# Patient Record
Sex: FEMALE | Race: BLACK OR AFRICAN AMERICAN | NOT HISPANIC OR LATINO | Employment: UNEMPLOYED | URBAN - METROPOLITAN AREA
[De-identification: names, ages, dates, MRNs, and addresses within clinical notes are randomized per-mention and may not be internally consistent; named-entity substitution may affect disease eponyms.]

---

## 2023-02-22 LAB
CLUE CELLS: NORMAL
NUGENT SCORE: 3
YEAST: NORMAL

## 2023-03-09 PROBLEM — M65.4 DE QUERVAIN'S TENOSYNOVITIS, RIGHT: Status: ACTIVE | Noted: 2023-03-09

## 2023-03-09 PROBLEM — H52.203 ASTIGMATISM OF BOTH EYES: Status: ACTIVE | Noted: 2023-03-09

## 2023-03-09 PROBLEM — H52.13 MYOPIA OF BOTH EYES: Status: ACTIVE | Noted: 2023-03-09

## 2023-03-09 PROBLEM — B00.9 HERPES SIMPLEX VIRUS (HSV) INFECTION: Status: ACTIVE | Noted: 2023-03-09

## 2023-03-09 PROBLEM — R23.9 IMPAIRED SKIN INTEGRITY ASSOCIATED WITH SURGICAL INCISION: Status: ACTIVE | Noted: 2023-03-09

## 2023-03-09 PROBLEM — R23.4 BREAST SKIN CHANGES: Status: ACTIVE | Noted: 2023-03-09

## 2023-03-09 PROBLEM — E11.9 DIABETES (MULTI): Status: ACTIVE | Noted: 2023-03-09

## 2023-03-09 PROBLEM — Z98.891 S/P CESAREAN SECTION: Status: ACTIVE | Noted: 2023-03-09

## 2023-03-09 PROBLEM — B37.9 YEAST INFECTION: Status: ACTIVE | Noted: 2023-03-09

## 2023-03-09 PROBLEM — N61.1 NONPUERPERAL ABSCESS OF LEFT BREAST: Status: ACTIVE | Noted: 2023-03-09

## 2023-03-09 PROBLEM — T81.49XA WOUND INFECTION AFTER SURGERY: Status: ACTIVE | Noted: 2023-03-09

## 2023-03-09 PROBLEM — R53.83 FATIGUE: Status: ACTIVE | Noted: 2023-03-09

## 2023-03-09 PROBLEM — L02.213 CUTANEOUS ABSCESS OF CHEST WALL: Status: ACTIVE | Noted: 2023-03-09

## 2023-03-09 PROBLEM — L70.9 ACNE: Status: ACTIVE | Noted: 2023-03-09

## 2023-03-09 PROBLEM — R06.09 DYSPNEA ON EXERTION: Status: ACTIVE | Noted: 2023-03-09

## 2023-03-09 PROBLEM — N89.8 VAGINAL ODOR: Status: ACTIVE | Noted: 2023-03-09

## 2023-03-09 PROBLEM — G89.29 CHRONIC HEADACHES: Status: ACTIVE | Noted: 2023-03-09

## 2023-03-09 PROBLEM — E78.5 HYPERLIPEMIA: Status: ACTIVE | Noted: 2023-03-09

## 2023-03-09 PROBLEM — N89.8 VAGINAL IRRITATION: Status: ACTIVE | Noted: 2023-03-09

## 2023-03-09 PROBLEM — R87.620 VAGINAL PAP SMEAR WITH ASC-US: Status: ACTIVE | Noted: 2023-03-09

## 2023-03-09 PROBLEM — R29.818 SUSPECTED SLEEP APNEA: Status: ACTIVE | Noted: 2023-03-09

## 2023-03-09 PROBLEM — N64.52 NIPPLE DISCHARGE, BLOODY: Status: ACTIVE | Noted: 2023-03-09

## 2023-03-09 PROBLEM — Z97.5 NEXPLANON IN PLACE: Status: ACTIVE | Noted: 2023-03-09

## 2023-03-09 PROBLEM — L72.0 EPIDERMAL INCLUSION CYST: Status: ACTIVE | Noted: 2023-03-09

## 2023-03-09 PROBLEM — N93.9 ABNORMAL UTERINE BLEEDING: Status: ACTIVE | Noted: 2023-03-09

## 2023-03-09 PROBLEM — N63.10 LUMP OF RIGHT BREAST: Status: ACTIVE | Noted: 2023-03-09

## 2023-03-09 PROBLEM — L02.91 ABSCESS: Status: ACTIVE | Noted: 2023-03-09

## 2023-03-09 PROBLEM — B96.89 BACTERIAL VAGINOSIS: Status: ACTIVE | Noted: 2023-03-09

## 2023-03-09 PROBLEM — T81.89XA IMPAIRED SKIN INTEGRITY ASSOCIATED WITH SURGICAL INCISION: Status: ACTIVE | Noted: 2023-03-09

## 2023-03-09 PROBLEM — R23.8 OTHER SKIN CHANGES: Status: ACTIVE | Noted: 2023-03-09

## 2023-03-09 PROBLEM — E04.0 DIFFUSE GOITER: Status: ACTIVE | Noted: 2023-03-09

## 2023-03-09 PROBLEM — R51.9 CHRONIC HEADACHES: Status: ACTIVE | Noted: 2023-03-09

## 2023-03-09 PROBLEM — Z85.3 HISTORY OF BREAST CANCER: Status: ACTIVE | Noted: 2023-03-09

## 2023-03-09 PROBLEM — E78.1 HYPERTRIGLYCERIDEMIA: Status: ACTIVE | Noted: 2023-03-09

## 2023-03-09 PROBLEM — L08.9 INFECTION OF GREAT TOE: Status: ACTIVE | Noted: 2023-03-09

## 2023-03-09 PROBLEM — N76.0 BACTERIAL VAGINOSIS: Status: ACTIVE | Noted: 2023-03-09

## 2023-03-09 PROBLEM — G56.02 LEFT CARPAL TUNNEL SYNDROME: Status: ACTIVE | Noted: 2023-03-09

## 2023-03-09 PROBLEM — E11.9 DIABETES MELLITUS TYPE 2 WITHOUT RETINOPATHY (MULTI): Status: ACTIVE | Noted: 2023-03-09

## 2023-03-09 PROBLEM — N60.01 CYST OF RIGHT BREAST: Status: ACTIVE | Noted: 2023-03-09

## 2023-03-09 PROBLEM — R74.8 ELEVATED LIVER ENZYMES: Status: ACTIVE | Noted: 2023-03-09

## 2023-03-09 PROBLEM — R35.0 URINE FREQUENCY: Status: ACTIVE | Noted: 2023-03-09

## 2023-03-09 PROBLEM — E11.65 UNCONTROLLED TYPE 2 DIABETES MELLITUS WITH HYPERGLYCEMIA (MULTI): Status: ACTIVE | Noted: 2023-03-09

## 2023-03-09 PROBLEM — R53.1 WEAKNESS GENERALIZED: Status: ACTIVE | Noted: 2023-03-09

## 2023-03-09 PROBLEM — F32.A DEPRESSION: Status: ACTIVE | Noted: 2023-03-09

## 2023-03-09 PROBLEM — R30.0 DYSURIA: Status: ACTIVE | Noted: 2023-03-09

## 2023-03-09 RX ORDER — OMEGA-3-ACID ETHYL ESTERS 1 G/1
2 CAPSULE, LIQUID FILLED ORAL
COMMUNITY
Start: 2022-03-25 | End: 2023-11-29 | Stop reason: SDUPTHER

## 2023-03-09 RX ORDER — ATORVASTATIN CALCIUM 40 MG/1
1 TABLET, FILM COATED ORAL NIGHTLY
COMMUNITY
Start: 2022-04-26 | End: 2023-10-18 | Stop reason: SDUPTHER

## 2023-03-09 RX ORDER — ESCITALOPRAM OXALATE 10 MG/1
3 TABLET ORAL DAILY
COMMUNITY
Start: 2021-02-11 | End: 2023-11-29 | Stop reason: ALTCHOICE

## 2023-03-09 RX ORDER — CALCIUM CITRATE/VITAMIN D3 200MG-6.25
TABLET ORAL 4 TIMES DAILY
COMMUNITY
Start: 2021-01-26 | End: 2023-11-29 | Stop reason: ALTCHOICE

## 2023-03-09 RX ORDER — INSULIN GLARGINE 100 [IU]/ML
40 INJECTION, SOLUTION SUBCUTANEOUS 2 TIMES DAILY
COMMUNITY
Start: 2020-03-18 | End: 2023-10-10 | Stop reason: SDUPTHER

## 2023-03-09 RX ORDER — MEDROXYPROGESTERONE ACETATE 150 MG/ML
INJECTION, SUSPENSION INTRAMUSCULAR
COMMUNITY
End: 2023-11-29 | Stop reason: ALTCHOICE

## 2023-03-09 RX ORDER — GABAPENTIN 100 MG/1
300 CAPSULE ORAL 2 TIMES DAILY
COMMUNITY
End: 2023-05-11

## 2023-03-09 RX ORDER — PEN NEEDLE, DIABETIC 30 GX3/16"
NEEDLE, DISPOSABLE MISCELLANEOUS
COMMUNITY
End: 2023-11-29 | Stop reason: ALTCHOICE

## 2023-03-09 RX ORDER — INSULIN LISPRO 100 [IU]/ML
INJECTION, SOLUTION SUBCUTANEOUS
COMMUNITY
Start: 2021-02-03 | End: 2023-11-22 | Stop reason: SDUPTHER

## 2023-03-09 RX ORDER — VALACYCLOVIR HYDROCHLORIDE 500 MG/1
1 TABLET, FILM COATED ORAL 2 TIMES DAILY
COMMUNITY
Start: 2022-06-16

## 2023-03-09 RX ORDER — METFORMIN HYDROCHLORIDE 500 MG/1
1000 TABLET, EXTENDED RELEASE ORAL
COMMUNITY
Start: 2015-12-21 | End: 2023-11-29 | Stop reason: SDUPTHER

## 2023-05-02 ENCOUNTER — OFFICE VISIT (OUTPATIENT)
Dept: PRIMARY CARE | Facility: CLINIC | Age: 33
End: 2023-05-02
Payer: COMMERCIAL

## 2023-05-02 VITALS
WEIGHT: 293 LBS | TEMPERATURE: 97.7 F | HEART RATE: 84 BPM | DIASTOLIC BLOOD PRESSURE: 76 MMHG | HEIGHT: 71 IN | SYSTOLIC BLOOD PRESSURE: 122 MMHG | BODY MASS INDEX: 41.02 KG/M2

## 2023-05-02 DIAGNOSIS — E11.65 UNCONTROLLED TYPE 2 DIABETES MELLITUS WITH HYPERGLYCEMIA (MULTI): Primary | ICD-10-CM

## 2023-05-02 DIAGNOSIS — E78.5 HYPERLIPIDEMIA, UNSPECIFIED HYPERLIPIDEMIA TYPE: ICD-10-CM

## 2023-05-02 DIAGNOSIS — E66.01 CLASS 3 SEVERE OBESITY DUE TO EXCESS CALORIES WITH SERIOUS COMORBIDITY AND BODY MASS INDEX (BMI) OF 40.0 TO 44.9 IN ADULT (MULTI): ICD-10-CM

## 2023-05-02 PROBLEM — E66.813 CLASS 3 SEVERE OBESITY DUE TO EXCESS CALORIES WITH SERIOUS COMORBIDITY AND BODY MASS INDEX (BMI) OF 40.0 TO 44.9 IN ADULT: Status: ACTIVE | Noted: 2023-05-02

## 2023-05-02 PROCEDURE — 3074F SYST BP LT 130 MM HG: CPT | Performed by: FAMILY MEDICINE

## 2023-05-02 PROCEDURE — 3008F BODY MASS INDEX DOCD: CPT | Performed by: FAMILY MEDICINE

## 2023-05-02 PROCEDURE — 1036F TOBACCO NON-USER: CPT | Performed by: FAMILY MEDICINE

## 2023-05-02 PROCEDURE — 3078F DIAST BP <80 MM HG: CPT | Performed by: FAMILY MEDICINE

## 2023-05-02 PROCEDURE — 99214 OFFICE O/P EST MOD 30 MIN: CPT | Performed by: FAMILY MEDICINE

## 2023-05-02 NOTE — PROGRESS NOTES
Subjective   Patient ID: Nia Harper is a 32 y.o. female who presents for surgery clearance.  HPI  The patient is here for a referral to weight management.  The patient brought her form for completion.     A review of system was completed.  All systems were reviewed and were normal, except for the ones that are listed in the HPI.    Objective   Physical Exam  Constitutional:       Appearance: Normal appearance.   HENT:      Head: Normocephalic and atraumatic.      Right Ear: Tympanic membrane, ear canal and external ear normal.      Left Ear: Tympanic membrane, ear canal and external ear normal.      Nose: Nose normal.      Mouth/Throat:      Mouth: Mucous membranes are moist.      Pharynx: Oropharynx is clear.   Eyes:      Extraocular Movements: Extraocular movements intact.      Conjunctiva/sclera: Conjunctivae normal.      Pupils: Pupils are equal, round, and reactive to light.   Cardiovascular:      Rate and Rhythm: Normal rate and regular rhythm.      Pulses: Normal pulses.   Pulmonary:      Effort: Pulmonary effort is normal.      Breath sounds: Normal breath sounds.   Abdominal:      General: Abdomen is flat. Bowel sounds are normal.      Palpations: Abdomen is soft.   Musculoskeletal:         General: Normal range of motion.      Cervical back: Normal range of motion and neck supple.   Skin:     General: Skin is warm.   Neurological:      General: No focal deficit present.      Mental Status: She is alert and oriented to person, place, and time. Mental status is at baseline.   Psychiatric:         Mood and Affect: Mood normal.         Behavior: Behavior normal.         Thought Content: Thought content normal.         Judgment: Judgment normal.         Assessment/Plan   Problem List Items Addressed This Visit          Endocrine/Metabolic    Uncontrolled type 2 diabetes mellitus with hyperglycemia (CMS/HCC) - Primary    Relevant Orders    Referral to Bariatric Surgery    Hemoglobin A1C    Lipid Panel    TSH  with reflex to Free T4 if abnormal    Comprehensive Metabolic Panel    CBC    Albumin , Urine Random    BMI 40.0-44.9, adult (CMS/[facility])    Relevant Orders    Referral to Bariatric Surgery    Class 3 severe obesity due to excess calories with serious comorbidity and body mass index (BMI) of 40.0 to 44.9 in adult (CMS/[facility])    Relevant Orders    Referral to Bariatric Surgery       Other    Hyperlipemia    Relevant Orders    Referral to Bariatric Surgery

## 2023-05-15 LAB — CHORIOGONADOTROPIN (MIU/ML) IN SER/PLAS: <3 IU/L

## 2023-05-19 ENCOUNTER — HOSPITAL ENCOUNTER (OUTPATIENT)
Dept: DATA CONVERSION | Facility: HOSPITAL | Age: 33
End: 2023-05-19
Attending: SURGERY
Payer: COMMERCIAL

## 2023-05-19 DIAGNOSIS — R92.8 OTHER ABNORMAL AND INCONCLUSIVE FINDINGS ON DIAGNOSTIC IMAGING OF BREAST: ICD-10-CM

## 2023-05-19 DIAGNOSIS — N63.11 UNSPECIFIED LUMP IN THE RIGHT BREAST, UPPER OUTER QUADRANT: ICD-10-CM

## 2023-05-19 DIAGNOSIS — N63.10 UNSPECIFIED LUMP IN THE RIGHT BREAST, UNSPECIFIED QUADRANT: ICD-10-CM

## 2023-05-19 DIAGNOSIS — Z17.0 ESTROGEN RECEPTOR POSITIVE STATUS (ER+): ICD-10-CM

## 2023-05-19 DIAGNOSIS — R59.0 LOCALIZED ENLARGED LYMPH NODES: ICD-10-CM

## 2023-05-19 DIAGNOSIS — C50.411 MALIGNANT NEOPLASM OF UPPER-OUTER QUADRANT OF RIGHT FEMALE BREAST (MULTI): ICD-10-CM

## 2023-05-23 ENCOUNTER — HOSPITAL ENCOUNTER (OUTPATIENT)
Dept: DATA CONVERSION | Facility: HOSPITAL | Age: 33
End: 2023-05-23
Attending: INTERNAL MEDICINE | Admitting: INTERNAL MEDICINE
Payer: COMMERCIAL

## 2023-05-23 DIAGNOSIS — Z79.84 LONG TERM (CURRENT) USE OF ORAL HYPOGLYCEMIC DRUGS: ICD-10-CM

## 2023-05-23 DIAGNOSIS — K31.89 OTHER DISEASES OF STOMACH AND DUODENUM: ICD-10-CM

## 2023-05-23 DIAGNOSIS — E11.9 TYPE 2 DIABETES MELLITUS WITHOUT COMPLICATIONS (MULTI): ICD-10-CM

## 2023-05-23 DIAGNOSIS — Z79.4 LONG TERM (CURRENT) USE OF INSULIN (MULTI): ICD-10-CM

## 2023-05-23 DIAGNOSIS — E78.00 PURE HYPERCHOLESTEROLEMIA, UNSPECIFIED: ICD-10-CM

## 2023-05-23 DIAGNOSIS — R11.2 NAUSEA WITH VOMITING, UNSPECIFIED: ICD-10-CM

## 2023-05-23 LAB — POCT GLUCOSE: 137 MG/DL (ref 74–99)

## 2023-05-24 LAB
COMPLETE PATHOLOGY REPORT: NORMAL
CONVERTED ADDENDUM DIAGNOSIS 2: NORMAL
CONVERTED ADDENDUM DIAGNOSIS 3: NORMAL
CONVERTED CLINICAL DIAGNOSIS-HISTORY: NORMAL
CONVERTED FINAL DIAGNOSIS: NORMAL
CONVERTED FINAL REPORT PDF LINK TO COPY AND PASTE: NORMAL
CONVERTED GROSS DESCRIPTION: NORMAL

## 2023-06-01 LAB
COMPLETE PATHOLOGY REPORT: NORMAL
CONVERTED CLINICAL DIAGNOSIS-HISTORY: NORMAL
CONVERTED FINAL DIAGNOSIS: NORMAL
CONVERTED FINAL REPORT PDF LINK TO COPY AND PASTE: NORMAL
CONVERTED GROSS DESCRIPTION: NORMAL

## 2023-06-03 ENCOUNTER — LAB (OUTPATIENT)
Dept: LAB | Facility: LAB | Age: 33
End: 2023-06-03
Payer: COMMERCIAL

## 2023-06-03 DIAGNOSIS — E11.65 UNCONTROLLED TYPE 2 DIABETES MELLITUS WITH HYPERGLYCEMIA (MULTI): ICD-10-CM

## 2023-06-03 LAB
ALANINE AMINOTRANSFERASE (SGPT) (U/L) IN SER/PLAS: 43 U/L (ref 7–45)
ALBUMIN (G/DL) IN SER/PLAS: 4.4 G/DL (ref 3.4–5)
ALKALINE PHOSPHATASE (U/L) IN SER/PLAS: 52 U/L (ref 33–110)
ANION GAP IN SER/PLAS: 13 MMOL/L (ref 10–20)
ASPARTATE AMINOTRANSFERASE (SGOT) (U/L) IN SER/PLAS: 28 U/L (ref 9–39)
BILIRUBIN TOTAL (MG/DL) IN SER/PLAS: 0.3 MG/DL (ref 0–1.2)
CALCIUM (MG/DL) IN SER/PLAS: 9.7 MG/DL (ref 8.6–10.6)
CARBON DIOXIDE, TOTAL (MMOL/L) IN SER/PLAS: 25 MMOL/L (ref 21–32)
CHLORIDE (MMOL/L) IN SER/PLAS: 103 MMOL/L (ref 98–107)
CHOLESTEROL (MG/DL) IN SER/PLAS: 243 MG/DL (ref 0–199)
CHOLESTEROL IN HDL (MG/DL) IN SER/PLAS: 41.2 MG/DL
CHOLESTEROL/HDL RATIO: 5.9
CREATININE (MG/DL) IN SER/PLAS: 0.61 MG/DL (ref 0.5–1.05)
ERYTHROCYTE DISTRIBUTION WIDTH (RATIO) BY AUTOMATED COUNT: 13 % (ref 11.5–14.5)
ERYTHROCYTE MEAN CORPUSCULAR HEMOGLOBIN CONCENTRATION (G/DL) BY AUTOMATED: 33.3 G/DL (ref 32–36)
ERYTHROCYTE MEAN CORPUSCULAR VOLUME (FL) BY AUTOMATED COUNT: 87 FL (ref 80–100)
ERYTHROCYTES (10*6/UL) IN BLOOD BY AUTOMATED COUNT: 4.52 X10E12/L (ref 4–5.2)
ESTIMATED AVERAGE GLUCOSE FOR HBA1C: 194 MG/DL
GFR FEMALE: >90 ML/MIN/1.73M2
GLUCOSE (MG/DL) IN SER/PLAS: 132 MG/DL (ref 74–99)
HEMATOCRIT (%) IN BLOOD BY AUTOMATED COUNT: 39.3 % (ref 36–46)
HEMOGLOBIN (G/DL) IN BLOOD: 13.1 G/DL (ref 12–16)
HEMOGLOBIN A1C/HEMOGLOBIN TOTAL IN BLOOD: 8.4 %
LDL: 177 MG/DL (ref 0–99)
LEUKOCYTES (10*3/UL) IN BLOOD BY AUTOMATED COUNT: 6.6 X10E9/L (ref 4.4–11.3)
NRBC (PER 100 WBCS) BY AUTOMATED COUNT: 0 /100 WBC (ref 0–0)
PLATELETS (10*3/UL) IN BLOOD AUTOMATED COUNT: 240 X10E9/L (ref 150–450)
POTASSIUM (MMOL/L) IN SER/PLAS: 4.1 MMOL/L (ref 3.5–5.3)
PROTEIN TOTAL: 7.4 G/DL (ref 6.4–8.2)
SODIUM (MMOL/L) IN SER/PLAS: 137 MMOL/L (ref 136–145)
THYROTROPIN (MIU/L) IN SER/PLAS BY DETECTION LIMIT <= 0.05 MIU/L: 1 MIU/L (ref 0.44–3.98)
TRIGLYCERIDE (MG/DL) IN SER/PLAS: 122 MG/DL (ref 0–149)
UREA NITROGEN (MG/DL) IN SER/PLAS: 15 MG/DL (ref 6–23)
VLDL: 24 MG/DL (ref 0–40)

## 2023-06-03 PROCEDURE — 83036 HEMOGLOBIN GLYCOSYLATED A1C: CPT

## 2023-06-03 PROCEDURE — 84443 ASSAY THYROID STIM HORMONE: CPT

## 2023-06-03 PROCEDURE — 80053 COMPREHEN METABOLIC PANEL: CPT

## 2023-06-03 PROCEDURE — 85027 COMPLETE CBC AUTOMATED: CPT

## 2023-06-03 PROCEDURE — 80061 LIPID PANEL: CPT

## 2023-06-03 PROCEDURE — 36415 COLL VENOUS BLD VENIPUNCTURE: CPT

## 2023-06-22 ENCOUNTER — HOSPITAL ENCOUNTER (OUTPATIENT)
Dept: DATA CONVERSION | Facility: HOSPITAL | Age: 33
End: 2023-06-22
Attending: SURGERY
Payer: COMMERCIAL

## 2023-06-22 DIAGNOSIS — C50.411 MALIGNANT NEOPLASM OF UPPER-OUTER QUADRANT OF RIGHT FEMALE BREAST (MULTI): ICD-10-CM

## 2023-06-22 DIAGNOSIS — Z17.0 ESTROGEN RECEPTOR POSITIVE STATUS (ER+): ICD-10-CM

## 2023-06-30 ENCOUNTER — HOSPITAL ENCOUNTER (OUTPATIENT)
Dept: DATA CONVERSION | Facility: HOSPITAL | Age: 33
End: 2023-06-30
Attending: SURGERY

## 2023-06-30 ENCOUNTER — HOSPITAL ENCOUNTER (OUTPATIENT)
Dept: DATA CONVERSION | Facility: HOSPITAL | Age: 33
End: 2023-07-02
Attending: SURGERY | Admitting: SURGERY
Payer: COMMERCIAL

## 2023-06-30 DIAGNOSIS — E78.5 HYPERLIPIDEMIA, UNSPECIFIED: ICD-10-CM

## 2023-06-30 DIAGNOSIS — C50.911 MALIGNANT NEOPLASM OF UNSPECIFIED SITE OF RIGHT FEMALE BREAST (MULTI): ICD-10-CM

## 2023-06-30 DIAGNOSIS — K21.9 GASTRO-ESOPHAGEAL REFLUX DISEASE WITHOUT ESOPHAGITIS: ICD-10-CM

## 2023-06-30 DIAGNOSIS — E66.9 OBESITY, UNSPECIFIED: ICD-10-CM

## 2023-06-30 DIAGNOSIS — C50.411 MALIGNANT NEOPLASM OF UPPER-OUTER QUADRANT OF RIGHT FEMALE BREAST (MULTI): ICD-10-CM

## 2023-06-30 DIAGNOSIS — F41.9 ANXIETY DISORDER, UNSPECIFIED: ICD-10-CM

## 2023-06-30 LAB
POCT GLUCOSE: 177 MG/DL (ref 74–99)
POCT GLUCOSE: 209 MG/DL (ref 74–99)

## 2023-07-01 LAB
ALBUMIN (G/DL) IN SER/PLAS: 4.2 G/DL (ref 3.4–5)
ANION GAP IN SER/PLAS: 15 MMOL/L (ref 10–20)
CALCIUM (MG/DL) IN SER/PLAS: 9 MG/DL (ref 8.6–10.6)
CARBON DIOXIDE, TOTAL (MMOL/L) IN SER/PLAS: 23 MMOL/L (ref 21–32)
CHLORIDE (MMOL/L) IN SER/PLAS: 102 MMOL/L (ref 98–107)
CREATININE (MG/DL) IN SER/PLAS: 0.67 MG/DL (ref 0.5–1.05)
ERYTHROCYTE DISTRIBUTION WIDTH (RATIO) BY AUTOMATED COUNT: 12.7 % (ref 11.5–14.5)
ERYTHROCYTE DISTRIBUTION WIDTH (RATIO) BY AUTOMATED COUNT: 13 % (ref 11.5–14.5)
ERYTHROCYTE MEAN CORPUSCULAR HEMOGLOBIN CONCENTRATION (G/DL) BY AUTOMATED: 32.3 G/DL (ref 32–36)
ERYTHROCYTE MEAN CORPUSCULAR HEMOGLOBIN CONCENTRATION (G/DL) BY AUTOMATED: 34.7 G/DL (ref 32–36)
ERYTHROCYTE MEAN CORPUSCULAR VOLUME (FL) BY AUTOMATED COUNT: 84 FL (ref 80–100)
ERYTHROCYTE MEAN CORPUSCULAR VOLUME (FL) BY AUTOMATED COUNT: 88 FL (ref 80–100)
ERYTHROCYTES (10*6/UL) IN BLOOD BY AUTOMATED COUNT: 3.9 X10E12/L (ref 4–5.2)
ERYTHROCYTES (10*6/UL) IN BLOOD BY AUTOMATED COUNT: 4 X10E12/L (ref 4–5.2)
GFR FEMALE: >90 ML/MIN/1.73M2
GLUCOSE (MG/DL) IN SER/PLAS: 230 MG/DL (ref 74–99)
HEMATOCRIT (%) IN BLOOD BY AUTOMATED COUNT: 33.7 % (ref 36–46)
HEMATOCRIT (%) IN BLOOD BY AUTOMATED COUNT: 34.4 % (ref 36–46)
HEMOGLOBIN (G/DL) IN BLOOD: 11.1 G/DL (ref 12–16)
HEMOGLOBIN (G/DL) IN BLOOD: 11.7 G/DL (ref 12–16)
LEUKOCYTES (10*3/UL) IN BLOOD BY AUTOMATED COUNT: 10.2 X10E9/L (ref 4.4–11.3)
LEUKOCYTES (10*3/UL) IN BLOOD BY AUTOMATED COUNT: 11.4 X10E9/L (ref 4.4–11.3)
MAGNESIUM (MG/DL) IN SER/PLAS: 1.71 MG/DL (ref 1.6–2.4)
NRBC (PER 100 WBCS) BY AUTOMATED COUNT: 0 /100 WBC (ref 0–0)
NRBC (PER 100 WBCS) BY AUTOMATED COUNT: 0 /100 WBC (ref 0–0)
PHOSPHATE (MG/DL) IN SER/PLAS: 3.8 MG/DL (ref 2.5–4.9)
PLATELETS (10*3/UL) IN BLOOD AUTOMATED COUNT: 197 X10E9/L (ref 150–450)
PLATELETS (10*3/UL) IN BLOOD AUTOMATED COUNT: 227 X10E9/L (ref 150–450)
POCT GLUCOSE: 185 MG/DL (ref 74–99)
POCT GLUCOSE: 186 MG/DL (ref 74–99)
POCT GLUCOSE: 226 MG/DL (ref 74–99)
POCT GLUCOSE: 281 MG/DL (ref 74–99)
POTASSIUM (MMOL/L) IN SER/PLAS: 4 MMOL/L (ref 3.5–5.3)
SODIUM (MMOL/L) IN SER/PLAS: 136 MMOL/L (ref 136–145)
UREA NITROGEN (MG/DL) IN SER/PLAS: 14 MG/DL (ref 6–23)

## 2023-07-02 LAB
ALBUMIN (G/DL) IN SER/PLAS: 3.8 G/DL (ref 3.4–5)
ANION GAP IN SER/PLAS: 8 MMOL/L (ref 10–20)
CALCIUM (MG/DL) IN SER/PLAS: 8.8 MG/DL (ref 8.6–10.6)
CARBON DIOXIDE, TOTAL (MMOL/L) IN SER/PLAS: 27 MMOL/L (ref 21–32)
CHLORIDE (MMOL/L) IN SER/PLAS: 106 MMOL/L (ref 98–107)
CREATININE (MG/DL) IN SER/PLAS: 0.62 MG/DL (ref 0.5–1.05)
ERYTHROCYTE DISTRIBUTION WIDTH (RATIO) BY AUTOMATED COUNT: 13 % (ref 11.5–14.5)
ERYTHROCYTE MEAN CORPUSCULAR HEMOGLOBIN CONCENTRATION (G/DL) BY AUTOMATED: 32.6 G/DL (ref 32–36)
ERYTHROCYTE MEAN CORPUSCULAR VOLUME (FL) BY AUTOMATED COUNT: 89 FL (ref 80–100)
ERYTHROCYTES (10*6/UL) IN BLOOD BY AUTOMATED COUNT: 3.65 X10E12/L (ref 4–5.2)
GFR FEMALE: >90 ML/MIN/1.73M2
GLUCOSE (MG/DL) IN SER/PLAS: 169 MG/DL (ref 74–99)
HEMATOCRIT (%) IN BLOOD BY AUTOMATED COUNT: 32.5 % (ref 36–46)
HEMOGLOBIN (G/DL) IN BLOOD: 10.6 G/DL (ref 12–16)
LEUKOCYTES (10*3/UL) IN BLOOD BY AUTOMATED COUNT: 5.5 X10E9/L (ref 4.4–11.3)
MAGNESIUM (MG/DL) IN SER/PLAS: 1.82 MG/DL (ref 1.6–2.4)
NRBC (PER 100 WBCS) BY AUTOMATED COUNT: 0 /100 WBC (ref 0–0)
PHOSPHATE (MG/DL) IN SER/PLAS: 3.2 MG/DL (ref 2.5–4.9)
PLATELETS (10*3/UL) IN BLOOD AUTOMATED COUNT: 199 X10E9/L (ref 150–450)
POCT GLUCOSE: 168 MG/DL (ref 74–99)
POTASSIUM (MMOL/L) IN SER/PLAS: 4.1 MMOL/L (ref 3.5–5.3)
SODIUM (MMOL/L) IN SER/PLAS: 137 MMOL/L (ref 136–145)
UREA NITROGEN (MG/DL) IN SER/PLAS: 14 MG/DL (ref 6–23)

## 2023-07-07 DIAGNOSIS — C50.911 MALIGNANT NEOPLASM OF UNSPECIFIED SITE OF RIGHT FEMALE BREAST (MULTI): ICD-10-CM

## 2023-07-10 LAB
COMPLETE PATHOLOGY REPORT: NORMAL
CONVERTED ADDENDUM DIAGNOSIS 2: NORMAL
CONVERTED CLINICAL DIAGNOSIS-HISTORY: NORMAL
CONVERTED FINAL DIAGNOSIS: NORMAL
CONVERTED FINAL REPORT PDF LINK TO COPY AND PASTE: NORMAL
CONVERTED GROSS DESCRIPTION: NORMAL
CONVERTED SYNOPTIC DIAGNOSIS: NORMAL

## 2023-08-11 LAB
ACTIVATED PARTIAL THROMBOPLASTIN TIME IN PPP BY COAGULATION ASSAY: 31 SEC (ref 27–38)
ALBUMIN (MG/L) IN URINE: 9.7 MG/L
ALBUMIN/CREATININE (UG/MG) IN URINE: 5.7 UG/MG CRT (ref 0–30)
AMPHETAMINE (PRESENCE) IN URINE BY SCREEN METHOD: ABNORMAL
BARBITURATES PRESENCE IN URINE BY SCREEN METHOD: ABNORMAL
BENZODIAZEPINE (PRESENCE) IN URINE BY SCREEN METHOD: ABNORMAL
C PEPTIDE (NG/ML) IN SER/PLAS: 3.4 NG/ML (ref 0.7–3.9)
CALCIDIOL (25 OH VITAMIN D3) (NG/ML) IN SER/PLAS: 17 NG/ML
CANNABINOIDS IN URINE BY SCREEN METHOD: ABNORMAL
CHOLESTEROL (MG/DL) IN SER/PLAS: 159 MG/DL (ref 0–199)
CHOLESTEROL IN HDL (MG/DL) IN SER/PLAS: 42 MG/DL
CHOLESTEROL/HDL RATIO: 3.8
COBALAMIN (VITAMIN B12) (PG/ML) IN SER/PLAS: 502 PG/ML (ref 211–911)
COCAINE (PRESENCE) IN URINE BY SCREEN METHOD: ABNORMAL
CREATININE (MG/DL) IN URINE: 169 MG/DL (ref 20–320)
DRUG SCREEN COMMENT URINE: ABNORMAL
ESTIMATED AVERAGE GLUCOSE FOR HBA1C: 157 MG/DL
FENTANYL URINE: ABNORMAL
FERRITIN (UG/LL) IN SER/PLAS: 46 UG/L (ref 8–150)
FOLATE (NG/ML) IN SER/PLAS: 12.4 NG/ML
H. PYLORI UBIT: NORMAL
HEMOGLOBIN A1C/HEMOGLOBIN TOTAL IN BLOOD: 7.1 %
INR IN PPP BY COAGULATION ASSAY: 1 (ref 0.9–1.1)
IRON (UG/DL) IN SER/PLAS: 48 UG/DL (ref 35–150)
IRON BINDING CAPACITY (UG/DL) IN SER/PLAS: 396 UG/DL (ref 240–445)
IRON SATURATION (%) IN SER/PLAS: 12 % (ref 25–45)
LDL: 95 MG/DL (ref 0–99)
METHADONE (PRESENCE) IN URINE BY SCREEN METHOD: ABNORMAL
OPIATES (PRESENCE) IN URINE BY SCREEN METHOD: ABNORMAL
OXYCODONE (PRESENCE) IN URINE BY SCREEN METHOD: ABNORMAL
PARATHYRIN INTACT (PG/ML) IN SER/PLAS: 32.3 PG/ML (ref 18.5–88)
PHENCYCLIDINE (PRESENCE) IN URINE BY SCREEN METHOD: ABNORMAL
PROTHROMBIN TIME (PT) IN PPP BY COAGULATION ASSAY: 10.7 SEC (ref 9.8–12.8)
THYROTROPIN (MIU/L) IN SER/PLAS BY DETECTION LIMIT <= 0.05 MIU/L: 1.01 MIU/L (ref 0.44–3.98)
THYROTROPIN (MIU/L) IN SER/PLAS BY DETECTION LIMIT <= 0.05 MIU/L: 1.06 MIU/L (ref 0.44–3.98)
TRIGLYCERIDE (MG/DL) IN SER/PLAS: 112 MG/DL (ref 0–149)
VLDL: 22 MG/DL (ref 0–40)

## 2023-08-15 LAB
COPPER: 133.1 UG/DL (ref 80–155)
ZINC,SERUM OR PLASMA: 102.9 UG/DL (ref 60–120)

## 2023-08-16 LAB — VITAMIN B1, WHOLE BLOOD: 120 NMOL/L (ref 70–180)

## 2023-08-17 LAB
11-NOR-9-CARBOXY-THC, URN, QUANT: >500 NG/ML
COTININE BLOOD QUANTITATIVE: 8 NG/ML
NICOTINE BLOOD QUANTITATIVE: <5 NG/ML

## 2023-09-05 ENCOUNTER — DOCUMENTATION (OUTPATIENT)
Dept: CARE COORDINATION | Facility: CLINIC | Age: 33
End: 2023-09-05
Payer: COMMERCIAL

## 2023-09-06 LAB
GRAM STAIN: NORMAL
TISSUE/WOUND CULTURE/SMEAR: NORMAL

## 2023-09-08 ENCOUNTER — OFFICE VISIT (OUTPATIENT)
Dept: PRIMARY CARE | Facility: CLINIC | Age: 33
End: 2023-09-08
Payer: COMMERCIAL

## 2023-09-08 VITALS
SYSTOLIC BLOOD PRESSURE: 115 MMHG | TEMPERATURE: 98 F | WEIGHT: 280 LBS | BODY MASS INDEX: 40.18 KG/M2 | DIASTOLIC BLOOD PRESSURE: 77 MMHG | HEART RATE: 80 BPM

## 2023-09-08 DIAGNOSIS — Z09 HOSPITAL DISCHARGE FOLLOW-UP: ICD-10-CM

## 2023-09-08 DIAGNOSIS — R11.2 NAUSEA AND VOMITING, UNSPECIFIED VOMITING TYPE: ICD-10-CM

## 2023-09-08 DIAGNOSIS — C50.911 MALIGNANT NEOPLASM OF RIGHT FEMALE BREAST, UNSPECIFIED ESTROGEN RECEPTOR STATUS, UNSPECIFIED SITE OF BREAST (MULTI): Primary | ICD-10-CM

## 2023-09-08 DIAGNOSIS — N61.1: ICD-10-CM

## 2023-09-08 DIAGNOSIS — I10 PRIMARY HYPERTENSION: ICD-10-CM

## 2023-09-08 PROCEDURE — 99496 TRANSJ CARE MGMT HIGH F2F 7D: CPT | Performed by: FAMILY MEDICINE

## 2023-09-08 PROCEDURE — 3051F HG A1C>EQUAL 7.0%<8.0%: CPT | Performed by: FAMILY MEDICINE

## 2023-09-08 PROCEDURE — 1036F TOBACCO NON-USER: CPT | Performed by: FAMILY MEDICINE

## 2023-09-08 PROCEDURE — 3078F DIAST BP <80 MM HG: CPT | Performed by: FAMILY MEDICINE

## 2023-09-08 PROCEDURE — 3008F BODY MASS INDEX DOCD: CPT | Performed by: FAMILY MEDICINE

## 2023-09-08 PROCEDURE — 3074F SYST BP LT 130 MM HG: CPT | Performed by: FAMILY MEDICINE

## 2023-09-08 NOTE — ASSESSMENT & PLAN NOTE
-She was also seen by dermatology for a breast drainage. They believe it was related to her Hidradenitis Suppurativa.   -Topical Hibiclens twice a day continued.  -Doxycycline 100 mg BID for 7 days started.   -Repeat WBC recommended for monitoring. Ordered.

## 2023-09-08 NOTE — PROGRESS NOTES
Subjective   Patient ID: Nia Harper is a 33 y.o. female who presents for Hospital Follow-up.  HPI  The patient is a 33 year old Female with a PMHx of DMII on insulin, marijuana use, ER+/WA+ breast CA Dx 6/23 s/p right mastectomy w/ SLNBx and 1 cycle of   docetaxel/cyclophosphamide (8/24/2023) with ongoing goserelin who presents for a post hospitalization follow-up visit (9/2-9/5/23) for nausea and emesis.  Most likely cause is chemo induced nausea and vomiting, however   cannabinoid hyperemesis cannot be ruled out.      She was noted to have HTN and was started on Losartan 25mg.  She was asked to follow up with PCP.   She was also seen by dermatology for a breast drainage. They believe it was related to her Hidradenitis Suppurativa. Topical Hibiclens   twice a day and Doxycycline 100 mg BID for 7 days started.   Repeat WBC recommended for monitoring.    A review of system was completed.  All systems were reviewed and were normal, except for the ones that are listed in the HPI.    Objective   Physical Exam  Constitutional:       Appearance: Normal appearance.   HENT:      Head: Normocephalic and atraumatic.      Right Ear: Tympanic membrane, ear canal and external ear normal.      Left Ear: Tympanic membrane, ear canal and external ear normal.      Nose: Nose normal.      Mouth/Throat:      Mouth: Mucous membranes are moist.      Pharynx: Oropharynx is clear.   Eyes:      Extraocular Movements: Extraocular movements intact.      Conjunctiva/sclera: Conjunctivae normal.      Pupils: Pupils are equal, round, and reactive to light.   Cardiovascular:      Rate and Rhythm: Normal rate and regular rhythm.      Pulses: Normal pulses.   Pulmonary:      Effort: Pulmonary effort is normal.      Breath sounds: Normal breath sounds.   Abdominal:      General: Abdomen is flat. Bowel sounds are normal.      Palpations: Abdomen is soft.   Musculoskeletal:         General: Normal range of motion.      Cervical back: Normal range  of motion and neck supple.   Skin:     General: Skin is warm.   Neurological:      General: No focal deficit present.      Mental Status: She is alert and oriented to person, place, and time. Mental status is at baseline.   Psychiatric:         Mood and Affect: Mood normal.         Behavior: Behavior normal.         Thought Content: Thought content normal.         Judgment: Judgment normal.     Assessment/Plan   Problem List Items Addressed This Visit       Nonpuerperal abscess of left breast     -She was also seen by dermatology for a breast drainage. They believe it was related to her Hidradenitis Suppurativa.   -Topical Hibiclens twice a day continued.  -Doxycycline 100 mg BID for 7 days started.   -Repeat WBC recommended for monitoring. Ordered.         Malignant neoplasm of right female breast (CMS/HCC) - Primary    Nausea and vomiting     -controlled now.  - Most likely cause is chemo induced nausea and vomiting, however cannabinoid hyperemesis cannot be ruled out.  - PRN Medication   *prochlorperazine 10 mg oral tablet - 1 tab(s) orally every 6 hours, As Needed -   for nausea and vomiting   *loperamide 2 mg oral capsule - 1 cap(s) orally every 4 hours, As Needed - for   diarrhea   *Zofran 8 mg oral tablet - 1 tab(s) orally every 8 hours, As Needed - for nausea   and vomiting          Primary hypertension      -She was noted to have HTN and was started on Losartan 25mg.   -BP controlled today.         Hospital discharge follow-up     -see above problem list.           Patient to return to office in 3 months for routine care.

## 2023-09-08 NOTE — ASSESSMENT & PLAN NOTE
-controlled now.  - Most likely cause is chemo induced nausea and vomiting, however cannabinoid hyperemesis cannot be ruled out.  - PRN Medication   *prochlorperazine 10 mg oral tablet - 1 tab(s) orally every 6 hours, As Needed -   for nausea and vomiting   *loperamide 2 mg oral capsule - 1 cap(s) orally every 4 hours, As Needed - for   diarrhea   *Zofran 8 mg oral tablet - 1 tab(s) orally every 8 hours, As Needed - for nausea   and vomiting

## 2023-09-21 VITALS — BODY MASS INDEX: 41.95 KG/M2 | HEIGHT: 70 IN | WEIGHT: 293 LBS

## 2023-09-21 PROBLEM — C50.919 MALIGNANT NEOPLASM OF FEMALE BREAST (MULTI): Status: ACTIVE | Noted: 2023-09-08

## 2023-09-21 RX ORDER — HEPARIN SODIUM,PORCINE/PF 10 UNIT/ML
50 SYRINGE (ML) INTRAVENOUS AS NEEDED
Status: CANCELLED | OUTPATIENT
Start: 2023-09-30

## 2023-09-21 RX ORDER — HEPARIN 100 UNIT/ML
500 SYRINGE INTRAVENOUS AS NEEDED
Status: CANCELLED | OUTPATIENT
Start: 2023-09-30

## 2023-09-26 ENCOUNTER — PATIENT OUTREACH (OUTPATIENT)
Dept: CARE COORDINATION | Facility: CLINIC | Age: 33
End: 2023-09-26
Payer: COMMERCIAL

## 2023-09-27 RX ORDER — LIDOCAINE HYDROCHLORIDE 10 MG/ML
2 INJECTION, SOLUTION EPIDURAL; INFILTRATION; INTRACAUDAL; PERINEURAL ONCE
Status: CANCELLED | OUTPATIENT
Start: 2023-10-16

## 2023-09-27 RX ORDER — ALBUTEROL SULFATE 0.83 MG/ML
3 SOLUTION RESPIRATORY (INHALATION) AS NEEDED
Status: CANCELLED | OUTPATIENT
Start: 2023-10-16

## 2023-09-27 RX ORDER — EPINEPHRINE 0.3 MG/.3ML
0.3 INJECTION SUBCUTANEOUS EVERY 5 MIN PRN
Status: CANCELLED | OUTPATIENT
Start: 2023-10-16

## 2023-09-27 RX ORDER — DIPHENHYDRAMINE HYDROCHLORIDE 50 MG/ML
50 INJECTION INTRAMUSCULAR; INTRAVENOUS AS NEEDED
Status: CANCELLED | OUTPATIENT
Start: 2023-10-16

## 2023-09-27 RX ORDER — METHYLPREDNISOLONE SODIUM SUCCINATE 40 MG/ML
40 INJECTION INTRAMUSCULAR; INTRAVENOUS AS NEEDED
Status: CANCELLED | OUTPATIENT
Start: 2023-10-16

## 2023-09-27 RX ORDER — FAMOTIDINE 10 MG/ML
20 INJECTION INTRAVENOUS ONCE AS NEEDED
Status: CANCELLED | OUTPATIENT
Start: 2023-10-16

## 2023-09-28 ENCOUNTER — PATIENT OUTREACH (OUTPATIENT)
Dept: CARE COORDINATION | Facility: CLINIC | Age: 33
End: 2023-09-28
Payer: COMMERCIAL

## 2023-09-28 RX ORDER — FAMOTIDINE 40 MG/1
40 TABLET, FILM COATED ORAL 2 TIMES DAILY
COMMUNITY
End: 2024-01-11 | Stop reason: ALTCHOICE

## 2023-09-28 RX ORDER — LORAZEPAM 1 MG/1
1 TABLET ORAL DAILY PRN
COMMUNITY
End: 2023-10-05 | Stop reason: SDUPTHER

## 2023-09-28 RX ORDER — DOXYCYCLINE 100 MG/1
100 CAPSULE ORAL 2 TIMES DAILY
COMMUNITY
End: 2023-10-05 | Stop reason: ALTCHOICE

## 2023-09-28 RX ORDER — PREDNISONE 10 MG/1
10 TABLET ORAL DAILY
COMMUNITY
End: 2023-10-04

## 2023-09-28 RX ORDER — LORATADINE 10 MG/1
20 TABLET ORAL 2 TIMES DAILY
COMMUNITY
End: 2023-10-11 | Stop reason: SDUPTHER

## 2023-09-28 NOTE — PROGRESS NOTES
Discharge Facility: [facility]  Discharge Diagnosis: angioedema  Admission Date: 9/24/23  Discharge Date: 9/26/23  (Also admitted to [facility] 9/23-9/24/23)    PCP Appointment Date: 10/3/23  Specialist Appointment Date: dermatology 10/4, allergy 10/11, oncology 10/5  Hospital Encounter and Summary: Linked  See discharge assessment below for further details    Engagement  Call Start Time: 1112 (9/28/2023 11:14 AM)    Medications  Medications reviewed with patient/caregiver?: Yes (9/28/2023 11:14 AM)  Is the patient having any side effects they believe may be caused by any medication additions or changes?: No (9/28/2023 11:14 AM)  Does the patient have all medications ordered at discharge?: Yes (9/28/2023 11:14 AM)  Medication Comments: new/changed medications reviewed only. Hold losartan and discuss with PCP. Start - doxycycline hyclate 100 mg oral capsule - 1 cap(s) orally 2 times a day for hidradenitis suppurativa, taper as directed predniSONE 10 mg oral tablet, loratadine 10 mg oral tablet - 2 tab(s) orally 2 times a day, famotidine 40 mg oral tablet - 1 tab(s) orally 2 times a day, LORazepam 1 mg oral tablet - 1 tab(s) orally once a day, As Needed (9/28/2023 11:14 AM)    Appointments  Does the patient have a primary care provider?: Yes (9/28/2023 11:14 AM)  Care Management Interventions: Verified appointment date/time/provider (9/28/2023 11:14 AM)  Care Management Interventions: Advised patient to keep appointment (9/28/2023 11:14 AM)    Self Management  What is the home health agency?: n/a (9/28/2023 11:14 AM)  What Durable Medical Equipment (DME) was ordered?: n/a (9/28/2023 11:14 AM)    Patient Teaching  Care Management Interventions: Reviewed instructions with patient (9/28/2023 11:14 AM)  What is the patient's perception of their health status since discharge?: Improving (9/28/2023 11:14 AM)  Is the patient/caregiver able to teach back the hierarchy of who to call/visit for symptoms/problems? PCP,  Specialist, Home Health nurse, Urgent Care, ED, 911: Yes (9/28/2023 11:14 AM)

## 2023-09-29 VITALS — WEIGHT: 293 LBS | HEIGHT: 70 IN | BODY MASS INDEX: 41.95 KG/M2

## 2023-09-30 NOTE — PROGRESS NOTES
Service: Supportive Oncology     Subjective Data:   DASIA GUNTER is a 33 year old Female who is Hospital Day # 2 and POD #0 for Wound exploration with evacuation of hematoma ligation of bleeders.    Overnight Events: Acute events in the past 24 hours  include   Additional Information:    Patient was emergently taken back to OR for wound exploration with evacuation of hematoma and ligation of bleeders. This morning reports her pain is about 7/10, no  other complaints. Drains with minimal output since the take back.     Objective Data:     Objective Information:      T   P  R  BP   MAP  SpO2   Value  36.5  90  18  121/73      100%  Date/Time 7/1 9:20 7/1 9:20 7/1 9:20 7/1 9:20    7/1 9:20  Range  (36.4C - 36.6C )  (82 - 93 )  (18 - 20 )  (119 - 136 )/ (73 - 89 )    (97% - 100% )      Pain reported at 7/1 8:46: 6 = Moderate    ---- Intake and Output  -----  Mn/Dy/Year Time  Intake   Output  Net  Jul 1, 2023 6:00 am  1450   1105  345  Jun 30, 2023 10:00 pm  336   265  71  Jun 30, 2023 2:00 pm  1000   30  970    The Intake and Output Totals for the last 24 hours are:      Intake   Output  Net      2786   1400  1386    Physical Exam Narrative:  ·  Physical Exam:    Constitutional: no acute distress, resting comfortably in bed  HEENT: EOMI, no scleral icterus, NCAT  CV: regular rate, non tachycardic   Pulm: nonlabored breathing on room air  Breast: right breast s/p mastectomy, incision c/d/i with fluff and kerlix in place, wrapped in ace wrap, right chest drains x2 with minimal ss output.   Abd: soft, nondistended, nontender to palpation  Extremities: warm and well perfused  Neuro: alert and oriented x3  Psych: normal affect      Medication:    Medications:          Continuous Medications       --------------------------------  No continuous medications are active       Scheduled Medications       --------------------------------    1. Acetaminophen IntraVenous:  1000  mg  IntraVenous Piggyback  Every 6 Hours    2.  Atorvastatin:  40  mg  Oral  Daily    3. Clindamycin IV Piggy Back:  900  mg  IntraVenous Piggyback  Every 6 Hours    4. Ezetimibe:  10  mg  Oral  Daily    5. Insulin Lispro Mild Corrective Scale:  unit(s)  SubCutaneous  3 Times a Day Before Meals    6. Ketorolac Injectable:  15  mg  IntraVenous Push  Every 6 Hours    7. Methocarbamol:  500  mg  Oral  4 Times a Day    8. Pantoprazole:  40  mg  Oral  Daily         PRN Medications       --------------------------------    1. Dextrose 50% in Water Injectable:  25  gram(s)  IntraVenous Push  Every 15 Minutes    2. Glucagon Injectable:  1  mg  IntraMuscular  Every 15 Minutes    3. Ondansetron Injectable:  4  mg  IntraVenous Push  Every 6 Hours    4. oxyCODONE Immediate Release:  5  mg  Oral  Every 4 Hours    5. oxyCODONE Immediate Release:  10  mg  Oral  Every 4 Hours        Recent Lab Results:    Results:    CBC: 7/1/2023 06:16              \     Hgb     /                              \     11.1 L    /  WBC  ----------------  Plt               10.2       ----------------    227              /     Hct     \                              /     34.4 L    \            RBC: 3.90 L    MCV: 88           RFP: 7/1/2023 06:16  NA+        Cl-     BUN  /                         136    102    14  /  --------------------------------  Glucose                ---------------------------  230 H    K+     HCO3-   Creat \                         4.0    23    0.67  \  Calcium : 9.0Anion Gap : 15          Albumin : 4.2     Phos : 3.8      Assessment and Plan:   Code Status:  ·  Code Status Full Code     Assessment:    33 year old female with right breast cancer now POD1 s/p mastectomy and POD0 s/p take back for wound exploration, hematoma evacuation and ligation of bleeders.    NEURO: pain control with tylenol, oxycodone, add robaxin and toradol   CV: cont home atorvastatin, ezetimibe   PULM: encourage IS 10x/hr  GI: resume diabatic diet, home omeprazole switched to pantoprazole in  house   RENAL: HLIV  : urinating spontaneously   HEME: Hgb 11.1<- 11.7, continue to monitor drain output for bleeding  ENDO: hold home hypoglycemic agents, on mild SSI   ID: 24 hours of clindamycin for concern of active hydradenitis   Skin: continue to monitor incision   Prophylaxis: SCDs, no chemoppx   Dispo: discharge later today or tomorrow     Patient discussed with attending Dr. Beatriz Gonzalez MD  PGY3  General Surgery  Surgical Oncology/[facility] pager [phone]        Attestation:   Note Completion:  I am a:  Resident/Fellow   Attending Attestation I saw and evaluated the patient.  I personally obtained the key and critical portions of the history and physical exam or was physically present for key and  critical portions performed by the resident/fellow. I reviewed the resident/fellow?s documentation and discussed the patient with the resident/fellow.  I agree with the resident/fellow?s medical decision making as documented in the note.     I personally evaluated the patient on 01-Jul-2023         Electronic Signatures:  Tamir Henderson)  (Signed 03-Jul-2023 10:51)   Authored: Note Completion   Co-Signer: Service, Subjective Data, Objective Data, Assessment and Plan, Note Completion  Jayson Gonzalez (Resident))  (Signed 01-Jul-2023 12:13)   Authored: Service, Subjective Data, Objective Data, Assessment  and Plan, Note Completion      Last Updated: 03-Jul-2023 10:51 by Tamir Henderson)

## 2023-09-30 NOTE — H&P
"    History & Physical Reviewed:   Pregnant/Lactating:  ·  Are You Pregnant no (1)   ·  Are You Currently Breastfeeding no (1)     I have reviewed the History and Physical dated:  22-Jun-2023   History and Physical reviewed and relevant findings noted. Patient examined to review pertinent physical  findings.: No significant changes   Home Medications Reviewed: no changes noted   Allergies Reviewed: no changes noted       ERAS (Enhanced Recovery After Surgery):  ·  ERAS Patient: no     Consent:   COVID-19 Consent:  ·  COVID-19 Risk Consent Surgeon has reviewed key risks related to the risk of radha COVID-19 and if they contract COVID-19 what the risks are.     Attestation:   Note Completion:  I am a:  Resident/Fellow   Attending Attestation I saw and evaluated the patient.  I personally obtained the key and critical portions of the history and physical exam or was physically present for key and  critical portions performed by the resident/fellow. I reviewed the resident/fellow?s documentation and discussed the patient with the resident/fellow.  I agree with the resident/fellow?s medical decision making as documented in the note.     I personally evaluated the patient on 30-Jun-2023         Electronic Signatures:  Tamir Henderson)  (Signed 30-Jun-2023 18:24)   Authored: Note Completion   Co-Signer: History & Physical Reviewed, ERAS, Consent, Note Completion  Jayson Gonzalez (Resident))  (Signed 30-Jun-2023 09:18)   Authored: History & Physical Reviewed, ERAS, Consent,  Note Completion      Last Updated: 30-Jun-2023 18:24 by Tamir Henderson)    References:  1.  Data Referenced From \"Patient Profile - Preop v3\" 30-Jun-2023 08:11   "

## 2023-09-30 NOTE — DISCHARGE SUMMARY
Send Summary:   Discharge Summary Providers:  Provider Role Provider Name   · Attending Tamir Henderson   · Referring Olga Hancock   · Primary Olga Hancock       Note Recipients: Olga Hancock MD Shenk, Robert, MD       Discharge:    Summary:   Admission Date: .30-Jun-2023 07:36:00   Discharge Date: 02-Jul-2023   Attending Physician at Discharge: Tamir Henderson   Admission Reason: Right partial mastectomy with SLNB   Final Discharge Diagnoses: Breast cancer, right   Procedures: Date: 30-Jun-2023 13:56:00  Procedure Name: Right partial mastectomy  [place] lymph node biopsy     Date: 01-Jul-2023 00:48:00  Procedure Name: Wound exploration with evacuation of hematoma ligation of bleeders   Condition at Discharge: Satisfactory   Disposition at Discharge: .Home   Vital Signs:        T   P  R  BP   MAP  SpO2   Value  36.3  78  18  124/79      99%  Date/Time 7/2 5:29 7/2 5:29 7/2 5:29 7/2 5:29    7/2 5:29  Range  (36.3C - 36.5C )  (71 - 91 )  (18 - 20 )  (119 - 130 )/ (70 - 79 )    (92% - 100% )    Date:            Weight/Scale Type:  Height:   30-Jun-2023 17:32  135.7  kg / standing  177.8  cm  Physical Exam:    Constitutional: no acute distress  Cardiac: regular rate  Respiratory: non labored breathing on room air  Breasts: R breast with 2 drains (serosang) output  Abdomen:  not distended  Extremities: LÓPEZ  Skin: warm and dry  Neuro: alert and oriented x3 - currently at pt's baseline  Psych: appropriate mood  Tubes/Lines: 2 drains; no [place] [facility] Course:    Patient is 33 year old with right breast cancer s/p right mastectomy and sentinel lymph node biopsy c/b post-op bleeding requiring wound exploration, evacuation of  hematoma and ligation of bleeders.   Post-op patient was restarted on diet and tolerated well. She was able to ambulate without problems. She was taking oral pain medications. Her drains had minimal scant output since take back to OR.  Patient was  discharged home with drains, home care and to follow up outpatient. Pt instructed to strip drains three times daily. Follow-up with Dr. Henderson in 1-2 weeks after discharge      Discharge Information:    and Continuing Care:   Lab Results - Pending:    Surgical Pathology Drawn at 30-Jun-2023 12:56:00  Radiology Results - Pending: None   Discharge Instructions:    Activity:           activity as tolerated.          May shower..  Sunday          May not return to school/work.            May not drive while taking narcotics.            No pushing, pulling, or lifting objects greater than 10 pounds.      Nutrition/Diet:           resume normal diet    Wound Care:           Wound Site:   chest/axilla          Wound Type:   surgical incision          Cleanse With:   soap and water          Cover With:   no dressing, leave open to air          Instructions:   no lotions, creams, or tub soaks          Other Instructions:   You may take off the wrap and shower on Monday. You may then wear a Surgibra for comfort.    Drain/Tube Care:           Type:   RADHA (Jesus Rutledge)          Site:   chest/axilla          Suction:   continuous   self          Cleanse With:   soap and water          Dress With:   2x2 gauze dressing          Other Instructions:   To empty the drain, open the cap, tip into cup and squeeze to empty. Squeeze drain flat then replace the cap.  Please empty the drain and record its output  _3_ times a day and bring these numbers to your follow up appointment.   The drain output should decrease and the color of the drainage should become lighter (red to pink to yellow).  This drain is sutured into place.  Keep the area around the drain clean and dry.  You may use mild soap and water to cleanse around the drain.   It is ok to shower; do not soak in a tub. Change the gauze around the drain as needed.  Call the office if you notice drastic changes in drain output, bloody drain output, or redness/drainage around insertion  site. Please call Dr. Henderson's office on Wednesday  with drain output information.    Home Care Certification:           Skilled Disciplines Ordered:   RN/LPN    Home Care Services:           Home Care Skilled Service:   assessment,  drain care,  follow up teaching,  wound care    Follow Up Appointments:    Follow-Up Appointment 01:           Physician/Dept/Service:   Dr Henderson          Reason for Referral:   post-op appointment          Call to Schedule in:   2 weeks          Phone Number:   [phone]    Discharge Medications: Home Medication   ezetimibe 10 mg oral tablet - 1 tab(s) orally once a day (at bedtime)  insulin glargine 100 units/mL subcutaneous solution - 40 unit(s) subcutaneous 2 times a day  dulaglutide 0.75 mg/0.5 mL subcutaneous solution - Inject 0.75mg once weekly on Mondays  Lovaza 1000 mg oral capsule - 2 cap(s) orally 2 times a day with meals  metFORMIN 500 mg oral tablet, extended release - 2 tab(s) orally 2 times a day with meals  atorvastatin 40 mg oral tablet - 1 tab(s) orally once a day  insulin lispro 100 units/mL injectable solution - Inject up to 20 units before meals; per sliding scale  acetaminophen 325 mg oral tablet - 2 tab(s) orally every 4 hours  omeprazole 20 mg oral delayed release tablet - 1 tab(s) oral once a day in the morning before breakfast     PRN Medication   oxyCODONE 5 mg oral tablet - 1 tab(s) orally every 4 hours, As Needed for pain     G89.18  LORazepam 1 mg oral tablet - 1 tab(s) orally once a day, As Needed     DNR Status:   ·  Code Status Code Status order at time of discharge: Full Code     Attestation:   Note Completion:  I am a:  Resident/Fellow   Attending Attestation I reviewed the resident/fellow?s documentation and discussed the patient with the resident/fellow.  I agree with the resident/fellow?s medical  decision making as documented in the note.          Electronic Signatures:  Moise Ortiz (Resident))  (Signed 02-Jul-2023 06:54)   Authored: Send  Summary, Summary Content, Ongoing Care,  DNR Status, Note Completion  Tamir Henderson)  (Signed 03-Jul-2023 10:51)   Authored: Summary Content, Ongoing Care, Note Completion   Co-Signer: Send Summary, Summary Content, Ongoing Care, DNR Status, Note Completion      Last Updated: 03-Jul-2023 10:51 by Tamir Henderson)

## 2023-10-02 NOTE — OP NOTE
PROCEDURE DETAILS    Preoperative Diagnosis:  Right breast cancer   Postoperative Diagnosis:  Right breast cancer   Surgeon: Tamir Henderson MD  Resident/Fellow/Other Assistant: Jayson Gonzalez MD    Procedure:  Right partial mastectomy  [place] lymph node biopsy   Anesthesia: general   Estimated Blood Loss: 30cc  Blood Replaced: none  Findings: Scarring in inframammary fold from prior infection/hydradenitis. Right axillary lymph node with magseed in place excised.   Specimens(s) Collected: yes,  Right breast, right axillary sentinel lymph node x 2   IV Fluids: 1L crystalloid   Drains and/or Catheters: right chest drain   Patient Returned To/Condition: PACU/stable         Operative Report:   Clinical note:  This woman had an abnormal mammogram showing multiple masses.  Biopsy showed invasive ductal cancer ER/OK positive and 2 separate areas.  She had an MRI of the breast which showed multiple foci of cancer.  She had a biopsy of the lymph node that came  back negative.  After discussing the options with her it was elected to have a mastectomy.  She saw plastic surgery but because of her diabetes and weight was not a candidate for immediate reconstruction at this time.  We therefore plan to do a mastectomy  but safe skin.  She has had hidradenitis in the lower medial portions of her breast and has had periareolar infections as well.  She had a chronic sinus in that area.  We did remove the nipple with a mastectomy.  I injected her with technetium 99 labeled  solution Lymphoseek and identify the sentinel nodes identified 2 sentinel nodes 1 of which was the previously biopsied node.  We had placed a mag seed in that node to identify it.    Operative note:  With the patient's supine position after general anesthesia was obtained her breast chest neck abdomen upper arm axilla were prepped with ChloraPrep  she was draped in usual manner.  Made an elliptical incision to include the nipple areolar complex and then elevated  skin flaps in all directions using Bovie cautery which is below the clavicle superiorly just medial to the sternum medially the insertion  the rectus muscle inferiorly and latissimus laterally.  We then took the breast off the pectoralis going from superior to inferior we dissected the tail of the breast off of the axilla and using the gamma probe identified a hotspot.  This had the mag  seed right next to it.  I dissected that out using Bovie cautery brought it up in the wound and excised it.  It had 56 counts.  Second lymph node was identified near this 1 that was dissected out in the same fashion had 23 counts.  It was soft.  We then  completed taking the breast off the pectoralis going from superior to inferior then went from medial to lateral at the inferior portion of the serratus and latissimus and remove the breast.  A stitch was placed in the tail of the breast.  We then irrigated  the area with warm saline there was good hemostasis which was obtained throughout Bovie cautery and 3-0 Vicryl ties.  We placed a 19 [place]gu[place] channel drain through a separate incision placed some Peri in the upper portion of the chest and then reapproximated  the skin with deep dermal 2-0 Vicryl sutures burying the knot.  We then closed the skin with a running 3-0 undyed PDS subicular stitch Steri-Strips fluff dressing were applied and an Ace wrap.  She tolerated the procedure well and was taken recovery room  in satisfactory condition.      [facility] Synoptic Op Report:  Breast [place] Node Biopsy  Operation performed with curative intent: yes  Tracer(s) used to identify sentinel nodes in the upfront surgery (non-neoadjuvant) setting: radioactive tracer  Tracer(s) used to identify sentinel nodes in the neoadjuvant setting: not applicable  All nodes (colored or non-colored) present at the end of a dye-filled lymphatic channel were removed: not applicable  All significantly radioactive nodes were removed: yes  All palpably suspicious  nodes were removed: yes  Biopsy-proven positive nodes marked with clips prior to chemotherapy were identified and removed: not applicable                    Attestation:   Note Completion   Attending Attestation I was present for the entire procedure   I am a: Resident/Fellow       Electronic Signatures for Addendum Section:   Jayson Gonzalez (MD (Resident)) (Signed Addendum 01-Jul-2023 09:51)   Procedure is right mastectomy and sentinel lymph node biopsy     Electronic Signatures:  Tamir Henderson)  (Signed 30-Jun-2023 18:32)   Authored: Post-Operative Note, Chart Review, Note Completion   Co-Signer: Post-Operative Note, Chart Review, Note Completion  Jayson Gonzalez (MD (Resident))  (Signed 30-Jun-2023 14:01)   Authored: Post-Operative Note, Chart Review, Note Completion      Last Updated: 01-Jul-2023 09:51 by Jayson Gonzalez (MD (Resident))

## 2023-10-02 NOTE — OP NOTE
PROCEDURE DETAILS    Preoperative Diagnosis:  Bleeding after right mastectomy with hematoma  Postoperative Diagnosis:  Bleeding after right mastectomy with hematoma  Surgeon: Beatriz  Resident/Fellow/Other Assistant: COLEMAN Pan    Procedure:  Wound exploration with evacuation of hematoma ligation of bleeders  Anesthesia: General  Estimated Blood Loss: 100cc  Findings: hematoma 2 small bleeders ligated one medial one lateral  Specimens(s) Collected: no,     Drains and/or Catheters: 2 19F channel drains right chest wall  Patient Returned To/Condition: To RR in sat cond                                Attestation:   Note Completion:  Attending Attestation I was present for the entire procedure         Electronic Signatures:  Tamir Henderson)  (Signed 01-Jul-2023 00:52)   Authored: Post-Operative Note, Chart Review, Note Completion      Last Updated: 01-Jul-2023 00:52 by Tamir Henderson)

## 2023-10-03 ENCOUNTER — APPOINTMENT (OUTPATIENT)
Dept: PRIMARY CARE | Facility: CLINIC | Age: 33
End: 2023-10-03
Payer: COMMERCIAL

## 2023-10-04 ENCOUNTER — APPOINTMENT (OUTPATIENT)
Dept: DERMATOLOGY | Facility: CLINIC | Age: 33
End: 2023-10-04
Payer: COMMERCIAL

## 2023-10-05 ENCOUNTER — APPOINTMENT (OUTPATIENT)
Dept: HEMATOLOGY/ONCOLOGY | Facility: HOSPITAL | Age: 33
End: 2023-10-05
Payer: COMMERCIAL

## 2023-10-05 ENCOUNTER — OFFICE VISIT (OUTPATIENT)
Dept: HEMATOLOGY/ONCOLOGY | Facility: HOSPITAL | Age: 33
End: 2023-10-05
Payer: COMMERCIAL

## 2023-10-05 VITALS
HEART RATE: 75 BPM | TEMPERATURE: 96.6 F | HEIGHT: 70 IN | DIASTOLIC BLOOD PRESSURE: 76 MMHG | SYSTOLIC BLOOD PRESSURE: 126 MMHG | OXYGEN SATURATION: 99 % | BODY MASS INDEX: 41.95 KG/M2 | WEIGHT: 293 LBS

## 2023-10-05 DIAGNOSIS — T45.1X5A CHEMOTHERAPY-INDUCED NAUSEA: ICD-10-CM

## 2023-10-05 DIAGNOSIS — Z79.4 TYPE 2 DIABETES MELLITUS WITHOUT COMPLICATION, WITH LONG-TERM CURRENT USE OF INSULIN (MULTI): ICD-10-CM

## 2023-10-05 DIAGNOSIS — E11.9 TYPE 2 DIABETES MELLITUS WITHOUT COMPLICATION, WITH LONG-TERM CURRENT USE OF INSULIN (MULTI): ICD-10-CM

## 2023-10-05 DIAGNOSIS — F41.9 ANXIETY: ICD-10-CM

## 2023-10-05 DIAGNOSIS — C50.919 MALIGNANT NEOPLASM OF FEMALE BREAST, UNSPECIFIED ESTROGEN RECEPTOR STATUS, UNSPECIFIED LATERALITY, UNSPECIFIED SITE OF BREAST (MULTI): Primary | ICD-10-CM

## 2023-10-05 DIAGNOSIS — R11.0 CHEMOTHERAPY-INDUCED NAUSEA: ICD-10-CM

## 2023-10-05 DIAGNOSIS — B37.31 VAGINAL CANDIDIASIS: ICD-10-CM

## 2023-10-05 PROCEDURE — 3008F BODY MASS INDEX DOCD: CPT | Performed by: INTERNAL MEDICINE

## 2023-10-05 PROCEDURE — 99215 OFFICE O/P EST HI 40 MIN: CPT | Performed by: INTERNAL MEDICINE

## 2023-10-05 PROCEDURE — 99417 PROLNG OP E/M EACH 15 MIN: CPT | Performed by: INTERNAL MEDICINE

## 2023-10-05 PROCEDURE — 3078F DIAST BP <80 MM HG: CPT | Performed by: INTERNAL MEDICINE

## 2023-10-05 PROCEDURE — 1036F TOBACCO NON-USER: CPT | Performed by: INTERNAL MEDICINE

## 2023-10-05 PROCEDURE — 3051F HG A1C>EQUAL 7.0%<8.0%: CPT | Performed by: INTERNAL MEDICINE

## 2023-10-05 PROCEDURE — 3074F SYST BP LT 130 MM HG: CPT | Performed by: INTERNAL MEDICINE

## 2023-10-05 RX ORDER — PROCHLORPERAZINE EDISYLATE 5 MG/ML
10 INJECTION INTRAMUSCULAR; INTRAVENOUS EVERY 6 HOURS PRN
Status: CANCELLED | OUTPATIENT
Start: 2023-11-09

## 2023-10-05 RX ORDER — METHOTREXATE 25 MG/ML
40 INJECTION INTRA-ARTERIAL; INTRAMUSCULAR; INTRATHECAL; INTRAVENOUS ONCE
Status: CANCELLED | OUTPATIENT
Start: 2023-10-16

## 2023-10-05 RX ORDER — FAMOTIDINE 10 MG/ML
20 INJECTION INTRAVENOUS ONCE AS NEEDED
Status: CANCELLED | OUTPATIENT
Start: 2023-11-09

## 2023-10-05 RX ORDER — FLUOROURACIL 50 MG/ML
600 INJECTION, SOLUTION INTRAVENOUS ONCE
Status: CANCELLED | OUTPATIENT
Start: 2023-10-16

## 2023-10-05 RX ORDER — PALONOSETRON 0.05 MG/ML
0.25 INJECTION, SOLUTION INTRAVENOUS ONCE
Status: CANCELLED | OUTPATIENT
Start: 2023-10-16

## 2023-10-05 RX ORDER — ALBUTEROL SULFATE 0.83 MG/ML
3 SOLUTION RESPIRATORY (INHALATION) AS NEEDED
Status: CANCELLED | OUTPATIENT
Start: 2023-11-09

## 2023-10-05 RX ORDER — FAMOTIDINE 10 MG/ML
20 INJECTION INTRAVENOUS ONCE AS NEEDED
Status: CANCELLED | OUTPATIENT
Start: 2023-10-16

## 2023-10-05 RX ORDER — PROCHLORPERAZINE MALEATE 5 MG
10 TABLET ORAL EVERY 6 HOURS PRN
Status: CANCELLED | OUTPATIENT
Start: 2023-11-09

## 2023-10-05 RX ORDER — PROCHLORPERAZINE EDISYLATE 5 MG/ML
10 INJECTION INTRAMUSCULAR; INTRAVENOUS EVERY 6 HOURS PRN
Status: CANCELLED | OUTPATIENT
Start: 2023-10-16

## 2023-10-05 RX ORDER — EPINEPHRINE 0.3 MG/.3ML
0.3 INJECTION SUBCUTANEOUS EVERY 5 MIN PRN
Status: CANCELLED | OUTPATIENT
Start: 2023-10-16

## 2023-10-05 RX ORDER — EPINEPHRINE 0.3 MG/.3ML
0.3 INJECTION SUBCUTANEOUS EVERY 5 MIN PRN
Status: CANCELLED | OUTPATIENT
Start: 2023-11-09

## 2023-10-05 RX ORDER — ALBUTEROL SULFATE 0.83 MG/ML
3 SOLUTION RESPIRATORY (INHALATION) AS NEEDED
Status: CANCELLED | OUTPATIENT
Start: 2023-10-16

## 2023-10-05 RX ORDER — DIPHENHYDRAMINE HYDROCHLORIDE 50 MG/ML
50 INJECTION INTRAMUSCULAR; INTRAVENOUS AS NEEDED
Status: CANCELLED | OUTPATIENT
Start: 2023-11-09

## 2023-10-05 RX ORDER — FLUCONAZOLE 150 MG/1
150 TABLET ORAL ONCE
Qty: 1 TABLET | Refills: 0 | Status: SHIPPED | OUTPATIENT
Start: 2023-10-05 | End: 2023-10-05

## 2023-10-05 RX ORDER — METHOTREXATE 25 MG/ML
40 INJECTION INTRA-ARTERIAL; INTRAMUSCULAR; INTRATHECAL; INTRAVENOUS ONCE
Status: CANCELLED | OUTPATIENT
Start: 2023-11-09

## 2023-10-05 RX ORDER — METHYLPREDNISOLONE SODIUM SUCCINATE 40 MG/ML
40 INJECTION INTRAMUSCULAR; INTRAVENOUS AS NEEDED
Status: CANCELLED | OUTPATIENT
Start: 2023-10-16

## 2023-10-05 RX ORDER — PROCHLORPERAZINE MALEATE 5 MG
10 TABLET ORAL EVERY 6 HOURS PRN
Status: CANCELLED | OUTPATIENT
Start: 2023-10-16

## 2023-10-05 RX ORDER — METHYLPREDNISOLONE SODIUM SUCCINATE 40 MG/ML
40 INJECTION INTRAMUSCULAR; INTRAVENOUS AS NEEDED
Status: CANCELLED | OUTPATIENT
Start: 2023-11-09

## 2023-10-05 RX ORDER — PALONOSETRON 0.05 MG/ML
0.25 INJECTION, SOLUTION INTRAVENOUS ONCE
Status: CANCELLED | OUTPATIENT
Start: 2023-11-09

## 2023-10-05 RX ORDER — FLUOROURACIL 50 MG/ML
600 INJECTION, SOLUTION INTRAVENOUS ONCE
Status: CANCELLED | OUTPATIENT
Start: 2023-11-09

## 2023-10-05 RX ORDER — DIPHENHYDRAMINE HYDROCHLORIDE 50 MG/ML
50 INJECTION INTRAMUSCULAR; INTRAVENOUS AS NEEDED
Status: CANCELLED | OUTPATIENT
Start: 2023-10-16

## 2023-10-05 RX ORDER — LORAZEPAM 1 MG/1
1 TABLET ORAL DAILY PRN
Qty: 30 TABLET | Refills: 0 | Status: SHIPPED | OUTPATIENT
Start: 2023-10-05 | End: 2023-11-11 | Stop reason: SDUPTHER

## 2023-10-05 RX ORDER — DULAGLUTIDE 1.5 MG/.5ML
1.5 INJECTION, SOLUTION SUBCUTANEOUS
Qty: 2 ML | Refills: 11 | Status: SHIPPED | OUTPATIENT
Start: 2023-10-05 | End: 2023-11-29 | Stop reason: ALTCHOICE

## 2023-10-05 RX ORDER — PROCHLORPERAZINE MALEATE 10 MG
10 TABLET ORAL EVERY 6 HOURS PRN
Qty: 30 TABLET | Refills: 3 | Status: SHIPPED | OUTPATIENT
Start: 2023-10-05 | End: 2023-12-04

## 2023-10-05 ASSESSMENT — PAIN SCALES - GENERAL: PAINLEVEL: 0-NO PAIN

## 2023-10-09 ENCOUNTER — TELEPHONE (OUTPATIENT)
Dept: HEMATOLOGY/ONCOLOGY | Facility: HOSPITAL | Age: 33
End: 2023-10-09
Payer: COMMERCIAL

## 2023-10-09 NOTE — TELEPHONE ENCOUNTER
Patient continues to have hives and itching, she just wants to confirm allergy testing will be done. Do we have any other recommendations.

## 2023-10-10 ENCOUNTER — TELEPHONE (OUTPATIENT)
Dept: HEMATOLOGY/ONCOLOGY | Facility: HOSPITAL | Age: 33
End: 2023-10-10
Payer: COMMERCIAL

## 2023-10-10 DIAGNOSIS — E11.9 DIABETES MELLITUS TYPE 2 WITHOUT RETINOPATHY (MULTI): Primary | ICD-10-CM

## 2023-10-10 RX ORDER — INSULIN GLARGINE 100 [IU]/ML
40 INJECTION, SOLUTION SUBCUTANEOUS 2 TIMES DAILY
Qty: 3 ML | Refills: 2 | Status: SHIPPED | OUTPATIENT
Start: 2023-10-10 | End: 2023-11-20 | Stop reason: SDUPTHER

## 2023-10-11 ENCOUNTER — OFFICE VISIT (OUTPATIENT)
Dept: DERMATOLOGY | Facility: CLINIC | Age: 33
End: 2023-10-11
Payer: COMMERCIAL

## 2023-10-11 DIAGNOSIS — L50.3 URTICARIA, DERMATOGRAPHIC: ICD-10-CM

## 2023-10-11 DIAGNOSIS — L50.9 URTICARIA: ICD-10-CM

## 2023-10-11 DIAGNOSIS — L73.2 HIDRADENITIS SUPPURATIVA: Primary | ICD-10-CM

## 2023-10-11 DIAGNOSIS — T78.3XXA ANGIOEDEMA, INITIAL ENCOUNTER: ICD-10-CM

## 2023-10-11 PROCEDURE — 1036F TOBACCO NON-USER: CPT | Performed by: STUDENT IN AN ORGANIZED HEALTH CARE EDUCATION/TRAINING PROGRAM

## 2023-10-11 PROCEDURE — 11900 INJECT SKIN LESIONS </W 7: CPT | Performed by: STUDENT IN AN ORGANIZED HEALTH CARE EDUCATION/TRAINING PROGRAM

## 2023-10-11 PROCEDURE — 3008F BODY MASS INDEX DOCD: CPT | Performed by: STUDENT IN AN ORGANIZED HEALTH CARE EDUCATION/TRAINING PROGRAM

## 2023-10-11 PROCEDURE — 3051F HG A1C>EQUAL 7.0%<8.0%: CPT | Performed by: STUDENT IN AN ORGANIZED HEALTH CARE EDUCATION/TRAINING PROGRAM

## 2023-10-11 PROCEDURE — 99214 OFFICE O/P EST MOD 30 MIN: CPT | Performed by: STUDENT IN AN ORGANIZED HEALTH CARE EDUCATION/TRAINING PROGRAM

## 2023-10-11 RX ORDER — LORATADINE 10 MG/1
20 TABLET ORAL 2 TIMES DAILY
Qty: 120 TABLET | Refills: 0 | Status: SHIPPED | OUTPATIENT
Start: 2023-10-11 | End: 2023-11-08

## 2023-10-11 RX ORDER — BENZOYL PEROXIDE 50 MG/ML
LIQUID TOPICAL DAILY
Qty: 236 G | Refills: 11 | Status: SHIPPED | OUTPATIENT
Start: 2023-10-11 | End: 2024-10-10

## 2023-10-11 RX ORDER — CLINDAMYCIN PHOSPHATE 11.9 MG/ML
SOLUTION TOPICAL DAILY
Qty: 60 ML | Refills: 11 | Status: SHIPPED | OUTPATIENT
Start: 2023-10-11 | End: 2024-02-09 | Stop reason: ALTCHOICE

## 2023-10-11 RX ORDER — FAMOTIDINE 40 MG/1
40 TABLET, FILM COATED ORAL 2 TIMES DAILY
Qty: 60 TABLET | Refills: 11 | Status: SHIPPED | OUTPATIENT
Start: 2023-10-11 | End: 2023-11-29 | Stop reason: ALTCHOICE

## 2023-10-11 ASSESSMENT — ITCH NUMERIC RATING SCALE: HOW SEVERE IS YOUR ITCHING?: 8

## 2023-10-11 NOTE — PROGRESS NOTES
Patient ID: Nia Harper is a 33 y.o. female.  MRN: [id number]  : 1990  The patient presents to clinic today for her history of breast cancer.     Cancer Staging   Malignant neoplasm of female breast (CMS/HCC)  Staging form: Breast, AJCC 8th Edition  - Pathologic stage from 2023: Stage IB (pT2(3), pN0(sn), cM0, G3, ER+, NH+, HER2-, Oncotype DX score: 45) - Signed by Jeramie Lancaster MD on 10/10/2023    Diagnostic/Therapeutic History:  -Enlarging right breast lumps.  -5/15/23: Dx MG/US demonstrated multiple masses: 1.1 cm (11:00, 8 cm FN); 1 cm (9:30, 5 cm FN); 1.2 cm (9:00, 6 cm FN); 0.7 cm (10:00, 6 cm FN); 0.4 cm (10:00, 5 cm FN). Four lymph nodes demonstrated suspicious cortical thickening.  -23: US-guided CNB of the 11:00 mass showed IDC grade 3; ER >95% NH 85% Her2-negative (1+ IHC). Biopsy of the 9:30 mass showed IDC grade 2-3; ER 80% NH 10% Her2-negative (1+ IHC). A right axillary LN was negative for carcinoma.  -23: Right mastectomy and SLNBx performed. Multipe foci of grade 3 carcinoma noted (3.1 cm, 2.2 cm, 1.8 cm), no LVI, margins negative, 0/3 LN’s involved IB, pT2 pN0 cM0 G3  Oncotype Dx RS 45 (33% 9-year recurrence risk; >15% chemo benefit).  RSClin: 59% recurrence risk with tamoxifen; 44% chemo benefit.  -23: Adjuvant TC (docetaxel, cyclophosphamide) initiated. First cycle complicated by admission for nausea/vomiting. Second cycle complicated by admission for angioedema, hives; thought to be secondary to docetaxel.    History of Present Illness (HPI)/Interval History:  Nia Harper presents for routine FUV.  She has been feeling well since discharge from the hospital.  Still notes intermittent hives/rash, but these have improved significantly.  No nausea, dyspnea, cough, diarrhea.    Review of Systems:  14-point ROS otherwise negative, as per HPI/Interval History.    Past Medical History:   Diagnosis Date    22 weeks gestation of pregnancy 2019    22 weeks gestation of  pregnancy    24 weeks gestation of pregnancy 2019    24 weeks gestation of pregnancy    Abnormal chromosomal and genetic finding on  screening of mother 2019    Abnormal genetic test during pregnancy    Abnormal hematological finding on  screening of mother 2019    Abnormal maternal serum screening test    Abnormal levels of other serum enzymes 2021    Elevated liver enzymes    Abnormal levels of other serum enzymes 2021    Elevated liver enzymes    Acute candidiasis of vulva and vagina 10/15/2015    Vaginal candidiasis    Acute candidiasis of vulva and vagina 10/05/2018    Vaginitis due to Candida albicans    Acute vaginitis 2020    Bacterial vaginosis    Anxiety disorder, unspecified 2019    Anxiety and depression    Body mass index (BMI)40.0-44.9, adult 2019    BMI 40.0-44.9, adult    Candidiasis, unspecified 2021    Yeast infection    Carpal tunnel syndrome, bilateral upper limbs 2019    Carpal tunnel syndrome on both sides    Carpal tunnel syndrome, left upper limb 2019    Left carpal tunnel syndrome    Carpal tunnel syndrome, right upper limb 2019    Right carpal tunnel syndrome    Depression, unspecified 2021    Depression    Disruption of external operation (surgical) wound, not elsewhere classified, initial encounter 2019    Open draining abdominal incision    Encounter for change or removal of nonsurgical wound dressing 2018    Dressing change    Encounter for contraceptive management, unspecified 2015    Contraceptive management    Encounter for contraceptive management, unspecified 2015    Encounter for contraceptive management    Encounter for examination of eyes and vision without abnormal findings 2016    Diabetic eye exam    Encounter for gynecological examination (general) (routine) without abnormal findings 2021    Encounter for well woman exam with routine  gynecological exam    Encounter for gynecological examination (general) (routine) without abnormal findings 07/28/2015    Encounter for cervical Pap smear with pelvic exam    Encounter for immunization 10/31/2017    Need for Tdap vaccination    Encounter for initial prescription of contraceptive pills 02/06/2018    Encounter for initial prescription of contraceptive pills    Encounter for other general counseling and advice on contraception 06/11/2018    Encounter for counseling regarding initiation of other contraceptive measure    Encounter for other general counseling and advice on procreation 11/28/2017    Encounter for preconception consultation    Encounter for other preprocedural examination 11/28/2017    Pre-op testing    Encounter for pregnancy test, result positive 11/28/2017    Pregnancy confirmed by positive urine test    Encounter for routine postpartum follow-up 02/06/2018    Postpartum exam    Encounter for screening for infections with a predominantly sexual mode of transmission 06/04/2019    Routine screening for STI (sexually transmitted infection)    Encounter for screening for infections with a predominantly sexual mode of transmission     Routine screening for STI (sexually transmitted infection)    Encounter for supervision of normal pregnancy, unspecified, unspecified trimester 11/28/2017    Encounter for pregnancy related examination    Encounter for surveillance of injectable contraceptive 08/19/2020    Encounter for Depo-Provera contraception    Glycosuria 06/30/2014    Glucosuria    Irregular menstruation, unspecified 12/21/2015    Missed periods    Irregular menstruation, unspecified 05/08/2019    Missed menses    Maternal care for other (suspected) fetal abnormality and damage, fetal cardiac anomalies, not applicable or unspecified 12/09/2019    Abnormal fetal echocardiogram affecting antepartum care of mother    Maternal care for other known or suspected poor fetal growth, unspecified  trimester, not applicable or unspecified 2019    Intrauterine growth restriction,     Maternal care for unspecified type scar from previous  delivery 2019    Uterine scar from previous  delivery complicating pregnancy    Missed  2019    , missed    Mixed hyperlipidemia 2018    Hyperlipemia, mixed    Morbid (severe) obesity due to excess calories (CMS/[facility]) 2019    Severe obesity (BMI >= 40)    Myopia, bilateral 09/15/2017    Myopia of both eyes with astigmatism    Obesity complicating pregnancy, unspecified trimester 2019    Obesity in pregnancy    Other conditions influencing health status 2017    History of cough    Other conditions influencing health status 2017    History of pregnancy    Other conditions influencing health status 2017    Type 2 diabetes mellitus, uncontrolled    Other infections with a predominantly sexual mode of transmission complicating pregnancy, unspecified trimester 2020    Genital herpes affecting pregnancy    Other mental disorders complicating pregnancy, unspecified trimester 2019    Depression affecting pregnancy, antepartum    Other specified pregnancy related conditions, second trimester 2019    Pregnancy headache in second trimester    Pain in right hand 2017    Pain of right hand    Personal history of other complications of pregnancy, childbirth and the puerperium 2015    History of galactorrhea    Personal history of other diseases of the circulatory system 2019    History of hypertension    Personal history of other diseases of the female genital tract 2015    History of vaginitis    Personal history of other diseases of the female genital tract 2017    History of irregular menstrual cycles    Personal history of other diseases of the female genital tract 2017    History of amenorrhea    Personal history of other diseases of the  female genital tract 08/24/2015    History of amenorrhea    Personal history of other diseases of the female genital tract 10/24/2014    History of vaginal discharge    Personal history of other diseases of the respiratory system 10/26/2014    History of bronchitis    Personal history of other drug therapy 09/19/2017    History of influenza vaccination    Personal history of other endocrine, nutritional and metabolic disease 06/11/2019    History of type 2 diabetes mellitus    Personal history of other endocrine, nutritional and metabolic disease 11/28/2017    History of hypoglycemia    Personal history of other endocrine, nutritional and metabolic disease 11/28/2017    History of hyperglycemia    Personal history of other infectious and parasitic diseases 06/11/2019    History of herpes genitalis    Personal history of other infectious and parasitic diseases 01/29/2019    History of wound infection    Personal history of other mental and behavioral disorders 09/08/2017    History of anxiety    Personal history of other mental and behavioral disorders 09/08/2017    History of depression    Personal history of other specified conditions 01/23/2015    No post-op complications    Personal history of other specified conditions 11/28/2017    History of nausea    Personal history of other specified conditions 05/01/2019    History of headache    Personal history of other specified conditions 07/30/2018    History of breast lump    Personal history of other specified conditions 05/01/2019    History of insomnia    Personal history of other specified conditions 10/17/2017    History of lump of right breast    Personal history of other specified conditions 03/04/2019    History of nausea and vomiting    Pre-existing type 2 diabetes mellitus, in pregnancy, unspecified trimester 12/09/2019    Pre-existing type 2 diabetes mellitus during pregnancy, antepartum    Pre-existing type 2 diabetes mellitus, in pregnancy, unspecified  trimester 12/15/2021    Type 2 diabetes mellitus during pregnancy    Pregnancy with inconclusive fetal viability, not applicable or unspecified 2017    Pregnancy with uncertain fetal viability    Solitary cyst of right breast 2021    Cyst of right breast    Supervision of young multigravida, unspecified trimester 2019    Encounter for supervision of high-risk pregnancy of young multigravida    Type 2 diabetes mellitus without complications (CMS/HCC) 2022    Diabetes    Unspecified maternal hypertension, second trimester 2017    Hypertension during pregnancy in second trimester    Unspecified maternal hypertension, unspecified trimester 2017    Hypertension in pregnancy, antepartum    Unspecified pre-existing hypertension complicating pregnancy, unspecified trimester 2019    Chronic hypertension in pregnancy     Patient Active Problem List   Diagnosis    Abnormal uterine bleeding    Abscess    Abscess of breast, postpartum    Acne    Astigmatism of both eyes    Bacterial vaginosis    Breast skin changes    Cutaneous abscess of chest wall    Cyst of right breast    Lump of right breast    De Quervain's tenosynovitis, right    Depression    Diabetes (CMS/HCC)    Diabetes mellitus type 2 without retinopathy (CMS/[facility])    Diffuse goiter    Dyspnea on exertion    Dysuria    Elevated liver enzymes    Epidermal inclusion cyst    Herpes simplex virus (HSV) infection    History of breast cancer    Hyperlipemia    Hypertriglyceridemia    Impaired skin integrity associated with surgical incision    Infection of great toe    Left carpal tunnel syndrome    Mild postpartum depression    Myopia of both eyes    Nexplanon in place    Nipple discharge, bloody    Nonpuerperal abscess of left breast    Other skin changes    S/P  section    Suspected sleep apnea    Uncontrolled type 2 diabetes mellitus with hyperglycemia (CMS/HCC)    Urine frequency    Vaginal irritation    Vaginal Pap  smear with ASC-US    Fatigue    Weakness generalized    Wound infection after surgery    Yeast infection    Vaginal odor    Chronic headaches    BMI 40.0-44.9, adult (CMS/[facility])    Class 3 severe obesity due to excess calories with serious comorbidity and body mass index (BMI) of 40.0 to 44.9 in adult (CMS/HCC)    Malignant neoplasm of female breast (CMS/[facility])    Nausea and vomiting    Primary hypertension    Hospital discharge follow-up        Past Surgical History:   Procedure Laterality Date     SECTION, CLASSIC  2019     Section    MR HEAD ANGIO WO IV CONTRAST  2015    MR HEAD ANGIO WO IV CONTRAST 2015 [facility]    OTHER SURGICAL HISTORY  2019    Surgical Treatment Of Missed  In First Trimester       Social History     Socioeconomic History    Marital status: Single     Spouse name: Not on file    Number of children: Not on file    Years of education: Not on file    Highest education level: Not on file   Occupational History    Not on file   Tobacco Use    Smoking status: Never    Smokeless tobacco: Never   Vaping Use    Vaping Use: Never used   Substance and Sexual Activity    Alcohol use: Never    Drug use: Yes     Types: Marijuana    Sexual activity: Not on file   Other Topics Concern    Not on file   Social History Narrative    Not on file     Social Determinants of Health     Financial Resource Strain: Not on file   Food Insecurity: Not on file   Transportation Needs: Not on file   Physical Activity: Not on file   Stress: Not on file   Social Connections: Not on file   Intimate Partner Violence: Not on file   Housing Stability: Not on file       Family History   Problem Relation Name Age of Onset    Heart disease Father      Diabetes type II Mother's Sister          uncontrolled    Breast cancer Father's Sister          two paternal 1st cousins (through paternal uncle, both sisters, both early onset, one ). two paternal great aunts (through PGF's  "side, both  in their 50s/60s)    Cancer Father's Sister      Cancer Father's Brother      Cancer Maternal Grandmother      Diabetes type II Maternal Grandmother      Cancer Maternal Grandfather      Cancer Paternal Grandmother      Breast cancer Paternal Cousin          two paternal 1st cousins (through paternal uncle, both sisters, both early onset, one ). two paternal great aunts (through PGF's side, both  in their 50s/60s)       Allergies:   Allergies   Allergen Reactions    Cephalexin Hives, Other, Rash and Swelling     \"welts\"    Sulfamethoxazole-Trimethoprim Hives, Rash and Swelling         Current Outpatient Medications:     atorvastatin (Lipitor) 40 mg tablet, Take 1 tablet (40 mg) by mouth once daily at bedtime., Disp: , Rfl:     blood sugar diagnostic (True Metrix Glucose Test Strip) strip, 4 times a day., Disp: , Rfl:     escitalopram (Lexapro) 10 mg tablet, Take 3 tablets (30 mg) by mouth once daily., Disp: , Rfl:     famotidine (Pepcid) 40 mg tablet, Take 1 tablet (40 mg) by mouth 2 times a day., Disp: , Rfl:     flash glucose sensor (FREESTYLE NGUYỄN 2 SENSOR MISC), Change sensor every 14 days as directed, Disp: , Rfl:     FREESTYLE LANCETS MISC, Use 4 lancets each day, Disp: , Rfl:     insulin lispro (HumaLOG) 100 unit/mL injection, inject up to 20 units before meals per sliding scale, Disp: , Rfl:     loratadine (Claritin) 10 mg tablet, TAKE 2 TABLETS BY MOUTH TWO TIMES A DAY, LAST DOSE IS 10/7, Disp: 48 tablet, Rfl: 0    metFORMIN XR (Glucophage-XR) 500 mg 24 hr tablet, Take 2 tablets (1,000 mg) by mouth 2 times a day with meals., Disp: , Rfl:     omega-3 acid ethyl esters (Lovaza) 1 gram capsule, Take 2 capsules (2 g) by mouth 2 times a day with meals., Disp: , Rfl:     pen needle, diabetic (BD Ultra-Fine Mini Pen Needle) 31 gauge x 3/16\" needle, Use 4 a day as directed, Disp: , Rfl:     pen needle, diabetic (TechLITE Pen Needle) 31 gauge x 5/16\" needle, , Disp: , Rfl:     " "valACYclovir (Valtrex) 500 mg tablet, Take 1 tablet (500 mg) by mouth 2 times a day., Disp: , Rfl:     dulaglutide (Trulicity) 1.5 mg/0.5 mL pen injector injection, Inject 1.5 mg under the skin 1 (one) time per week., Disp: 2 mL, Rfl: 11    famotidine (Pepcid) 40 mg tablet, TAKE 1 TABLET BY MOUTH TWO TIMES A DAY. LAST DOSE ON 10/7/23. (Patient not taking: Reported on 10/5/2023), Disp: 24 tablet, Rfl: 0    gabapentin (Neurontin) 100 mg capsule, TAKE 3 CAPSULES TWICE A DAY (Patient not taking: Reported on 10/5/2023), Disp: 180 capsule, Rfl: 5    insulin glargine (Lantus Solostar U-100 Insulin) 100 unit/mL (3 mL) pen, Inject 40 Units under the skin 2 times a day., Disp: 3 mL, Rfl: 2    loratadine (Claritin) 10 mg tablet, Take 2 tablets (20 mg) by mouth 2 times a day., Disp: , Rfl:     LORazepam (Ativan) 1 mg tablet, TAKE 1 TABLET BY MOUTH ONCE DAILY AS NEEDED FOR ANXIETY, Disp: 30 tablet, Rfl: 0    medroxyPROGESTERone (Depo-Provera) 150 mg/mL injection, MedroxyPROGESTERone Acetate 150 MG/ML Intramuscular Suspension  Refills: 0     Active, Disp: , Rfl:     predniSONE (Deltasone) 10 mg tablet, TAKE 8 TABLETS BY MOUTH ONCE DAILY ON 9/27/23 AT 7:00 AM THEN TAKE 7 TABLETS BY MOUTH ONCE DAILY ON 9/28/23 AT 7:00 AM THEN TAKE 6 TABLETS BY MOUTH ONCE DAILY ON 9/29/23 AT 7:00 AM THEN TAKE 5 TABLETS BY MOUTH ONCE DAILY ON 9/30/23 AT 7:00 AM THEN TAKE 4 (Patient not taking: Reported on 10/5/2023), Disp: 36 tablet, Rfl: 0    prochlorperazine (Compazine) 10 mg tablet, Take 1 tablet (10 mg) by mouth every 6 hours if needed for nausea., Disp: 30 tablet, Rfl: 3     Objective    BSA: 2.58 meters squared  /76   Pulse 75   Temp 35.9 °C (96.6 °F) (Skin)   Ht 1.787 m (5' 10.35\")   Wt 134 kg (296 lb 1.2 oz)   SpO2 99%   BMI 42.06 kg/m²     ECOG Performance Status:  0 Fully active, able to carry on all pre-disease performance without restriction.  1 Restricted in physically strenuous activity but ambulatory and able to carry out " work of a light or sedentary nature, e.g., light house work, office work.  2 Ambulatory and capable of all selfcare but unable to carry out any work activities; up and about more than 50% of waking hours.  3 Capable of only limited selfcare; confined to bed or chair more than 50% of waking hours.  4 Completely disabled; cannot carry on any selfcare; totally confined to bed or chair.    Physical Exam  Constitutional:       General: She is not in acute distress.     Appearance: Normal appearance.   HENT:      Head: Normocephalic and atraumatic.      Mouth/Throat:      Mouth: Mucous membranes are moist.      Pharynx: Oropharynx is clear. No oropharyngeal exudate.   Eyes:      General: No scleral icterus.     Conjunctiva/sclera: Conjunctivae normal.   Cardiovascular:      Rate and Rhythm: Normal rate and regular rhythm.      Heart sounds: No murmur heard.  Pulmonary:      Effort: Pulmonary effort is normal. No respiratory distress.      Breath sounds: Normal breath sounds.   Musculoskeletal:         General: No swelling, tenderness or deformity. Normal range of motion.      Cervical back: Normal range of motion and neck supple. No rigidity.   Lymphadenopathy:      Cervical: No cervical adenopathy.   Skin:     General: Skin is warm and dry.      Coloration: Skin is not jaundiced.      Findings: No rash.   Neurological:      Mental Status: She is alert and oriented to person, place, and time.      Gait: Gait normal.   Psychiatric:         Mood and Affect: Mood normal.         Behavior: Behavior normal.         Thought Content: Thought content normal.         Judgment: Judgment normal.         Laboratory Data:  Lab Results   Component Value Date    WBC 17.5 (H) 09/26/2023    HGB 12.4 09/26/2023    HCT 38.4 09/26/2023    MCV 87 09/26/2023     09/26/2023    ANC 15.37 (H) 09/26/2023       Chemistry    Lab Results   Component Value Date/Time     (L) 09/26/2023 0402    K 4.6 09/26/2023 0402    CL 99 09/26/2023 0402     CO2 22 09/26/2023 0402    BUN 17 09/26/2023 0402    CREATININE 0.61 09/26/2023 0402    Lab Results   Component Value Date/Time    CALCIUM 9.9 09/26/2023 0402    ALKPHOS 97 09/25/2023 0558    AST 32 09/25/2023 0558    ALT 82 (H) 09/25/2023 0558    BILITOT 0.3 09/25/2023 0558             Radiology:  STUDY:  CT ANGIO CHEST FOR PE;  9/23/2023 11:39 am     INDICATION:  CP, dyspnea, cancer, Lie Flat: Yes .     COMPARISON:  Same day chest radiograph.     ACCESSION NUMBER(S):  [id number]     ORDERING CLINICIAN:  DELFINO REA     TECHNIQUE:  Helical data acquisition of the chest was obtained after intravenous  administration of 150 milliliterOMNIPAQUE 350, as per PE protocol.  Images were reformatted in coronal and sagittal planes. Axial and  coronal maximum intensity projection (MIP) images were created and  reviewed.     FINDINGS:  POTENTIAL LIMITATIONS OF THE STUDY: The assessment is limited by  respiratory motion and suboptimal contrast opacification of pulmonary  arteries.     HEART AND VESSELS:  There are no discrete filling defects within main pulmonary artery  and its branches to suggest acute pulmonary embolism.  Main pulmonary artery and its branches are normal in caliber.     The thoracic aorta normal in course and caliber.Although, the study  is not tailored for evaluation of aorta, there is no definite  evidence of acute aortic pathology.  No coronary artery calcifications are seen. Please note, the study is  not optimized for evaluation of coronary arteries.     The cardiac chambers are not enlarged.There are no findings to  suggest right heart strain.     There is no pericardial effusion seen.     MEDIASTINUM AND BENJAMÍN, LOWER NECK AND AXILLA:  The visualized thyroid gland is within normal limits.  No evidence of thoracic lymphadenopathy by CT criteria.  Esophagus appears within normal limits as seen.     LUNGS AND AIRWAYS:  The trachea and central airways are patent. No endobronchial lesion  is seen. There is  mild diffuse bronchial wall thickening, which is a  non-specific finding, but may be due to chronic bronchitis, given the  history of smoking.     The bilateral lungs are clear without evidence of focal  consolidation, pleural effusion, or pneumothorax.     UPPER ABDOMEN:  Probable hepatomegaly and fatty liver infiltrates. Visualized adrenal  glands are unremarkable.     CHEST WALL AND OSSEOUS STRUCTURES:  Within the right axilla on series 401, image 29, there is a 2.9 x 3.1  cm fat containing rounded, lesion which likely represents lipoma.  Otherwise, the chest wall soft tissues appear unremarkable  No acute osseous pathology.There are no suspicious osseous lesions.     IMPRESSION:  1. No evidence of acute pulmonary embolism to segmental level. Please  note that, assessment of subsegmental branches is limited and small  peripheral emboli are not entirely excluded due to the limitations  listed above.  2. There is no consolidation, pleural effusion, or pneumothorax.  3. Rounded fat containing lesion measuring up to 3.1 x 2.9 cm within  the right axilla likely represent lipoma.    Pathology:  No recent pathology results to review.         Assessment/Plan:  Nia Harper is a 33 y.o. female with a history of right breast cancer, who presents today for follow-up evaluation on adjuvant chemotherapy.    Breast cancer, right, stage IB (ER/AZ+, Her2-).  High-risk for recurrence, based on Oncotype Dx and clinical features.  Very poor tolerance of TC (2 cycles), including severe allergic reaction.  - I recommend switching to CMF (cyclophosphamide, methotrexate, fluorouracil) for 2 cycles. Potential toxicities discussed; formal consent signed.  - Premedications will include:  - No need for pegfilgrastim.  - Following chemotherapy, will discuss endocrine therapy. No RT needed.    Allergic reaction.  - Likely delayed reaction to docetaxel, but pegfilgrastim also possible.  - Avoid further TC/Neulasta. Switch therapy, as  above.  - Pending Allergy/Immunology evaluation.    Premenopausal status. Continue goserelin q4 weeks, initiated 8/18/23. Next scheduled for 10/16/23.    Insulin-dependent T2DM.  - Needs new endocrinologist. Referral placed.  - Refills on current regimen sent (Trulicyesenia, Lantus).    Disposition.  - CMF scheduled for 10/16/23.  - FUV ~4-5 weeks for C2D1 CMF and labs.  - She has our contact information and was instructed to call with concerns/questions in the interim.    Orders Placed This Encounter   Procedures    CBC and Auto Differential    Comprehensive metabolic panel    Hcg, Quantitative, Pregnancy    Hepatitis B surface antigen    Hepatitis B Core Antibody, Total    Hepatitis B surface antibody    CBC and Auto Differential    Comprehensive metabolic panel    Hcg, Quantitative, Pregnancy    Referral to Endocrinology        Jeramie Lancaster MD  Hematology and Medical Oncology  [place] [facility]

## 2023-10-11 NOTE — PROGRESS NOTES
Subjective     Nia Harper is a 33 y.o. female who presents for the following: Rash (Widespread).   Boils in skin fold, present for decades. They drain and recur. Has used antibiotics for short courses in past, minimal improvement    Angioedema/hives on arms back. Hospitalized for this x2. Was supposed to see allergy today per her discharge instructions but is scheduled with derm instead.     Review of Systems:  No other skin or systemic complaints other than what is documented elsewhere in the note.    The following portions of the chart were reviewed this encounter and updated as appropriate:          Skin Cancer History  No skin cancer on file.      Specialty Problems          Dermatology Problems    Acne    Epidermal inclusion cyst    Impaired skin integrity associated with surgical incision    Other skin changes        Objective   Well appearing patient in no apparent distress; mood and affect are within normal limits.    A focused skin examination was performed. All findings within normal limits unless otherwise noted below.    Assessment/Plan   1. Hidradenitis suppurativa  Left Inguinal Area  2 cm draining nodule    HS- Garcia stage I  Draining nodule of left inguinal fold  Discussed diagnosis and various treatment options  Start BPO 5% cleanser daily to affected area. Discussed risk of skin irritation and bleaching of towels/clothing.  Start clindamycin 1% solution qam to affected area  Will inject draining lesions today with Kenalog, discuss r/b/se. 0.5 ml of ILK injected today.     FU 4 months    Intralesional injection - Left Inguinal Area  Intralesional Injection:   Consent:     Consent obtained:  Verbal    Consent given by:  Patient    Risks discussed:  Poor cosmetic result, pain, infection and bleeding    Alternatives discussed:  No treatment  Pre-Procedure Details:     Prep Type:  Isopropyl alcohol  Procedure Details:   Injection:  Triamcinolone  Post-procedure details: sterile dressing applied and  wound care instructions given  Dressing type: bandage     benzoyl peroxide 5 % external wash - Left Inguinal Area  Apply topically once daily. Lather onto skin folds, leave on 2 minutes, rinse with water    clindamycin (Cleocin T) 1 % external solution - Left Inguinal Area  Apply topically once daily.    Related Procedures  Follow Up In Dermatology - Established Patient    Related Medications  triamcinolone acetonide (Kenalog) injection 5 mg      2. Angioedema, initial encounter    Related Medications  loratadine (Claritin) 10 mg tablet  Take 2 tablets (20 mg) by mouth 2 times a day.    3. Urticaria    Related Medications  loratadine (Claritin) 10 mg tablet  Take 2 tablets (20 mg) by mouth 2 times a day.    4. Urticaria, dermatographic  Urticarial erythematous plaque on RUE    Urticaria, h/o angioedema causing hospitalization. Started after starting chemotherapy and new BP medication. Patient is dermatographic today and has hives present on RUE.    Restart Loratadine 20mg BID and Famotidine 40mg BID given recurrence. Patient was instructed to follow up today with A&I on her discharge instructions, but had derm appt today instead. Advised her to make fu with A&I.     famotidine (Pepcid) 40 mg tablet  Take 1 tablet (40 mg) by mouth 2 times a day.    Referral to Allergy    Related Medications  loratadine (Claritin) 10 mg tablet  Take 2 tablets (20 mg) by mouth 2 times a day.

## 2023-10-12 ENCOUNTER — APPOINTMENT (OUTPATIENT)
Dept: HEMATOLOGY/ONCOLOGY | Facility: HOSPITAL | Age: 33
End: 2023-10-12
Payer: COMMERCIAL

## 2023-10-16 ENCOUNTER — INFUSION (OUTPATIENT)
Dept: HEMATOLOGY/ONCOLOGY | Facility: HOSPITAL | Age: 33
End: 2023-10-16
Payer: COMMERCIAL

## 2023-10-16 ENCOUNTER — SOCIAL WORK (OUTPATIENT)
Dept: CASE MANAGEMENT | Facility: HOSPITAL | Age: 33
End: 2023-10-16
Payer: COMMERCIAL

## 2023-10-16 ENCOUNTER — LAB (OUTPATIENT)
Dept: LAB | Facility: HOSPITAL | Age: 33
End: 2023-10-16
Payer: COMMERCIAL

## 2023-10-16 VITALS
OXYGEN SATURATION: 100 % | DIASTOLIC BLOOD PRESSURE: 71 MMHG | SYSTOLIC BLOOD PRESSURE: 140 MMHG | HEART RATE: 95 BPM | WEIGHT: 293 LBS | TEMPERATURE: 97.5 F | BODY MASS INDEX: 42.28 KG/M2 | RESPIRATION RATE: 18 BRPM

## 2023-10-16 DIAGNOSIS — C50.919 MALIGNANT NEOPLASM OF FEMALE BREAST, UNSPECIFIED ESTROGEN RECEPTOR STATUS, UNSPECIFIED LATERALITY, UNSPECIFIED SITE OF BREAST (MULTI): ICD-10-CM

## 2023-10-16 LAB
ALBUMIN SERPL BCP-MCNC: 4 G/DL (ref 3.4–5)
ALP SERPL-CCNC: 57 U/L (ref 33–110)
ALT SERPL W P-5'-P-CCNC: 65 U/L (ref 7–45)
ANION GAP SERPL CALC-SCNC: 13 MMOL/L (ref 10–20)
AST SERPL W P-5'-P-CCNC: 33 U/L (ref 9–39)
B-HCG SERPL-ACNC: <3 MIU/ML
BASOPHILS # BLD AUTO: 0.01 X10*3/UL (ref 0–0.1)
BASOPHILS NFR BLD AUTO: 0.2 %
BILIRUB SERPL-MCNC: 0.3 MG/DL (ref 0–1.2)
BUN SERPL-MCNC: 13 MG/DL (ref 6–23)
CALCIUM SERPL-MCNC: 9.2 MG/DL (ref 8.6–10.3)
CHLORIDE SERPL-SCNC: 104 MMOL/L (ref 98–107)
CO2 SERPL-SCNC: 28 MMOL/L (ref 21–32)
CREAT SERPL-MCNC: 0.53 MG/DL (ref 0.5–1.05)
EOSINOPHIL # BLD AUTO: 0.16 X10*3/UL (ref 0–0.7)
EOSINOPHIL NFR BLD AUTO: 3.7 %
ERYTHROCYTE [DISTWIDTH] IN BLOOD BY AUTOMATED COUNT: 14.7 % (ref 11.5–14.5)
GFR SERPL CREATININE-BSD FRML MDRD: >90 ML/MIN/1.73M*2
GLUCOSE SERPL-MCNC: 134 MG/DL (ref 74–99)
HBV CORE AB SER QL: NONREACTIVE
HBV SURFACE AB SER-ACNC: >1000 MIU/ML
HBV SURFACE AG SERPL QL IA: NONREACTIVE
HCT VFR BLD AUTO: 35.4 % (ref 36–46)
HGB BLD-MCNC: 11.6 G/DL (ref 12–16)
IMM GRANULOCYTES # BLD AUTO: 0.01 X10*3/UL (ref 0–0.7)
IMM GRANULOCYTES NFR BLD AUTO: 0.2 % (ref 0–0.9)
LYMPHOCYTES # BLD AUTO: 1.74 X10*3/UL (ref 1.2–4.8)
LYMPHOCYTES NFR BLD AUTO: 40 %
MCH RBC QN AUTO: 28.4 PG (ref 26–34)
MCHC RBC AUTO-ENTMCNC: 32.8 G/DL (ref 32–36)
MCV RBC AUTO: 87 FL (ref 80–100)
MONOCYTES # BLD AUTO: 0.29 X10*3/UL (ref 0.1–1)
MONOCYTES NFR BLD AUTO: 6.7 %
NEUTROPHILS # BLD AUTO: 2.14 X10*3/UL (ref 1.2–7.7)
NEUTROPHILS NFR BLD AUTO: 49.2 %
NRBC BLD-RTO: 0 /100 WBCS (ref 0–0)
PLATELET # BLD AUTO: 306 X10*3/UL (ref 150–450)
PMV BLD AUTO: 10.1 FL (ref 7.5–11.5)
POTASSIUM SERPL-SCNC: 3.8 MMOL/L (ref 3.5–5.3)
PROT SERPL-MCNC: 7.1 G/DL (ref 6.4–8.2)
RBC # BLD AUTO: 4.09 X10*6/UL (ref 4–5.2)
SODIUM SERPL-SCNC: 141 MMOL/L (ref 136–145)
WBC # BLD AUTO: 4.4 X10*3/UL (ref 4.4–11.3)

## 2023-10-16 PROCEDURE — 96375 TX/PRO/DX INJ NEW DRUG ADDON: CPT

## 2023-10-16 PROCEDURE — 96402 CHEMO HORMON ANTINEOPL SQ/IM: CPT

## 2023-10-16 PROCEDURE — 2500000004 HC RX 250 GENERAL PHARMACY W/ HCPCS (ALT 636 FOR OP/ED): Performed by: INTERNAL MEDICINE

## 2023-10-16 PROCEDURE — 96411 CHEMO IV PUSH ADDL DRUG: CPT

## 2023-10-16 PROCEDURE — 96413 CHEMO IV INFUSION 1 HR: CPT

## 2023-10-16 PROCEDURE — 86704 HEP B CORE ANTIBODY TOTAL: CPT | Mod: CMCLAB

## 2023-10-16 PROCEDURE — 87340 HEPATITIS B SURFACE AG IA: CPT | Mod: CMCLAB

## 2023-10-16 PROCEDURE — 86706 HEP B SURFACE ANTIBODY: CPT

## 2023-10-16 PROCEDURE — 80053 COMPREHEN METABOLIC PANEL: CPT

## 2023-10-16 PROCEDURE — 2500000005 HC RX 250 GENERAL PHARMACY W/O HCPCS: Performed by: INTERNAL MEDICINE

## 2023-10-16 PROCEDURE — 96361 HYDRATE IV INFUSION ADD-ON: CPT

## 2023-10-16 PROCEDURE — 86706 HEP B SURFACE ANTIBODY: CPT | Mod: CMCLAB

## 2023-10-16 PROCEDURE — 84702 CHORIONIC GONADOTROPIN TEST: CPT

## 2023-10-16 PROCEDURE — 85025 COMPLETE CBC W/AUTO DIFF WBC: CPT

## 2023-10-16 PROCEDURE — 36415 COLL VENOUS BLD VENIPUNCTURE: CPT

## 2023-10-16 PROCEDURE — 84702 CHORIONIC GONADOTROPIN TEST: CPT | Mod: CMCLAB

## 2023-10-16 RX ORDER — FLUOROURACIL 50 MG/ML
600 INJECTION, SOLUTION INTRAVENOUS ONCE
Status: COMPLETED | OUTPATIENT
Start: 2023-10-16 | End: 2023-10-16

## 2023-10-16 RX ORDER — FAMOTIDINE 10 MG/ML
20 INJECTION INTRAVENOUS ONCE AS NEEDED
Status: CANCELLED | OUTPATIENT
Start: 2023-11-13

## 2023-10-16 RX ORDER — LIDOCAINE HYDROCHLORIDE 10 MG/ML
2 INJECTION, SOLUTION EPIDURAL; INFILTRATION; INTRACAUDAL; PERINEURAL ONCE
Status: COMPLETED | OUTPATIENT
Start: 2023-10-16 | End: 2023-10-16

## 2023-10-16 RX ORDER — PALONOSETRON 0.05 MG/ML
0.25 INJECTION, SOLUTION INTRAVENOUS ONCE
Status: COMPLETED | OUTPATIENT
Start: 2023-10-16 | End: 2023-10-16

## 2023-10-16 RX ORDER — EPINEPHRINE 0.3 MG/.3ML
0.3 INJECTION SUBCUTANEOUS EVERY 5 MIN PRN
Status: CANCELLED | OUTPATIENT
Start: 2023-11-13

## 2023-10-16 RX ORDER — METHOTREXATE 25 MG/ML
40 INJECTION INTRA-ARTERIAL; INTRAMUSCULAR; INTRATHECAL; INTRAVENOUS ONCE
Status: COMPLETED | OUTPATIENT
Start: 2023-10-16 | End: 2023-10-16

## 2023-10-16 RX ORDER — LIDOCAINE HYDROCHLORIDE 10 MG/ML
2 INJECTION, SOLUTION EPIDURAL; INFILTRATION; INTRACAUDAL; PERINEURAL ONCE
Status: CANCELLED | OUTPATIENT
Start: 2023-11-13

## 2023-10-16 RX ORDER — FAMOTIDINE 10 MG/ML
20 INJECTION INTRAVENOUS ONCE AS NEEDED
Status: DISCONTINUED | OUTPATIENT
Start: 2023-10-16 | End: 2023-10-16 | Stop reason: HOSPADM

## 2023-10-16 RX ORDER — PROCHLORPERAZINE MALEATE 10 MG
10 TABLET ORAL EVERY 6 HOURS PRN
Status: DISCONTINUED | OUTPATIENT
Start: 2023-10-16 | End: 2023-10-16 | Stop reason: HOSPADM

## 2023-10-16 RX ORDER — ALBUTEROL SULFATE 0.83 MG/ML
3 SOLUTION RESPIRATORY (INHALATION) AS NEEDED
Status: DISCONTINUED | OUTPATIENT
Start: 2023-10-16 | End: 2023-10-16 | Stop reason: HOSPADM

## 2023-10-16 RX ORDER — DIPHENHYDRAMINE HYDROCHLORIDE 50 MG/ML
50 INJECTION INTRAMUSCULAR; INTRAVENOUS AS NEEDED
Status: CANCELLED | OUTPATIENT
Start: 2023-11-13

## 2023-10-16 RX ORDER — ALBUTEROL SULFATE 0.83 MG/ML
3 SOLUTION RESPIRATORY (INHALATION) AS NEEDED
Status: CANCELLED | OUTPATIENT
Start: 2023-11-13

## 2023-10-16 RX ORDER — EPINEPHRINE 0.3 MG/.3ML
0.3 INJECTION SUBCUTANEOUS EVERY 5 MIN PRN
Status: DISCONTINUED | OUTPATIENT
Start: 2023-10-16 | End: 2023-10-16 | Stop reason: HOSPADM

## 2023-10-16 RX ORDER — DIPHENHYDRAMINE HYDROCHLORIDE 50 MG/ML
50 INJECTION INTRAMUSCULAR; INTRAVENOUS AS NEEDED
Status: DISCONTINUED | OUTPATIENT
Start: 2023-10-16 | End: 2023-10-16 | Stop reason: HOSPADM

## 2023-10-16 RX ORDER — PROCHLORPERAZINE EDISYLATE 5 MG/ML
10 INJECTION INTRAMUSCULAR; INTRAVENOUS EVERY 6 HOURS PRN
Status: DISCONTINUED | OUTPATIENT
Start: 2023-10-16 | End: 2023-10-16 | Stop reason: HOSPADM

## 2023-10-16 RX ADMIN — METHOTREXATE 103.5 MG: 25 SOLUTION INTRA-ARTERIAL; INTRAMUSCULAR; INTRATHECAL; INTRAVENOUS at 16:54

## 2023-10-16 RX ADMIN — GOSERELIN ACETATE 3.6 MG: 3.6 IMPLANT SUBCUTANEOUS at 16:46

## 2023-10-16 RX ADMIN — FLUOROURACIL 1550 MG: 50 INJECTION, SOLUTION INTRAVENOUS at 17:07

## 2023-10-16 RX ADMIN — LIDOCAINE HYDROCHLORIDE 20 MG: 10 INJECTION, SOLUTION EPIDURAL; INFILTRATION; INTRACAUDAL; PERINEURAL at 16:41

## 2023-10-16 RX ADMIN — PALONOSETRON 250 MCG: 0.05 INJECTION, SOLUTION INTRAVENOUS at 15:34

## 2023-10-16 RX ADMIN — Medication 1500 MG: at 16:06

## 2023-10-16 RX ADMIN — SODIUM CHLORIDE 500 ML: 9 INJECTION, SOLUTION INTRAVENOUS at 14:34

## 2023-10-16 RX ADMIN — DEXAMETHASONE SODIUM PHOSPHATE 12 MG: 10 INJECTION, SOLUTION INTRAMUSCULAR; INTRAVENOUS at 15:34

## 2023-10-16 NOTE — PROGRESS NOTES
Patient is requesting a medical certification form from the [employer]. The company has issued a shut off notice. Her outstanding bill exceeds $2,000.00. she was unable to sign up for the PIP plan to help with her bills. I have sent an email to the [employer] requesting the medical certification form which will be completed and signed by physician. Email: [facility] [employer]  phone number [phone].  I have left a message for patient on her cell phone re: this situation.    MIGUEL Blood

## 2023-10-17 ENCOUNTER — SOCIAL WORK (OUTPATIENT)
Dept: CASE MANAGEMENT | Facility: HOSPITAL | Age: 33
End: 2023-10-17
Payer: COMMERCIAL

## 2023-10-17 NOTE — PROGRESS NOTES
SW spoke with patient this date. She reports that she spoke with a representative from the [employer] and was awarded a 30 day  extension before a shutoff would take place. Patient is working to Get on the PIP plan that helps with bills.    MIGUEL Astudillo

## 2023-10-18 ENCOUNTER — TELEPHONE (OUTPATIENT)
Dept: HEMATOLOGY/ONCOLOGY | Facility: HOSPITAL | Age: 33
End: 2023-10-18
Payer: COMMERCIAL

## 2023-10-18 DIAGNOSIS — E78.5 HYPERLIPIDEMIA, UNSPECIFIED HYPERLIPIDEMIA TYPE: Primary | ICD-10-CM

## 2023-10-18 RX ORDER — ATORVASTATIN CALCIUM 40 MG/1
40 TABLET, FILM COATED ORAL NIGHTLY
Qty: 90 TABLET | Refills: 3 | Status: SHIPPED | OUTPATIENT
Start: 2023-10-18 | End: 2023-11-29 | Stop reason: SDUPTHER

## 2023-10-19 ENCOUNTER — NUTRITION (OUTPATIENT)
Dept: SURGERY | Facility: CLINIC | Age: 33
End: 2023-10-19
Payer: COMMERCIAL

## 2023-10-19 VITALS — WEIGHT: 287 LBS | HEIGHT: 70 IN | BODY MASS INDEX: 41.09 KG/M2

## 2023-10-19 NOTE — PROGRESS NOTES
"PREOPERATIVE, MULTIDISCIPLINARY, MEDICALLY SUPERVISED, REDUCED CALORIE DIET, BEHAVIOR MODIFICATION AND EXERCISE PROGRAM    S:  May need to wait a year+ until she can have surgery d/t her cancer dx. The pt will continue to get chemo until next month. Recently started to eat again this month and is showing a little bit of weight gain. B: egg (2) L: crab dip and cracker or tuna/turkey (3oz) sandwich, D: chicken wing in air fryer and salad, S: fruits. Continues to get 64oz of water and Gatorade zero. Continues to practice post op behaviors. Continues to take a MVI.     O:    Wt: 287lbs     Ht:    1.781 m (5' 10.1\")          BMI: 41    Goal: 5% body weight loss over the course of program    Dietary recommendation:   1. Continue to drink 64oz of beverages.   2. Practice the 30-30-30 rule by drinking between meals.  3. Structure your meal plan - have 3 meals and 1 snack daily.  4. Have balanced meals that always contain a good source of protein.  5. Increase intake of non-starchy vegetables.  Have 5 servings fruits and vegetables daily.   6. Continue to take a multivitamin daily.  7. Continue walking and increase physical activity by 10-15 minutes to an end goal of 60 minutes 5 x per week.    Group Topic: Eating Triggers and Controlling Environment    Behavioral recommendation: Pt will be able to identify eating triggers and how to avoid them.    A/P: Pt appears to be able to recognize eating triggers and how to avoid eating when not hungry and/or not eating when it is not time for a meal or snack. Pt will continue to practice being mindful of healthy eating habits. The pt is doing well and is cleared from a nutrition standpoint. Will call pt to check in over the next 2 months.     Exercise: walking for 10-15 minutes 2-3x per week     Amanda Soria RDN, LD  "

## 2023-10-23 ENCOUNTER — PATIENT OUTREACH (OUTPATIENT)
Dept: CARE COORDINATION | Facility: CLINIC | Age: 33
End: 2023-10-23
Payer: COMMERCIAL

## 2023-10-23 NOTE — PROGRESS NOTES
Follow up call after hospitalization. Patient did not have PCP appt within 14 days of discharge.   At time of outreach call the patient feels as if their condition has (improved) since last visit.  Addressed any questions or concerns.

## 2023-11-01 LAB
GENETICS TEST NAME-DATA CONVERSION: NORMAL
LAB MOLECULAR CA TECHNICAL NOTES: NORMAL

## 2023-11-08 DIAGNOSIS — L50.9 URTICARIA: ICD-10-CM

## 2023-11-08 DIAGNOSIS — T78.3XXA ANGIOEDEMA, INITIAL ENCOUNTER: ICD-10-CM

## 2023-11-08 DIAGNOSIS — L50.3 URTICARIA, DERMATOGRAPHIC: ICD-10-CM

## 2023-11-08 RX ORDER — LORATADINE 10 MG/1
20 TABLET ORAL 2 TIMES DAILY
Qty: 120 TABLET | Refills: 0 | Status: SHIPPED | OUTPATIENT
Start: 2023-11-08 | End: 2023-11-13 | Stop reason: SDUPTHER

## 2023-11-09 ENCOUNTER — LAB (OUTPATIENT)
Dept: LAB | Facility: HOSPITAL | Age: 33
End: 2023-11-09
Payer: COMMERCIAL

## 2023-11-09 ENCOUNTER — INFUSION (OUTPATIENT)
Dept: HEMATOLOGY/ONCOLOGY | Facility: HOSPITAL | Age: 33
End: 2023-11-09
Payer: COMMERCIAL

## 2023-11-09 ENCOUNTER — OFFICE VISIT (OUTPATIENT)
Dept: HEMATOLOGY/ONCOLOGY | Facility: HOSPITAL | Age: 33
End: 2023-11-09
Payer: COMMERCIAL

## 2023-11-09 ENCOUNTER — APPOINTMENT (OUTPATIENT)
Dept: HEMATOLOGY/ONCOLOGY | Facility: HOSPITAL | Age: 33
End: 2023-11-09
Payer: COMMERCIAL

## 2023-11-09 VITALS
TEMPERATURE: 98.2 F | RESPIRATION RATE: 20 BRPM | WEIGHT: 293 LBS | HEART RATE: 100 BPM | SYSTOLIC BLOOD PRESSURE: 129 MMHG | OXYGEN SATURATION: 99 % | HEIGHT: 70 IN | BODY MASS INDEX: 41.95 KG/M2 | DIASTOLIC BLOOD PRESSURE: 73 MMHG

## 2023-11-09 DIAGNOSIS — C50.919 MALIGNANT NEOPLASM OF FEMALE BREAST, UNSPECIFIED ESTROGEN RECEPTOR STATUS, UNSPECIFIED LATERALITY, UNSPECIFIED SITE OF BREAST (MULTI): ICD-10-CM

## 2023-11-09 DIAGNOSIS — Z01.818 EXAMINATION PRIOR TO CHEMOTHERAPY: Primary | ICD-10-CM

## 2023-11-09 DIAGNOSIS — L50.9 HIVES: ICD-10-CM

## 2023-11-09 DIAGNOSIS — Z79.818 USE OF GOSERELIN ACETATE (ZOLADEX): ICD-10-CM

## 2023-11-09 DIAGNOSIS — N95.9 PREMENOPAUSAL PATIENT: ICD-10-CM

## 2023-11-09 LAB
ALBUMIN SERPL BCP-MCNC: 4.6 G/DL (ref 3.4–5)
ALP SERPL-CCNC: 66 U/L (ref 33–110)
ALT SERPL W P-5'-P-CCNC: 65 U/L (ref 7–45)
ANION GAP SERPL CALC-SCNC: 16 MMOL/L (ref 10–20)
AST SERPL W P-5'-P-CCNC: 40 U/L (ref 9–39)
B-HCG SERPL-ACNC: <3 MIU/ML
BASOPHILS # BLD AUTO: 0.03 X10*3/UL (ref 0–0.1)
BASOPHILS NFR BLD AUTO: 0.5 %
BILIRUB SERPL-MCNC: 0.4 MG/DL (ref 0–1.2)
BUN SERPL-MCNC: 14 MG/DL (ref 6–23)
CALCIUM SERPL-MCNC: 10 MG/DL (ref 8.6–10.3)
CHLORIDE SERPL-SCNC: 101 MMOL/L (ref 98–107)
CO2 SERPL-SCNC: 26 MMOL/L (ref 21–32)
CREAT SERPL-MCNC: 0.72 MG/DL (ref 0.5–1.05)
EOSINOPHIL # BLD AUTO: 0.06 X10*3/UL (ref 0–0.7)
EOSINOPHIL NFR BLD AUTO: 1.1 %
ERYTHROCYTE [DISTWIDTH] IN BLOOD BY AUTOMATED COUNT: 14.6 % (ref 11.5–14.5)
GFR SERPL CREATININE-BSD FRML MDRD: >90 ML/MIN/1.73M*2
GLUCOSE SERPL-MCNC: 165 MG/DL (ref 74–99)
HCT VFR BLD AUTO: 40.4 % (ref 36–46)
HGB BLD-MCNC: 13.6 G/DL (ref 12–16)
IMM GRANULOCYTES # BLD AUTO: 0.01 X10*3/UL (ref 0–0.7)
IMM GRANULOCYTES NFR BLD AUTO: 0.2 % (ref 0–0.9)
LYMPHOCYTES # BLD AUTO: 2.02 X10*3/UL (ref 1.2–4.8)
LYMPHOCYTES NFR BLD AUTO: 35.4 %
MCH RBC QN AUTO: 28.5 PG (ref 26–34)
MCHC RBC AUTO-ENTMCNC: 33.7 G/DL (ref 32–36)
MCV RBC AUTO: 85 FL (ref 80–100)
MONOCYTES # BLD AUTO: 0.31 X10*3/UL (ref 0.1–1)
MONOCYTES NFR BLD AUTO: 5.4 %
NEUTROPHILS # BLD AUTO: 3.28 X10*3/UL (ref 1.2–7.7)
NEUTROPHILS NFR BLD AUTO: 57.4 %
NRBC BLD-RTO: 0 /100 WBCS (ref 0–0)
PLATELET # BLD AUTO: 259 X10*3/UL (ref 150–450)
POTASSIUM SERPL-SCNC: 4.3 MMOL/L (ref 3.5–5.3)
PROT SERPL-MCNC: 8.1 G/DL (ref 6.4–8.2)
RBC # BLD AUTO: 4.77 X10*6/UL (ref 4–5.2)
SODIUM SERPL-SCNC: 139 MMOL/L (ref 136–145)
WBC # BLD AUTO: 5.7 X10*3/UL (ref 4.4–11.3)

## 2023-11-09 PROCEDURE — 2500000005 HC RX 250 GENERAL PHARMACY W/O HCPCS: Performed by: INTERNAL MEDICINE

## 2023-11-09 PROCEDURE — 1036F TOBACCO NON-USER: CPT | Performed by: INTERNAL MEDICINE

## 2023-11-09 PROCEDURE — 99215 OFFICE O/P EST HI 40 MIN: CPT | Performed by: INTERNAL MEDICINE

## 2023-11-09 PROCEDURE — 84702 CHORIONIC GONADOTROPIN TEST: CPT

## 2023-11-09 PROCEDURE — 99215 OFFICE O/P EST HI 40 MIN: CPT | Mod: 25 | Performed by: INTERNAL MEDICINE

## 2023-11-09 PROCEDURE — 3008F BODY MASS INDEX DOCD: CPT | Performed by: INTERNAL MEDICINE

## 2023-11-09 PROCEDURE — 96375 TX/PRO/DX INJ NEW DRUG ADDON: CPT | Mod: INF

## 2023-11-09 PROCEDURE — 3051F HG A1C>EQUAL 7.0%<8.0%: CPT | Performed by: INTERNAL MEDICINE

## 2023-11-09 PROCEDURE — 3078F DIAST BP <80 MM HG: CPT | Performed by: INTERNAL MEDICINE

## 2023-11-09 PROCEDURE — 3074F SYST BP LT 130 MM HG: CPT | Performed by: INTERNAL MEDICINE

## 2023-11-09 PROCEDURE — 85025 COMPLETE CBC W/AUTO DIFF WBC: CPT

## 2023-11-09 PROCEDURE — 96411 CHEMO IV PUSH ADDL DRUG: CPT

## 2023-11-09 PROCEDURE — 80053 COMPREHEN METABOLIC PANEL: CPT

## 2023-11-09 PROCEDURE — 96413 CHEMO IV INFUSION 1 HR: CPT

## 2023-11-09 PROCEDURE — 36415 COLL VENOUS BLD VENIPUNCTURE: CPT

## 2023-11-09 PROCEDURE — 2500000004 HC RX 250 GENERAL PHARMACY W/ HCPCS (ALT 636 FOR OP/ED): Performed by: INTERNAL MEDICINE

## 2023-11-09 PROCEDURE — 96361 HYDRATE IV INFUSION ADD-ON: CPT | Mod: INF

## 2023-11-09 PROCEDURE — 2500000004 HC RX 250 GENERAL PHARMACY W/ HCPCS (ALT 636 FOR OP/ED): Mod: JZ | Performed by: INTERNAL MEDICINE

## 2023-11-09 RX ORDER — HEPARIN 100 UNIT/ML
500 SYRINGE INTRAVENOUS AS NEEDED
OUTPATIENT
Start: 2023-11-09

## 2023-11-09 RX ORDER — EPINEPHRINE 0.3 MG/.3ML
0.3 INJECTION SUBCUTANEOUS EVERY 5 MIN PRN
Status: CANCELLED | OUTPATIENT
Start: 2023-11-13

## 2023-11-09 RX ORDER — FLUOROURACIL 50 MG/ML
600 INJECTION, SOLUTION INTRAVENOUS ONCE
Status: COMPLETED | OUTPATIENT
Start: 2023-11-09 | End: 2023-11-09

## 2023-11-09 RX ORDER — PALONOSETRON 0.05 MG/ML
0.25 INJECTION, SOLUTION INTRAVENOUS ONCE
Status: COMPLETED | OUTPATIENT
Start: 2023-11-09 | End: 2023-11-09

## 2023-11-09 RX ORDER — DIPHENHYDRAMINE HYDROCHLORIDE 50 MG/ML
50 INJECTION INTRAMUSCULAR; INTRAVENOUS AS NEEDED
Status: CANCELLED | OUTPATIENT
Start: 2023-11-13

## 2023-11-09 RX ORDER — FAMOTIDINE 10 MG/ML
20 INJECTION INTRAVENOUS ONCE AS NEEDED
Status: CANCELLED | OUTPATIENT
Start: 2023-11-13

## 2023-11-09 RX ORDER — HEPARIN SODIUM,PORCINE/PF 10 UNIT/ML
50 SYRINGE (ML) INTRAVENOUS AS NEEDED
OUTPATIENT
Start: 2023-11-09

## 2023-11-09 RX ORDER — LIDOCAINE HYDROCHLORIDE 10 MG/ML
2 INJECTION, SOLUTION EPIDURAL; INFILTRATION; INTRACAUDAL; PERINEURAL ONCE
Status: COMPLETED | OUTPATIENT
Start: 2023-11-09 | End: 2023-11-09

## 2023-11-09 RX ORDER — METHOTREXATE 25 MG/ML
40 INJECTION INTRA-ARTERIAL; INTRAMUSCULAR; INTRATHECAL; INTRAVENOUS ONCE
Status: COMPLETED | OUTPATIENT
Start: 2023-11-09 | End: 2023-11-09

## 2023-11-09 RX ORDER — PROCHLORPERAZINE MALEATE 10 MG
10 TABLET ORAL EVERY 6 HOURS PRN
Status: DISCONTINUED | OUTPATIENT
Start: 2023-11-09 | End: 2023-11-09 | Stop reason: HOSPADM

## 2023-11-09 RX ORDER — ALBUTEROL SULFATE 0.83 MG/ML
3 SOLUTION RESPIRATORY (INHALATION) AS NEEDED
Status: CANCELLED | OUTPATIENT
Start: 2023-11-13

## 2023-11-09 RX ORDER — LIDOCAINE HYDROCHLORIDE 10 MG/ML
2 INJECTION, SOLUTION EPIDURAL; INFILTRATION; INTRACAUDAL; PERINEURAL ONCE
Status: CANCELLED | OUTPATIENT
Start: 2023-11-13

## 2023-11-09 RX ORDER — PROCHLORPERAZINE EDISYLATE 5 MG/ML
10 INJECTION INTRAMUSCULAR; INTRAVENOUS EVERY 6 HOURS PRN
Status: DISCONTINUED | OUTPATIENT
Start: 2023-11-09 | End: 2023-11-09 | Stop reason: HOSPADM

## 2023-11-09 RX ADMIN — CYCLOPHOSPHAMIDE 1500 MG: 1 INJECTION, POWDER, FOR SOLUTION INTRAVENOUS; ORAL at 14:59

## 2023-11-09 RX ADMIN — SODIUM CHLORIDE 500 ML: 9 INJECTION, SOLUTION INTRAVENOUS at 13:47

## 2023-11-09 RX ADMIN — FLUOROURACIL 1550 MG: 50 INJECTION, SOLUTION INTRAVENOUS at 15:59

## 2023-11-09 RX ADMIN — DEXAMETHASONE SODIUM PHOSPHATE 12 MG: 10 INJECTION, SOLUTION INTRAMUSCULAR; INTRAVENOUS at 13:51

## 2023-11-09 RX ADMIN — METHOTREXATE SODIUM 103.5 MG: 25 INJECTION, SOLUTION INTRA-ARTERIAL; INTRAMUSCULAR; INTRAVENOUS at 15:46

## 2023-11-09 RX ADMIN — PALONOSETRON HYDROCHLORIDE 250 MCG: 0.25 INJECTION INTRAVENOUS at 13:49

## 2023-11-09 RX ADMIN — LIDOCAINE HYDROCHLORIDE 20 MG: 10 INJECTION, SOLUTION EPIDURAL; INFILTRATION; INTRACAUDAL; PERINEURAL at 14:10

## 2023-11-09 RX ADMIN — GOSERELIN ACETATE 3.6 MG: 3.6 IMPLANT SUBCUTANEOUS at 14:10

## 2023-11-09 ASSESSMENT — PAIN SCALES - GENERAL: PAINLEVEL: 8

## 2023-11-09 NOTE — PROGRESS NOTES
Patient ID: Nia Harper is a 33 y.o. female.  MRN: [id number]  : 1990  The patient presents to clinic today for her history of breast cancer.     Cancer Staging   Malignant neoplasm of female breast (CMS/HCC)  Staging form: Breast, AJCC 8th Edition  - Pathologic stage from 2023: Stage IB (pT2(3), pN0(sn), cM0, G3, ER+, ND+, HER2-, Oncotype DX score: 45) - Signed by Jeramie Lancaster MD on 10/10/2023    Diagnostic/Therapeutic History:  -Enlarging right breast lumps.  -5/15/23: Dx MG/US demonstrated multiple masses: 1.1 cm (11:00, 8 cm FN); 1 cm (9:30, 5 cm FN); 1.2 cm (9:00, 6 cm FN); 0.7 cm (10:00, 6 cm FN); 0.4 cm (10:00, 5 cm FN). Four lymph nodes demonstrated suspicious cortical thickening.  -23: US-guided CNB of the 11:00 mass showed IDC grade 3; ER >95% ND 85% Her2-negative (1+ IHC). Biopsy of the 9:30 mass showed IDC grade 2-3; ER 80% ND 10% Her2-negative (1+ IHC). A right axillary LN was negative for carcinoma.  -23: Right mastectomy and SLNBx performed. Multipe foci of grade 3 carcinoma noted (3.1 cm, 2.2 cm, 1.8 cm), no LVI, margins negative, 0/3 LN’s involved IB, pT2 pN0 cM0 G3  Oncotype Dx RS 45 (33% 9-year recurrence risk; >15% chemo benefit).  RSClin: 59% recurrence risk with tamoxifen; 44% chemo benefit.  -23: Adjuvant TC (docetaxel, cyclophosphamide) initiated. First cycle complicated by admission for nausea/vomiting. Second cycle complicated by admission for angioedema, hives; thought to be secondary to docetaxel. Switched to CMF (cyclophosphamide, methotrexate, fluorouracil) on 10/16/23. Second (and last) cycle given 23.    History of Present Illness (HPI)/Interval History:  Nia Harper presents for routine FUV prior to last CMF treatment   She tolerated last cycle of CMF , admitted to some diarrhea , also some tiredness.   Seen Derm and is on Claritin 20 mg BID , also on Pepcid 40 mg bid .   Having some dizziness and dry mouth .   Asked for refill on ativan for  anxiety , takes it prn at night   Asked about ovarian suppression , hormonal therapy and future child bearing , she wants to have one more child before she turns 38 .    Review of Systems:  14-point ROS otherwise negative, as per HPI/Interval History.    Past Medical History:   Diagnosis Date    22 weeks gestation of pregnancy 2019    22 weeks gestation of pregnancy    24 weeks gestation of pregnancy 2019    24 weeks gestation of pregnancy    Abnormal chromosomal and genetic finding on  screening of mother 2019    Abnormal genetic test during pregnancy    Abnormal hematological finding on  screening of mother 2019    Abnormal maternal serum screening test    Abnormal levels of other serum enzymes 2021    Elevated liver enzymes    Abnormal levels of other serum enzymes 2021    Elevated liver enzymes    Acute candidiasis of vulva and vagina 10/15/2015    Vaginal candidiasis    Acute candidiasis of vulva and vagina 10/05/2018    Vaginitis due to Candida albicans    Acute vaginitis 2020    Bacterial vaginosis    Anxiety disorder, unspecified 2019    Anxiety and depression    Body mass index (BMI)40.0-44.9, adult 2019    BMI 40.0-44.9, adult    Candidiasis, unspecified 2021    Yeast infection    Carpal tunnel syndrome, bilateral upper limbs 2019    Carpal tunnel syndrome on both sides    Carpal tunnel syndrome, left upper limb 2019    Left carpal tunnel syndrome    Carpal tunnel syndrome, right upper limb 2019    Right carpal tunnel syndrome    Depression, unspecified 2021    Depression    Disruption of external operation (surgical) wound, not elsewhere classified, initial encounter 2019    Open draining abdominal incision    Encounter for change or removal of nonsurgical wound dressing 2018    Dressing change    Encounter for contraceptive management, unspecified 2015    Contraceptive management     Encounter for contraceptive management, unspecified 04/06/2015    Encounter for contraceptive management    Encounter for examination of eyes and vision without abnormal findings 07/14/2016    Diabetic eye exam    Encounter for gynecological examination (general) (routine) without abnormal findings 05/20/2021    Encounter for well woman exam with routine gynecological exam    Encounter for gynecological examination (general) (routine) without abnormal findings 07/28/2015    Encounter for cervical Pap smear with pelvic exam    Encounter for immunization 10/31/2017    Need for Tdap vaccination    Encounter for initial prescription of contraceptive pills 02/06/2018    Encounter for initial prescription of contraceptive pills    Encounter for other general counseling and advice on contraception 06/11/2018    Encounter for counseling regarding initiation of other contraceptive measure    Encounter for other general counseling and advice on procreation 11/28/2017    Encounter for preconception consultation    Encounter for other preprocedural examination 11/28/2017    Pre-op testing    Encounter for pregnancy test, result positive 11/28/2017    Pregnancy confirmed by positive urine test    Encounter for routine postpartum follow-up 02/06/2018    Postpartum exam    Encounter for screening for infections with a predominantly sexual mode of transmission 06/04/2019    Routine screening for STI (sexually transmitted infection)    Encounter for screening for infections with a predominantly sexual mode of transmission     Routine screening for STI (sexually transmitted infection)    Encounter for supervision of normal pregnancy, unspecified, unspecified trimester 11/28/2017    Encounter for pregnancy related examination    Encounter for surveillance of injectable contraceptive 08/19/2020    Encounter for Depo-Provera contraception    Glycosuria 06/30/2014    Glucosuria    Irregular menstruation, unspecified 12/21/2015    Missed  periods    Irregular menstruation, unspecified 2019    Missed menses    Maternal care for other (suspected) fetal abnormality and damage, fetal cardiac anomalies, not applicable or unspecified 2019    Abnormal fetal echocardiogram affecting antepartum care of mother    Maternal care for other known or suspected poor fetal growth, unspecified trimester, not applicable or unspecified 2019    Intrauterine growth restriction,     Maternal care for unspecified type scar from previous  delivery 2019    Uterine scar from previous  delivery complicating pregnancy    Missed  2019    , missed    Mixed hyperlipidemia 2018    Hyperlipemia, mixed    Morbid (severe) obesity due to excess calories (CMS/[facility]) 2019    Severe obesity (BMI >= 40)    Myopia, bilateral 09/15/2017    Myopia of both eyes with astigmatism    Obesity complicating pregnancy, unspecified trimester 2019    Obesity in pregnancy    Other conditions influencing health status 2017    History of cough    Other conditions influencing health status 2017    History of pregnancy    Other conditions influencing health status 2017    Type 2 diabetes mellitus, uncontrolled    Other infections with a predominantly sexual mode of transmission complicating pregnancy, unspecified trimester 2020    Genital herpes affecting pregnancy    Other mental disorders complicating pregnancy, unspecified trimester 2019    Depression affecting pregnancy, antepartum    Other specified pregnancy related conditions, second trimester 2019    Pregnancy headache in second trimester    Pain in right hand 2017    Pain of right hand    Personal history of other complications of pregnancy, childbirth and the puerperium 2015    History of galactorrhea    Personal history of other diseases of the circulatory system 2019    History of hypertension    Personal  history of other diseases of the female genital tract 07/14/2015    History of vaginitis    Personal history of other diseases of the female genital tract 11/28/2017    History of irregular menstrual cycles    Personal history of other diseases of the female genital tract 11/28/2017    History of amenorrhea    Personal history of other diseases of the female genital tract 08/24/2015    History of amenorrhea    Personal history of other diseases of the female genital tract 10/24/2014    History of vaginal discharge    Personal history of other diseases of the respiratory system 10/26/2014    History of bronchitis    Personal history of other drug therapy 09/19/2017    History of influenza vaccination    Personal history of other endocrine, nutritional and metabolic disease 06/11/2019    History of type 2 diabetes mellitus    Personal history of other endocrine, nutritional and metabolic disease 11/28/2017    History of hypoglycemia    Personal history of other endocrine, nutritional and metabolic disease 11/28/2017    History of hyperglycemia    Personal history of other infectious and parasitic diseases 06/11/2019    History of herpes genitalis    Personal history of other infectious and parasitic diseases 01/29/2019    History of wound infection    Personal history of other mental and behavioral disorders 09/08/2017    History of anxiety    Personal history of other mental and behavioral disorders 09/08/2017    History of depression    Personal history of other specified conditions 01/23/2015    No post-op complications    Personal history of other specified conditions 11/28/2017    History of nausea    Personal history of other specified conditions 05/01/2019    History of headache    Personal history of other specified conditions 07/30/2018    History of breast lump    Personal history of other specified conditions 05/01/2019    History of insomnia    Personal history of other specified conditions 10/17/2017     History of lump of right breast    Personal history of other specified conditions 03/04/2019    History of nausea and vomiting    Pre-existing type 2 diabetes mellitus, in pregnancy, unspecified trimester 12/09/2019    Pre-existing type 2 diabetes mellitus during pregnancy, antepartum    Pre-existing type 2 diabetes mellitus, in pregnancy, unspecified trimester 12/15/2021    Type 2 diabetes mellitus during pregnancy    Pregnancy with inconclusive fetal viability, not applicable or unspecified 11/28/2017    Pregnancy with uncertain fetal viability    Solitary cyst of right breast 05/20/2021    Cyst of right breast    Supervision of young multigravida, unspecified trimester 12/09/2019    Encounter for supervision of high-risk pregnancy of young multigravida    Type 2 diabetes mellitus without complications (CMS/HCC) 09/14/2022    Diabetes    Unspecified maternal hypertension, second trimester 11/28/2017    Hypertension during pregnancy in second trimester    Unspecified maternal hypertension, unspecified trimester 11/28/2017    Hypertension in pregnancy, antepartum    Unspecified pre-existing hypertension complicating pregnancy, unspecified trimester 12/09/2019    Chronic hypertension in pregnancy     Patient Active Problem List   Diagnosis    Abnormal uterine bleeding    Abscess    Abscess of breast, postpartum    Acne    Astigmatism of both eyes    Bacterial vaginosis    Breast skin changes    Cutaneous abscess of chest wall    Cyst of right breast    Lump of right breast    De Quervain's tenosynovitis, right    Depression    Diabetes (CMS/HCC)    Diabetes mellitus type 2 without retinopathy (CMS/HCC)    Diffuse goiter    Dyspnea on exertion    Dysuria    Elevated liver enzymes    Epidermal inclusion cyst    Herpes simplex virus (HSV) infection    History of breast cancer    Hyperlipemia    Hypertriglyceridemia    Impaired skin integrity associated with surgical incision    Infection of great toe    Left carpal  tunnel syndrome    Mild postpartum depression    Myopia of both eyes    Nexplanon in place    Nipple discharge, bloody    Nonpuerperal abscess of left breast    Other skin changes    S/P  section    Suspected sleep apnea    Uncontrolled type 2 diabetes mellitus with hyperglycemia (CMS/HCC)    Urine frequency    Vaginal irritation    Vaginal Pap smear with ASC-US    Fatigue    Weakness generalized    Wound infection after surgery    Yeast infection    Vaginal odor    Chronic headaches    BMI 40.0-44.9, adult (CMS/[facility])    Class 3 severe obesity due to excess calories with serious comorbidity and body mass index (BMI) of 40.0 to 44.9 in adult (CMS/[facility])    Malignant neoplasm of female breast (CMS/[facility])    Nausea and vomiting    Primary hypertension    Hospital discharge follow-up        Past Surgical History:   Procedure Laterality Date     SECTION, CLASSIC  2019     Section    MR HEAD ANGIO WO IV CONTRAST  2015    MR HEAD ANGIO WO IV CONTRAST 2015 [facility]    OTHER SURGICAL HISTORY  2019    Surgical Treatment Of Missed  In First Trimester       Social History     Socioeconomic History    Marital status: Single     Spouse name: Not on file    Number of children: Not on file    Years of education: Not on file    Highest education level: Not on file   Occupational History    Not on file   Tobacco Use    Smoking status: Never    Smokeless tobacco: Never   Vaping Use    Vaping Use: Never used   Substance and Sexual Activity    Alcohol use: Never    Drug use: Yes     Types: Marijuana    Sexual activity: Not on file   Other Topics Concern    Not on file   Social History Narrative    Not on file     Social Determinants of Health     Financial Resource Strain: Not on file   Food Insecurity: Not on file   Transportation Needs: Not on file   Physical Activity: Not on file   Stress: Not on file   Social Connections: Not on file   Intimate Partner Violence: Not on file  "  Housing Stability: Not on file       Family History   Problem Relation Name Age of Onset    Heart disease Father      Diabetes type II Mother's Sister          uncontrolled    Breast cancer Father's Sister          two paternal 1st cousins (through paternal uncle, both sisters, both early onset, one ). two paternal great aunts (through PGF's side, both  in their 50s/60s)    Cancer Father's Sister      Cancer Father's Brother      Cancer Maternal Grandmother      Diabetes type II Maternal Grandmother      Cancer Maternal Grandfather      Cancer Paternal Grandmother      Breast cancer Paternal Cousin          two paternal 1st cousins (through paternal uncle, both sisters, both early onset, one ). two paternal great aunts (through PGF's side, both  in their 50s/60s)       Allergies:   Allergies   Allergen Reactions    Cephalexin Hives, Other, Rash and Swelling     \"welts\"    Sulfamethoxazole-Trimethoprim Hives, Rash and Swelling         Current Outpatient Medications:     atorvastatin (Lipitor) 40 mg tablet, Take 1 tablet (40 mg) by mouth once daily at bedtime., Disp: 90 tablet, Rfl: 3    benzoyl peroxide 5 % external wash, Apply topically once daily. Lather onto skin folds, leave on 2 minutes, rinse with water, Disp: 236 g, Rfl: 11    blood sugar diagnostic (True Metrix Glucose Test Strip) strip, 4 times a day., Disp: , Rfl:     clindamycin (Cleocin T) 1 % external solution, Apply topically once daily., Disp: 60 mL, Rfl: 11    dulaglutide (Trulicity) 1.5 mg/0.5 mL pen injector injection, Inject 1.5 mg under the skin 1 (one) time per week., Disp: 2 mL, Rfl: 11    escitalopram (Lexapro) 10 mg tablet, Take 3 tablets (30 mg) by mouth once daily., Disp: , Rfl:     famotidine (Pepcid) 40 mg tablet, Take 1 tablet (40 mg) by mouth 2 times a day., Disp: 60 tablet, Rfl: 11    flash glucose sensor (FREESTYLE NGUY[place]N 2 SENSOR [facility] [place] Change sensor every 14 days as directed, Disp: , Rfl:     FREESTYLE " "LANCETS MISC, Use 4 lancets each day, Disp: , Rfl:     insulin glargine (Lantus Solostar U-100 Insulin) 100 unit/mL (3 mL) pen, Inject 40 Units under the skin 2 times a day., Disp: 3 mL, Rfl: 2    insulin lispro (HumaLOG) 100 unit/mL injection, inject up to 20 units before meals per sliding scale, Disp: , Rfl:     loratadine (Claritin) 10 mg tablet, TAKE 2 TABLETS BY MOUTH TWO TIMES A DAY, LAST DOSE IS 10/7, Disp: 48 tablet, Rfl: 0    metFORMIN XR (Glucophage-XR) 500 mg 24 hr tablet, Take 2 tablets (1,000 mg) by mouth 2 times a day with meals., Disp: , Rfl:     omega-3 acid ethyl esters (Lovaza) 1 gram capsule, Take 2 capsules (2 g) by mouth 2 times a day with meals., Disp: , Rfl:     pen needle, diabetic (BD Ultra-Fine Mini Pen Needle) 31 gauge x 3/16\" needle, Use 4 a day as directed, Disp: , Rfl:     pen needle, diabetic (TechLITE Pen Needle) 31 gauge x 5/16\" needle, , Disp: , Rfl:     prochlorperazine (Compazine) 10 mg tablet, Take 1 tablet (10 mg) by mouth every 6 hours if needed for nausea., Disp: 30 tablet, Rfl: 3    valACYclovir (Valtrex) 500 mg tablet, Take 1 tablet (500 mg) by mouth 2 times a day., Disp: , Rfl:     famotidine (Pepcid) 40 mg tablet, Take 1 tablet (40 mg) by mouth 2 times a day., Disp: , Rfl:     LORazepam (Ativan) 1 mg tablet, TAKE 1 TABLET BY MOUTH ONCE DAILY AS NEEDED FOR ANXIETY, Disp: 7 tablet, Rfl: 0    medroxyPROGESTERone (Depo-Provera) 150 mg/mL injection, MedroxyPROGESTERone Acetate 150 MG/ML Intramuscular Suspension  Refills: 0     Active, Disp: , Rfl:      Objective    BSA: 2.59 meters squared  /73   Pulse 100   Temp 36.8 °C (98.2 °F) (Skin)   Resp 20   Ht 1.781 m (5' 10.12\")   Wt 136 kg (299 lb 6.2 oz)   SpO2 99%   BMI 42.81 kg/m²     ECOG Performance Status:  0 Fully active, able to carry on all pre-disease performance without restriction.  1 Restricted in physically strenuous activity but ambulatory and able to carry out work of a light or sedentary nature, e.g., " light house work, office work.  2 Ambulatory and capable of all selfcare but unable to carry out any work activities; up and about more than 50% of waking hours.  3 Capable of only limited selfcare; confined to bed or chair more than 50% of waking hours.  4 Completely disabled; cannot carry on any selfcare; totally confined to bed or chair.    Physical Exam  Constitutional:       General: She is not in acute distress.     Appearance: Normal appearance.   HENT:      Head: Normocephalic and atraumatic.      Mouth/Throat:      Mouth: Mucous membranes are moist.      Pharynx: Oropharynx is clear. No oropharyngeal exudate.   Eyes:      General: No scleral icterus.     Conjunctiva/sclera: Conjunctivae normal.   Cardiovascular:      Rate and Rhythm: Normal rate and regular rhythm.      Heart sounds: No murmur heard.  Pulmonary:      Effort: Pulmonary effort is normal. No respiratory distress.      Breath sounds: Normal breath sounds.   Musculoskeletal:         General: No swelling, tenderness or deformity. Normal range of motion.      Cervical back: Normal range of motion and neck supple. No rigidity.   Lymphadenopathy:      Cervical: No cervical adenopathy.   Skin:     General: Skin is warm and dry.      Coloration: Skin is not jaundiced.      Findings: No rash.      Comments: No hives noted today.   Neurological:      Mental Status: She is alert and oriented to person, place, and time.      Gait: Gait normal.   Psychiatric:         Mood and Affect: Mood normal.         Behavior: Behavior normal.         Thought Content: Thought content normal.         Judgment: Judgment normal.      Comments: Occasionally tearful.         Laboratory Data:  Lab Results   Component Value Date    WBC 5.7 11/09/2023    HGB 13.6 11/09/2023    HCT 40.4 11/09/2023    MCV 85 11/09/2023     11/09/2023    ANC 15.37 (H) 09/26/2023       Chemistry    Lab Results   Component Value Date/Time     11/09/2023 1036    K 4.3 11/09/2023 1036      11/09/2023 1036    CO2 26 11/09/2023 1036    BUN 14 11/09/2023 1036    CREATININE 0.72 11/09/2023 1036    Lab Results   Component Value Date/Time    CALCIUM 10.0 11/09/2023 1036    ALKPHOS 66 11/09/2023 1036    AST 40 (H) 11/09/2023 1036    ALT 65 (H) 11/09/2023 1036    BILITOT 0.4 11/09/2023 1036             Radiology:  STUDY:  CT ANGIO CHEST FOR PE;  9/23/2023 11:39 am     INDICATION:  CP, dyspnea, cancer, Lie Flat: Yes .     COMPARISON:  Same day chest radiograph.     ACCESSION NUMBER(S):  [id number]     ORDERING CLINICIAN:  DELFINO REA     TECHNIQUE:  Helical data acquisition of the chest was obtained after intravenous  administration of 150 milliliterOMNIPAQUE 350, as per PE protocol.  Images were reformatted in coronal and sagittal planes. Axial and  coronal maximum intensity projection (MIP) images were created and  reviewed.     FINDINGS:  POTENTIAL LIMITATIONS OF THE STUDY: The assessment is limited by  respiratory motion and suboptimal contrast opacification of pulmonary  arteries.     HEART AND VESSELS:  There are no discrete filling defects within main pulmonary artery  and its branches to suggest acute pulmonary embolism.  Main pulmonary artery and its branches are normal in caliber.     The thoracic aorta normal in course and caliber.Although, the study  is not tailored for evaluation of aorta, there is no definite  evidence of acute aortic pathology.  No coronary artery calcifications are seen. Please note, the study is  not optimized for evaluation of coronary arteries.     The cardiac chambers are not enlarged.There are no findings to  suggest right heart strain.     There is no pericardial effusion seen.     MEDIASTINUM AND BENJAMÍN, LOWER NECK AND AXILLA:  The visualized thyroid gland is within normal limits.  No evidence of thoracic lymphadenopathy by CT criteria.  Esophagus appears within normal limits as seen.     LUNGS AND AIRWAYS:  The trachea and central airways are patent. No  endobronchial lesion  is seen. There is mild diffuse bronchial wall thickening, which is a  non-specific finding, but may be due to chronic bronchitis, given the  history of smoking.     The bilateral lungs are clear without evidence of focal  consolidation, pleural effusion, or pneumothorax.     UPPER ABDOMEN:  Probable hepatomegaly and fatty liver infiltrates. Visualized adrenal  glands are unremarkable.     CHEST WALL AND OSSEOUS STRUCTURES:  Within the right axilla on series 401, image 29, there is a 2.9 x 3.1  cm fat containing rounded, lesion which likely represents lipoma.  Otherwise, the chest wall soft tissues appear unremarkable  No acute osseous pathology.There are no suspicious osseous lesions.     IMPRESSION:  1. No evidence of acute pulmonary embolism to segmental level. Please  note that, assessment of subsegmental branches is limited and small  peripheral emboli are not entirely excluded due to the limitations  listed above.  2. There is no consolidation, pleural effusion, or pneumothorax.  3. Rounded fat containing lesion measuring up to 3.1 x 2.9 cm within  the right axilla likely represent lipoma.    Pathology:  No recent pathology results to review.         Assessment/Plan:  Nia Harper is a 33 y.o. female with a history of right breast cancer, who presents today for follow-up evaluation on adjuvant chemotherapy. Last CMF treatment today     Breast cancer, right, stage IB (ER/NH+, Her2-).  High-risk for recurrence, based on Oncotype Dx and clinical features.  Very poor tolerance of TC (2 cycles), including severe allergic reaction.  - Switched to CMF on 10/16/23. Last cycle given today, tolerating well.  - No need for pegfilgrastim.  - Following chemotherapy, will discuss endocrine therapy. No RT needed.  - Short refill on Ativan also given today ( for anxiety associated with chemotherapy )     Allergic reaction.  - Likely delayed reaction to docetaxel, but pegfilgrastim also possible.  - Avoid  further TC/Neulasta. Switched therapy, as above.  - Pending Allergy/Immunology evaluation. Will refer her again to allergy & immunology .   - Seen Derm on Claritin 20 mg bid  and famotidine 40 mg bid . Can reduce Claritin to 10 mg bid (side effects), stay on Pepcid till next office visit.    Premenopausal status. Continue goserelin q4 weeks, initiated 8/18/23. Will need to discuss continuing with ET  vs stopping it as well as address her concerns for child bearing at next visit.    Insulin-dependent T2DM.  - Needs new endocrinologist. Referral has been placed.    Disposition.  - CMF last cycle today.  - FUV  in 4  weeks to discuss endocrine therapy, with possible goserelin.  - She has our contact information and was instructed to call with concerns/questions in the interim.    Seen & staffed with Dr. Lancaster who is in agreement with the plan       Orders Placed This Encounter   Procedures    Referral to Allergy        Dr. Lisa Ledbetter MD FACP  PGY-6,  Hematology & Oncology Fellow   [facility]  [id number] [place], [place] [address] [phone]      Jeramie Lancaster MD

## 2023-11-10 ENCOUNTER — TELEMEDICINE (OUTPATIENT)
Dept: HEMATOLOGY/ONCOLOGY | Facility: HOSPITAL | Age: 33
End: 2023-11-10
Payer: COMMERCIAL

## 2023-11-10 DIAGNOSIS — Z51.5 ENCOUNTER FOR PALLIATIVE CARE: ICD-10-CM

## 2023-11-10 DIAGNOSIS — C50.919 MALIGNANT NEOPLASM OF FEMALE BREAST, UNSPECIFIED ESTROGEN RECEPTOR STATUS, UNSPECIFIED LATERALITY, UNSPECIFIED SITE OF BREAST (MULTI): Primary | ICD-10-CM

## 2023-11-10 DIAGNOSIS — Z59.41 FOOD INSECURITY: ICD-10-CM

## 2023-11-10 DIAGNOSIS — F41.9 ANXIETY: ICD-10-CM

## 2023-11-10 DIAGNOSIS — Z91.89 AT RISK FOR INFERTILITY: ICD-10-CM

## 2023-11-10 PROCEDURE — 99215 OFFICE O/P EST HI 40 MIN: CPT | Performed by: NURSE PRACTITIONER

## 2023-11-10 SDOH — ECONOMIC STABILITY - FOOD INSECURITY: FOOD INSECURITY: Z59.41

## 2023-11-10 NOTE — PROGRESS NOTES
Patient ID: Nia Harper is a 33 y.o. female.    Cancer History:          Breast         AJCC Edition: 8th (AJCC), Diagnosis Date: Jun 2023, IB, pT2 pN0 cM0 G3     Treatment Synopsis:    Treatment Synopsis:   -Enlarging right breast lumps.  -5/15/23: Dx MG/US demonstrated multiple masses: 1.1 cm (11:00, 8 cm FN); 1 cm (9:30, 5 cm FN); 1.2 cm (9:00, 6 cm FN); 0.7 cm (10:00, 6 cm FN); 0.4 cm (10:00, 5 cm FN). Four lymph nodes demonstrated suspicious cortical thickening.  -5/19/23: US-guided CNB of the 11:00 mass showed IDC grade 3; ER >95% SC 85% Her2-negative (1+ IHC). Biopsy of the 9:30 mass showed IDC grade 2-3; ER 80% SC 10% Her2-negative (1+ IHC). A right axillary LN was negative for carcinoma.  -6/30/23: Right mastectomy and SLNBx performed. Multipe foci of grade 3 carcinoma noted (3.1 cm, 2.2 cm, 1.8 cm), no LVI, margins negative, 0/3 LN’s involved IB, pT2 pN0 cM0 G3  Oncotype Dx RS 45 (33% 9-year recurrence risk; >15% chemo benefit).  RSClin: 59% recurrence risk with tamoxifen; 44% chemo benefit.  -8/24/23: Adjuvant TC (docetaxel, cyclophosphamide) initiated. First cycle complicated by admission for nausea/vomiting. Second cycle complicated by admission for angioedema, hives; thought to be secondary to docetaxel.   -10/16/23 Therapy changed to Cyclophosphamide/Methotrexate/5-Flouracil d/t reaction x 2 cycles   -11/09/23 C2/2 CMF -- last dose of systemic chemotherapy     Subjective    Patient returns today in follow up for a virtual visit regarding issues unique to being a young person with cancer.    Physically she reports doing fair -- mainly notes fatigue and generalized weakness. Has lost weight since start of chemotherapy but notes she needs to lose more weight before her reconstructive surgeries. Bariatric surgery that was planned for early 2024 has been delayed d/t her cancer. Would like to begin doing gentle exercise and working on health/weight loss but uncertain where to start.     Continues on  goserelin injections monthly, last dose yesterday. Notes significant hot flashes that are bothersome and worse at night. Also with decreased interest in sex/intimacy, pain with penetration and vaginal dryness. Notes that this is a stress on her relationship.     Emotionally she relays feeling very anxious and worried about her cancer coming back and not being around for her children. She recently witnessed her god-mother going through complications from metastatic breast cancer -- was even admitted to the same room patient was admitted to and she witnessed her declining and eventual death. In the past she took zoloft but never felt that it was working. Interested in medications to help with anxiety, also interested in therapy to help with processing her experience. Parenting is a stressor -- 3 children aged 11, 5, 3 -- has told them about her cancer but interested in resources to help.     Does desire fourth biologic child, very interested in re-evaluating fertility after chemotherapy and in attempting to conceive. Not on any form of contraception -- understands severity of getting pregnant while undergoing cancer treatment. Not interested in IUD, is interested in options.     Practically does note some concerns over finances -- medicaid insurance so doesn't have medical bills but cost of parking/gas and food prices are challenging for her to make ends meet. Notes no issue with housing, transportation, or insurance.       Social History: Lives in [place] with partner/father of her children Dinesh, 11 y.o. daughter Lambert, 5 y.o. Florecita starting , Gal 3 y.o. son  Previously worked as , currently stays at home with children/runs household  Denies tobacco, does smoke marijuana nightly, rare ETOH estimates 6x/year, no vaping or other recreational drug use.    Objective    BSA: There is no height or weight on file to calculate BSA.  There were no vitals taken for this visit. No V/S  "recorded d/t telehealth nature of this visit.     Current Outpatient Medications   Medication Instructions    atorvastatin (LIPITOR) 40 mg, oral, Nightly    benzoyl peroxide 5 % external wash Topical, Daily, Lather onto skin folds, leave on 2 minutes, rinse with water    blood sugar diagnostic (True Metrix Glucose Test Strip) strip 4 times daily    clindamycin (Cleocin T) 1 % external solution Topical, Daily    escitalopram (Lexapro) 10 mg tablet 3 tablets, oral, Daily    famotidine (PEPCID) 40 mg, oral, 2 times daily    famotidine (PEPCID) 40 mg, oral, 2 times daily    flash glucose sensor (FREESTYLE NGUYỄN 2 SENSOR MISC) Change sensor every 14 days as directed    FREESTYLE LANCETS MISC Use 4 lancets each day    insulin glargine (LANTUS SOLOSTAR U-100 INSULIN) 40 Units, subcutaneous, 2 times daily    insulin lispro (HumaLOG) 100 unit/mL injection inject up to 20 units before meals per sliding scale    loratadine (Claritin) 10 mg tablet TAKE 2 TABLETS BY MOUTH TWO TIMES A DAY, LAST DOSE IS 10/7    loratadine (CLARITIN) 20 mg, oral, 2 times daily    LORazepam (Ativan) 1 mg tablet TAKE 1 TABLET BY MOUTH ONCE DAILY AS NEEDED FOR ANXIETY    medroxyPROGESTERone (Depo-Provera) 150 mg/mL injection MedroxyPROGESTERone Acetate 150 MG/ML Intramuscular Suspension   Refills: 0       Active    metFORMIN XR (GLUCOPHAGE-XR) 1,000 mg, oral, 2 times daily with meals    omega-3 acid ethyl esters (LOVAZA) 2 g, oral, 2 times daily with meals    pen needle, diabetic (BD Ultra-Fine Mini Pen Needle) 31 gauge x 3/16\" needle Use 4 a day as directed    pen needle, diabetic (TechLITE Pen Needle) 31 gauge x 5/16\" needle No dose, route, or frequency recorded.    prochlorperazine (COMPAZINE) 10 mg, oral, Every 6 hours PRN    Trulicity 1.5 mg, subcutaneous, Weekly    valACYclovir (Valtrex) 500 mg tablet 1 tablet, oral, 2 times daily     Lab on 11/09/2023   Component Date Value Ref Range Status    WBC 11/09/2023 5.7  4.4 - 11.3 x10*3/uL Final    " nRBC 11/09/2023 0.0  0.0 - 0.0 /100 WBCs Final    RBC 11/09/2023 4.77  4.00 - 5.20 x10*6/uL Final    Hemoglobin 11/09/2023 13.6  12.0 - 16.0 g/dL Final    Hematocrit 11/09/2023 40.4  36.0 - 46.0 % Final    MCV 11/09/2023 85  80 - 100 fL Final    MCH 11/09/2023 28.5  26.0 - 34.0 pg Final    MCHC 11/09/2023 33.7  32.0 - 36.0 g/dL Final    RDW 11/09/2023 14.6 (H)  11.5 - 14.5 % Final    Platelets 11/09/2023 259  150 - 450 x10*3/uL Final    Neutrophils % 11/09/2023 57.4  40.0 - 80.0 % Final    Immature Granulocytes %, Automated 11/09/2023 0.2  0.0 - 0.9 % Final    Immature Granulocyte Count (IG) includes promyelocytes, myelocytes and metamyelocytes but does not include bands. Percent differential counts (%) should be interpreted in the context of the absolute cell counts (cells/UL).    Lymphocytes % 11/09/2023 35.4  13.0 - 44.0 % Final    Monocytes % 11/09/2023 5.4  2.0 - 10.0 % Final    Eosinophils % 11/09/2023 1.1  0.0 - 6.0 % Final    Basophils % 11/09/2023 0.5  0.0 - 2.0 % Final    Neutrophils Absolute 11/09/2023 3.28  1.20 - 7.70 x10*3/uL Final    Percent differential counts (%) should be interpreted in the context of the absolute cell counts (cells/uL).    Immature Granulocytes Absolute, Au* 11/09/2023 0.01  0.00 - 0.70 x10*3/uL Final    Lymphocytes Absolute 11/09/2023 2.02  1.20 - 4.80 x10*3/uL Final    Monocytes Absolute 11/09/2023 0.31  0.10 - 1.00 x10*3/uL Final    Eosinophils Absolute 11/09/2023 0.06  0.00 - 0.70 x10*3/uL Final    Basophils Absolute 11/09/2023 0.03  0.00 - 0.10 x10*3/uL Final    Glucose 11/09/2023 165 (H)  74 - 99 mg/dL Final    Sodium 11/09/2023 139  136 - 145 mmol/L Final    Potassium 11/09/2023 4.3  3.5 - 5.3 mmol/L Final    Chloride 11/09/2023 101  98 - 107 mmol/L Final    Bicarbonate 11/09/2023 26  21 - 32 mmol/L Final    Anion Gap 11/09/2023 16  10 - 20 mmol/L Final    Urea Nitrogen 11/09/2023 14  6 - 23 mg/dL Final    Creatinine 11/09/2023 0.72  0.50 - 1.05 mg/dL Final    eGFR  11/09/2023 >90  >60 mL/min/1.73m*2 Final    Calculations of estimated GFR are performed using the 2021 CKD-EPI Study Refit equation without the race variable for the IDMS-Traceable creatinine methods.  https://jasn.asnjournals.org/content/early/2021/09/22/ASN.5043398376    Calcium 11/09/2023 10.0  8.6 - 10.3 mg/dL Final    Albumin 11/09/2023 4.6  3.4 - 5.0 g/dL Final    Alkaline Phosphatase 11/09/2023 66  33 - 110 U/L Final    Total Protein 11/09/2023 8.1  6.4 - 8.2 g/dL Final    AST 11/09/2023 40 (H)  9 - 39 U/L Final    Bilirubin, Total 11/09/2023 0.4  0.0 - 1.2 mg/dL Final    ALT 11/09/2023 65 (H)  7 - 45 U/L Final    Patients treated with Sulfasalazine may generate falsely decreased results for ALT.    HCG, Beta-Quantitative 11/09/2023 <3  <5 mIU/mL Final      Physical Exam  Constitutional:       General: She is not in acute distress.     Appearance: Normal appearance. She is normal weight. She is ill-appearing.   Neurological:      General: No focal deficit present.      Mental Status: She is alert and oriented to person, place, and time.   Psychiatric:         Mood and Affect: Mood normal.         Behavior: Behavior normal.         Thought Content: Thought content normal.         Judgment: Judgment normal.       Performance Status:  Symptomatic; fully ambulatory    Assessment/Plan   Nia Harper is a 33 y.o. AA F with a recent diagnosis of G3 IB adenocarcinoma of the breast, ER+. S/p surgery followed by adjuvant chemotherapy with TC x 2 cycles but with angioedema so switched to CMF x 2 additional cycles. Plan for endocrine therapy.    #Psychosocial:  - Referral to psychiatry for medication management of anxiety  - Referral to psychology for 1:1 therapy support to help with emotional management and illness  - Discussed ways to talk to children about cancer, provided patient with resource for approaches differing based on age and developmental level of child  - Provided patient with [employer] resource  for parents of young children for support and resources (books, art kits)    #Practical Needs:  - Referral to [employer] for financial assistance grants  - Referral placed to [employer] for food insecurity    #Hot Flashes: secondary to ovarian suppression and low circulating estrogen  - Discussed medication management options, could also consider accupuncture  - Discussed trialing deep breathing and meditation techniques -- provided patient with resources to do this in-home  - Provided patient with link to virtual yoga classes through [employer] [facility] to help with hot flashes, mood, and exercise    #Sexual Dysfunction/Contraception: secondary to ovarian suppression and low circulating estrogen  - Provided patient with sexual concerns handout, encouraged open communication outside of intimacy to discuss patient decreased interest/sexual dysfunction with partner  - Discussed rationale for vaginal dryness and tissue atrophy in vagina causing painful intercourse -- recommended initial treatment with topical lubricant during intercourse -- use astroglide silicone based lubricant, prolonged foreplay  - If not improvement consider vaginal lubricant  - We discussed the seriousness of getting pregnant while receiving chemotherapy due to the harmful effects this could have on the  fetus, and on her cancer treatment given the decreased availability of medical  services.   - Options for contraception include intrauterine devices, estrogen containing contraceptives are contraindicated d/t ER+ status -- provided patient with comparison chart of contraception options    #Risk for infertility:  - Discussed the damaging effect cancer treatment can have on the ovaries.   - Discussed natural age related decline in fertility and menopause that occurs as a result of ovarian aging, and that cancer treatments can accelerate that progression and diminish ovarian reserve.  - Individuals may experience varying degrees of short  or long term ovarian dysfunction and amenorrhea, and that this is dependent upon type of cancer treatment, cumulative dose, and patients age.   - Loss of ovarian function may be permanent or temporary.  - Amenorrhea may be temporary or permanent -- temporary amenorrhea results from destruction of maturing follicles whereas permanent amenorrhea results from depletion of viable primordial follicles.  - Risk to fertility is INTERMEDIATE based on cancer treatment of Docetaxel/Cyclophosphamide  with cyclophosphamide being the primary  of infertility -- this is dose dependent  - Patient elected not to undergo fertility preservation up front and opted for ovarian suppression with goserelin  - Baseline AMH level drawn 08/11/2023 2.464     Plan to follow up with patient in person or virtually in 3 months     Cancer Staging   Malignant neoplasm of female breast (CMS/HCC)  Staging form: Breast, AJCC 8th Edition  - Pathologic stage from 6/30/2023: Stage IB (pT2(3), pN0(sn), cM0, G3, ER+, NH+, HER2-, Oncotype DX score: 45) - Signed by Jeramie Lancaster MD on 10/10/2023      Oncology History   Malignant neoplasm of female breast (CMS/HCC)   6/30/2023 Cancer Staged    Staging form: Breast, AJCC 8th Edition, Pathologic stage from 6/30/2023: Stage IB (pT2(3), pN0(sn), cM0, G3, ER+, NH+, HER2-, Oncotype DX score: 45) - Signed by Jeramie Lancaster MD on 10/10/2023     8/24/2023 - 9/15/2023 Chemotherapy    TC (DOCEtaxel / Cyclophosphamide), 21 Day Cycles     9/8/2023 Initial Diagnosis    Malignant neoplasm of female breast (CMS/HCC)     10/16/2023 -  Chemotherapy    CMF (Cyclophosphamide / Methotrexate / Fluorouracil) 21 Day Cycles                        RONEL Pemberton-CNP

## 2023-11-10 NOTE — PROGRESS NOTES
Social History: Lives in [place] with partner/father of her children Dinesh, 11 y.o. daughter Lambert, 5 y.o. Florecita starting , Gal 3 y.o. son  Previously worked as , currently stays at home with children/runs household  Denies tobacco, does smoke marijuana nightly, rare ETOH estimates 6x/year, no vaping or other recreational drug use.

## 2023-11-11 ENCOUNTER — TELEPHONE (OUTPATIENT)
Dept: HEMATOLOGY/ONCOLOGY | Facility: HOSPITAL | Age: 33
End: 2023-11-11
Payer: COMMERCIAL

## 2023-11-11 ENCOUNTER — TELEPHONE (OUTPATIENT)
Dept: ADMISSION | Facility: HOSPITAL | Age: 33
End: 2023-11-11
Payer: COMMERCIAL

## 2023-11-11 DIAGNOSIS — F41.9 ANXIETY: Primary | ICD-10-CM

## 2023-11-11 RX ORDER — LORAZEPAM 1 MG/1
1 TABLET ORAL DAILY PRN
Qty: 7 TABLET | Refills: 0 | Status: SHIPPED | OUTPATIENT
Start: 2023-11-11 | End: 2023-12-07 | Stop reason: SDUPTHER

## 2023-11-11 NOTE — TELEPHONE ENCOUNTER
HEMATOLOGY AND MEDICAL ONCOLOGY  The patient called our service due to ran out refills of lorazepam 1mg once daily prn. Looks like she asked for refill in her last appointment on 11/09/2023 but medication was not sent.  Per note reviewed from our excellent nursing team the patient was tearful during the call and stated she was having a bad day.  We will do a prescription for 7 days and the patient should have a new appointment with her oncologists in case she requires more doses.    Michael Allison MD  Hematology and Medical Oncology Fellow  Epic chat, [id number], [phone], [phone]

## 2023-11-11 NOTE — TELEPHONE ENCOUNTER
Pt updated that covering provider sent in 7 day supply of ativan to preferred pharmacy.  She should check with oncology team next week about ongoing medication refills.

## 2023-11-11 NOTE — TELEPHONE ENCOUNTER
Pt requests a refill of lorazepam 1mg once daily prn.  She asked for a refill at her appointment 11/9 but medication was not sent in and she is now out of meds.  She was tearful during the call and stated she was just having a bad day.  Pt didn't elaborate but states she is safe at home.  [facility] [id number] [place].

## 2023-11-13 ENCOUNTER — SOCIAL WORK (OUTPATIENT)
Dept: CASE MANAGEMENT | Facility: HOSPITAL | Age: 33
End: 2023-11-13
Payer: COMMERCIAL

## 2023-11-13 NOTE — PROGRESS NOTES
Referral received per physician regarding financial assistance for patient. Application has bene emailed to patient for the Breast cancer fund of [place]. Sw has requested assistance from [facility] worker, Karie King re: any additional funds or grants for patient.    MIGUEL Astudillo

## 2023-11-14 ENCOUNTER — PATIENT OUTREACH (OUTPATIENT)
Dept: CARE COORDINATION | Facility: CLINIC | Age: 33
End: 2023-11-14
Payer: COMMERCIAL

## 2023-11-14 NOTE — PROGRESS NOTES
Pt contacted CHW regarding applying for financial león applications due to hardship. CHW explained she would email applications to her and to understand that the organizations meet certain times of the year so therefore it would not be a quick process. Pt would also have to meet eligibility process and can only apply once a year. CHW emailed Inna Shelby and Charlette Ball applications to pt's email address of [email or web address]. CHW advised pt to contact her if she needed assistance with completing applications.

## 2023-11-14 NOTE — PROGRESS NOTES
"CHW received email from MINERVA Philip stating \"[facility], Can you reach out to this patient to see if there is anything else financially that she may qualify for? Thank you. Rani. CHW called pt and left a voicemail message requesting a call back.     "

## 2023-11-15 ENCOUNTER — NUTRITION (OUTPATIENT)
Dept: SURGERY | Facility: CLINIC | Age: 33
End: 2023-11-15
Payer: COMMERCIAL

## 2023-11-15 VITALS — WEIGHT: 290 LBS | BODY MASS INDEX: 41.47 KG/M2

## 2023-11-15 NOTE — PROGRESS NOTES
PREOPERATIVE, MULTIDISCIPLINARY, MEDICALLY SUPERVISED, REDUCED CALORIE DIET, BEHAVIOR MODIFICATION AND EXERCISE PROGRAM    S:      O:    Wt: 290lbs    Goal: 5% body weight loss over the course of program    Dietary recommendation:   1. Eliminate high calorie, carbonated, and caffeinated beverages  2. Practice the 30-30-30 rule by drinking between meals.  3. Structure your meal plan - have 3 meals and 1 snack daily.  4. Have balanced meals that always contain a good source of protein.  5. Increase intake of non-starchy vegetables.  Have 5 servings fruits and vegetables daily.   6. Take a multivitamin daily.  7. Increase physical activity by 10-15 minutes to an end goal of 60 minutes 5 x per week.    Group Topic: Healthy Holiday Meal Planning    Behavioral recommendation: Patient is encouraged to choose healthy foods, along with ways to modify recipes as part of meal planning during the holiday season.     A/P: Pt appears to have a good understanding of how to incorporate health foods as part of holiday meal plans into her daily meal pattern.  Pt has a goal to consume Healthier choices as part of the holiday meal plans in their appropriate portion sizes.    Exercise: walking, riding bike 4x per week for 60 minutes    Amanda Soria RDN, LD

## 2023-11-17 ENCOUNTER — PATIENT OUTREACH (OUTPATIENT)
Dept: CARE COORDINATION | Facility: CLINIC | Age: 33
End: 2023-11-17
Payer: COMMERCIAL

## 2023-11-17 NOTE — PROGRESS NOTES
Call placed regarding one month post discharge follow up call.  At time of outreach call the patient feels as if their condition has (improved) since initial visit with PCP or specialist.  Questions or concerns regarding recovery period addressed at this time.   Reviewed any PCP or specialists progress notes/labs/radiology reports if applicable and addressed any questions or concerns.   Patient reports needing insulin lispro refilled and her endocrinologist has left . Message sent to PCP/Office. Encouraged patient to establish with new endocrinologist. Patient verbalizes understanding.

## 2023-11-20 ENCOUNTER — TELEPHONE (OUTPATIENT)
Dept: HEMATOLOGY/ONCOLOGY | Facility: HOSPITAL | Age: 33
End: 2023-11-20
Payer: COMMERCIAL

## 2023-11-20 ENCOUNTER — SOCIAL WORK (OUTPATIENT)
Dept: CASE MANAGEMENT | Facility: HOSPITAL | Age: 33
End: 2023-11-20
Payer: COMMERCIAL

## 2023-11-20 DIAGNOSIS — E11.9 DIABETES MELLITUS TYPE 2 WITHOUT RETINOPATHY (MULTI): ICD-10-CM

## 2023-11-20 RX ORDER — INSULIN GLARGINE 100 [IU]/ML
40 INJECTION, SOLUTION SUBCUTANEOUS 2 TIMES DAILY
Qty: 3 ML | Refills: 2 | Status: SHIPPED | OUTPATIENT
Start: 2023-11-20 | End: 2023-11-29 | Stop reason: SDUPTHER

## 2023-11-20 NOTE — PROGRESS NOTES
SW spoke with patient regarding certain financial bills [place] will be covered by Breast Cancer fund: [facility]. [employer]. Patient will try and provide a clean Bill from the illuminating company. She was informed that her TV bill was not being covered. Will continue to work with patient around financial concerns.    MIGUEL Astudillo

## 2023-11-20 NOTE — TELEPHONE ENCOUNTER
Patient would like a refill insulin she can't get a hold of her PCP please send to [facility] on [address] [phone]

## 2023-11-22 DIAGNOSIS — E11.9 DIABETES MELLITUS TYPE 2 WITHOUT RETINOPATHY (MULTI): Primary | ICD-10-CM

## 2023-11-22 NOTE — TELEPHONE ENCOUNTER
Pt is requesting a refill for her insulin lispro please send to [facility] pharmacy.

## 2023-11-24 RX ORDER — INSULIN LISPRO 100 [IU]/ML
20 INJECTION, SOLUTION SUBCUTANEOUS 3 TIMES DAILY PRN
Qty: 15 ML | Refills: 5 | Status: SHIPPED | OUTPATIENT
Start: 2023-11-24 | End: 2023-11-28 | Stop reason: SDUPTHER

## 2023-11-28 ENCOUNTER — TELEPHONE (OUTPATIENT)
Dept: ADMISSION | Facility: HOSPITAL | Age: 33
End: 2023-11-28
Payer: COMMERCIAL

## 2023-11-28 DIAGNOSIS — E11.9 DIABETES MELLITUS TYPE 2 WITHOUT RETINOPATHY (MULTI): ICD-10-CM

## 2023-11-28 RX ORDER — INSULIN LISPRO 100 [IU]/ML
40 INJECTION, SOLUTION SUBCUTANEOUS 3 TIMES DAILY PRN
Qty: 15 ML | Refills: 5 | Status: SHIPPED | OUTPATIENT
Start: 2023-11-28 | End: 2023-11-29 | Stop reason: SDUPTHER

## 2023-11-29 ENCOUNTER — OFFICE VISIT (OUTPATIENT)
Dept: ENDOCRINOLOGY | Facility: CLINIC | Age: 33
End: 2023-11-29
Payer: COMMERCIAL

## 2023-11-29 VITALS
SYSTOLIC BLOOD PRESSURE: 118 MMHG | DIASTOLIC BLOOD PRESSURE: 72 MMHG | HEART RATE: 87 BPM | WEIGHT: 280 LBS | HEIGHT: 70 IN | BODY MASS INDEX: 40.09 KG/M2

## 2023-11-29 DIAGNOSIS — E11.9 TYPE 2 DIABETES MELLITUS WITHOUT COMPLICATION, WITH LONG-TERM CURRENT USE OF INSULIN (MULTI): ICD-10-CM

## 2023-11-29 DIAGNOSIS — E78.5 HYPERLIPIDEMIA, UNSPECIFIED HYPERLIPIDEMIA TYPE: ICD-10-CM

## 2023-11-29 DIAGNOSIS — Z79.4 TYPE 2 DIABETES MELLITUS WITHOUT COMPLICATION, WITH LONG-TERM CURRENT USE OF INSULIN (MULTI): ICD-10-CM

## 2023-11-29 DIAGNOSIS — E11.9 DIABETES MELLITUS TYPE 2 WITHOUT RETINOPATHY (MULTI): ICD-10-CM

## 2023-11-29 LAB — POC HEMOGLOBIN A1C: 8.6 % (ref 4.2–6.5)

## 2023-11-29 PROCEDURE — 83036 HEMOGLOBIN GLYCOSYLATED A1C: CPT | Performed by: STUDENT IN AN ORGANIZED HEALTH CARE EDUCATION/TRAINING PROGRAM

## 2023-11-29 PROCEDURE — 99214 OFFICE O/P EST MOD 30 MIN: CPT | Performed by: STUDENT IN AN ORGANIZED HEALTH CARE EDUCATION/TRAINING PROGRAM

## 2023-11-29 PROCEDURE — 3051F HG A1C>EQUAL 7.0%<8.0%: CPT | Performed by: STUDENT IN AN ORGANIZED HEALTH CARE EDUCATION/TRAINING PROGRAM

## 2023-11-29 PROCEDURE — 3008F BODY MASS INDEX DOCD: CPT | Performed by: STUDENT IN AN ORGANIZED HEALTH CARE EDUCATION/TRAINING PROGRAM

## 2023-11-29 PROCEDURE — 3074F SYST BP LT 130 MM HG: CPT | Performed by: STUDENT IN AN ORGANIZED HEALTH CARE EDUCATION/TRAINING PROGRAM

## 2023-11-29 PROCEDURE — 1036F TOBACCO NON-USER: CPT | Performed by: STUDENT IN AN ORGANIZED HEALTH CARE EDUCATION/TRAINING PROGRAM

## 2023-11-29 PROCEDURE — 95251 CONT GLUC MNTR ANALYSIS I&R: CPT | Performed by: STUDENT IN AN ORGANIZED HEALTH CARE EDUCATION/TRAINING PROGRAM

## 2023-11-29 PROCEDURE — 3078F DIAST BP <80 MM HG: CPT | Performed by: STUDENT IN AN ORGANIZED HEALTH CARE EDUCATION/TRAINING PROGRAM

## 2023-11-29 RX ORDER — DULAGLUTIDE 3 MG/.5ML
INJECTION, SOLUTION SUBCUTANEOUS
Qty: 4 EACH | Refills: 11 | Status: SHIPPED | OUTPATIENT
Start: 2023-11-29 | End: 2024-05-13 | Stop reason: ALTCHOICE

## 2023-11-29 RX ORDER — SEMAGLUTIDE 0.68 MG/ML
0.5 INJECTION, SOLUTION SUBCUTANEOUS
Qty: 3 ML | Refills: 2 | Status: SHIPPED | OUTPATIENT
Start: 2023-11-29 | End: 2024-02-02 | Stop reason: ALTCHOICE

## 2023-11-29 RX ORDER — BLOOD-GLUCOSE SENSOR
2 EACH MISCELLANEOUS
Qty: 2 EACH | Refills: 11 | Status: SHIPPED | OUTPATIENT
Start: 2023-11-29

## 2023-11-29 RX ORDER — METFORMIN HYDROCHLORIDE 500 MG/1
1000 TABLET, EXTENDED RELEASE ORAL
Qty: 180 TABLET | Refills: 1 | Status: SHIPPED | OUTPATIENT
Start: 2023-11-29

## 2023-11-29 RX ORDER — BLOOD-GLUCOSE METER
100 EACH MISCELLANEOUS DAILY
Qty: 100 STRIP | Refills: 1 | Status: SHIPPED | OUTPATIENT
Start: 2023-11-29

## 2023-11-29 RX ORDER — INSULIN GLARGINE 100 [IU]/ML
40 INJECTION, SOLUTION SUBCUTANEOUS 2 TIMES DAILY
Qty: 3 ML | Refills: 2 | Status: SHIPPED | OUTPATIENT
Start: 2023-11-29 | End: 2024-02-13 | Stop reason: SDUPTHER

## 2023-11-29 RX ORDER — OMEGA-3-ACID ETHYL ESTERS 1 G/1
2 CAPSULE, LIQUID FILLED ORAL
Qty: 120 CAPSULE | Refills: 1 | Status: SHIPPED | OUTPATIENT
Start: 2023-11-29 | End: 2024-01-28

## 2023-11-29 RX ORDER — INSULIN LISPRO 100 [IU]/ML
40 INJECTION, SOLUTION SUBCUTANEOUS 3 TIMES DAILY PRN
Qty: 15 ML | Refills: 5 | Status: SHIPPED | OUTPATIENT
Start: 2023-11-29

## 2023-11-29 RX ORDER — PEN NEEDLE, DIABETIC 30 GX3/16"
200 NEEDLE, DISPOSABLE MISCELLANEOUS
Qty: 200 EACH | Refills: 3 | Status: SHIPPED | OUTPATIENT
Start: 2023-11-29

## 2023-11-29 RX ORDER — ATORVASTATIN CALCIUM 40 MG/1
40 TABLET, FILM COATED ORAL NIGHTLY
Qty: 90 TABLET | Refills: 3 | Status: SHIPPED | OUTPATIENT
Start: 2023-11-29 | End: 2024-11-28

## 2023-11-29 NOTE — PROGRESS NOTES
"33 F PMH: DM2, HLD, breast CA     DM2 follow up, previously seeing Dr. Robins     Was recently on chemo 8-11/2023 complicated by angioedema and s/e.  She was on multiple rounds of glucocorticoids around this time     Diabetes History     DM diagnosed > 5 years ago, during teenage years  Complications Micro and Macro-none known   A1c:   Lab Results   Component Value Date    HGBA1C 8.6 (A) 11/29/2023   IO A1c done today 8.6%    Regimen   Metformin 1000mg BID   Trulicity 1.5   Lantus  use 40 units in am and 40 units at dinner      please use lispro 10 units with each meals plus following scale for blood sugars before meals:    - Will keep you on same insulin regimen and same metformin dose   - Add insulin correction scale before meals as the following:  if sugar<150 no extra humolog   If blood glucose is 150-199: add 2 units  If blood glucose is 200-249: add 4 units  If blood glucose is 250-299: add 6 units  If blood glucose is 300-349: add 8 units  If blood glucose is 350-400: add 10 units and call your maternofetal medicine doctor       SMBG   Freestyle jason    Hypoglycemia none     Comorbidities and Screening  Eye Exam: overdue, no DR   Foot exam: needs    Lipid  Lab Results   Component Value Date    CHOL 159 08/11/2023    CHOL 243 (H) 06/03/2023    CHOL 209 (H) 09/20/2022     Lab Results   Component Value Date    HDL 42.0 08/11/2023    HDL 41.2 06/03/2023    HDL 43.6 09/20/2022     No results found for: \"LDLCALC\"  Lab Results   Component Value Date    TRIG 112 08/11/2023    TRIG 122 06/03/2023    TRIG 121 09/20/2022       Statin- omega 3, atorvastatin 40   Cr and albuminuria- no CKD   Lab Results   Component Value Date    CREATININE 0.72 11/09/2023    EGFR >90 11/09/2023      ACE/ARB- none     Past Medical History:   Diagnosis Date    22 weeks gestation of pregnancy 12/09/2019    22 weeks gestation of pregnancy    24 weeks gestation of pregnancy 12/09/2019    24 weeks gestation of pregnancy    Abnormal chromosomal " and genetic finding on  screening of mother 2019    Abnormal genetic test during pregnancy    Abnormal hematological finding on  screening of mother 2019    Abnormal maternal serum screening test    Abnormal levels of other serum enzymes 2021    Elevated liver enzymes    Abnormal levels of other serum enzymes 2021    Elevated liver enzymes    Acute candidiasis of vulva and vagina 10/15/2015    Vaginal candidiasis    Acute candidiasis of vulva and vagina 10/05/2018    Vaginitis due to Candida albicans    Acute vaginitis 2020    Bacterial vaginosis    Anxiety disorder, unspecified 2019    Anxiety and depression    Body mass index (BMI)40.0-44.9, adult 2019    BMI 40.0-44.9, adult    Candidiasis, unspecified 2021    Yeast infection    Carpal tunnel syndrome, bilateral upper limbs 2019    Carpal tunnel syndrome on both sides    Carpal tunnel syndrome, left upper limb 2019    Left carpal tunnel syndrome    Carpal tunnel syndrome, right upper limb 2019    Right carpal tunnel syndrome    Depression, unspecified 2021    Depression    Disruption of external operation (surgical) wound, not elsewhere classified, initial encounter 2019    Open draining abdominal incision    Encounter for change or removal of nonsurgical wound dressing 2018    Dressing change    Encounter for contraceptive management, unspecified 2015    Contraceptive management    Encounter for contraceptive management, unspecified 2015    Encounter for contraceptive management    Encounter for examination of eyes and vision without abnormal findings 2016    Diabetic eye exam    Encounter for gynecological examination (general) (routine) without abnormal findings 2021    Encounter for well woman exam with routine gynecological exam    Encounter for gynecological examination (general) (routine) without abnormal findings 2015     Encounter for cervical Pap smear with pelvic exam    Encounter for immunization 10/31/2017    Need for Tdap vaccination    Encounter for initial prescription of contraceptive pills 2018    Encounter for initial prescription of contraceptive pills    Encounter for other general counseling and advice on contraception 2018    Encounter for counseling regarding initiation of other contraceptive measure    Encounter for other general counseling and advice on procreation 2017    Encounter for preconception consultation    Encounter for other preprocedural examination 2017    Pre-op testing    Encounter for pregnancy test, result positive 2017    Pregnancy confirmed by positive urine test    Encounter for routine postpartum follow-up 2018    Postpartum exam    Encounter for screening for infections with a predominantly sexual mode of transmission 2019    Routine screening for STI (sexually transmitted infection)    Encounter for screening for infections with a predominantly sexual mode of transmission     Routine screening for STI (sexually transmitted infection)    Encounter for supervision of normal pregnancy, unspecified, unspecified trimester 2017    Encounter for pregnancy related examination    Encounter for surveillance of injectable contraceptive 2020    Encounter for Depo-Provera contraception    Glycosuria 2014    Glucosuria    Irregular menstruation, unspecified 2015    Missed periods    Irregular menstruation, unspecified 2019    Missed menses    Maternal care for other (suspected) fetal abnormality and damage, fetal cardiac anomalies, not applicable or unspecified 2019    Abnormal fetal echocardiogram affecting antepartum care of mother    Maternal care for other known or suspected poor fetal growth, unspecified trimester, not applicable or unspecified 2019    Intrauterine growth restriction,     Maternal care for  unspecified type scar from previous  delivery 2019    Uterine scar from previous  delivery complicating pregnancy    Missed  2019    , missed    Mixed hyperlipidemia 2018    Hyperlipemia, mixed    Morbid (severe) obesity due to excess calories (CMS/[facility]) 2019    Severe obesity (BMI >= 40)    Myopia, bilateral 09/15/2017    Myopia of both eyes with astigmatism    Obesity complicating pregnancy, unspecified trimester 2019    Obesity in pregnancy    Other conditions influencing health status 2017    History of cough    Other conditions influencing health status 2017    History of pregnancy    Other conditions influencing health status 2017    Type 2 diabetes mellitus, uncontrolled    Other infections with a predominantly sexual mode of transmission complicating pregnancy, unspecified trimester 2020    Genital herpes affecting pregnancy    Other mental disorders complicating pregnancy, unspecified trimester 2019    Depression affecting pregnancy, antepartum    Other specified pregnancy related conditions, second trimester 2019    Pregnancy headache in second trimester    Pain in right hand 2017    Pain of right hand    Personal history of other complications of pregnancy, childbirth and the puerperium 2015    History of galactorrhea    Personal history of other diseases of the circulatory system 2019    History of hypertension    Personal history of other diseases of the female genital tract 2015    History of vaginitis    Personal history of other diseases of the female genital tract 2017    History of irregular menstrual cycles    Personal history of other diseases of the female genital tract 2017    History of amenorrhea    Personal history of other diseases of the female genital tract 2015    History of amenorrhea    Personal history of other diseases of the female genital tract  10/24/2014    History of vaginal discharge    Personal history of other diseases of the respiratory system 10/26/2014    History of bronchitis    Personal history of other drug therapy 09/19/2017    History of influenza vaccination    Personal history of other endocrine, nutritional and metabolic disease 06/11/2019    History of type 2 diabetes mellitus    Personal history of other endocrine, nutritional and metabolic disease 11/28/2017    History of hypoglycemia    Personal history of other endocrine, nutritional and metabolic disease 11/28/2017    History of hyperglycemia    Personal history of other infectious and parasitic diseases 06/11/2019    History of herpes genitalis    Personal history of other infectious and parasitic diseases 01/29/2019    History of wound infection    Personal history of other mental and behavioral disorders 09/08/2017    History of anxiety    Personal history of other mental and behavioral disorders 09/08/2017    History of depression    Personal history of other specified conditions 01/23/2015    No post-op complications    Personal history of other specified conditions 11/28/2017    History of nausea    Personal history of other specified conditions 05/01/2019    History of headache    Personal history of other specified conditions 07/30/2018    History of breast lump    Personal history of other specified conditions 05/01/2019    History of insomnia    Personal history of other specified conditions 10/17/2017    History of lump of right breast    Personal history of other specified conditions 03/04/2019    History of nausea and vomiting    Pre-existing type 2 diabetes mellitus, in pregnancy, unspecified trimester 12/09/2019    Pre-existing type 2 diabetes mellitus during pregnancy, antepartum    Pre-existing type 2 diabetes mellitus, in pregnancy, unspecified trimester 12/15/2021    Type 2 diabetes mellitus during pregnancy    Pregnancy with inconclusive fetal viability, not  applicable or unspecified 2017    Pregnancy with uncertain fetal viability    Solitary cyst of right breast 2021    Cyst of right breast    Supervision of young multigravida, unspecified trimester 2019    Encounter for supervision of high-risk pregnancy of young multigravida    Type 2 diabetes mellitus without complications (CMS/[facility]) 2022    Diabetes    Unspecified maternal hypertension, second trimester 2017    Hypertension during pregnancy in second trimester    Unspecified maternal hypertension, unspecified trimester 2017    Hypertension in pregnancy, antepartum    Unspecified pre-existing hypertension complicating pregnancy, unspecified trimester 2019    Chronic hypertension in pregnancy     Family History   Problem Relation Name Age of Onset    Heart disease Father      Diabetes type II Mother's Sister          uncontrolled    Breast cancer Father's Sister          two paternal 1st cousins (through paternal uncle, both sisters, both early onset, one ). two paternal great aunts (through PGF's side, both  in their 50s/60s)    Cancer Father's Sister      Cancer Father's Brother      Cancer Maternal Grandmother      Diabetes type II Maternal Grandmother      Cancer Maternal Grandfather      Cancer Paternal Grandmother      Breast cancer Paternal Cousin          two paternal 1st cousins (through paternal uncle, both sisters, both early onset, one ). two paternal great aunts (through PGF's side, both  in their 50s/60s)      Social History     Socioeconomic History    Marital status: Single     Spouse name: Not on file    Number of children: Not on file    Years of education: Not on file    Highest education level: Not on file   Occupational History    Not on file   Tobacco Use    Smoking status: Never     Passive exposure: Never    Smokeless tobacco: Never   Vaping Use    Vaping Use: Never used   Substance and Sexual Activity    Alcohol use: Never     "Drug use: Yes     Types: Marijuana    Sexual activity: Not on file   Other Topics Concern    Not on file   Social History Narrative    Not on file     Social Determinants of Health     Financial Resource Strain: Not on file   Food Insecurity: Not on file   Transportation Needs: Not on file   Physical Activity: Not on file   Stress: Not on file   Social Connections: Not on file   Intimate Partner Violence: Not on file   Housing Stability: Not on file        ROS:  No polyuria or polydipsia  No abdominal pain   No nausea   Negative except those noted in current and interim history    Physical Exam  HENT:      Head: Normocephalic and atraumatic.   Eyes:      Pupils: Pupils are equal, round, and reactive to light.   Abdominal:      Comments: Abdominal obesity, no lipodystrophy   Musculoskeletal:         General: No swelling or tenderness.   Skin:     Comments: + acanthosis   Neurological:      General: No focal deficit present.      Mental Status: She is oriented to person, place, and time.   Psychiatric:         Mood and Affect: Mood normal.         Behavior: Behavior normal.          labs and imaging reviewed, pertinent findings listed on HPI and Impression      Problem List Items Addressed This Visit       Diabetes (CMS/[facility])    Relevant Orders    POCT glycosylated hemoglobin (Hb A1C) manually resulted (Completed)    Diabetes mellitus type 2 without retinopathy (CMS/[facility])    Relevant Medications    insulin glargine (Lantus Solostar U-100 Insulin) 100 unit/mL (3 mL) pen    metFORMIN  mg 24 hr tablet    insulin lispro (HumaLOG Aurelio Kwikpen) 100 unit/mL injection    blood sugar diagnostic (OneTouch Verio test strips) strip    semaglutide (Ozempic) 0.25 mg or 0.5 mg (2 mg/3 mL) pen injector    blood-glucose sensor (FreeStyle Cristina 3 Sensor) device    omega-3 acid ethyl esters (Lovaza) 1 gram capsule    pen needle, diabetic (BD Ultra-Fine Mini Pen Needle) 31 gauge x 3/16\" needle    dulaglutide (Trulicity) 3 mg/0.5 mL " pen injector    Hyperlipemia    Relevant Medications    atorvastatin (Lipitor) 40 mg tablet    omega-3 acid ethyl esters (Lovaza) 1 gram capsule     CGM reviewed, minimum of 72 hrs of data reviewed, CGM data reviewed to influence glucose treatment plan   53% in range, 47% high no low   This is likely related to her chemo treatment and multiple rounds of steroids    Humalog 12-12-12 with sliding scale 2:50 >150  Lantus 40-40   Increase trulicity 3mg weekly,   Despite being in trulicity did not achieve A1c control, will switch to ozempic  Continue metformin    Will update to freestyle jason 3    Thyroid ultrasound ordered, not done yet    Previous records reviewed     Folow up in 3 months

## 2023-11-29 NOTE — PATIENT INSTRUCTIONS
Let us know if you are still having low sugars  Follow the insulin scale   For now trulicity 3mg weekly   Continue metformin   Continue your cholesterol pills  I sent for freestyle jason 3       Follow up in 3 months    Yasmin Clements MD  Divison of Endocrinology   [facility]   Phone: [phone]    option 4, then option 1  Fax: [phone]

## 2023-12-01 DIAGNOSIS — N89.8 VAGINAL DRYNESS: ICD-10-CM

## 2023-12-01 DIAGNOSIS — C50.919 MALIGNANT NEOPLASM OF FEMALE BREAST, UNSPECIFIED ESTROGEN RECEPTOR STATUS, UNSPECIFIED LATERALITY, UNSPECIFIED SITE OF BREAST (MULTI): Primary | ICD-10-CM

## 2023-12-01 RX ORDER — GLYCERIN/MIN OIL/POLYCARBOPHIL
1 GEL WITH APPLICATOR (GRAM) VAGINAL AS NEEDED
Qty: 35 G | Refills: 11 | Status: SHIPPED | OUTPATIENT
Start: 2023-12-01 | End: 2023-12-31

## 2023-12-04 ENCOUNTER — SOCIAL WORK (OUTPATIENT)
Dept: CASE MANAGEMENT | Facility: HOSPITAL | Age: 33
End: 2023-12-04
Payer: COMMERCIAL

## 2023-12-04 NOTE — PROGRESS NOTES
SW phoned patient and left message re: patient providing original utility bills for submission to [facility]. Patient [facility], last week, but as of this writing did not. A friendly reminder phone call was left on her voice mail this am.    MIGUEL Blood

## 2023-12-05 ENCOUNTER — TELEMEDICINE (OUTPATIENT)
Dept: SURGERY | Facility: HOSPITAL | Age: 33
End: 2023-12-05
Payer: COMMERCIAL

## 2023-12-05 DIAGNOSIS — E66.01 OBESITY, CLASS III, BMI 40-49.9 (MORBID OBESITY) (MULTI): Primary | ICD-10-CM

## 2023-12-05 PROCEDURE — 99213 OFFICE O/P EST LOW 20 MIN: CPT | Performed by: SURGERY

## 2023-12-05 PROCEDURE — 99213 OFFICE O/P EST LOW 20 MIN: CPT | Mod: GT | Performed by: SURGERY

## 2023-12-05 NOTE — PROGRESS NOTES
Subjective     Date: 2023 Time: 11:44 AM  Name: Nia Harper  MRN: [id number]    This is a 33 y.o. female with morbid obesity (There is no height or weight on file to calculate BMI.) who presents to clinic for further consideration of sleeve gastrectomy. She was evaluated in clinic on 23. Since her last visit, she underwent right partial mastectomy 2023 for right breast cancer.      Patient Active Problem List   Diagnosis    Abnormal uterine bleeding    Abscess    Abscess of breast, postpartum    Acne    Astigmatism of both eyes    Bacterial vaginosis    Breast skin changes    Cutaneous abscess of chest wall    Cyst of right breast    Lump of right breast    De Quervain's tenosynovitis, right    Depression    Diabetes (CMS/HCC)    Diabetes mellitus type 2 without retinopathy (CMS/HCC)    Diffuse goiter    Dyspnea on exertion    Dysuria    Elevated liver enzymes    Epidermal inclusion cyst    Herpes simplex virus (HSV) infection    History of breast cancer    Hyperlipemia    Hypertriglyceridemia    Impaired skin integrity associated with surgical incision    Infection of great toe    Left carpal tunnel syndrome    Mild postpartum depression    Myopia of both eyes    Nexplanon in place    Nipple discharge, bloody    Nonpuerperal abscess of left breast    Other skin changes    S/P  section    Suspected sleep apnea    Uncontrolled type 2 diabetes mellitus with hyperglycemia (CMS/HCC)    Urine frequency    Vaginal irritation    Vaginal Pap smear with ASC-US    Fatigue    Weakness generalized    Wound infection after surgery    Yeast infection    Vaginal odor    Chronic headaches    BMI 40.0-44.9, adult (CMS/HCC)    Class 3 severe obesity due to excess calories with serious comorbidity and body mass index (BMI) of 40.0 to 44.9 in adult (CMS/HCC)    Malignant neoplasm of female breast (CMS/HCC)    Nausea and vomiting    Primary hypertension    Hospital discharge follow-up       SLEEVE Gastric  Surgery For Morbid Obesity Laparoscopic Longitudinal Gastrectomy       PMH:   Past Medical History:   Diagnosis Date    22 weeks gestation of pregnancy 2019    22 weeks gestation of pregnancy    24 weeks gestation of pregnancy 2019    24 weeks gestation of pregnancy    Abnormal chromosomal and genetic finding on  screening of mother 2019    Abnormal genetic test during pregnancy    Abnormal hematological finding on  screening of mother 2019    Abnormal maternal serum screening test    Abnormal levels of other serum enzymes 2021    Elevated liver enzymes    Abnormal levels of other serum enzymes 2021    Elevated liver enzymes    Acute candidiasis of vulva and vagina 10/15/2015    Vaginal candidiasis    Acute candidiasis of vulva and vagina 10/05/2018    Vaginitis due to Candida albicans    Acute vaginitis 2020    Bacterial vaginosis    Anxiety disorder, unspecified 2019    Anxiety and depression    Body mass index (BMI)40.0-44.9, adult 2019    BMI 40.0-44.9, adult    Candidiasis, unspecified 2021    Yeast infection    Carpal tunnel syndrome, bilateral upper limbs 2019    Carpal tunnel syndrome on both sides    Carpal tunnel syndrome, left upper limb 2019    Left carpal tunnel syndrome    Carpal tunnel syndrome, right upper limb 2019    Right carpal tunnel syndrome    Depression, unspecified 2021    Depression    Disruption of external operation (surgical) wound, not elsewhere classified, initial encounter 2019    Open draining abdominal incision    Encounter for change or removal of nonsurgical wound dressing 2018    Dressing change    Encounter for contraceptive management, unspecified 2015    Contraceptive management    Encounter for contraceptive management, unspecified 2015    Encounter for contraceptive management    Encounter for examination of eyes and vision without abnormal findings  07/14/2016    Diabetic eye exam    Encounter for gynecological examination (general) (routine) without abnormal findings 05/20/2021    Encounter for well woman exam with routine gynecological exam    Encounter for gynecological examination (general) (routine) without abnormal findings 07/28/2015    Encounter for cervical Pap smear with pelvic exam    Encounter for immunization 10/31/2017    Need for Tdap vaccination    Encounter for initial prescription of contraceptive pills 02/06/2018    Encounter for initial prescription of contraceptive pills    Encounter for other general counseling and advice on contraception 06/11/2018    Encounter for counseling regarding initiation of other contraceptive measure    Encounter for other general counseling and advice on procreation 11/28/2017    Encounter for preconception consultation    Encounter for other preprocedural examination 11/28/2017    Pre-op testing    Encounter for pregnancy test, result positive 11/28/2017    Pregnancy confirmed by positive urine test    Encounter for routine postpartum follow-up 02/06/2018    Postpartum exam    Encounter for screening for infections with a predominantly sexual mode of transmission 06/04/2019    Routine screening for STI (sexually transmitted infection)    Encounter for screening for infections with a predominantly sexual mode of transmission     Routine screening for STI (sexually transmitted infection)    Encounter for supervision of normal pregnancy, unspecified, unspecified trimester 11/28/2017    Encounter for pregnancy related examination    Encounter for surveillance of injectable contraceptive 08/19/2020    Encounter for Depo-Provera contraception    Glycosuria 06/30/2014    Glucosuria    Irregular menstruation, unspecified 12/21/2015    Missed periods    Irregular menstruation, unspecified 05/08/2019    Missed menses    Maternal care for other (suspected) fetal abnormality and damage, fetal cardiac anomalies, not  applicable or unspecified 2019    Abnormal fetal echocardiogram affecting antepartum care of mother    Maternal care for other known or suspected poor fetal growth, unspecified trimester, not applicable or unspecified 2019    Intrauterine growth restriction,     Maternal care for unspecified type scar from previous  delivery 2019    Uterine scar from previous  delivery complicating pregnancy    Missed  2019    , missed    Mixed hyperlipidemia 2018    Hyperlipemia, mixed    Morbid (severe) obesity due to excess calories (CMS/[facility]) 2019    Severe obesity (BMI >= 40)    Myopia, bilateral 09/15/2017    Myopia of both eyes with astigmatism    Obesity complicating pregnancy, unspecified trimester 2019    Obesity in pregnancy    Other conditions influencing health status 2017    History of cough    Other conditions influencing health status 2017    History of pregnancy    Other conditions influencing health status 2017    Type 2 diabetes mellitus, uncontrolled    Other infections with a predominantly sexual mode of transmission complicating pregnancy, unspecified trimester 2020    Genital herpes affecting pregnancy    Other mental disorders complicating pregnancy, unspecified trimester 2019    Depression affecting pregnancy, antepartum    Other specified pregnancy related conditions, second trimester 2019    Pregnancy headache in second trimester    Pain in right hand 2017    Pain of right hand    Personal history of other complications of pregnancy, childbirth and the puerperium 2015    History of galactorrhea    Personal history of other diseases of the circulatory system 2019    History of hypertension    Personal history of other diseases of the female genital tract 2015    History of vaginitis    Personal history of other diseases of the female genital tract 2017     History of irregular menstrual cycles    Personal history of other diseases of the female genital tract 11/28/2017    History of amenorrhea    Personal history of other diseases of the female genital tract 08/24/2015    History of amenorrhea    Personal history of other diseases of the female genital tract 10/24/2014    History of vaginal discharge    Personal history of other diseases of the respiratory system 10/26/2014    History of bronchitis    Personal history of other drug therapy 09/19/2017    History of influenza vaccination    Personal history of other endocrine, nutritional and metabolic disease 06/11/2019    History of type 2 diabetes mellitus    Personal history of other endocrine, nutritional and metabolic disease 11/28/2017    History of hypoglycemia    Personal history of other endocrine, nutritional and metabolic disease 11/28/2017    History of hyperglycemia    Personal history of other infectious and parasitic diseases 06/11/2019    History of herpes genitalis    Personal history of other infectious and parasitic diseases 01/29/2019    History of wound infection    Personal history of other mental and behavioral disorders 09/08/2017    History of anxiety    Personal history of other mental and behavioral disorders 09/08/2017    History of depression    Personal history of other specified conditions 01/23/2015    No post-op complications    Personal history of other specified conditions 11/28/2017    History of nausea    Personal history of other specified conditions 05/01/2019    History of headache    Personal history of other specified conditions 07/30/2018    History of breast lump    Personal history of other specified conditions 05/01/2019    History of insomnia    Personal history of other specified conditions 10/17/2017    History of lump of right breast    Personal history of other specified conditions 03/04/2019    History of nausea and vomiting    Pre-existing type 2 diabetes mellitus,  in pregnancy, unspecified trimester 2019    Pre-existing type 2 diabetes mellitus during pregnancy, antepartum    Pre-existing type 2 diabetes mellitus, in pregnancy, unspecified trimester 12/15/2021    Type 2 diabetes mellitus during pregnancy    Pregnancy with inconclusive fetal viability, not applicable or unspecified 2017    Pregnancy with uncertain fetal viability    Solitary cyst of right breast 2021    Cyst of right breast    Supervision of young multigravida, unspecified trimester 2019    Encounter for supervision of high-risk pregnancy of young multigravida    Type 2 diabetes mellitus without complications (CMS/[facility]) 2022    Diabetes    Unspecified maternal hypertension, second trimester 2017    Hypertension during pregnancy in second trimester    Unspecified maternal hypertension, unspecified trimester 2017    Hypertension in pregnancy, antepartum    Unspecified pre-existing hypertension complicating pregnancy, unspecified trimester 2019    Chronic hypertension in pregnancy        PSH:   Past Surgical History:   Procedure Laterality Date     SECTION, CLASSIC  2019     Section    MR HEAD ANGIO WO IV CONTRAST  2015    MR HEAD ANGIO WO IV CONTRAST 2015 [facility]    OTHER SURGICAL HISTORY  2019    Surgical Treatment Of Missed  In First Trimester        FAMILY HISTORY:  Family History   Problem Relation Name Age of Onset    Heart disease Father      Diabetes type II Mother's Sister          uncontrolled    Breast cancer Father's Sister          two paternal 1st cousins (through paternal uncle, both sisters, both early onset, one ). two paternal great aunts (through PGF's side, both  in their 50s/60s)    Cancer Father's Sister      Cancer Father's Brother      Cancer Maternal Grandmother      Diabetes type II Maternal Grandmother      Cancer Maternal Grandfather      Cancer Paternal Grandmother       Breast cancer Paternal Cousin          two paternal 1st cousins (through paternal uncle, both sisters, both early onset, one ). two paternal great aunts (through PGF's side, both  in their 50s/60s)        SOCIAL HISTORY:  Social History     Tobacco Use    Smoking status: Never     Passive exposure: Never    Smokeless tobacco: Never   Vaping Use    Vaping Use: Never used   Substance Use Topics    Alcohol use: Never    Drug use: Yes     Types: Marijuana       MEDICATIONS:  Prior to Admission Medications:  Medication Documentation Review Audit       Reviewed by Jeramie Lancaster MD (Physician) on 23 at 2304      Medication Order Taking? Sig Documenting Provider Last Dose Status   atorvastatin (Lipitor) 40 mg tablet [id number] Yes Take 1 tablet (40 mg) by mouth once daily at bedtime. Jeramie Lancaster MD Taking Active   benzoyl peroxide 5 % external wash [id number] Yes Apply topically once daily. Lather onto skin folds, leave on 2 minutes, rinse with water Lauren Mcgee MD Taking Active   blood sugar diagnostic (True Metrix Glucose Test Strip) strip [id number] Yes 4 times a day. Historical Provider, MD Taking Active   clindamycin (Cleocin T) 1 % external solution [id number] Yes Apply topically once daily. Lauren Mcgee MD Taking Active   dulaglutide (Trulicity) 1.5 mg/0.5 mL pen injector injection [id number] Yes Inject 1.5 mg under the skin 1 (one) time per week. Jeramie Lancaster MD Taking Active   escitalopram (Lexapro) 10 mg tablet [id number] Yes Take 3 tablets (30 mg) by mouth once daily. Historical Provider, MD Taking Active   famotidine (Pepcid) 40 mg tablet [id number] No Take 1 tablet (40 mg) by mouth 2 times a day. Regine Provider, MD Not Taking Active   famotidine (Pepcid) 40 mg tablet [id number] Yes Take 1 tablet (40 mg) by mouth 2 times a day. Lauren Mcgee MD Taking Active   flash glucose sensor (FREESTYLE NGUYỄN 2 SENSOR [facility]) [id number] Yes Change sensor every 14 days as directed  "Historical Provider, MD Taking Active   FREESTYLE LANCETS MISC [id number] Yes Use 4 lancets each day Historical MD Preston Taking Active   insulin glargine (Lantus Solostar U-100 Insulin) 100 unit/mL (3 mL) pen [id number] Yes Inject 40 Units under the skin 2 times a day. Jeramie Lancaster MD Taking Active   insulin lispro (HumaLOG) 100 unit/mL injection [id number] Yes inject up to 20 units before meals per sliding scale Historical MD Preston Taking Active   loratadine (Claritin) 10 mg tablet [id number] Yes TAKE 2 TABLETS BY MOUTH TWO TIMES A DAY, LAST DOSE IS 10/7 Domingo Shelton MD Taking Active   Patient not taking:  Discontinued 11/13/23 2304   LORazepam (Ativan) 1 mg tablet [id number]  TAKE 1 TABLET BY MOUTH ONCE DAILY AS NEEDED FOR ANXIETY Michael Allison MD  Active   medroxyPROGESTERone (Depo-Provera) 150 mg/mL injection [id number] No MedroxyPROGESTERone Acetate 150 MG/ML Intramuscular Suspension   Refills: 0       Active Historical MD Preston Not Taking Active   metFORMIN XR (Glucophage-XR) 500 mg 24 hr tablet [id number] Yes Take 2 tablets (1,000 mg) by mouth 2 times a day with meals. Historical MD Preston Taking Active   omega-3 acid ethyl esters (Lovaza) 1 gram capsule [id number] Yes Take 2 capsules (2 g) by mouth 2 times a day with meals. Historical MD Preston Taking Active   pen needle, diabetic (BD Ultra-Fine Mini Pen Needle) 31 gauge x 3/16\" needle [id number] Yes Use 4 a day as directed Historical MD Preston Taking Active   pen needle, diabetic (TechLITE Pen Needle) 31 gauge x 5/16\" needle [id number] Yes  Historical Provider, MD Taking Active   prochlorperazine (Compazine) 10 mg tablet [id number] Yes Take 1 tablet (10 mg) by mouth every 6 hours if needed for nausea. Jeramie Lancaster MD Taking Active   valACYclovir (Valtrex) 500 mg tablet [id number] Yes Take 1 tablet (500 mg) by mouth 2 times a day. Historical Provider, MD Taking Active                     ALLERGIES:  Allergies   Allergen Reactions    " "Cephalexin Hives, Other, Rash and Swelling     \"welts\"    Sulfamethoxazole-Trimethoprim Hives, Rash and Swelling       REVIEW OF SYSTEMS:  GENERAL: Negative for malaise, significant weight loss and fever  HEAD: Negative for headache, swelling.  NECK: Negative for lumps, goiter, pain and significant neck swelling  RESPIRATORY: Negative for cough, wheezing or shortness of breath.  CARDIOVASCULAR: Negative for chest pain, leg swelling or palpitations.  GI: Negative for abdominal discomfort, blood in stools or black stools or change in bowel habits  : No history of dysuria, frequency or incontinence  MUSCULOSKELETAL: Negative for joint pain or swelling, back pain or muscle pain.  SKIN: Negative for lesions, rash, and itching.  PSYCH: Negative for sleep disturbance, mood disorder and recent psychosocial stressors.  ENDOCRINE: Negative for cold or heat intolerance, polyuria, polydipsia and goiter.    Objective   PHYSICAL EXAM:  Visit Vitals  OB Status Having periods   Smoking Status Never     Assessment/Plan   IMPRESSION:  Nia Harper is a 33 y.o. female with a BMI of There is no height or weight on file to calculate BMI.     Patient recently underwent partial mastectomy and has healed from it.  She will be put on hormone therapy after she sees her oncologist according to her.  She does not have any evidence of active malignancy now.    She was wondering if she can proceed with her previously planned sleeve gastrectomy which she was interested in.    I discussed with the patient that there is no contraindication to proceed with sleeve gastrectomy at present if she does not have any active disease and hormonal therapy should not affect her candidacy for bariatric surgical procedure.    She is doing marijuana Gummies which I have advised her to stop 4 weeks before the surgery at least.    We will schedule for surgery after insurance approval has been obtained and if there are any clearances pending.    T  "

## 2023-12-07 ENCOUNTER — OFFICE VISIT (OUTPATIENT)
Dept: HEMATOLOGY/ONCOLOGY | Facility: HOSPITAL | Age: 33
End: 2023-12-07
Payer: COMMERCIAL

## 2023-12-07 ENCOUNTER — INFUSION (OUTPATIENT)
Dept: HEMATOLOGY/ONCOLOGY | Facility: HOSPITAL | Age: 33
End: 2023-12-07
Payer: COMMERCIAL

## 2023-12-07 VITALS
OXYGEN SATURATION: 98 % | BODY MASS INDEX: 41.95 KG/M2 | DIASTOLIC BLOOD PRESSURE: 70 MMHG | TEMPERATURE: 95.7 F | WEIGHT: 293 LBS | RESPIRATION RATE: 20 BRPM | SYSTOLIC BLOOD PRESSURE: 128 MMHG | HEART RATE: 107 BPM | HEIGHT: 70 IN

## 2023-12-07 DIAGNOSIS — C50.919 MALIGNANT NEOPLASM OF FEMALE BREAST, UNSPECIFIED ESTROGEN RECEPTOR STATUS, UNSPECIFIED LATERALITY, UNSPECIFIED SITE OF BREAST (MULTI): ICD-10-CM

## 2023-12-07 DIAGNOSIS — E28.39 SUPPRESSION OF OVARIAN SECRETION: Primary | ICD-10-CM

## 2023-12-07 DIAGNOSIS — E28.39 SUPPRESSION OF OVARIAN SECRETION: ICD-10-CM

## 2023-12-07 DIAGNOSIS — F41.9 ANXIETY: ICD-10-CM

## 2023-12-07 LAB
25(OH)D3 SERPL-MCNC: 30 NG/ML (ref 30–100)
ALBUMIN SERPL BCP-MCNC: 4.6 G/DL (ref 3.4–5)
ALP SERPL-CCNC: 68 U/L (ref 33–110)
ALT SERPL W P-5'-P-CCNC: 57 U/L (ref 7–45)
ANION GAP SERPL CALC-SCNC: 12 MMOL/L (ref 10–20)
AST SERPL W P-5'-P-CCNC: 45 U/L (ref 9–39)
BASOPHILS # BLD AUTO: 0.02 X10*3/UL (ref 0–0.1)
BASOPHILS NFR BLD AUTO: 0.4 %
BILIRUB SERPL-MCNC: 0.4 MG/DL (ref 0–1.2)
BUN SERPL-MCNC: 10 MG/DL (ref 6–23)
CALCIUM SERPL-MCNC: 10 MG/DL (ref 8.6–10.3)
CHLORIDE SERPL-SCNC: 101 MMOL/L (ref 98–107)
CO2 SERPL-SCNC: 29 MMOL/L (ref 21–32)
CREAT SERPL-MCNC: 0.63 MG/DL (ref 0.5–1.05)
EOSINOPHIL # BLD AUTO: 0.06 X10*3/UL (ref 0–0.7)
EOSINOPHIL NFR BLD AUTO: 1.2 %
ERYTHROCYTE [DISTWIDTH] IN BLOOD BY AUTOMATED COUNT: 14.3 % (ref 11.5–14.5)
ESTRADIOL SERPL-MCNC: <19 PG/ML
FSH SERPL-ACNC: 3.5 IU/L
GFR SERPL CREATININE-BSD FRML MDRD: >90 ML/MIN/1.73M*2
GLUCOSE SERPL-MCNC: 123 MG/DL (ref 74–99)
HCT VFR BLD AUTO: 40.6 % (ref 36–46)
HGB BLD-MCNC: 13.4 G/DL (ref 12–16)
IMM GRANULOCYTES # BLD AUTO: 0 X10*3/UL (ref 0–0.7)
IMM GRANULOCYTES NFR BLD AUTO: 0 % (ref 0–0.9)
LYMPHOCYTES # BLD AUTO: 1.57 X10*3/UL (ref 1.2–4.8)
LYMPHOCYTES NFR BLD AUTO: 32.4 %
MCH RBC QN AUTO: 28.3 PG (ref 26–34)
MCHC RBC AUTO-ENTMCNC: 33 G/DL (ref 32–36)
MCV RBC AUTO: 86 FL (ref 80–100)
MONOCYTES # BLD AUTO: 0.27 X10*3/UL (ref 0.1–1)
MONOCYTES NFR BLD AUTO: 5.6 %
NEUTROPHILS # BLD AUTO: 2.92 X10*3/UL (ref 1.2–7.7)
NEUTROPHILS NFR BLD AUTO: 60.4 %
NRBC BLD-RTO: 0 /100 WBCS (ref 0–0)
PLATELET # BLD AUTO: 256 X10*3/UL (ref 150–450)
POTASSIUM SERPL-SCNC: 4.2 MMOL/L (ref 3.5–5.3)
PROT SERPL-MCNC: 7.3 G/DL (ref 6.4–8.2)
RBC # BLD AUTO: 4.74 X10*6/UL (ref 4–5.2)
SODIUM SERPL-SCNC: 138 MMOL/L (ref 136–145)
WBC # BLD AUTO: 4.8 X10*3/UL (ref 4.4–11.3)

## 2023-12-07 PROCEDURE — 1036F TOBACCO NON-USER: CPT | Performed by: INTERNAL MEDICINE

## 2023-12-07 PROCEDURE — 96402 CHEMO HORMON ANTINEOPL SQ/IM: CPT

## 2023-12-07 PROCEDURE — 3078F DIAST BP <80 MM HG: CPT | Performed by: INTERNAL MEDICINE

## 2023-12-07 PROCEDURE — 83001 ASSAY OF GONADOTROPIN (FSH): CPT

## 2023-12-07 PROCEDURE — 36415 COLL VENOUS BLD VENIPUNCTURE: CPT

## 2023-12-07 PROCEDURE — 99214 OFFICE O/P EST MOD 30 MIN: CPT | Performed by: INTERNAL MEDICINE

## 2023-12-07 PROCEDURE — 2500000005 HC RX 250 GENERAL PHARMACY W/O HCPCS: Performed by: INTERNAL MEDICINE

## 2023-12-07 PROCEDURE — 85025 COMPLETE CBC W/AUTO DIFF WBC: CPT

## 2023-12-07 PROCEDURE — 3074F SYST BP LT 130 MM HG: CPT | Performed by: INTERNAL MEDICINE

## 2023-12-07 PROCEDURE — 84075 ASSAY ALKALINE PHOSPHATASE: CPT

## 2023-12-07 PROCEDURE — 82670 ASSAY OF TOTAL ESTRADIOL: CPT

## 2023-12-07 PROCEDURE — 82306 VITAMIN D 25 HYDROXY: CPT

## 2023-12-07 PROCEDURE — 3008F BODY MASS INDEX DOCD: CPT | Performed by: INTERNAL MEDICINE

## 2023-12-07 PROCEDURE — 2500000004 HC RX 250 GENERAL PHARMACY W/ HCPCS (ALT 636 FOR OP/ED): Mod: JZ | Performed by: INTERNAL MEDICINE

## 2023-12-07 PROCEDURE — 80053 COMPREHEN METABOLIC PANEL: CPT

## 2023-12-07 PROCEDURE — 3051F HG A1C>EQUAL 7.0%<8.0%: CPT | Performed by: INTERNAL MEDICINE

## 2023-12-07 RX ORDER — LIDOCAINE HYDROCHLORIDE 10 MG/ML
2 INJECTION, SOLUTION EPIDURAL; INFILTRATION; INTRACAUDAL; PERINEURAL ONCE
Status: CANCELLED | OUTPATIENT
Start: 2024-01-04

## 2023-12-07 RX ORDER — EPINEPHRINE 0.3 MG/.3ML
0.3 INJECTION SUBCUTANEOUS EVERY 5 MIN PRN
Status: CANCELLED | OUTPATIENT
Start: 2024-01-04

## 2023-12-07 RX ORDER — ALBUTEROL SULFATE 0.83 MG/ML
3 SOLUTION RESPIRATORY (INHALATION) AS NEEDED
Status: CANCELLED | OUTPATIENT
Start: 2024-01-04

## 2023-12-07 RX ORDER — DIPHENHYDRAMINE HYDROCHLORIDE 50 MG/ML
50 INJECTION INTRAMUSCULAR; INTRAVENOUS AS NEEDED
Status: CANCELLED | OUTPATIENT
Start: 2024-01-04

## 2023-12-07 RX ORDER — LORAZEPAM 1 MG/1
1 TABLET ORAL DAILY PRN
Qty: 30 TABLET | Refills: 0 | Status: SHIPPED | OUTPATIENT
Start: 2023-12-07 | End: 2024-02-07 | Stop reason: SDUPTHER

## 2023-12-07 RX ORDER — FAMOTIDINE 10 MG/ML
20 INJECTION INTRAVENOUS ONCE AS NEEDED
Status: CANCELLED | OUTPATIENT
Start: 2024-01-04

## 2023-12-07 RX ORDER — LIDOCAINE HYDROCHLORIDE 10 MG/ML
2 INJECTION, SOLUTION EPIDURAL; INFILTRATION; INTRACAUDAL; PERINEURAL ONCE
Status: COMPLETED | OUTPATIENT
Start: 2023-12-07 | End: 2023-12-07

## 2023-12-07 RX ORDER — ANASTROZOLE 1 MG/1
1 TABLET ORAL DAILY
Qty: 30 TABLET | Refills: 2 | Status: SHIPPED | OUTPATIENT
Start: 2023-12-07 | End: 2024-02-29 | Stop reason: SDUPTHER

## 2023-12-07 RX ADMIN — GOSERELIN ACETATE 3.6 MG: 3.6 IMPLANT SUBCUTANEOUS at 11:14

## 2023-12-07 RX ADMIN — LIDOCAINE HYDROCHLORIDE 20 MG: 10 INJECTION, SOLUTION EPIDURAL; INFILTRATION; INTRACAUDAL; PERINEURAL at 11:14

## 2023-12-07 ASSESSMENT — PAIN SCALES - GENERAL: PAINLEVEL: 0-NO PAIN

## 2023-12-07 NOTE — PROGRESS NOTES
Presents for Zoledex injection after office visit, labwork drawn and sent, Tolerated injection well. Confirmed next apts made, declined AVS,  uses my chart. Discharged home ambulatory in [facility].

## 2023-12-11 ENCOUNTER — PATIENT OUTREACH (OUTPATIENT)
Dept: CARE COORDINATION | Facility: CLINIC | Age: 33
End: 2023-12-11
Payer: COMMERCIAL

## 2023-12-11 SDOH — ECONOMIC STABILITY: GENERAL: WHICH SDOH AREAS ARE YOU CURRENLTY ADDRESSING WITH THE PATIENT?: FINANCIAL STRAIN RELIEF ASSISTANCE

## 2023-12-11 NOTE — PROGRESS NOTES
"CHW contacted pt regarding receiving león applications for Inna Shelby and Charlette Ball through her email. Pt stated she was not able to open the file. CHW asked if would be ok to send through regular mail and she replied \"Yes.\" CHW mailed both applications through regular mail and told pt she would follow up in a week.   "

## 2023-12-11 NOTE — PROGRESS NOTES
Patient ID: Nia Harper is a 33 y.o. female.  MRN: [id number]  : 1990  The patient presents to clinic today for her history of right-sided breast cancer.     Cancer Staging   Malignant neoplasm of female breast (CMS/HCC)  Staging form: Breast, AJCC 8th Edition  - Pathologic stage from 2023: Stage IB (pT2(3), pN0(sn), cM0, G3, ER+, AR+, HER2-, Oncotype DX score: 45) - Signed by Jeramie Lancaster MD on 10/10/2023    Diagnostic/Therapeutic History:  -Enlarging right breast lumps.  -5/15/23: Dx MG/US demonstrated multiple masses: 1.1 cm (11:00, 8 cm FN); 1 cm (9:30, 5 cm FN); 1.2 cm (9:00, 6 cm FN); 0.7 cm (10:00, 6 cm FN); 0.4 cm (10:00, 5 cm FN). Four lymph nodes demonstrated suspicious cortical thickening.  -23: US-guided CNB of the 11:00 mass showed IDC grade 3; ER >95% AR 85% Her2-negative (1+ IHC). Biopsy of the 9:30 mass showed IDC grade 2-3; ER 80% AR 10% Her2-negative (1+ IHC). A right axillary LN was negative for carcinoma.  -23: Right mastectomy and SLNBx performed. Multipe foci of grade 3 carcinoma noted (3.1 cm, 2.2 cm, 1.8 cm), no LVI, margins negative, 0/3 LN’s involved IB, pT2 pN0 cM0 G3  Oncotype Dx RS 45 (33% 9-year recurrence risk; >15% chemo benefit).  RSClin: 59% recurrence risk with tamoxifen; 44% chemo benefit.  -23: Adjuvant TC (docetaxel, cyclophosphamide) initiated. First cycle complicated by admission for nausea/vomiting. Second cycle complicated by admission for angioedema, hives; thought to be secondary to docetaxel. Switched to CMF (cyclophosphamide, methotrexate, fluorouracil) on 10/16/23. Second (and last) cycle given 23.    History of Present Illness (HPI)/Interval History:  Nia Harper presents for routine FUV and discussion of adjuvant endocrine therapy options.  She has recovered well from her last chemotherapy cycle.  No further hives; off Claritin and Pepcid.  She is still interested in another child before she turns 38. Willing to wait a bit before  trying to conceive.  No new bone pain, dyspnea, cough, abdominal pain.    Review of Systems:  14-point ROS otherwise negative, as per HPI/Interval History.    Past Medical History:   Diagnosis Date    22 weeks gestation of pregnancy 2019    22 weeks gestation of pregnancy    24 weeks gestation of pregnancy 2019    24 weeks gestation of pregnancy    Abnormal chromosomal and genetic finding on  screening of mother 2019    Abnormal genetic test during pregnancy    Abnormal hematological finding on  screening of mother 2019    Abnormal maternal serum screening test    Abnormal levels of other serum enzymes 2021    Elevated liver enzymes    Abnormal levels of other serum enzymes 2021    Elevated liver enzymes    Acute candidiasis of vulva and vagina 10/15/2015    Vaginal candidiasis    Acute candidiasis of vulva and vagina 10/05/2018    Vaginitis due to Candida albicans    Acute vaginitis 2020    Bacterial vaginosis    Anxiety disorder, unspecified 2019    Anxiety and depression    Body mass index (BMI)40.0-44.9, adult 2019    BMI 40.0-44.9, adult    Candidiasis, unspecified 2021    Yeast infection    Carpal tunnel syndrome, bilateral upper limbs 2019    Carpal tunnel syndrome on both sides    Carpal tunnel syndrome, left upper limb 2019    Left carpal tunnel syndrome    Carpal tunnel syndrome, right upper limb 2019    Right carpal tunnel syndrome    Depression, unspecified 2021    Depression    Disruption of external operation (surgical) wound, not elsewhere classified, initial encounter 2019    Open draining abdominal incision    Encounter for change or removal of nonsurgical wound dressing 2018    Dressing change    Encounter for contraceptive management, unspecified 2015    Contraceptive management    Encounter for contraceptive management, unspecified 2015    Encounter for contraceptive  management    Encounter for examination of eyes and vision without abnormal findings 07/14/2016    Diabetic eye exam    Encounter for gynecological examination (general) (routine) without abnormal findings 05/20/2021    Encounter for well woman exam with routine gynecological exam    Encounter for gynecological examination (general) (routine) without abnormal findings 07/28/2015    Encounter for cervical Pap smear with pelvic exam    Encounter for immunization 10/31/2017    Need for Tdap vaccination    Encounter for initial prescription of contraceptive pills 02/06/2018    Encounter for initial prescription of contraceptive pills    Encounter for other general counseling and advice on contraception 06/11/2018    Encounter for counseling regarding initiation of other contraceptive measure    Encounter for other general counseling and advice on procreation 11/28/2017    Encounter for preconception consultation    Encounter for other preprocedural examination 11/28/2017    Pre-op testing    Encounter for pregnancy test, result positive 11/28/2017    Pregnancy confirmed by positive urine test    Encounter for routine postpartum follow-up 02/06/2018    Postpartum exam    Encounter for screening for infections with a predominantly sexual mode of transmission 06/04/2019    Routine screening for STI (sexually transmitted infection)    Encounter for screening for infections with a predominantly sexual mode of transmission     Routine screening for STI (sexually transmitted infection)    Encounter for supervision of normal pregnancy, unspecified, unspecified trimester 11/28/2017    Encounter for pregnancy related examination    Encounter for surveillance of injectable contraceptive 08/19/2020    Encounter for Depo-Provera contraception    Glycosuria 06/30/2014    Glucosuria    Irregular menstruation, unspecified 12/21/2015    Missed periods    Irregular menstruation, unspecified 05/08/2019    Missed menses    Maternal care  for other (suspected) fetal abnormality and damage, fetal cardiac anomalies, not applicable or unspecified 2019    Abnormal fetal echocardiogram affecting antepartum care of mother    Maternal care for other known or suspected poor fetal growth, unspecified trimester, not applicable or unspecified 2019    Intrauterine growth restriction,     Maternal care for unspecified type scar from previous  delivery 2019    Uterine scar from previous  delivery complicating pregnancy    Missed  2019    , missed    Mixed hyperlipidemia 2018    Hyperlipemia, mixed    Morbid (severe) obesity due to excess calories (CMS/[facility]) 2019    Severe obesity (BMI >= 40)    Myopia, bilateral 09/15/2017    Myopia of both eyes with astigmatism    Obesity complicating pregnancy, unspecified trimester 2019    Obesity in pregnancy    Other conditions influencing health status 2017    History of cough    Other conditions influencing health status 2017    History of pregnancy    Other conditions influencing health status 2017    Type 2 diabetes mellitus, uncontrolled    Other infections with a predominantly sexual mode of transmission complicating pregnancy, unspecified trimester 2020    Genital herpes affecting pregnancy    Other mental disorders complicating pregnancy, unspecified trimester 2019    Depression affecting pregnancy, antepartum    Other specified pregnancy related conditions, second trimester 2019    Pregnancy headache in second trimester    Pain in right hand 2017    Pain of right hand    Personal history of other complications of pregnancy, childbirth and the puerperium 2015    History of galactorrhea    Personal history of other diseases of the circulatory system 2019    History of hypertension    Personal history of other diseases of the female genital tract 2015    History of vaginitis     Personal history of other diseases of the female genital tract 11/28/2017    History of irregular menstrual cycles    Personal history of other diseases of the female genital tract 11/28/2017    History of amenorrhea    Personal history of other diseases of the female genital tract 08/24/2015    History of amenorrhea    Personal history of other diseases of the female genital tract 10/24/2014    History of vaginal discharge    Personal history of other diseases of the respiratory system 10/26/2014    History of bronchitis    Personal history of other drug therapy 09/19/2017    History of influenza vaccination    Personal history of other endocrine, nutritional and metabolic disease 06/11/2019    History of type 2 diabetes mellitus    Personal history of other endocrine, nutritional and metabolic disease 11/28/2017    History of hypoglycemia    Personal history of other endocrine, nutritional and metabolic disease 11/28/2017    History of hyperglycemia    Personal history of other infectious and parasitic diseases 06/11/2019    History of herpes genitalis    Personal history of other infectious and parasitic diseases 01/29/2019    History of wound infection    Personal history of other mental and behavioral disorders 09/08/2017    History of anxiety    Personal history of other mental and behavioral disorders 09/08/2017    History of depression    Personal history of other specified conditions 01/23/2015    No post-op complications    Personal history of other specified conditions 11/28/2017    History of nausea    Personal history of other specified conditions 05/01/2019    History of headache    Personal history of other specified conditions 07/30/2018    History of breast lump    Personal history of other specified conditions 05/01/2019    History of insomnia    Personal history of other specified conditions 10/17/2017    History of lump of right breast    Personal history of other specified conditions 03/04/2019     History of nausea and vomiting    Pre-existing type 2 diabetes mellitus, in pregnancy, unspecified trimester 12/09/2019    Pre-existing type 2 diabetes mellitus during pregnancy, antepartum    Pre-existing type 2 diabetes mellitus, in pregnancy, unspecified trimester 12/15/2021    Type 2 diabetes mellitus during pregnancy    Pregnancy with inconclusive fetal viability, not applicable or unspecified 11/28/2017    Pregnancy with uncertain fetal viability    Solitary cyst of right breast 05/20/2021    Cyst of right breast    Supervision of young multigravida, unspecified trimester 12/09/2019    Encounter for supervision of high-risk pregnancy of young multigravida    Type 2 diabetes mellitus without complications (CMS/HCC) 09/14/2022    Diabetes    Unspecified maternal hypertension, second trimester 11/28/2017    Hypertension during pregnancy in second trimester    Unspecified maternal hypertension, unspecified trimester 11/28/2017    Hypertension in pregnancy, antepartum    Unspecified pre-existing hypertension complicating pregnancy, unspecified trimester 12/09/2019    Chronic hypertension in pregnancy     Patient Active Problem List   Diagnosis    Abnormal uterine bleeding    Abscess    Abscess of breast, postpartum    Acne    Astigmatism of both eyes    Bacterial vaginosis    Breast skin changes    Cutaneous abscess of chest wall    Cyst of right breast    Lump of right breast    De Quervain's tenosynovitis, right    Depression    Diabetes (CMS/HCC)    Diabetes mellitus type 2 without retinopathy (CMS/[facility])    Diffuse goiter    Dyspnea on exertion    Dysuria    Elevated liver enzymes    Epidermal inclusion cyst    Herpes simplex virus (HSV) infection    History of breast cancer    Hyperlipemia    Hypertriglyceridemia    Impaired skin integrity associated with surgical incision    Infection of great toe    Left carpal tunnel syndrome    Mild postpartum depression    Myopia of both eyes    Nexplanon in place     Nipple discharge, bloody    Nonpuerperal abscess of left breast    Other skin changes    S/P  section    Suspected sleep apnea    Uncontrolled type 2 diabetes mellitus with hyperglycemia (CMS/[facility])    Urine frequency    Vaginal irritation    Vaginal Pap smear with ASC-US    Fatigue    Weakness generalized    Wound infection after surgery    Yeast infection    Vaginal odor    Chronic headaches    BMI 40.0-44.9, adult (CMS/[facility])    Class 3 severe obesity due to excess calories with serious comorbidity and body mass index (BMI) of 40.0 to 44.9 in adult (CMS/[facility])    Malignant neoplasm of female breast (CMS/[facility])    Nausea and vomiting    Primary hypertension    Hospital discharge follow-up        Past Surgical History:   Procedure Laterality Date     SECTION, CLASSIC  2019     Section    MR HEAD ANGIO WO IV CONTRAST  2015    MR HEAD ANGIO WO IV CONTRAST 2015 [facility]    OTHER SURGICAL HISTORY  2019    Surgical Treatment Of Missed  In First Trimester       Social History     Socioeconomic History    Marital status: Single     Spouse name: Not on file    Number of children: Not on file    Years of education: Not on file    Highest education level: Not on file   Occupational History    Not on file   Tobacco Use    Smoking status: Never     Passive exposure: Never    Smokeless tobacco: Never   Vaping Use    Vaping Use: Never used   Substance and Sexual Activity    Alcohol use: Never    Drug use: Yes     Types: Marijuana    Sexual activity: Not on file   Other Topics Concern    Not on file   Social History Narrative    Not on file     Social Determinants of Health     Financial Resource Strain: Not on file   Food Insecurity: Not on file   Transportation Needs: Not on file   Physical Activity: Not on file   Stress: Not on file   Social Connections: Not on file   Intimate Partner Violence: Not on file   Housing Stability: Not on file       Family History   Problem  "Relation Name Age of Onset    Heart disease Father      Diabetes type II Mother's Sister          uncontrolled    Breast cancer Father's Sister          two paternal 1st cousins (through paternal uncle, both sisters, both early onset, one ). two paternal great aunts (through PGF's side, both  in their 50s/60s)    Cancer Father's Sister      Cancer Father's Brother      Cancer Maternal Grandmother      Diabetes type II Maternal Grandmother      Cancer Maternal Grandfather      Cancer Paternal Grandmother      Breast cancer Paternal Cousin          two paternal 1st cousins (through paternal uncle, both sisters, both early onset, one ). two paternal great aunts (through PGF's side, both  in their 50s/60s)       Allergies:   Allergies   Allergen Reactions    Cephalexin Hives, Other, Rash and Swelling     \"welts\"    Sulfamethoxazole-Trimethoprim Hives, Rash and Swelling         Current Outpatient Medications:     atorvastatin (Lipitor) 40 mg tablet, Take 1 tablet (40 mg) by mouth once daily at bedtime., Disp: 90 tablet, Rfl: 3    benzoyl peroxide 5 % external wash, Apply topically once daily. Lather onto skin folds, leave on 2 minutes, rinse with water, Disp: 236 g, Rfl: 11    blood sugar diagnostic (OneTouch Verio test strips) strip, Inject 100 strips under the skin once daily., Disp: 100 strip, Rfl: 1    blood-glucose sensor (FreeStyle Cristina 3 Sensor) device, 2 each every 14 (fourteen) days., Disp: 2 each, Rfl: 11    clindamycin (Cleocin T) 1 % external solution, Apply topically once daily., Disp: 60 mL, Rfl: 11    dulaglutide (Trulicity) 3 mg/0.5 mL pen injector, Once weekly, Disp: 4 each, Rfl: 11    famotidine (Pepcid) 40 mg tablet, Take 1 tablet (40 mg) by mouth 2 times a day., Disp: , Rfl:     glycerin-min oil-polycarbophil (Replens) gel, Insert 1 Application into the vagina if needed (vaginal dryness). Insert into vagina 1-2 times/day as needed for topical lubrication, to be used as daily " "management not to be used as lubricant for intercourse, Disp: 35 g, Rfl: 11    insulin glargine (Lantus Solostar U-100 Insulin) 100 unit/mL (3 mL) pen, Inject 40 Units under the skin 2 times a day., Disp: 3 mL, Rfl: 2    insulin lispro (HumaLOG Aurelio Kwikpen) 100 unit/mL injection, Inject 40 Units under the skin 3 times a day as needed for high blood sugar. inject up to 20 units before meals per sliding scale, Disp: 15 mL, Rfl: 5    loratadine (Claritin) 10 mg tablet, TAKE 2 TABLETS BY MOUTH TWO TIMES A DAY, LAST DOSE IS 10/7, Disp: 48 tablet, Rfl: 0    metFORMIN  mg 24 hr tablet, Take 2 tablets (1,000 mg) by mouth 2 times a day with meals., Disp: 180 tablet, Rfl: 1    omega-3 acid ethyl esters (Lovaza) 1 gram capsule, Take 2 capsules (2 g) by mouth 2 times a day with meals., Disp: 120 capsule, Rfl: 1    pen needle, diabetic (BD Ultra-Fine Mini Pen Needle) 31 gauge x 3/16\" needle, Inject 200 each under the skin 4 times a day before meals. Use 4 a day as directed, Disp: 200 each, Rfl: 3    valACYclovir (Valtrex) 500 mg tablet, Take 1 tablet (500 mg) by mouth 2 times a day., Disp: , Rfl:     anastrozole (Arimidex) 1 mg tablet, Take 1 tablet (1 mg total) by mouth once daily.  Swallow whole with a drink of water., Disp: 30 tablet, Rfl: 2    LORazepam (Ativan) 1 mg tablet, TAKE 1 TABLET BY MOUTH ONCE DAILY AS NEEDED FOR ANXIETY, Disp: 30 tablet, Rfl: 0    semaglutide (Ozempic) 0.25 mg or 0.5 mg (2 mg/3 mL) pen injector, Inject 0.5 mg under the skin every 7 days. (Patient not taking: Reported on 12/7/2023), Disp: 3 mL, Rfl: 2     Objective    BSA: 2.58 meters squared  /70   Pulse 107   Temp 35.4 °C (95.7 °F) (Temporal)   Resp 20   Ht 1.778 m (5' 10\")   Wt 135 kg (296 lb 15.4 oz)   SpO2 98%   BMI 42.61 kg/m²     ECOG Performance Status:  1 Restricted in physically strenuous activity but ambulatory and able to carry out work of a light or sedentary nature, e.g., light house work, office work.    Physical " Exam  Constitutional:       General: She is not in acute distress.     Appearance: Normal appearance.   HENT:      Head: Normocephalic and atraumatic.   Eyes:      General: No scleral icterus.     Conjunctiva/sclera: Conjunctivae normal.   Cardiovascular:      Rate and Rhythm: Normal rate and regular rhythm.      Heart sounds: No murmur heard.  Pulmonary:      Effort: Pulmonary effort is normal. No respiratory distress.      Breath sounds: Normal breath sounds.   Musculoskeletal:         General: No swelling, tenderness or deformity. Normal range of motion.      Cervical back: Normal range of motion and neck supple. No rigidity.   Lymphadenopathy:      Cervical: No cervical adenopathy.   Skin:     General: Skin is warm and dry.      Coloration: Skin is not jaundiced.      Findings: No rash.      Comments: No hives noted today.   Neurological:      Mental Status: She is alert and oriented to person, place, and time.      Gait: Gait normal.   Psychiatric:         Mood and Affect: Mood normal.         Behavior: Behavior normal.         Thought Content: Thought content normal.         Judgment: Judgment normal.         Laboratory Data:  Lab Results   Component Value Date    WBC 4.8 12/07/2023    HGB 13.4 12/07/2023    HCT 40.6 12/07/2023    MCV 86 12/07/2023     12/07/2023    ANC 15.37 (H) 09/26/2023       Chemistry    Lab Results   Component Value Date/Time     12/07/2023 1106    K 4.2 12/07/2023 1106     12/07/2023 1106    CO2 29 12/07/2023 1106    BUN 10 12/07/2023 1106    CREATININE 0.63 12/07/2023 1106    Lab Results   Component Value Date/Time    CALCIUM 10.0 12/07/2023 1106    ALKPHOS 68 12/07/2023 1106    AST 45 (H) 12/07/2023 1106    ALT 57 (H) 12/07/2023 1106    BILITOT 0.4 12/07/2023 1106           Component      Latest Ref Rng 12/7/2023   FOLLICLE STIMULATING HORMONE      IU/L 3.5    Estradiol      pg/mL <19    Vitamin D, 25-Hydroxy, Total      30 - 100 ng/mL 30        Radiology:  N/A    Pathology: N/A      Assessment/Plan:  Nia Harper is a 33 y.o. female with a history of right breast cancer, who presents today for follow-up evaluation.    Breast cancer, right, stage IB (ER/MD+, Her2-).  High-risk for recurrence, based on Oncotype Dx and clinical features.  Very poor tolerance of TC (2 cycles), including severe allergic reaction. Completed CMF x2 11/9/23.  - We reviewed the rationale for adjuvant endocrine therapy in her case. Based on high-risk disease, I do recommend continuing ovarian suppression as part of her adjuvant treatment. She is agreeable to this. We can stop in 18-24 months, temporarily, to allow for family planning.  - Labs today show adequate ovarian suppresion.  - Start anastrozole 1 mg daily. Instructed to call with side effects. Script sent to pharmacy.  - No role for adjuvant RT.  - Left mammogram ordered.    Allergic reaction.  - Likely delayed reaction to docetaxel, but pegfilgrastim also possible.  - Avoid further TC/Neulasta. Switched therapy, as above.  - Off therapy. No further hives.    Premenopausal status. Continue goserelin q4 weeks, initiated 8/18/23.    Insulin-dependent T2DM.  - Following with Endocrinology.    Disposition.  - FUV ~12 weeks.  - She has our contact information and was instructed to call with concerns/questions in the interim.    Orders Placed This Encounter   Procedures    BI mammo left diagnostic    CBC and Auto Differential    Comprehensive Metabolic Panel    Vitamin D 25-Hydroxy,Total (for eval of Vitamin D levels)    Estradiol    Follicle Stimulating Hormone     Jeramie Lancaster MD

## 2023-12-13 ENCOUNTER — SOCIAL WORK (OUTPATIENT)
Dept: CASE MANAGEMENT | Facility: HOSPITAL | Age: 33
End: 2023-12-13
Payer: COMMERCIAL

## 2023-12-13 NOTE — PROGRESS NOTES
Covering SW received phone call from patient today seeking name and fax number for [employer] to sent a 30-day medical certification of illness form for Dr. Lancaster to complete. MINERVA will have physician sign during clinic on 12/14/23 and will fax back.    Update: Physician signed form today. MINERVA faxed it back to [employer] today at [phone].

## 2023-12-19 ENCOUNTER — APPOINTMENT (OUTPATIENT)
Dept: ALLERGY | Facility: HOSPITAL | Age: 33
End: 2023-12-19
Payer: COMMERCIAL

## 2023-12-22 ENCOUNTER — PATIENT OUTREACH (OUTPATIENT)
Dept: CARE COORDINATION | Facility: CLINIC | Age: 33
End: 2023-12-22
Payer: COMMERCIAL

## 2023-12-22 NOTE — PROGRESS NOTES
Final call.  CM called and spoke with pt to address any final questions or concerns regarding hospitalization.  Pt reports doing well and has no concerns at this time.  Pt given my contact info  in the event questions should arise.

## 2023-12-26 ENCOUNTER — APPOINTMENT (OUTPATIENT)
Dept: ALLERGY | Facility: CLINIC | Age: 33
End: 2023-12-26
Payer: COMMERCIAL

## 2023-12-29 ENCOUNTER — NURSE TRIAGE (OUTPATIENT)
Dept: HEMATOLOGY/ONCOLOGY | Facility: CLINIC | Age: 33
End: 2023-12-29
Payer: COMMERCIAL

## 2023-12-29 NOTE — TELEPHONE ENCOUNTER
Pt states that for the past 7 days she has had a sudden, sharp shooting pain from the middle of her head to the back of her neck, multiple times a day. Pain does not occur with any specific movement or positioning. This occurred at least 4 times today and frequency is increasing. Pain lasts approx 5 secs and resolves on its own. Pain is 10/10 when it occurs.   Pt denies unilateral weakness, changes in vision, dizziness, headache, nasal congestion, cough, fever.   Pt has planned FUV 2/29.   Pt in agreement to go to ED if any new /worsening symptoms.    Purse String (Intermediate) Text: Given the location of the defect and the characteristics of the surrounding skin a purse string intermediate closure was deemed most appropriate.  Undermining was performed circumfirentially around the surgical defect.  A purse string suture was then placed and tightened.

## 2023-12-29 NOTE — TELEPHONE ENCOUNTER
Pt notified per [facility] PAC directive. Pt in agreement with plan- will try tylenol and NSAIDs- if no relief or worsening symptoms pt will go to ED for evaluation /treatment.

## 2024-01-02 DIAGNOSIS — Z98.84 BARIATRIC SURGERY STATUS: ICD-10-CM

## 2024-01-04 ENCOUNTER — TELEPHONE (OUTPATIENT)
Dept: SURGERY | Facility: HOSPITAL | Age: 34
End: 2024-01-04

## 2024-01-04 ENCOUNTER — PATIENT OUTREACH (OUTPATIENT)
Dept: CARE COORDINATION | Facility: CLINIC | Age: 34
End: 2024-01-04

## 2024-01-04 ENCOUNTER — HOSPITAL ENCOUNTER (OUTPATIENT)
Dept: RADIOLOGY | Facility: HOSPITAL | Age: 34
Discharge: HOME | End: 2024-01-04
Payer: COMMERCIAL

## 2024-01-04 ENCOUNTER — INFUSION (OUTPATIENT)
Dept: HEMATOLOGY/ONCOLOGY | Facility: HOSPITAL | Age: 34
End: 2024-01-04
Payer: COMMERCIAL

## 2024-01-04 VITALS
BODY MASS INDEX: 42.18 KG/M2 | OXYGEN SATURATION: 100 % | HEART RATE: 82 BPM | RESPIRATION RATE: 18 BRPM | HEIGHT: 70 IN | SYSTOLIC BLOOD PRESSURE: 130 MMHG | TEMPERATURE: 97.7 F | DIASTOLIC BLOOD PRESSURE: 73 MMHG

## 2024-01-04 DIAGNOSIS — C50.919 MALIGNANT NEOPLASM OF FEMALE BREAST, UNSPECIFIED ESTROGEN RECEPTOR STATUS, UNSPECIFIED LATERALITY, UNSPECIFIED SITE OF BREAST (MULTI): ICD-10-CM

## 2024-01-04 PROBLEM — K80.20 GALLSTONES: Status: ACTIVE | Noted: 2024-01-04

## 2024-01-04 PROBLEM — N64.4 BREAST PAIN: Status: ACTIVE | Noted: 2024-01-04

## 2024-01-04 PROBLEM — R92.8 ABNORMAL MAMMOGRAM OF RIGHT BREAST: Status: ACTIVE | Noted: 2024-01-04

## 2024-01-04 PROBLEM — G47.19 EXCESSIVE DAYTIME SLEEPINESS: Status: ACTIVE | Noted: 2024-01-04

## 2024-01-04 PROBLEM — A41.9 SEPSIS (MULTI): Status: ACTIVE | Noted: 2024-01-04

## 2024-01-04 PROBLEM — G47.30 SLEEP DISORDER BREATHING: Status: ACTIVE | Noted: 2024-01-04

## 2024-01-04 PROBLEM — T78.40XA ALLERGIC REACTION TO DRUG: Status: ACTIVE | Noted: 2024-01-04

## 2024-01-04 PROBLEM — F54 PSYCHOLOGICAL FACTORS AFFECTING MEDICAL CONDITION: Status: ACTIVE | Noted: 2024-01-04

## 2024-01-04 PROBLEM — T78.3XXA ANGIOEDEMA: Status: ACTIVE | Noted: 2024-01-04

## 2024-01-04 PROBLEM — R00.2 PALPITATIONS: Status: ACTIVE | Noted: 2024-01-04

## 2024-01-04 PROBLEM — F51.12 INSUFFICIENT SLEEP SYNDROME: Status: ACTIVE | Noted: 2024-01-04

## 2024-01-04 PROBLEM — L21.9 SEBORRHEIC DERMATITIS, UNSPECIFIED: Status: ACTIVE | Noted: 2019-11-25

## 2024-01-04 PROBLEM — Z98.84 BARIATRIC SURGERY STATUS: Status: ACTIVE | Noted: 2024-01-04

## 2024-01-04 PROBLEM — Z20.822 EXPOSURE TO COVID-19 VIRUS: Status: ACTIVE | Noted: 2024-01-04

## 2024-01-04 PROBLEM — N92.6 MISSED PERIOD: Status: ACTIVE | Noted: 2024-01-04

## 2024-01-04 PROBLEM — R01.1 SYSTOLIC MURMUR: Status: ACTIVE | Noted: 2024-01-04

## 2024-01-04 PROBLEM — R07.9 CHEST PAIN: Status: ACTIVE | Noted: 2024-01-04

## 2024-01-04 PROBLEM — Z90.10 STATUS POST MASTECTOMY: Status: ACTIVE | Noted: 2024-01-04

## 2024-01-04 PROCEDURE — 96372 THER/PROPH/DIAG INJ SC/IM: CPT

## 2024-01-04 PROCEDURE — 2500000004 HC RX 250 GENERAL PHARMACY W/ HCPCS (ALT 636 FOR OP/ED): Mod: JZ | Performed by: INTERNAL MEDICINE

## 2024-01-04 PROCEDURE — 96402 CHEMO HORMON ANTINEOPL SQ/IM: CPT

## 2024-01-04 PROCEDURE — G0279 TOMOSYNTHESIS, MAMMO: HCPCS | Mod: LEFT SIDE | Performed by: RADIOLOGY

## 2024-01-04 PROCEDURE — 77065 DX MAMMO INCL CAD UNI: CPT | Mod: LEFT SIDE | Performed by: RADIOLOGY

## 2024-01-04 PROCEDURE — 2500000005 HC RX 250 GENERAL PHARMACY W/O HCPCS: Performed by: INTERNAL MEDICINE

## 2024-01-04 PROCEDURE — 77061 BREAST TOMOSYNTHESIS UNI: CPT | Mod: LT

## 2024-01-04 RX ORDER — ONDANSETRON 4 MG/1
4 TABLET, ORALLY DISINTEGRATING ORAL EVERY 6 HOURS
COMMUNITY
Start: 2023-04-16 | End: 2024-02-09 | Stop reason: ALTCHOICE

## 2024-01-04 RX ORDER — LANCETS 33 GAUGE
EACH MISCELLANEOUS
COMMUNITY
Start: 2023-08-14

## 2024-01-04 RX ORDER — FAMOTIDINE 10 MG/ML
20 INJECTION INTRAVENOUS ONCE AS NEEDED
Status: CANCELLED | OUTPATIENT
Start: 2024-02-01

## 2024-01-04 RX ORDER — TRAMADOL HYDROCHLORIDE 50 MG/1
TABLET ORAL
COMMUNITY
Start: 2023-06-30 | End: 2024-02-09 | Stop reason: ALTCHOICE

## 2024-01-04 RX ORDER — DEXAMETHASONE 4 MG/1
TABLET ORAL
COMMUNITY
Start: 2023-09-15 | End: 2024-02-09 | Stop reason: ALTCHOICE

## 2024-01-04 RX ORDER — LOPERAMIDE HYDROCHLORIDE 2 MG/1
CAPSULE ORAL
COMMUNITY
End: 2024-02-09 | Stop reason: ALTCHOICE

## 2024-01-04 RX ORDER — LIDOCAINE HYDROCHLORIDE 10 MG/ML
2 INJECTION, SOLUTION EPIDURAL; INFILTRATION; INTRACAUDAL; PERINEURAL ONCE
Status: COMPLETED | OUTPATIENT
Start: 2024-01-04 | End: 2024-01-04

## 2024-01-04 RX ORDER — ONDANSETRON HYDROCHLORIDE 8 MG/1
8 TABLET, FILM COATED ORAL 3 TIMES DAILY
Status: ON HOLD | COMMUNITY
End: 2024-03-26 | Stop reason: ALTCHOICE

## 2024-01-04 RX ORDER — FLASH GLUCOSE SENSOR
KIT MISCELLANEOUS
COMMUNITY
Start: 2023-11-24

## 2024-01-04 RX ORDER — ALBUTEROL SULFATE 0.83 MG/ML
3 SOLUTION RESPIRATORY (INHALATION) AS NEEDED
Status: CANCELLED | OUTPATIENT
Start: 2024-02-01

## 2024-01-04 RX ORDER — EZETIMIBE 10 MG/1
10 TABLET ORAL DAILY
COMMUNITY
Start: 2023-06-21

## 2024-01-04 RX ORDER — LANCETS 30 GAUGE
EACH MISCELLANEOUS
COMMUNITY
Start: 2023-06-21

## 2024-01-04 RX ORDER — OMEPRAZOLE 20 MG/1
20 CAPSULE, DELAYED RELEASE ORAL DAILY
COMMUNITY
End: 2024-02-09 | Stop reason: ALTCHOICE

## 2024-01-04 RX ORDER — OXYCODONE HYDROCHLORIDE 5 MG/1
TABLET ORAL EVERY 6 HOURS
COMMUNITY
Start: 2023-07-17 | End: 2024-02-09 | Stop reason: ALTCHOICE

## 2024-01-04 RX ORDER — OLANZAPINE 5 MG/1
5 TABLET ORAL NIGHTLY
COMMUNITY
Start: 2023-09-15 | End: 2024-02-09 | Stop reason: ALTCHOICE

## 2024-01-04 RX ORDER — ERGOCALCIFEROL 1.25 MG/1
CAPSULE ORAL WEEKLY
COMMUNITY
End: 2024-02-09 | Stop reason: ALTCHOICE

## 2024-01-04 RX ORDER — DIPHENHYDRAMINE HYDROCHLORIDE 50 MG/ML
50 INJECTION INTRAMUSCULAR; INTRAVENOUS AS NEEDED
Status: CANCELLED | OUTPATIENT
Start: 2024-02-01

## 2024-01-04 RX ORDER — LOSARTAN POTASSIUM 25 MG/1
25 TABLET ORAL DAILY
COMMUNITY
Start: 2023-09-04 | End: 2024-02-09 | Stop reason: ALTCHOICE

## 2024-01-04 RX ORDER — LIDOCAINE HYDROCHLORIDE 10 MG/ML
2 INJECTION, SOLUTION EPIDURAL; INFILTRATION; INTRACAUDAL; PERINEURAL ONCE
Status: CANCELLED | OUTPATIENT
Start: 2024-02-01

## 2024-01-04 RX ORDER — PROCHLORPERAZINE MALEATE 10 MG
TABLET ORAL EVERY 6 HOURS PRN
COMMUNITY
End: 2024-02-09 | Stop reason: ALTCHOICE

## 2024-01-04 RX ORDER — EPINEPHRINE 0.3 MG/.3ML
0.3 INJECTION SUBCUTANEOUS EVERY 5 MIN PRN
Status: CANCELLED | OUTPATIENT
Start: 2024-02-01

## 2024-01-04 RX ADMIN — GOSERELIN ACETATE 3.6 MG: 3.6 IMPLANT SUBCUTANEOUS at 12:09

## 2024-01-04 RX ADMIN — LIDOCAINE HYDROCHLORIDE 20 MG: 10 INJECTION, SOLUTION EPIDURAL; INFILTRATION; INTRACAUDAL; PERINEURAL at 12:02

## 2024-01-04 NOTE — TELEPHONE ENCOUNTER
Telephone call placed to patient. Order placed by Dr. Ervin for nicotine/cotinine test. Patient will have this completed this week.

## 2024-01-04 NOTE — PROGRESS NOTES
Pt arrived ambulatory to infusion for treatment of zoladex. Administered lidocaine and zoladex in RLQ.  Denies any new or worsening symptoms. Tolerated injections without issue. Discharged in stable condition.

## 2024-01-04 NOTE — PROGRESS NOTES
CHW left voicemail message to follow up with pt regarding breast cancer applications mailed out to her as requested. Requested a return call.

## 2024-01-05 ENCOUNTER — TELEPHONE (OUTPATIENT)
Dept: HEMATOLOGY/ONCOLOGY | Facility: HOSPITAL | Age: 34
End: 2024-01-05
Payer: COMMERCIAL

## 2024-01-05 NOTE — TELEPHONE ENCOUNTER
LVM for pt regarding negative left dx mammogram on the left side. Encouraged to call with any questions or concerns, call back number provided

## 2024-01-09 ENCOUNTER — LAB (OUTPATIENT)
Dept: LAB | Facility: LAB | Age: 34
End: 2024-01-09
Payer: COMMERCIAL

## 2024-01-09 DIAGNOSIS — Z98.84 BARIATRIC SURGERY STATUS: ICD-10-CM

## 2024-01-09 PROCEDURE — 80323 ALKALOIDS NOS: CPT

## 2024-01-09 PROCEDURE — 36415 COLL VENOUS BLD VENIPUNCTURE: CPT

## 2024-01-10 ENCOUNTER — PATIENT OUTREACH (OUTPATIENT)
Dept: CARE COORDINATION | Facility: CLINIC | Age: 34
End: 2024-01-10
Payer: COMMERCIAL

## 2024-01-10 NOTE — PROGRESS NOTES
CHW received call from pt regarding [employer] applications for financial hardship. Pt stated she had completed them and wanted to return. Pt needed to know where to drop them off at. CHW told pt to return applications at [facility]'s .

## 2024-01-11 ENCOUNTER — OFFICE VISIT (OUTPATIENT)
Dept: OBSTETRICS AND GYNECOLOGY | Facility: CLINIC | Age: 34
End: 2024-01-11
Payer: COMMERCIAL

## 2024-01-11 VITALS
BODY MASS INDEX: 41.95 KG/M2 | HEIGHT: 70 IN | WEIGHT: 293 LBS | SYSTOLIC BLOOD PRESSURE: 132 MMHG | DIASTOLIC BLOOD PRESSURE: 84 MMHG

## 2024-01-11 DIAGNOSIS — Z17.0 MALIGNANT NEOPLASM OF BREAST IN FEMALE, ESTROGEN RECEPTOR POSITIVE, UNSPECIFIED LATERALITY, UNSPECIFIED SITE OF BREAST (MULTI): ICD-10-CM

## 2024-01-11 DIAGNOSIS — N95.8 ARTIFICIAL MENOPAUSE: ICD-10-CM

## 2024-01-11 DIAGNOSIS — C50.919 MALIGNANT NEOPLASM OF BREAST IN FEMALE, ESTROGEN RECEPTOR POSITIVE, UNSPECIFIED LATERALITY, UNSPECIFIED SITE OF BREAST (MULTI): ICD-10-CM

## 2024-01-11 DIAGNOSIS — N76.0 ACUTE VAGINITIS: Primary | ICD-10-CM

## 2024-01-11 PROCEDURE — 1036F TOBACCO NON-USER: CPT | Performed by: OBSTETRICS & GYNECOLOGY

## 2024-01-11 PROCEDURE — 3079F DIAST BP 80-89 MM HG: CPT | Performed by: OBSTETRICS & GYNECOLOGY

## 2024-01-11 PROCEDURE — 87205 SMEAR GRAM STAIN: CPT

## 2024-01-11 PROCEDURE — 3008F BODY MASS INDEX DOCD: CPT | Performed by: OBSTETRICS & GYNECOLOGY

## 2024-01-11 PROCEDURE — 4010F ACE/ARB THERAPY RXD/TAKEN: CPT | Performed by: OBSTETRICS & GYNECOLOGY

## 2024-01-11 PROCEDURE — 3075F SYST BP GE 130 - 139MM HG: CPT | Performed by: OBSTETRICS & GYNECOLOGY

## 2024-01-11 PROCEDURE — 99204 OFFICE O/P NEW MOD 45 MIN: CPT | Performed by: OBSTETRICS & GYNECOLOGY

## 2024-01-11 ASSESSMENT — ENCOUNTER SYMPTOMS
CARDIOVASCULAR NEGATIVE: 0
PSYCHIATRIC NEGATIVE: 0
GASTROINTESTINAL NEGATIVE: 0
RESPIRATORY NEGATIVE: 0
MUSCULOSKELETAL NEGATIVE: 0
HEMATOLOGIC/LYMPHATIC NEGATIVE: 0
EYES NEGATIVE: 0
ENDOCRINE NEGATIVE: 0
ALLERGIC/IMMUNOLOGIC NEGATIVE: 0
CONSTITUTIONAL NEGATIVE: 0
NEUROLOGICAL NEGATIVE: 0

## 2024-01-11 ASSESSMENT — PAIN SCALES - GENERAL: PAINLEVEL: 0-NO PAIN

## 2024-01-11 NOTE — PROGRESS NOTES
"Nia Harper is a 33 y.o. here for vaginitis.    HPI:   Just completed chemotherapy for Stage IB ER+, AL+, HER2 - R-sided breast cancer.   Now s/p chemotherapy and was previously on goserelin (GNRH agonist) for ovarian suppression. Now transitioned to anastrozole for suppression by primary breast oncologist.     Over the last week, she started noticing foul smelling vaginal discharge, she thinks its BV. Very slight itching with the discharge. No vaginal bleeding or pink tinge.    She also notes menopausal symptoms in the setting of ovarian suppression. Having daily hot flashes, mostly at night, that disturb her sleep. She also has had mood changes/instability since starting anastrozole as well. She has mild vaginal dryness and has used astroglide in the past, but this is not bothering her currently.    Objective   /84 (BP Location: Right arm, Patient Position: Sitting, BP Cuff Size: Adult)   Ht 1.778 m (5' 10\")   Wt 133 kg (293 lb)   LMP  (LMP Unknown) Comment: Pt taking suppression meds  BMI 42.04 kg/m²      General:   Alert and oriented, in no acute distress   Neck: Supple. No visible thyromegaly.    Breast/Axilla: Normal to palpation bilaterally without masses, skin changes, or nipple discharge.    Abdomen: Soft, non-tender, without masses or organomegaly   Vulva: Normal architecture without erythema, masses, or lesions.    Psych Normal affect. Normal mood.      Assessment and Plan:  Problem List Items Addressed This Visit       Malignant neoplasm of female breast (CMS/HCC)    Artificial menopause    Relevant Medications    fezolinetant 45 mg tablet    Other Relevant Orders    Comprehensive Metabolic Panel    Acute vaginitis - Primary    Relevant Orders    Vaginitis Gram Stain For Bacterial Vaginosis + Yeast      Orders Placed This Encounter   Procedures    Vaginitis Gram Stain For Bacterial Vaginosis + Yeast     Order Specific Question:   Release result to [email or web address]     Answer:   Immediate [1]    " Comprehensive Metabolic Panel     Standing Status:   Future     Standing Expiration Date:   1/11/2025     Order Specific Question:   Release result to [employer]     Answer:   Immediate [1]      Acute Vaginitis  - Vaginitis swab collected and sent    Artifical Menopause Sx  - Based on patient's medical history, discussed options for symptom management including Veozah (for hot flashes) versus initiating SSRI (for both mood and hot flashes). Pt desired Veozah, will send rx to specialty pharmacy  - Discussed CMP surveillance, will test now, then 3, 6, 9mo schedule.  - RTC in 3mo or sooner    Seen and d/w Dr. Payton.  Nesha Figueredo MD PGY-1 OB/GYN

## 2024-01-12 ENCOUNTER — TELEPHONE (OUTPATIENT)
Dept: OBSTETRICS AND GYNECOLOGY | Facility: CLINIC | Age: 34
End: 2024-01-12
Payer: COMMERCIAL

## 2024-01-12 DIAGNOSIS — B96.89 BACTERIAL VAGINOSIS: ICD-10-CM

## 2024-01-12 DIAGNOSIS — N76.0 BACTERIAL VAGINOSIS: ICD-10-CM

## 2024-01-12 LAB
CLUE CELLS VAG LPF-#/AREA: PRESENT /[LPF]
COTININE SERPL-MCNC: <5 NG/ML
NICOTINE SERPL-MCNC: <5 NG/ML
NUGENT SCORE: 8
YEAST VAG WET PREP-#/AREA: ABNORMAL

## 2024-01-12 NOTE — PROGRESS NOTES
Call placed to patient to discuss test results.   Patient identified by name and . Patient informed her test results came back +bacterial vaginosis.   Patient informed the treatment for bacterial vaginosis is metronidazole, one pill taken twice a day for seven days   prescription sent to pharmacy, patient aware.  Patient verbalized understanding and all questions were answered.

## 2024-01-13 ENCOUNTER — TELEPHONE (OUTPATIENT)
Dept: OBSTETRICS AND GYNECOLOGY | Facility: HOSPITAL | Age: 34
End: 2024-01-13
Payer: COMMERCIAL

## 2024-01-13 DIAGNOSIS — N76.0 BACTERIAL VAGINOSIS: Primary | ICD-10-CM

## 2024-01-13 DIAGNOSIS — B96.89 BACTERIAL VAGINOSIS: Primary | ICD-10-CM

## 2024-01-13 RX ORDER — METRONIDAZOLE 500 MG/1
500 TABLET ORAL 2 TIMES DAILY
Qty: 14 TABLET | Refills: 0 | Status: SHIPPED | OUTPATIENT
Start: 2024-01-13 | End: 2024-01-20

## 2024-01-13 NOTE — TELEPHONE ENCOUNTER
Telephone Call    Patient called triage line. Name and  confirmed.    Patient of MartorellSophie Harper is a 33 y.o. just completed chemotherapy for Stage IB ER+, NM+, HER2 - R-sided breast cancer.   Now s/p chemotherapy and was previously on goserelin (GNRH agonist) for ovarian suppression. Now transitioned to anastrozole for suppression by primary breast oncologist.     Patient seen on  for foul smelling vaginal discharge. Vaginitis swab positive for clue cells consistent with BV. Patient feeling more uncomfortable and requesting rx sent. Confirmed pharmacy, will send Flagyl 500 BID for 7 days to [facility] now.     Daylin Quinteros MD  OBGYN Attending

## 2024-01-15 RX ORDER — METRONIDAZOLE 500 MG/1
500 TABLET ORAL 2 TIMES DAILY
Qty: 14 TABLET | Refills: 0 | Status: SHIPPED | OUTPATIENT
Start: 2024-01-15 | End: 2024-01-22

## 2024-01-15 NOTE — TELEPHONE ENCOUNTER
contacted pt  name and  verified  Spoke with pt to verify that she picked up her rx at pharmacy  She has started medication  pt verbalized understanding  no further questions or concerns at this time

## 2024-01-22 ENCOUNTER — PATIENT OUTREACH (OUTPATIENT)
Dept: CARE COORDINATION | Facility: CLINIC | Age: 34
End: 2024-01-22
Payer: COMMERCIAL

## 2024-01-22 ENCOUNTER — DOCUMENTATION (OUTPATIENT)
Dept: SURGERY | Facility: CLINIC | Age: 34
End: 2024-01-22
Payer: COMMERCIAL

## 2024-01-22 NOTE — PROGRESS NOTES
CHW spoke with pt regarding returning applications for financial hardship. Pt stated she had not been feeling well and would try to drop off applications on Tues 1/23/24.

## 2024-01-24 ENCOUNTER — PATIENT OUTREACH (OUTPATIENT)
Dept: CARE COORDINATION | Facility: CLINIC | Age: 34
End: 2024-01-24
Payer: COMMERCIAL

## 2024-01-24 NOTE — PROGRESS NOTES
S[employer] received león applications for Inna Shelby and Charlette Ball that pt dropped off at [facility], Pt called CHW stating she needed help paying property taxes. CHW referred pt to [facility] [phone], [facility] [phone] and [facility]  [phone].

## 2024-01-25 ENCOUNTER — TELEMEDICINE CLINICAL SUPPORT (OUTPATIENT)
Dept: SURGERY | Facility: CLINIC | Age: 34
End: 2024-01-25
Payer: COMMERCIAL

## 2024-01-25 DIAGNOSIS — E66.01 OBESITY, CLASS III, BMI 40-49.9 (MORBID OBESITY) (MULTI): Primary | ICD-10-CM

## 2024-01-26 NOTE — PROGRESS NOTES
Letter of Medical Necessity    24      ATTN: Pre-Authorization  Department           Tentative Surgery Date:  3/11/2024    RE: Nia Harper    : 1990         BMI: 42.04    Weight: 293      Height:  5'10''    Ms. Nia Harper suffers from multiple health conditions including Morbid Obesity (E66.01). An extensive clinical evaluation has been completed and the final recommendation has been provided to Ms. Harper; explanation of short and long term surgical risks vs. benefits has been reviewed at length. Ms. Harper has verbalized an understanding of the associated risks, has agreed to comply with behavioral and lifestyle changes that support a successful post-surgical outcome, resolution of comorbid conditions and improvement in quality of life.    The patient suffers from the following health conditions:   Patient Active Problem List   Diagnosis    Abnormal uterine bleeding    Abscess    Abscess of breast, postpartum    Acne    Astigmatism of both eyes    Bacterial vaginosis    Breast skin changes    Cutaneous abscess of chest wall    Cyst of right breast    Lump of right breast    De Quervain's tenosynovitis, right    Depression    Diabetes (CMS/HCC)    Diabetes mellitus type 2 without retinopathy (CMS/HCC)    Diffuse goiter    Dyspnea on exertion    Dysuria    Elevated liver enzymes    Epidermal inclusion cyst    Herpes simplex virus (HSV) infection    History of breast cancer    Hyperlipemia    Hypertriglyceridemia    Impaired skin integrity associated with surgical incision    Infection of great toe    Left carpal tunnel syndrome    Mild postpartum depression    Myopia of both eyes    Nexplanon in place    Nipple discharge, bloody    Nonpuerperal abscess of left breast    Other skin changes    S/P  section    Suspected sleep apnea    Uncontrolled type 2 diabetes mellitus with hyperglycemia (CMS/HCC)    Urine frequency    Vaginal irritation    Vaginal Pap smear with ASC-US    Fatigue    Weakness  generalized    Wound infection after surgery    Yeast infection    Vaginal odor    Chronic headaches    BMI 40.0-44.9, adult (CMS/HCC)    Class 3 severe obesity due to excess calories with serious comorbidity and body mass index (BMI) of 40.0 to 44.9 in adult (CMS/HCC)    Malignant neoplasm of female breast (CMS/HCC)    Nausea and vomiting    Primary hypertension    Hospital discharge follow-up    Abnormal fetal ultrasound    Abnormal mammogram of right breast    Abnormal ultrasonic finding on  screening of mother    Allergic reaction to drug    Angioedema    Anxiety disorder    Carrier of group B Streptococcus    Bariatric surgery status    Breast pain    Chest pain    Excessive daytime sleepiness    Exposure to COVID-19 virus    Fatty liver    Gallstones    History of cannabis abuse    History of noncompliance with medical treatment    Insufficient sleep syndrome    Missed period    Noncompliance with treatment    Palpitations    Pregnancy with inconclusive fetal viability    Psychological factors affecting medical condition    Seborrheic dermatitis, unspecified    Sepsis (CMS/HCC)    Sleep disorder breathing    Status post mastectomy    Systolic murmur    Artificial menopause    Acute vaginitis         We kindly request careful review of the following documents: Surgeon's consultation with weight related comorbidities and diagnoses, primary care support letter, psychiatric evaluation,  nutrition assessment and other pertinent information required by the member's plan.      Ms. Harper has attended our pre-operative educational programs and verbalizes that she has an understanding of the behaviors and choices necessary to be successful with bariatric surgery for a lifetime. Ms. Harper further understands that in order to be successful she must attend post-operative visits at 1week, 6 weeks, 3 months, 6 months, 12 months, and annually to review her progress and consult with our registered dietitian. In addition  Ms. Harper agrees to attend monthly support group meetings hosted by our licensed program staff to further enhance her chances of successful lifetime weight loss. she has agreed to participate in exercise 5 days per week as tolerated and has already been advised  to increase her physical activity in preparation for surgery.              We kindly request review of prior-authorization for a 1 to 2 day hospital stay for procedure Laparoscopic sleeve gastrectomy  [id number] with Dr. Sage Ervin  NPI:[id number]  TID: [id number] at [facility]. Tentative surgery date 3/11/2024.    Please fax your approval or requests for  additional information to the attention of Coleen Gray, Patient Navigator at [phone], this is a secured fax line.    We sincerely appreciate your thoughtful review of this case.      Sincerely,  Dr. Sage Ervin  [facility]    [employer]

## 2024-01-29 ENCOUNTER — OFFICE VISIT (OUTPATIENT)
Dept: SURGICAL ONCOLOGY | Facility: HOSPITAL | Age: 34
End: 2024-01-29
Payer: COMMERCIAL

## 2024-01-29 ENCOUNTER — DOCUMENTATION (OUTPATIENT)
Dept: CARE COORDINATION | Facility: CLINIC | Age: 34
End: 2024-01-29
Payer: COMMERCIAL

## 2024-01-29 ENCOUNTER — HOSPITAL ENCOUNTER (OUTPATIENT)
Dept: RADIOLOGY | Facility: HOSPITAL | Age: 34
Discharge: HOME | End: 2024-01-29
Payer: COMMERCIAL

## 2024-01-29 ENCOUNTER — PATIENT OUTREACH (OUTPATIENT)
Dept: CARE COORDINATION | Facility: CLINIC | Age: 34
End: 2024-01-29
Payer: COMMERCIAL

## 2024-01-29 VITALS
DIASTOLIC BLOOD PRESSURE: 81 MMHG | SYSTOLIC BLOOD PRESSURE: 130 MMHG | WEIGHT: 289 LBS | HEART RATE: 88 BPM | BODY MASS INDEX: 41.47 KG/M2 | TEMPERATURE: 96.8 F

## 2024-01-29 DIAGNOSIS — N63.20 MASS OF LEFT BREAST, UNSPECIFIED QUADRANT: ICD-10-CM

## 2024-01-29 DIAGNOSIS — L73.2 HIDRADENITIS SUPPURATIVA: ICD-10-CM

## 2024-01-29 DIAGNOSIS — N63.20 MASS OF LEFT BREAST, UNSPECIFIED QUADRANT: Primary | ICD-10-CM

## 2024-01-29 DIAGNOSIS — Z85.3 ENCOUNTER FOR FOLLOW-UP SURVEILLANCE OF BREAST CANCER: ICD-10-CM

## 2024-01-29 DIAGNOSIS — Z08 ENCOUNTER FOR FOLLOW-UP SURVEILLANCE OF BREAST CANCER: ICD-10-CM

## 2024-01-29 PROCEDURE — 3079F DIAST BP 80-89 MM HG: CPT | Performed by: NURSE PRACTITIONER

## 2024-01-29 PROCEDURE — 3008F BODY MASS INDEX DOCD: CPT | Performed by: NURSE PRACTITIONER

## 2024-01-29 PROCEDURE — 76642 ULTRASOUND BREAST LIMITED: CPT | Mod: LT

## 2024-01-29 PROCEDURE — 99213 OFFICE O/P EST LOW 20 MIN: CPT | Performed by: NURSE PRACTITIONER

## 2024-01-29 PROCEDURE — 76982 USE 1ST TARGET LESION: CPT | Mod: LT

## 2024-01-29 PROCEDURE — 3075F SYST BP GE 130 - 139MM HG: CPT | Performed by: NURSE PRACTITIONER

## 2024-01-29 PROCEDURE — 4010F ACE/ARB THERAPY RXD/TAKEN: CPT | Performed by: NURSE PRACTITIONER

## 2024-01-29 PROCEDURE — 1036F TOBACCO NON-USER: CPT | Performed by: NURSE PRACTITIONER

## 2024-01-29 PROCEDURE — 76642 ULTRASOUND BREAST LIMITED: CPT | Mod: LEFT SIDE | Performed by: RADIOLOGY

## 2024-01-29 ASSESSMENT — PAIN SCALES - GENERAL: PAINLEVEL: 8

## 2024-01-29 NOTE — PROGRESS NOTES
[facility]  Nia Harper female   1990 33 y.o.   [id number]      Chief Complaint  Follow up, new left breast mass, history of right breast cancer.     History Of Present Illness  Nia Harper is a very pleasant 33 y.o. female diagnosed on 2023 with right breast multicentric breast cancer, invasive ductal carcinoma (IDC), grade 2-3, ER/MI+, HER2-. On 2023 she underwent a right mastectomy and sentinel lymph node biopsy (0/3). Oncotype 45. She completed adjuvant chemotherapy on 2023. She was started on ovarian suppression and Anastrozole 1mg. She has family history of breast cancer, see below. She presents today for a new left breast lump. It has been there recently and did noticed that it has gotten smaller in size. No fever or chills. No drainage. She has a personal history of hidradenitis.     BREAST IMAGIN2024 Left breast ultrasound, indicates BI-RADS Category 2.     REPRODUCTIVE HISTORY:  menarche age 10, , first birth age 20,  x 2 months, no OCP's, premenopausal on ovarian suppression therapy, no HRT, scattered fibroglandular tissue     FAMILY CANCER HISTORY:   Maternal Cousin (1): Breast cancer, 30s alive in her 40s  Maternal Cousin (2): Breast cancer, 40s  in her early 40s  Maternal Cousin (3): BRCA negative (no cancer diagnosed)  Maternal grandmother: unknown cancer ()  Maternal grandfather: unknown cancer ()  Paternal grandmother: unknown cancer ()      Review of Systems  Constitutional:  Negative for appetite change, fatigue, fever and unexpected weight change.   HENT:  Negative for ear pain, hearing loss, nosebleeds, sore throat and trouble swallowing.    Eyes:  Negative for discharge, itching and visual disturbance.   Breast: As stated in HPI.  Respiratory:  Negative for cough, chest tightness and shortness of breath.    Cardiovascular:  Negative for chest pain, palpitations and leg swelling.   Gastrointestinal:   Negative for abdominal pain, constipation, diarrhea and nausea.   Endocrine: Negative for cold intolerance and heat intolerance.   Genitourinary:  Negative for dysuria, frequency, hematuria, pelvic pain and vaginal bleeding.   Musculoskeletal:  Negative for arthralgias, back pain, gait problem, joint swelling and myalgias.   Skin:  Negative for color change and rash.   Allergic/Immunologic: Negative for environmental allergies and food allergies.   Neurological:  Negative for dizziness, tremors, speech difficulty, weakness, numbness and headaches.   Hematological:  Does not bruise/bleed easily.   Psychiatric/Behavioral:  Negative for agitation, dysphoric mood and sleep disturbance. The patient is not nervous/anxious.      Past Medical History  She has a past medical history of 22 weeks gestation of pregnancy (2019), 24 weeks gestation of pregnancy (2019), Abnormal chromosomal and genetic finding on  screening of mother (2019), Abnormal hematological finding on  screening of mother (2019), Abnormal levels of other serum enzymes (2021), Abnormal levels of other serum enzymes (2021), Acute candidiasis of vulva and vagina (10/15/2015), Acute candidiasis of vulva and vagina (10/05/2018), Acute vaginitis (2020), Anxiety disorder, unspecified (2019), Body mass index (BMI)40.0-44.9, adult (2019), Breast cancer (CMS/[facility]), Candidiasis, unspecified (2021), Carpal tunnel syndrome, bilateral upper limbs (2019), Carpal tunnel syndrome, left upper limb (2019), Carpal tunnel syndrome, right upper limb (2019), Depression, unspecified (2021), Disruption of external operation (surgical) wound, not elsewhere classified, initial encounter (2019), Encounter for change or removal of nonsurgical wound dressing (2018), Encounter for contraceptive management, unspecified (2015), Encounter for contraceptive management,  unspecified (2015), Encounter for examination of eyes and vision without abnormal findings (2016), Encounter for gynecological examination (general) (routine) without abnormal findings (2021), Encounter for gynecological examination (general) (routine) without abnormal findings (2015), Encounter for immunization (10/31/2017), Encounter for initial prescription of contraceptive pills (2018), Encounter for other general counseling and advice on contraception (2018), Encounter for other general counseling and advice on procreation (2017), Encounter for other preprocedural examination (2017), Encounter for pregnancy test, result positive (2017), Encounter for routine postpartum follow-up (2018), Encounter for screening for infections with a predominantly sexual mode of transmission (2019), Encounter for screening for infections with a predominantly sexual mode of transmission, Encounter for supervision of normal pregnancy, unspecified, unspecified trimester (2017), Encounter for surveillance of injectable contraceptive (2020), Glycosuria (2014), antineoplastic chemo, Irregular menstruation, unspecified (2015), Irregular menstruation, unspecified (2019), Maternal care for other (suspected) fetal abnormality and damage, fetal cardiac anomalies, not applicable or unspecified (2019), Maternal care for other known or suspected poor fetal growth, unspecified trimester, not applicable or unspecified (2019), Maternal care for unspecified type scar from previous  delivery (2019), Missed  (2019), Mixed hyperlipidemia (2018), Morbid (severe) obesity due to excess calories (CMS/[facility]) (2019), Myopia, bilateral (09/15/2017), Obesity complicating pregnancy, unspecified trimester (2019), Other conditions influencing health status (2017), Other conditions influencing health status  (11/28/2017), Other conditions influencing health status (11/28/2017), Other infections with a predominantly sexual mode of transmission complicating pregnancy, unspecified trimester (01/14/2020), Other mental disorders complicating pregnancy, unspecified trimester (12/09/2019), Other specified pregnancy related conditions, second trimester (12/09/2019), Pain in right hand (11/09/2017), Personal history of other complications of pregnancy, childbirth and the puerperium (11/20/2015), Personal history of other diseases of the circulatory system (06/11/2019), Personal history of other diseases of the female genital tract (07/14/2015), Personal history of other diseases of the female genital tract (11/28/2017), Personal history of other diseases of the female genital tract (11/28/2017), Personal history of other diseases of the female genital tract (08/24/2015), Personal history of other diseases of the female genital tract (10/24/2014), Personal history of other diseases of the respiratory system (10/26/2014), Personal history of other drug therapy (09/19/2017), Personal history of other endocrine, nutritional and metabolic disease (06/11/2019), Personal history of other endocrine, nutritional and metabolic disease (11/28/2017), Personal history of other endocrine, nutritional and metabolic disease (11/28/2017), Personal history of other infectious and parasitic diseases (06/11/2019), Personal history of other infectious and parasitic diseases (01/29/2019), Personal history of other mental and behavioral disorders (09/08/2017), Personal history of other mental and behavioral disorders (09/08/2017), Personal history of other specified conditions (01/23/2015), Personal history of other specified conditions (11/28/2017), Personal history of other specified conditions (05/01/2019), Personal history of other specified conditions (07/30/2018), Personal history of other specified conditions (05/01/2019), Personal history of  other specified conditions (10/17/2017), Personal history of other specified conditions (2019), Pre-existing type 2 diabetes mellitus, in pregnancy, unspecified trimester (2019), Pre-existing type 2 diabetes mellitus, in pregnancy, unspecified trimester (12/15/2021), Pregnancy with inconclusive fetal viability, not applicable or unspecified (2017), Solitary cyst of right breast (2021), Supervision of young multigravida, unspecified trimester (2019), Type 2 diabetes mellitus without complications (CMS/[facility]) (2022), Unspecified maternal hypertension, second trimester (2017), Unspecified maternal hypertension, unspecified trimester (2017), and Unspecified pre-existing hypertension complicating pregnancy, unspecified trimester (2019).    Surgical History  She has a past surgical history that includes  section, classic (2019); Other surgical history (2019); MR angio head wo IV contrast (2015); and Mastectomy (Right).    Family History  Cancer-related family history includes Breast cancer in her cousin, father's sister, and paternal cousin; Cancer in her father's brother, father's sister, maternal grandfather, maternal grandmother, and paternal grandmother.     Social History  She reports that she has never smoked. She has never been exposed to tobacco smoke. She has never used smokeless tobacco. She reports current drug use. Drug: Marijuana. She reports that she does not drink alcohol.    Allergies  Metformin, Cephalexin, and Sulfamethoxazole-trimethoprim    Medications  Current Outpatient Medications   Medication Instructions    anastrozole (ARIMIDEX) 1 mg, oral, Daily, Swallow whole with a drink of water.    atorvastatin (LIPITOR) 40 mg, oral, Nightly    benzoyl peroxide 5 % external wash Topical, Daily, Lather onto skin folds, leave on 2 minutes, rinse with water    blood sugar diagnostic (OneTouch Verio test strips) strip 100 strips,  "subcutaneous, Daily    blood-glucose sensor (FreeStyle Cristina 3 Sensor) device 2 each, miscellaneous, Every 14 days    clindamycin (Cleocin T) 1 % external solution Topical, Daily    dexAMETHasone (Decadron) 4 mg tablet 2 TAB(S) ORALLY ONCE A DAY DAYS 2-4 AFTER EACH CHEMOTHERAPY TREATMENT    dulaglutide (Trulicity) 3 mg/0.5 mL pen injector Once weekly    ergocalciferol (Vitamin D-2) 1.25 MG (73564 UT) capsule oral, Weekly    ezetimibe (Zetia) 10 mg tablet oral    fezolinetant 45 mg, oral, Daily    FreeStyle Cristina 2 Sensor kit CHANGE SENSOR EVERY 14 DAYS AS DIRECTED.    insulin glargine (LANTUS SOLOSTAR U-100 INSULIN) 40 Units, subcutaneous, 2 times daily    insulin lispro (HUMALOG BHUPINDER KWIKPEN) 40 Units, subcutaneous, 3 times daily PRN, inject up to 20 units before meals per sliding scale    loperamide (Imodium) 2 mg capsule TAKE 1 CAPSULE BY MOUTH EVERY 4 HOURS AS NEEDED FOR DIARREHA    loratadine (Claritin) 10 mg tablet TAKE 2 TABLETS BY MOUTH TWO TIMES A DAY, LAST DOSE IS 10/7    LORazepam (Ativan) 1 mg tablet TAKE 1 TABLET BY MOUTH ONCE DAILY AS NEEDED FOR ANXIETY    losartan (COZAAR) 25 mg, oral, Daily    metFORMIN XR (GLUCOPHAGE-XR) 1,000 mg, oral, 2 times daily with meals    OLANZapine (ZYPREXA) 5 mg, oral, Nightly    omega-3 acid ethyl esters (LOVAZA) 2 g, oral, 2 times daily with meals    omeprazole (PRILOSEC) 20 mg, oral, Daily, Take (1) capsule by mouth once daily in the morning before breakfast.    ondansetron (ZOFRAN) 8 mg, oral, 3 times daily    ondansetron ODT (ZOFRAN-ODT) 4 mg, oral, Every 6 hours    OneTouch Delica Plus Lancet 33 gauge misc TEST BLOOD GLUCOSE 3 TIMES/ DAY    OneTouch Verio Flex meter misc USE AS DIRECTED.    oxyCODONE (Roxicodone) 5 mg immediate release tablet oral, Every 6 hours    Ozempic 0.5 mg, subcutaneous, Every 7 days    pen needle, diabetic (BD Ultra-Fine Mini Pen Needle) 31 gauge x 3/16\" needle 200 each, subcutaneous, 4 times daily before meals and nightly, Use 4 a day as " directed    prochlorperazine (Compazine) 10 mg tablet oral, Every 6 hours PRN    traMADol (Ultram) 50 mg tablet 1 TAB(S) ORALLY EVERY 4 TO 6 HOURS X 7 DAYS, AS NEEDED -FOR PAIN DX: G89.18    valACYclovir (Valtrex) 500 mg tablet 1 tablet, oral, 2 times daily       Last Recorded Vitals  Vitals:    01/29/24 0939   BP: 130/81   Pulse: 88   Temp: 36 °C (96.8 °F)        Physical Exam  Chest:       Patient is alert and oriented x3 and in a relaxed and appropriate mood. Her gait is steady and hand grasps are equal. Sclera is clear. The right breast has been removed with well-healed mastectomy incision. Overlying tissue soft without palpable abnormalities, discrete nodules or masses. The left breast 5:00, 8 cm from the nipple is a 3.0 x 1.5 cm mass, soft, and round. There are no signs or symptoms of infection. No skin erythema, drainage, or warmth. The skin and nipples appear normal. There is no cervical, supraclavicular or axillary lymphadenopathy. Heart rate and rhythm normal, S1 and S2 appreciated. The lungs are clear to auscultation bilaterally. Abdomen is soft and non-tender.     Relevant Results and Imaging  Study Result    Narrative & Impression   Interpreted By:  Jessica Felton,  Sean Campbell   STUDY:  BI US BREAST LIMITED LEFT;  1/29/2024 10:31 am      ACCESSION NUMBER(S):  [id number]      ORDERING CLINICIAN:  KEYANA RIVERO      INDICATION:  History of hidradenitis. Palpable abnormality in the left breast for  3-4 years which has become larger and painful since the week prior.  Patient states now improving. History of stage IB hormone positive  right-sided breast cancer      COMPARISON:  Mammogram 01/04/2024      FINDINGS:  Targeted ultrasound of the left breast was performed using  elastography.      At 5 o'clock 8 cm from the nipple, corresponding to patient's  palpable area of concern, a 3.3 x 1.7 x 1.1 cm heterogeneous  collection is noted with internal echoes, extending to the skin  surface. There is  associated predominantly peripheral vascularity.  This collection demonstrates posterior enhancement and is  predominantly soft on elastography with areas of intermediate to hard  elasticity along the margins. There is thickening of the overlying  skin.      IMPRESSION:  Heterogeneous collection corresponding to patient's palpable area of  concern is most consistent with an abscess. Clinical follow-up until  resolution is recommended. If the area does not resolve then a  follow-up ultrasound is recommended.      BI-RADS CATEGORY:      BI-RADS Category:  2 Benign.  Recommendation:  Clinical follow-up.  Recommended Date:  N/A.  Laterality:  Left.      For any future breast imaging appointments, please call [phone] ([phone]).      I personally reviewed the images/study and I agree with the findings  as stated by Dr. Shannan Riggs. The study was interpreted at  [place], [place].      MACRO:  None      Signed by: Jessica Felton 1/29/2024 11:21 AM  Dictation workstation:   [id number]       Time was spent viewing digital images. I explained the results in depth, along with suggested explanation for follow up recommendations based on the testing results. BI-RADS Category 2    Orders  Orders Placed This Encounter   Procedures    BI US breast limited left     Standing Status:   Future     Number of Occurrences:   1     Standing Expiration Date:   3/29/2025     Order Specific Question:   Reason for exam:     Answer:   left breast mass, history right breast cancer, s/p mastectomy     Order Specific Question:   Radiologist to Determine Optimal Study     Answer:   Yes     Order Specific Question:   Release result to [place]     Answer:   Immediate [1]     Order Specific Question:   Is this exam part of a Research Study? If Yes, link this order to the research study     Answer:   No       Visit Diagnosis  1. Mass of left breast, unspecified quadrant  BI US breast limited left     CANCELED: BI US breast limited left      2. Encounter for follow-up surveillance of breast cancer        3. Hidradenitis suppurativa            Assessment/Plan  Breast cancer surveillance, left breast mass, history right breast cancer, s/p right mastectomy, completed adjuvant chemotherapy, on Anastrozole and ovarian suppression therapy, family history of breast cancer, scattered fibroglandular tissue    Plan:  Warm compresses to the left breast. Call the office for redness, pain, drainage, or fever. Follow up with all previous appointments as scheduled.    Patient Discussion/Summary  Warm compresses to the left breast. Call the office for redness, pain, drainage, or fever. Follow up with all previous appointments as scheduled.    You can see your health information, review clinical summaries from office visits & test results online when you follow your health with MY  Chart, a personal health record. To sign up go to [email or web address]. If you need assistance with signing up or trouble getting into your account call [facility] Patient Line 24/7 at [phone].    My office phone number is [phone] if you need to get in touch with me or have additional questions or concerns. Thank you for choosing [facility] and trusting me as your healthcare provider. I look forward to seeing you again at your next office visit. I am honored to be a provider on your health care team and I remain dedicated to helping you achieve your health goals.    Emerita Durand, RONEL-CNP

## 2024-01-29 NOTE — PROGRESS NOTES
CHW submitted Inna Shelby /Charlette Ball applications to organizations along with requested medical records (listed secured) through email. Listed SW, Rani Philip and pt on emails.

## 2024-01-29 NOTE — PROGRESS NOTES
"CHW received receipt from [employer]na [employer] stating \"confirming receipt. the application is incomplete, I will reach out directly to Ms. Harper to complete it before we can move it forward.   if you communicate regularly with her, please advise her that I will be reaching out.  thank you\"    CHW left a voicemail message with pt. Informing that N would be contacting her regarding the application.     "

## 2024-01-31 ENCOUNTER — DOCUMENTATION (OUTPATIENT)
Dept: CARE COORDINATION | Facility: CLINIC | Age: 34
End: 2024-01-31
Payer: COMMERCIAL

## 2024-02-01 ENCOUNTER — TELEPHONE (OUTPATIENT)
Dept: OBSTETRICS AND GYNECOLOGY | Facility: CLINIC | Age: 34
End: 2024-02-01

## 2024-02-01 ENCOUNTER — INFUSION (OUTPATIENT)
Dept: HEMATOLOGY/ONCOLOGY | Facility: HOSPITAL | Age: 34
End: 2024-02-01
Payer: COMMERCIAL

## 2024-02-01 ENCOUNTER — LAB (OUTPATIENT)
Dept: LAB | Facility: HOSPITAL | Age: 34
End: 2024-02-01
Payer: COMMERCIAL

## 2024-02-01 VITALS
HEART RATE: 82 BPM | WEIGHT: 293 LBS | OXYGEN SATURATION: 99 % | SYSTOLIC BLOOD PRESSURE: 149 MMHG | TEMPERATURE: 98.6 F | RESPIRATION RATE: 18 BRPM | DIASTOLIC BLOOD PRESSURE: 80 MMHG | BODY MASS INDEX: 42.66 KG/M2

## 2024-02-01 DIAGNOSIS — N95.8 ARTIFICIAL MENOPAUSE: ICD-10-CM

## 2024-02-01 DIAGNOSIS — C50.919 MALIGNANT NEOPLASM OF FEMALE BREAST, UNSPECIFIED ESTROGEN RECEPTOR STATUS, UNSPECIFIED LATERALITY, UNSPECIFIED SITE OF BREAST (MULTI): ICD-10-CM

## 2024-02-01 LAB
ALBUMIN SERPL BCP-MCNC: 4.4 G/DL (ref 3.4–5)
ALP SERPL-CCNC: 70 U/L (ref 33–110)
ALT SERPL W P-5'-P-CCNC: 64 U/L (ref 7–45)
ANION GAP SERPL CALC-SCNC: 16 MMOL/L (ref 10–20)
AST SERPL W P-5'-P-CCNC: 38 U/L (ref 9–39)
BILIRUB SERPL-MCNC: 0.3 MG/DL (ref 0–1.2)
BUN SERPL-MCNC: 13 MG/DL (ref 6–23)
CALCIUM SERPL-MCNC: 9.6 MG/DL (ref 8.6–10.3)
CHLORIDE SERPL-SCNC: 99 MMOL/L (ref 98–107)
CO2 SERPL-SCNC: 25 MMOL/L (ref 21–32)
CREAT SERPL-MCNC: 0.66 MG/DL (ref 0.5–1.05)
EGFRCR SERPLBLD CKD-EPI 2021: >90 ML/MIN/1.73M*2
GLUCOSE SERPL-MCNC: 387 MG/DL (ref 74–99)
HOLD SPECIMEN: NORMAL
POTASSIUM SERPL-SCNC: 4.5 MMOL/L (ref 3.5–5.3)
PROT SERPL-MCNC: 7.7 G/DL (ref 6.4–8.2)
SODIUM SERPL-SCNC: 135 MMOL/L (ref 136–145)

## 2024-02-01 PROCEDURE — 2500000005 HC RX 250 GENERAL PHARMACY W/O HCPCS: Performed by: INTERNAL MEDICINE

## 2024-02-01 PROCEDURE — 96402 CHEMO HORMON ANTINEOPL SQ/IM: CPT

## 2024-02-01 PROCEDURE — 36415 COLL VENOUS BLD VENIPUNCTURE: CPT

## 2024-02-01 PROCEDURE — 2500000004 HC RX 250 GENERAL PHARMACY W/ HCPCS (ALT 636 FOR OP/ED): Mod: JZ | Performed by: INTERNAL MEDICINE

## 2024-02-01 PROCEDURE — 80053 COMPREHEN METABOLIC PANEL: CPT

## 2024-02-01 RX ORDER — FAMOTIDINE 10 MG/ML
20 INJECTION INTRAVENOUS ONCE AS NEEDED
Status: CANCELLED | OUTPATIENT
Start: 2024-02-29

## 2024-02-01 RX ORDER — EPINEPHRINE 0.3 MG/.3ML
0.3 INJECTION SUBCUTANEOUS EVERY 5 MIN PRN
Status: CANCELLED | OUTPATIENT
Start: 2024-02-29

## 2024-02-01 RX ORDER — ALBUTEROL SULFATE 0.83 MG/ML
3 SOLUTION RESPIRATORY (INHALATION) AS NEEDED
Status: CANCELLED | OUTPATIENT
Start: 2024-02-29

## 2024-02-01 RX ORDER — LIDOCAINE HYDROCHLORIDE 10 MG/ML
2 INJECTION, SOLUTION EPIDURAL; INFILTRATION; INTRACAUDAL; PERINEURAL ONCE
Status: COMPLETED | OUTPATIENT
Start: 2024-02-01 | End: 2024-02-01

## 2024-02-01 RX ORDER — LIDOCAINE HYDROCHLORIDE 10 MG/ML
2 INJECTION, SOLUTION EPIDURAL; INFILTRATION; INTRACAUDAL; PERINEURAL ONCE
Status: CANCELLED | OUTPATIENT
Start: 2024-02-29

## 2024-02-01 RX ORDER — DIPHENHYDRAMINE HYDROCHLORIDE 50 MG/ML
50 INJECTION INTRAMUSCULAR; INTRAVENOUS AS NEEDED
Status: CANCELLED | OUTPATIENT
Start: 2024-02-29

## 2024-02-01 RX ADMIN — LIDOCAINE HYDROCHLORIDE 20 MG: 10 INJECTION, SOLUTION EPIDURAL; INFILTRATION; INTRACAUDAL; PERINEURAL at 10:33

## 2024-02-01 RX ADMIN — GOSERELIN ACETATE 3.6 MG: 3.6 IMPLANT SUBCUTANEOUS at 10:33

## 2024-02-01 NOTE — TELEPHONE ENCOUNTER
contacted pt  name and  verified  Spoke with pt made aware rx was sent 24 states she never heard from [facility] but will call them provided number [phone]  Told pt we would try to send to [facility] and see if [employer] would cover made aware its a new med and ins. In process of starting to approve  pt verbalized understanding  no further questions or concerns at this time    Rx to [facility] for approval, may need to do Prior Auth

## 2024-02-01 NOTE — PROGRESS NOTES
Patient arrived ambulatory to infusion for scheduled tx of zoladex. Endorses gr 3 hot flashes/ night sweats, going to possibly start new medication per her PCP. Sees Dr. Lancaster in 3 weeks, encouraged pt to reach out to clinic if sx don't improve. Tolerated injection into LLQ without issue. Aware of upcoming appts. Discharged in stable condition.

## 2024-02-02 PROBLEM — R74.01 ELEVATION OF LEVELS OF LIVER TRANSAMINASE LEVELS: Status: ACTIVE | Noted: 2023-03-09

## 2024-02-02 PROBLEM — T81.89XA IMPAIRED SKIN INTEGRITY ASSOCIATED WITH SURGICAL INCISION: Status: RESOLVED | Noted: 2023-03-09 | Resolved: 2024-02-02

## 2024-02-02 PROBLEM — R23.9 IMPAIRED SKIN INTEGRITY ASSOCIATED WITH SURGICAL INCISION: Status: RESOLVED | Noted: 2023-03-09 | Resolved: 2024-02-02

## 2024-02-02 PROBLEM — R11.2 NAUSEA AND VOMITING: Status: RESOLVED | Noted: 2023-09-08 | Resolved: 2024-02-02

## 2024-02-02 PROBLEM — E28.39 SUPPRESSION OF OVARIAN SECRETION: Status: ACTIVE | Noted: 2023-09-15

## 2024-02-02 NOTE — TELEPHONE ENCOUNTER
----- Message from Corinne A Bazella, MD sent at 2/2/2024 10:01 AM EST -----  We need to not do the prior auth on the [place] as her liver enzymes are high and her BS is REALLY high.

## 2024-02-02 NOTE — TELEPHONE ENCOUNTER
contacted pt  name and  verified  Called spoke with pt made her aware that since her labs have come back and are out of range she will not be able to use the [place]. She states that she saw the results and is not sure why her liver enzymes are high but know that she did not take her medicine for her blood sugar that day. Explained that we could see the trends and have to go with what the results show. pt verbalized understanding  no further questions or concerns at this time

## 2024-02-05 ENCOUNTER — DOCUMENTATION (OUTPATIENT)
Dept: CARE COORDINATION | Facility: CLINIC | Age: 34
End: 2024-02-05
Payer: COMMERCIAL

## 2024-02-07 ENCOUNTER — TELEPHONE (OUTPATIENT)
Dept: ADMISSION | Facility: HOSPITAL | Age: 34
End: 2024-02-07
Payer: COMMERCIAL

## 2024-02-07 DIAGNOSIS — F41.9 ANXIETY: ICD-10-CM

## 2024-02-07 DIAGNOSIS — F32.A DEPRESSION, UNSPECIFIED DEPRESSION TYPE: Primary | ICD-10-CM

## 2024-02-07 RX ORDER — LORAZEPAM 1 MG/1
1 TABLET ORAL DAILY PRN
Qty: 30 TABLET | Refills: 0 | Status: SHIPPED | OUTPATIENT
Start: 2024-02-07 | End: 2024-03-05 | Stop reason: SDUPTHER

## 2024-02-07 NOTE — TELEPHONE ENCOUNTER
Pt in agreement with plan for onco - psych referral.   Grateful for Dr. Lancaster's attention to her concerns.

## 2024-02-07 NOTE — TELEPHONE ENCOUNTER
Pt requesting refill of ativan 1mg. 1 tablet every day prn.   Pt has had to stop taking marijuana gummies, in order to test negative for marijuana, so that she can be scheduled for her bariatric surgery.  Pt states she is anxious and sad- denies thoughts/ means of hurting herself or others.   Pt was tearful on the phone- states it has only been 3 days without gummies and she is very anxious.   Last FUV 12/7 with next planned FUV 2/29

## 2024-02-09 ENCOUNTER — OFFICE VISIT (OUTPATIENT)
Dept: HEMATOLOGY/ONCOLOGY | Facility: HOSPITAL | Age: 34
End: 2024-02-09
Payer: COMMERCIAL

## 2024-02-09 VITALS
HEART RATE: 102 BPM | BODY MASS INDEX: 42.17 KG/M2 | WEIGHT: 293 LBS | DIASTOLIC BLOOD PRESSURE: 76 MMHG | TEMPERATURE: 98.1 F | RESPIRATION RATE: 20 BRPM | OXYGEN SATURATION: 100 % | SYSTOLIC BLOOD PRESSURE: 135 MMHG

## 2024-02-09 DIAGNOSIS — F41.9 ANXIETY: Primary | ICD-10-CM

## 2024-02-09 DIAGNOSIS — C50.919 MALIGNANT NEOPLASM OF FEMALE BREAST, UNSPECIFIED ESTROGEN RECEPTOR STATUS, UNSPECIFIED LATERALITY, UNSPECIFIED SITE OF BREAST (MULTI): ICD-10-CM

## 2024-02-09 DIAGNOSIS — Z91.89 AT RISK FOR INFERTILITY: ICD-10-CM

## 2024-02-09 DIAGNOSIS — Z51.5 ENCOUNTER FOR PALLIATIVE CARE: ICD-10-CM

## 2024-02-09 PROCEDURE — 99215 OFFICE O/P EST HI 40 MIN: CPT | Performed by: NURSE PRACTITIONER

## 2024-02-09 PROCEDURE — 3008F BODY MASS INDEX DOCD: CPT | Performed by: NURSE PRACTITIONER

## 2024-02-09 PROCEDURE — 3078F DIAST BP <80 MM HG: CPT | Performed by: NURSE PRACTITIONER

## 2024-02-09 PROCEDURE — 3075F SYST BP GE 130 - 139MM HG: CPT | Performed by: NURSE PRACTITIONER

## 2024-02-09 PROCEDURE — 1036F TOBACCO NON-USER: CPT | Performed by: NURSE PRACTITIONER

## 2024-02-09 RX ORDER — DULOXETIN HYDROCHLORIDE 30 MG/1
30 CAPSULE, DELAYED RELEASE ORAL DAILY
Qty: 30 CAPSULE | Refills: 1 | Status: SHIPPED | OUTPATIENT
Start: 2024-02-09 | End: 2024-03-05 | Stop reason: SDUPTHER

## 2024-02-09 ASSESSMENT — PAIN SCALES - GENERAL: PAINLEVEL: 7

## 2024-02-09 NOTE — PROGRESS NOTES
Patient ID: Nia Harper is a 33 y.o. female.    Cancer History:          Breast         AJCC Edition: 8th (AJCC), Diagnosis Date: Jun 2023, IB, pT2 pN0 cM0 G3     Treatment Synopsis:    Treatment Synopsis:   -Enlarging right breast lumps.  -5/15/23: Dx MG/US demonstrated multiple masses: 1.1 cm (11:00, 8 cm FN); 1 cm (9:30, 5 cm FN); 1.2 cm (9:00, 6 cm FN); 0.7 cm (10:00, 6 cm FN); 0.4 cm (10:00, 5 cm FN). Four lymph nodes demonstrated suspicious cortical thickening.  -5/19/23: US-guided CNB of the 11:00 mass showed IDC grade 3; ER >95% VT 85% Her2-negative (1+ IHC). Biopsy of the 9:30 mass showed IDC grade 2-3; ER 80% VT 10% Her2-negative (1+ IHC). A right axillary LN was negative for carcinoma.  -6/30/23: Right mastectomy and SLNBx performed. Multipe foci of grade 3 carcinoma noted (3.1 cm, 2.2 cm, 1.8 cm), no LVI, margins negative, 0/3 LN’s involved IB, pT2 pN0 cM0 G3  Oncotype Dx RS 45 (33% 9-year recurrence risk; >15% chemo benefit).  RSClin: 59% recurrence risk with tamoxifen; 44% chemo benefit.  -8/24/23: Adjuvant TC (docetaxel, cyclophosphamide) initiated. First cycle complicated by admission for nausea/vomiting. Second cycle complicated by admission for angioedema, hives; thought to be secondary to docetaxel.   -10/16/23 Therapy changed to Cyclophosphamide/Methotrexate/5-Flouracil d/t reaction x 2 cycles   -11/09/23 C2/2 CMF -- last dose of systemic chemotherapy     Subjective    Patient returns today in follow up regarding issues unique to being a young person with cancer.    Physically, she reports doing fair. Is planning still for bariatric surgery, working on losing weight, notes she needs to be physically active. Is connected with dietician for weight loss management. Notes that she stopped using marijuana daily b/c this was a requirement for bariatric surgery -- this has exacerbated her anxiety. She does not smoke, vape, or use ETOH.     Emotionally, she relays she is really struggling. She is  experiencing anxiety and more severe worries -- fear her children may get kidnapped, fear that if they go on vacation something bad might happen, fear that the cancer may come back. She also notes depressed mood, not enjoying activities as much, not wanting to leave the house, decreased interest in sex, poor sleep, and decreased appetite. She is interested in speaking with someone and considering a medication.     She notes that her hot flashes are very bothersome, occurring multiple times/day and in the night, interrupt her sleep, make her not want to go out of the house b/c she has to wear wig/more clothes and she feels very uncomfortable in this out in public with hot flashes. Relays being seen by gynecology who wanted to start a medication for hot flashes but then could not d/t effect on liver function. She continues on goserelin injections monthly, she is also on anastrazole for endocrine suppression.     Still experiencing vaginal dryness, pain with intercourse, and now more significant decreased interest in sex and decreased arousal. Did purchase topical vaginal moisturizer and silicone based lubricant with some symptomatic improvement -- but now mainly not interested in engaging in sex. This has been a stressor for her relationship.     Relays continued interest in fourth biologic child and interest in re-evaluating fertility after chemotherapy and in attempting to conceive. She relayed one of the stipulations with bariatric surgery is to not get pregnant for 18 mo after surgery -- she is uncertain about this. Knows that she cannot achieve pregnancy on anastrazole, but that in the future she may pause therapy to attempt to conceive.    Social History: Lives in [place] with partner/father of her children Dinesh, 11 y.o. daughter Lambert, 5 y.o. Florecita starting , Gal 3 y.o. son  Previously worked as , currently stays at home with children/runs household  Denies tobacco, does  "smoke marijuana nightly, rare ETOH estimates 6x/year, no vaping or other recreational drug use.    Objective    BSA: 2.56 meters squared  /76 (BP Location: Left arm, Patient Position: Sitting, BP Cuff Size: Large adult)   Pulse 102   Temp 36.7 °C (98.1 °F) (Temporal)   Resp 20   Wt 133 kg (293 lb 14 oz)   LMP  (LMP Unknown) Comment: Pt taking suppression meds  SpO2 100%   BMI 42.17 kg/m²      Current Outpatient Medications   Medication Instructions    anastrozole (ARIMIDEX) 1 mg, oral, Daily, Swallow whole with a drink of water.    atorvastatin (LIPITOR) 40 mg, oral, Nightly    benzoyl peroxide 5 % external wash Topical, Daily, Lather onto skin folds, leave on 2 minutes, rinse with water    blood sugar diagnostic (OneTouch Verio test strips) strip 100 strips, subcutaneous, Daily    blood-glucose sensor (FreeStyle Cristina 3 Sensor) device 2 each, miscellaneous, Every 14 days    dulaglutide (Trulicity) 3 mg/0.5 mL pen injector Once weekly    ezetimibe (Zetia) 10 mg tablet oral    FreeStyle Cristina 2 Sensor kit CHANGE SENSOR EVERY 14 DAYS AS DIRECTED.    insulin glargine (LANTUS SOLOSTAR U-100 INSULIN) 40 Units, subcutaneous, 2 times daily    insulin lispro (HUMALOG BHUPINDER KWIKPEN) 40 Units, subcutaneous, 3 times daily PRN, inject up to 20 units before meals per sliding scale    LORazepam (Ativan) 1 mg tablet TAKE 1 TABLET BY MOUTH ONCE DAILY AS NEEDED FOR ANXIETY    metFORMIN XR (GLUCOPHAGE-XR) 1,000 mg, oral, 2 times daily with meals    omega-3 acid ethyl esters (LOVAZA) 2 g, oral, 2 times daily with meals    ondansetron (ZOFRAN) 8 mg, oral, 3 times daily    OneTouch Delica Plus Lancet 33 gauge misc TEST BLOOD GLUCOSE 3 TIMES/ DAY    OneTouch Verio Flex meter misc USE AS DIRECTED.    pen needle, diabetic (BD Ultra-Fine Mini Pen Needle) 31 gauge x 3/16\" needle 200 each, subcutaneous, 4 times daily before meals and nightly, Use 4 a day as directed    valACYclovir (Valtrex) 500 mg tablet 1 tablet, oral, 2 times " daily     No visits with results within 1 Week(s) from this visit.   Latest known visit with results is:   Lab on 02/01/2024   Component Date Value Ref Range Status    Glucose 02/01/2024 387 (H)  74 - 99 mg/dL Final    Sodium 02/01/2024 135 (L)  136 - 145 mmol/L Final    Potassium 02/01/2024 4.5  3.5 - 5.3 mmol/L Final    Chloride 02/01/2024 99  98 - 107 mmol/L Final    Bicarbonate 02/01/2024 25  21 - 32 mmol/L Final    Anion Gap 02/01/2024 16  10 - 20 mmol/L Final    Urea Nitrogen 02/01/2024 13  6 - 23 mg/dL Final    Creatinine 02/01/2024 0.66  0.50 - 1.05 mg/dL Final    eGFR 02/01/2024 >90  >60 mL/min/1.73m*2 Final    Calculations of estimated GFR are performed using the 2021 CKD-EPI Study Refit equation without the race variable for the IDMS-Traceable creatinine methods.  https://jasn.asnjournals.org/content/early/2021/09/22/ASN.4562731387    Calcium 02/01/2024 9.6  8.6 - 10.3 mg/dL Final    Albumin 02/01/2024 4.4  3.4 - 5.0 g/dL Final    Alkaline Phosphatase 02/01/2024 70  33 - 110 U/L Final    Total Protein 02/01/2024 7.7  6.4 - 8.2 g/dL Final    AST 02/01/2024 38  9 - 39 U/L Final    Bilirubin, Total 02/01/2024 0.3  0.0 - 1.2 mg/dL Final    ALT 02/01/2024 64 (H)  7 - 45 U/L Final    Patients treated with Sulfasalazine may generate falsely decreased results for ALT.    Extra Tube 02/01/2024 Hold for add-ons.   Final    Auto resulted.      Physical Exam  Constitutional:       General: She is not in acute distress.     Appearance: Normal appearance. She is normal weight. She is ill-appearing.   Neurological:      General: No focal deficit present.      Mental Status: She is alert and oriented to person, place, and time.   Psychiatric:         Mood and Affect: Mood normal.         Behavior: Behavior normal.         Thought Content: Thought content normal.         Judgment: Judgment normal.     Performance Status:  Symptomatic; fully ambulatory    Assessment/Plan   Nia Harper is a 33 y.o. AA F with a recent  diagnosis of G3 IB adenocarcinoma of the breast, ER+. S/p surgery followed by adjuvant chemotherapy with TC x 2 cycles but with angioedema so switched to CMF x 2 additional cycles. Currently on anastrazole and goserelin for endocrine therapy.     #Psychosocial:  - Referral to psychology for 1:1 therapy support to help with emotional management and illness  - Discussed starting SSRI or SNRI to help with mood and hot flashes, plan to start duloxetine will confirm with primary oncology no contraindications  - Consider child life referral to help with children/parenting    #Hot Flashes: secondary to ovarian suppression and low circulating estrogen  - Start SNRI as above    #Diet/Exercise/Healthy Lifestyle:  - Discussed research demonstrating consumption of a healthy, plant based diet not only decreases cancer risk, but also has been found to improve survival in cancer patients who partake in a healthy diet.   - We reviewed the American Cancer Society guidelines for a healthy diet including mostly plant based foods  with incorporation of plant/lean animal proteins and healthy fats such as the mediterranean diet.   - Patient provided with a copy of ACS guidelines for healthy diet  - Discussed research that demonstrates decreased cancer risk and improved survival in cancer patients who exercise and stay active throughout diagnosis and treatment.  - Encouraged patient to join our 12 week exercise training program for young adults with cancer  - Encouraged patient to continue to abstain from alcohol, tobacco, and recreational drugs    #Sexual Dysfunction/Contraception: secondary to ovarian suppression and low circulating estrogen  - Provided patient with sexual concerns handout, encouraged open communication outside of intimacy to discuss patient decreased interest/sexual dysfunction with partner  - Use replens daily topical vaginal moisturizer  - Use silicone based lubricant with sex  - We discussed the seriousness of  getting pregnant while receiving chemotherapy due to the harmful effects this could have on the  fetus, and on her cancer treatment given the decreased availability of medical  services.   - Options for contraception include intrauterine devices, estrogen containing contraceptives are contraindicated d/t ER+ status -- provided patient with comparison chart of contraception options  - Following with gynecology    #Practical Needs:  - Following with [facility] for financial assistance grants and support  - Has referral in place to [facility] for food insecurity    #Risk for infertility:  - Discussed the damaging effect cancer treatment can have on the ovaries.   - Discussed natural age related decline in fertility and menopause that occurs as a result of ovarian aging, and that cancer treatments can accelerate that progression and diminish ovarian reserve.  - Individuals may experience varying degrees of short or long term ovarian dysfunction and amenorrhea, and that this is dependent upon type of cancer treatment, cumulative dose, and patients age.   - Loss of ovarian function may be permanent or temporary.  - Amenorrhea may be temporary or permanent -- temporary amenorrhea results from destruction of maturing follicles whereas permanent amenorrhea results from depletion of viable primordial follicles.  - Risk to fertility is INTERMEDIATE based on cancer treatment of Docetaxel/Cyclophosphamide  with cyclophosphamide being the primary  of infertility -- this is dose dependent  - Patient elected not to undergo fertility preservation up front and opted for ovarian suppression with goserelin  - Baseline AMH level drawn 2023 2.464  - Discussed plan for at least 18 months on oral endocrine therapy followed by a 3 month wash out before she should attempt to conceive     Follow up phone call in 2 weeks to see how she is doing on SNRI, visit in 6 weeks     Cancer Staging   Malignant neoplasm of female  breast (CMS/HCC)  Staging form: Breast, AJCC 8th Edition  - Pathologic stage from 6/30/2023: Stage IB (pT2(3), pN0(sn), cM0, G3, ER+, MS+, HER2-, Oncotype DX score: 45) - Signed by Jeramie Lancaster MD on 10/10/2023      Oncology History   Malignant neoplasm of female breast (CMS/HCC)   6/30/2023 Cancer Staged    Staging form: Breast, AJCC 8th Edition, Pathologic stage from 6/30/2023: Stage IB (pT2(3), pN0(sn), cM0, G3, ER+, MS+, HER2-, Oncotype DX score: 45) - Signed by Jeramie Lancaster MD on 10/10/2023     8/24/2023 - 9/15/2023 Chemotherapy    TC (DOCEtaxel / Cyclophosphamide), 21 Day Cycles     9/8/2023 Initial Diagnosis    Malignant neoplasm of female breast (CMS/HCC)     10/16/2023 - 11/9/2023 Chemotherapy    CMF (Cyclophosphamide / Methotrexate / Fluorouracil) 21 Day Cycles                        Sanjuana Anand, RONEL-CNP

## 2024-02-13 DIAGNOSIS — E11.9 DIABETES MELLITUS TYPE 2 WITHOUT RETINOPATHY (MULTI): ICD-10-CM

## 2024-02-13 RX ORDER — INSULIN GLARGINE 100 [IU]/ML
40 INJECTION, SOLUTION SUBCUTANEOUS 2 TIMES DAILY
Qty: 15 ML | Refills: 3 | Status: SHIPPED | OUTPATIENT
Start: 2024-02-13 | End: 2024-05-13 | Stop reason: SDUPTHER

## 2024-02-14 ENCOUNTER — APPOINTMENT (OUTPATIENT)
Dept: DERMATOLOGY | Facility: CLINIC | Age: 34
End: 2024-02-14
Payer: COMMERCIAL

## 2024-02-15 ENCOUNTER — TELEPHONE (OUTPATIENT)
Dept: ADMISSION | Facility: HOSPITAL | Age: 34
End: 2024-02-15
Payer: COMMERCIAL

## 2024-02-15 NOTE — TELEPHONE ENCOUNTER
Insurance called needing dosing information from 8/24 and 9/15, information provided, no further questions at this time

## 2024-02-26 RX ORDER — INSULIN GLARGINE 100 [IU]/ML
INJECTION, SOLUTION SUBCUTANEOUS
Refills: 0 | OUTPATIENT
Start: 2024-02-26

## 2024-02-28 NOTE — PROGRESS NOTES
Patient ID: Nia Harper is a 33 y.o. female.  MRN: [id number]  : 1990  The patient presents to clinic today for her history of right-sided breast cancer.     Cancer Staging   Malignant neoplasm of female breast (CMS/HCC)  Staging form: Breast, AJCC 8th Edition  - Pathologic stage from 2023: Stage IB (pT2(3), pN0(sn), cM0, G3, ER+, NH+, HER2-, Oncotype DX score: 45) - Signed by Jeramie Lancaster MD on 10/10/2023    Diagnostic/Therapeutic History:  -Enlarging right breast lumps.  -5/15/23: Dx MG/US demonstrated multiple masses: 1.1 cm (11:00, 8 cm FN); 1 cm (9:30, 5 cm FN); 1.2 cm (9:00, 6 cm FN); 0.7 cm (10:00, 6 cm FN); 0.4 cm (10:00, 5 cm FN). Four lymph nodes demonstrated suspicious cortical thickening.  -23: US-guided CNB of the 11:00 mass showed IDC grade 3; ER >95% NH 85% Her2-negative (1+ IHC). Biopsy of the 9:30 mass showed IDC grade 2-3; ER 80% NH 10% Her2-negative (1+ IHC). A right axillary LN was negative for carcinoma.  -23: Right mastectomy and SLNBx performed. Multipe foci of grade 3 carcinoma noted (3.1 cm, 2.2 cm, 1.8 cm), no LVI, margins negative, 0/3 LN’s involved IB, pT2 pN0 cM0 G3  Oncotype Dx RS 45 (33% 9-year recurrence risk; >15% chemo benefit).  RSClin: 59% recurrence risk with tamoxifen; 44% chemo benefit.  -23: Adjuvant TC (docetaxel, cyclophosphamide) initiated. First cycle complicated by admission for nausea/vomiting. Second cycle complicated by admission for angioedema, hives; thought to be secondary to docetaxel. Switched to CMF (cyclophosphamide, methotrexate, fluorouracil) on 10/16/23. Second (and last) cycle given 23.    History of Present Illness (HPI)/Interval History:  Nia Harper presents for routine FUV. On anastrozole.     She denies any new breast cancer concerns. She does note have a bothersome sebaceous cyst at the left inframammary fold. She does wear wire-bras quite often.      She denies any chest pain or breathing issues.     She denies  "any vision changes or headache issues, dizziness, loss of balance or falls.     She denies any new or unexplained bone aches or pains.    She denies any skin lesions or masses.    She reports a normal appetite and normal bowel movements.    She sleeps \"okay\", hot flashes do wake her up. Hot flashes continue to be very bothersome, recently started on Cymbalta. Only on this for 2 weeks. She endorses vaginal dryness.     She would like to get reconstruction int he future and looking to get gastric bypass.     Review of Systems:  14-point ROS otherwise negative, as per HPI/Interval History.    Past Medical History:   Diagnosis Date    Abnormal levels of other serum enzymes 08/26/2021    Elevated liver enzymes    Anxiety disorder, unspecified 08/13/2019    Anxiety and depression    Body mass index (BMI)40.0-44.9, adult 06/11/2019    BMI 40.0-44.9, adult    Breast cancer (CMS/[facility])     Carpal tunnel syndrome, bilateral upper limbs 05/01/2019    Carpal tunnel syndrome on both sides    Depression, unspecified 12/29/2021    Depression    Glycosuria 06/30/2014    Glucosuria    Hx antineoplastic chemo     Irregular menstruation, unspecified 12/21/2015    Missed periods    Mixed hyperlipidemia 07/30/2018    Hyperlipemia, mixed    Morbid (severe) obesity due to excess calories (CMS/HCC) 06/11/2019    Severe obesity (BMI >= 40)    Myopia, bilateral 09/15/2017    Myopia of both eyes with astigmatism    Type 2 diabetes mellitus without complications (CMS/HCC) 09/14/2022    Diabetes     Patient Active Problem List   Diagnosis    Astigmatism of both eyes    Breast skin changes    Radial styloid tenosynovitis of right hand    Depression    Diabetes mellitus type 2 without retinopathy (CMS/HCC)    Diffuse goiter    Elevation of levels of liver transaminase levels    Epidermal inclusion cyst    Herpes simplex virus (HSV) infection    History of malignant neoplasm of breast    Hyperlipemia    Hypertriglyceridemia    Myopia of both eyes    " Nexplanon in place    Suspected sleep apnea    Uncontrolled type 2 diabetes mellitus with hyperglycemia (CMS/HCC)    Chronic headaches    BMI 40.0-44.9, adult (CMS/HCC)    Severe obesity (CMS/HCC)    Malignant neoplasm of female breast (CMS/HCC)    Primary hypertension    Angioedema    Anxiety disorder    History of bariatric surgery    Fatty liver    Gallstones    History of cannabis abuse    History of noncompliance with medical treatment    Insufficient sleep syndrome    Sleep disorder breathing    Status post mastectomy    Systolic murmur    Artificial menopause    Acute vaginitis    Encounter for follow-up surveillance of breast cancer    Hidradenitis suppurativa    Suppression of ovarian secretion        Past Surgical History:   Procedure Laterality Date     SECTION, CLASSIC  2019     Section    MASTECTOMY Right     MR HEAD ANGIO WO IV CONTRAST  2015    MR HEAD ANGIO WO IV CONTRAST 2015 [facility]    OTHER SURGICAL HISTORY  2019    Surgical Treatment Of Missed  In First Trimester       Social History     Socioeconomic History    Marital status: Single     Spouse name: Not on file    Number of children: Not on file    Years of education: Not on file    Highest education level: Not on file   Occupational History    Not on file   Tobacco Use    Smoking status: Never     Passive exposure: Never    Smokeless tobacco: Never   Vaping Use    Vaping Use: Never used   Substance and Sexual Activity    Alcohol use: Never    Drug use: Yes     Types: Marijuana    Sexual activity: Not on file   Other Topics Concern    Not on file   Social History Narrative    Not on file     Social Determinants of Health     Financial Resource Strain: Not on file   Food Insecurity: Not on file   Transportation Needs: Not on file   Physical Activity: Not on file   Stress: Not on file   Social Connections: Not on file   Intimate Partner Violence: Not on file   Housing Stability: Not on  "file       Family History   Problem Relation Name Age of Onset    Heart disease Father      Cancer Maternal Grandmother      Diabetes type II Maternal Grandmother      Cancer Maternal Grandfather      Cancer Paternal Grandmother      Diabetes type II Mother's Sister          uncontrolled    Breast cancer Father's Sister          two paternal 1st cousins (through paternal uncle, both sisters, both early onset, one ). two paternal great aunts (through PGF's side, both  in their 50s/60s)    Cancer Father's Sister      Cancer Father's Brother      Breast cancer Paternal Cousin          two paternal 1st cousins (through paternal uncle, both sisters, both early onset, one ). two paternal great aunts (through PGF's side, both  in their 50s/60s)    Breast cancer Cousin         Allergies:   Allergies   Allergen Reactions    Metformin Swelling    Cephalexin Hives, Other, Rash and Swelling     \"welts\"    Sulfamethoxazole-Trimethoprim Hives, Rash and Swelling         Current Outpatient Medications:     anastrozole (Arimidex) 1 mg tablet, Take 1 tablet (1 mg total) by mouth once daily.  Swallow whole with a drink of water., Disp: 30 tablet, Rfl: 2    atorvastatin (Lipitor) 40 mg tablet, Take 1 tablet (40 mg) by mouth once daily at bedtime., Disp: 90 tablet, Rfl: 3    benzoyl peroxide 5 % external wash, Apply topically once daily. Lather onto skin folds, leave on 2 minutes, rinse with water, Disp: 236 g, Rfl: 11    blood sugar diagnostic (OneTouch Verio test strips) strip, Inject 100 strips under the skin once daily., Disp: 100 strip, Rfl: 1    blood-glucose sensor (FreeStyle Cristina 3 Sensor) device, 2 each every 14 (fourteen) days., Disp: 2 each, Rfl: 11    dulaglutide (Trulicity) 3 mg/0.5 mL pen injector, Once weekly, Disp: 4 each, Rfl: 11    DULoxetine (Cymbalta) 30 mg DR capsule, Take 1 capsule (30 mg) by mouth once daily. Do not crush or chew. Take in the evening with food. Start with 30mg daily and " "increase as directed., Disp: 30 capsule, Rfl: 1    ezetimibe (Zetia) 10 mg tablet, Take by mouth., Disp: , Rfl:     FreeStyle Cristina 2 Sensor kit, CHANGE SENSOR EVERY 14 DAYS AS DIRECTED., Disp: , Rfl:     insulin glargine (Lantus Solostar U-100 Insulin) 100 unit/mL (3 mL) pen, Inject 40 Units under the skin 2 times a day., Disp: 15 mL, Rfl: 3    insulin lispro (HumaLOG Aurelio Kwikpen) 100 unit/mL injection, Inject 40 Units under the skin 3 times a day as needed for high blood sugar. inject up to 20 units before meals per sliding scale, Disp: 15 mL, Rfl: 5    LORazepam (Ativan) 1 mg tablet, TAKE 1 TABLET BY MOUTH ONCE DAILY AS NEEDED FOR ANXIETY, Disp: 30 tablet, Rfl: 0    metFORMIN  mg 24 hr tablet, Take 2 tablets (1,000 mg) by mouth 2 times a day with meals., Disp: 180 tablet, Rfl: 1    omega-3 acid ethyl esters (Lovaza) 1 gram capsule, Take 2 capsules (2 g) by mouth 2 times a day with meals., Disp: 120 capsule, Rfl: 1    ondansetron (Zofran) 8 mg tablet, Take 1 tablet (8 mg) by mouth 3 times a day., Disp: , Rfl:     OneTouch Delica Plus Lancet 33 gauge misc, TEST BLOOD GLUCOSE 3 TIMES/ DAY, Disp: , Rfl:     OneTouch Verio Flex meter misc, USE AS DIRECTED., Disp: , Rfl:     pen needle, diabetic (BD Ultra-Fine Mini Pen Needle) 31 gauge x 3/16\" needle, Inject 200 each under the skin 4 times a day before meals. Use 4 a day as directed, Disp: 200 each, Rfl: 3    valACYclovir (Valtrex) 500 mg tablet, Take 1 tablet (500 mg) by mouth 2 times a day., Disp: , Rfl:      Objective    BSA: There is no height or weight on file to calculate BSA.  There were no vitals taken for this visit.    Performance Status:  The ECOG performance scale today is ECO- Fully active, able to carry on all pre-disease performance w/o restriction.    Physical Exam  Vitals reviewed.   Constitutional:       General: She is awake. She is not in acute distress.     Appearance: Normal appearance. She is not ill-appearing.   HENT:      Head: " Normocephalic and atraumatic.      Mouth/Throat:      Pharynx: Oropharynx is clear. No oropharyngeal exudate.   Eyes:      General: No scleral icterus.     Conjunctiva/sclera: Conjunctivae normal.   Neck:      Trachea: Trachea and phonation normal. No tracheal tenderness.   Cardiovascular:      Rate and Rhythm: Normal rate and regular rhythm.      Heart sounds: No murmur heard.     No friction rub. No gallop.   Pulmonary:      Effort: Pulmonary effort is normal. No respiratory distress.      Breath sounds: Normal breath sounds. No stridor. No wheezing, rhonchi or rales.   Chest:      Chest wall: No mass, lacerations, deformity or tenderness.   Breasts:     Right: Absent.      Left: Skin change (inframammary fold sebaceous cyst currently not imflammed) present. No swelling, mass, nipple discharge or tenderness.      Comments: S/p right mastectomy   Abdominal:      General: Abdomen is flat. There is no distension.      Palpations: Abdomen is soft. There is no mass.      Tenderness: There is no abdominal tenderness.   Musculoskeletal:         General: No swelling, tenderness or deformity. Normal range of motion.      Cervical back: Normal range of motion and neck supple. No rigidity.   Lymphadenopathy:      Cervical: No cervical adenopathy.      Upper Body:      Right upper body: No supraclavicular, axillary or pectoral adenopathy.      Left upper body: No supraclavicular, axillary or pectoral adenopathy.   Skin:     General: Skin is warm and dry.      Coloration: Skin is not jaundiced.      Findings: No lesion or rash.      Comments: No hives noted today.   Neurological:      General: No focal deficit present.      Mental Status: She is alert and oriented to person, place, and time.      Motor: No weakness.      Gait: Gait normal.   Psychiatric:         Mood and Affect: Mood normal.         Behavior: Behavior normal.         Thought Content: Thought content normal.         Judgment: Judgment normal.         Laboratory  Data:  Lab Results   Component Value Date    WBC 4.8 12/07/2023    HGB 13.4 12/07/2023    HCT 40.6 12/07/2023    MCV 86 12/07/2023     12/07/2023    ANC 15.37 (H) 09/26/2023       Chemistry    Lab Results   Component Value Date/Time     (L) 02/01/2024 1014    K 4.5 02/01/2024 1014    CL 99 02/01/2024 1014    CO2 25 02/01/2024 1014    BUN 13 02/01/2024 1014    CREATININE 0.66 02/01/2024 1014    Lab Results   Component Value Date/Time    CALCIUM 9.6 02/01/2024 1014    ALKPHOS 70 02/01/2024 1014    AST 38 02/01/2024 1014    ALT 64 (H) 02/01/2024 1014    BILITOT 0.3 02/01/2024 1014           Component      Latest Ref Rng 12/7/2023   FOLLICLE STIMULATING HORMONE      IU/L 3.5    Estradiol      pg/mL <19    Vitamin D, 25-Hydroxy, Total      30 - 100 ng/mL 30        Radiology: N/A    Pathology: N/A      Assessment/Plan:  Nia Harper is a 33 y.o. female with a history of right breast cancer, who presents today for follow-up evaluation.    Breast cancer, right, stage IB (ER/ND+, Her2-).  High-risk for recurrence, based on Oncotype Dx and clinical features.  Very poor tolerance of TC (2 cycles), including severe allergic reaction. Completed CMF x2 11/9/23.  Grade 2 hot flashes: recently started on cymbalta, will continue with this for another ~4 weeks as we discussed it take time for these medications to reach their full effect. She will call in a month if still not effective then will try consider effexor or gabapentin.   Grade 2 vaginal dryness: vaginal estrace cream sent for once a day x 14 days then as needed   - Continue ovarian suppression started 8/2023   - Continue anastrozole 1 mg every day, refill x 1 year sent   - No role for adjuvant RT.  - Left mammogram 1/29/2024: cat 2  - Left breast sebaceous cyst: referral to breast surgery as Dr. Henderson is retired    There is no evidence of breast cancer recurrence based on her clinical exam today.     Allergic reaction.  - Likely delayed reaction to docetaxel,  but pegfilgrastim also possible.  - Avoid further TC/Neulasta. Switched therapy, as above.  - Off therapy. No further hives.    Premenopausal status. Continue goserelin q4 weeks, initiated 8/18/23.    Insulin-dependent T2DM.  - Following with Endocrinology.    Disposition.  - FUV 4 months with Petrona Bermeo PA-C and goserelin q28 days   - She has our contact information and was instructed to call with concerns/questions in the interim.    No orders of the defined types were placed in this encounter.    Petrona Bermeo PA-C

## 2024-02-29 ENCOUNTER — APPOINTMENT (OUTPATIENT)
Dept: HEMATOLOGY/ONCOLOGY | Facility: HOSPITAL | Age: 34
End: 2024-02-29
Payer: COMMERCIAL

## 2024-02-29 ENCOUNTER — OFFICE VISIT (OUTPATIENT)
Dept: HEMATOLOGY/ONCOLOGY | Facility: HOSPITAL | Age: 34
End: 2024-02-29
Payer: COMMERCIAL

## 2024-02-29 ENCOUNTER — INFUSION (OUTPATIENT)
Dept: HEMATOLOGY/ONCOLOGY | Facility: HOSPITAL | Age: 34
End: 2024-02-29
Payer: COMMERCIAL

## 2024-02-29 VITALS
TEMPERATURE: 96.3 F | OXYGEN SATURATION: 97 % | DIASTOLIC BLOOD PRESSURE: 88 MMHG | HEART RATE: 88 BPM | WEIGHT: 293 LBS | HEIGHT: 70 IN | SYSTOLIC BLOOD PRESSURE: 146 MMHG | BODY MASS INDEX: 41.95 KG/M2 | RESPIRATION RATE: 20 BRPM

## 2024-02-29 DIAGNOSIS — N89.8 VAGINAL DRYNESS: Primary | ICD-10-CM

## 2024-02-29 DIAGNOSIS — L72.3 SEBACEOUS CYST: ICD-10-CM

## 2024-02-29 DIAGNOSIS — C50.919 MALIGNANT NEOPLASM OF FEMALE BREAST, UNSPECIFIED ESTROGEN RECEPTOR STATUS, UNSPECIFIED LATERALITY, UNSPECIFIED SITE OF BREAST (MULTI): ICD-10-CM

## 2024-02-29 PROCEDURE — 2500000005 HC RX 250 GENERAL PHARMACY W/O HCPCS: Performed by: INTERNAL MEDICINE

## 2024-02-29 PROCEDURE — 99215 OFFICE O/P EST HI 40 MIN: CPT

## 2024-02-29 PROCEDURE — 3077F SYST BP >= 140 MM HG: CPT

## 2024-02-29 PROCEDURE — 3079F DIAST BP 80-89 MM HG: CPT

## 2024-02-29 PROCEDURE — 3008F BODY MASS INDEX DOCD: CPT

## 2024-02-29 PROCEDURE — 2500000004 HC RX 250 GENERAL PHARMACY W/ HCPCS (ALT 636 FOR OP/ED): Mod: JZ | Performed by: INTERNAL MEDICINE

## 2024-02-29 PROCEDURE — 1036F TOBACCO NON-USER: CPT

## 2024-02-29 PROCEDURE — 96372 THER/PROPH/DIAG INJ SC/IM: CPT

## 2024-02-29 PROCEDURE — 96402 CHEMO HORMON ANTINEOPL SQ/IM: CPT

## 2024-02-29 RX ORDER — ESTRADIOL 0.1 MG/G
2 CREAM VAGINAL DAILY
Qty: 42.5 G | Refills: 0 | Status: SHIPPED | OUTPATIENT
Start: 2024-02-29

## 2024-02-29 RX ORDER — LIDOCAINE HYDROCHLORIDE 10 MG/ML
2 INJECTION, SOLUTION EPIDURAL; INFILTRATION; INTRACAUDAL; PERINEURAL ONCE
Status: CANCELLED | OUTPATIENT
Start: 2024-03-28

## 2024-02-29 RX ORDER — FAMOTIDINE 10 MG/ML
20 INJECTION INTRAVENOUS ONCE AS NEEDED
Status: CANCELLED | OUTPATIENT
Start: 2024-03-28

## 2024-02-29 RX ORDER — EPINEPHRINE 0.3 MG/.3ML
0.3 INJECTION SUBCUTANEOUS EVERY 5 MIN PRN
Status: CANCELLED | OUTPATIENT
Start: 2024-03-28

## 2024-02-29 RX ORDER — ANASTROZOLE 1 MG/1
1 TABLET ORAL DAILY
Qty: 90 TABLET | Refills: 3 | Status: SHIPPED | OUTPATIENT
Start: 2024-02-29

## 2024-02-29 RX ORDER — LIDOCAINE HYDROCHLORIDE 10 MG/ML
2 INJECTION, SOLUTION EPIDURAL; INFILTRATION; INTRACAUDAL; PERINEURAL ONCE
Status: COMPLETED | OUTPATIENT
Start: 2024-02-29 | End: 2024-02-29

## 2024-02-29 RX ORDER — ALBUTEROL SULFATE 0.83 MG/ML
3 SOLUTION RESPIRATORY (INHALATION) AS NEEDED
Status: CANCELLED | OUTPATIENT
Start: 2024-03-28

## 2024-02-29 RX ORDER — DIPHENHYDRAMINE HYDROCHLORIDE 50 MG/ML
50 INJECTION INTRAMUSCULAR; INTRAVENOUS AS NEEDED
Status: CANCELLED | OUTPATIENT
Start: 2024-03-28

## 2024-02-29 RX ADMIN — GOSERELIN ACETATE 3.6 MG: 3.6 IMPLANT SUBCUTANEOUS at 10:13

## 2024-02-29 RX ADMIN — LIDOCAINE HYDROCHLORIDE 20 MG: 10 INJECTION, SOLUTION EPIDURAL; INFILTRATION; INTRACAUDAL; PERINEURAL at 10:11

## 2024-02-29 ASSESSMENT — PAIN SCALES - GENERAL: PAINLEVEL: 0-NO PAIN

## 2024-02-29 NOTE — PROGRESS NOTES
[facility] Infusion Note     Nia Harper is a 33 y.o. year old female here today,02/29/24,  in the [facility] infusion center for a zoladex injection.      Overall, pt states mild fatigue, no concerns regarding appetite, and no pain.     Pt requested a bag of ice prior to lidocaine injection and Zoladex injection.     Administrations This Visit       goserelin (Zoladex) injection 3.6 mg       Admin Date  02/29/2024 Action  Given Dose  3.6 mg Route  abdominal subcutaneous Administered By  Peggy Leon RN              lidocaine PF (Xylocaine) 10 mg/mL (1 %) injection 20 mg       Admin Date  02/29/2024 Action  Given Dose  20 mg Route  intradermal Administered By  Peggy Leon RN                    Pt tolerated injections above well and was discharged to home in stable condition. Pt had no further questions or concerns at this time.

## 2024-03-01 ENCOUNTER — TELEPHONE (OUTPATIENT)
Dept: SURGERY | Facility: CLINIC | Age: 34
End: 2024-03-01
Payer: COMMERCIAL

## 2024-03-01 NOTE — TELEPHONE ENCOUNTER
Spoke with patient to review preop diet for upcoming surgery on 3/25/24. Advised to start preop diet 3/11/24. Reviewed full liquids upon coming home, and postop vitamin/mineral regimen. Email sent per pt request with attached Preop diet handout and NG booklet. Also sent as My Chart message per pt request.

## 2024-03-04 ENCOUNTER — SOCIAL WORK (OUTPATIENT)
Dept: CASE MANAGEMENT | Facility: HOSPITAL | Age: 34
End: 2024-03-04
Payer: COMMERCIAL

## 2024-03-04 NOTE — PROGRESS NOTES
Patient requesting medical cert to be provided to [employer] company as her electricity ws shut off.  I called [phone] and provided into over the phone. The cert is good for 7 days.  Certification sent to Dr. Lancaster for his signature. Patient is aware.    MIGUEL Blood

## 2024-03-05 ENCOUNTER — SOCIAL WORK (OUTPATIENT)
Dept: CASE MANAGEMENT | Facility: HOSPITAL | Age: 34
End: 2024-03-05
Payer: COMMERCIAL

## 2024-03-05 DIAGNOSIS — F41.1 GENERALIZED ANXIETY DISORDER: ICD-10-CM

## 2024-03-05 DIAGNOSIS — F41.9 ANXIETY: ICD-10-CM

## 2024-03-05 DIAGNOSIS — F32.A DEPRESSION, UNSPECIFIED DEPRESSION TYPE: ICD-10-CM

## 2024-03-05 DIAGNOSIS — Z98.84 BARIATRIC SURGERY STATUS: Primary | ICD-10-CM

## 2024-03-05 DIAGNOSIS — Z85.3 HISTORY OF MALIGNANT NEOPLASM OF BREAST: Primary | ICD-10-CM

## 2024-03-05 DIAGNOSIS — R23.2 HOT FLASHES: ICD-10-CM

## 2024-03-05 DIAGNOSIS — C50.919 MALIGNANT NEOPLASM OF FEMALE BREAST, UNSPECIFIED ESTROGEN RECEPTOR STATUS, UNSPECIFIED LATERALITY, UNSPECIFIED SITE OF BREAST (MULTI): ICD-10-CM

## 2024-03-05 RX ORDER — DULOXETIN HYDROCHLORIDE 60 MG/1
60 CAPSULE, DELAYED RELEASE ORAL DAILY
Qty: 30 CAPSULE | Refills: 0 | Status: SHIPPED | OUTPATIENT
Start: 2024-03-05 | End: 2024-05-09 | Stop reason: SDUPTHER

## 2024-03-05 RX ORDER — LORAZEPAM 1 MG/1
1 TABLET ORAL DAILY PRN
Qty: 30 TABLET | Refills: 0 | Status: SHIPPED | OUTPATIENT
Start: 2024-03-05 | End: 2024-05-02 | Stop reason: SDUPTHER

## 2024-03-05 NOTE — PROGRESS NOTES
Medical certification of illness was faxed to [facility] [phone].    MIGUEL Blood

## 2024-03-06 PROBLEM — Z98.84 BARIATRIC SURGERY STATUS: Status: ACTIVE | Noted: 2024-03-05

## 2024-03-08 ENCOUNTER — TELEPHONE (OUTPATIENT)
Dept: OBSTETRICS AND GYNECOLOGY | Facility: CLINIC | Age: 34
End: 2024-03-08

## 2024-03-08 ENCOUNTER — TELEMEDICINE (OUTPATIENT)
Dept: HEMATOLOGY/ONCOLOGY | Facility: HOSPITAL | Age: 34
End: 2024-03-08
Payer: COMMERCIAL

## 2024-03-08 DIAGNOSIS — F32.A DEPRESSION, UNSPECIFIED DEPRESSION TYPE: ICD-10-CM

## 2024-03-08 DIAGNOSIS — Z51.5 ENCOUNTER FOR PALLIATIVE CARE: ICD-10-CM

## 2024-03-08 DIAGNOSIS — Z85.3 HISTORY OF MALIGNANT NEOPLASM OF BREAST: Primary | ICD-10-CM

## 2024-03-08 DIAGNOSIS — F41.1 GENERALIZED ANXIETY DISORDER: ICD-10-CM

## 2024-03-08 DIAGNOSIS — Z91.89 AT RISK FOR INFERTILITY: ICD-10-CM

## 2024-03-08 DIAGNOSIS — F41.9 ANXIETY: ICD-10-CM

## 2024-03-08 DIAGNOSIS — C50.919 MALIGNANT NEOPLASM OF FEMALE BREAST, UNSPECIFIED ESTROGEN RECEPTOR STATUS, UNSPECIFIED LATERALITY, UNSPECIFIED SITE OF BREAST (MULTI): ICD-10-CM

## 2024-03-08 PROBLEM — N63.20 MASS OF LEFT BREAST: Status: ACTIVE | Noted: 2024-03-08

## 2024-03-08 PROCEDURE — 3008F BODY MASS INDEX DOCD: CPT | Performed by: NURSE PRACTITIONER

## 2024-03-08 PROCEDURE — 1036F TOBACCO NON-USER: CPT | Performed by: NURSE PRACTITIONER

## 2024-03-08 PROCEDURE — 99215 OFFICE O/P EST HI 40 MIN: CPT | Performed by: NURSE PRACTITIONER

## 2024-03-08 NOTE — TELEPHONE ENCOUNTER
Copied from CRM #[id number]. Topic: Information Request - Prescription Refill FAQ  >> Mar 8, 2024 10:36 AM Shital LOUISE wrote:  Fluconazole, Metronidazole  Pharmacy: [facility]  >> Mar 8, 2024 11:35 AM Nesha HADDAD wrote:  Jessica morejon

## 2024-03-08 NOTE — PROGRESS NOTES
Patient ID: Nia Harper is a 33 y.o. female.    Cancer History:          Breast         AJCC Edition: 8th (AJCC), Diagnosis Date: Jun 2023, IB, pT2 pN0 cM0 G3     Treatment Synopsis:    Treatment Synopsis:   -Enlarging right breast lumps.  -5/15/23: Dx MG/US demonstrated multiple masses: 1.1 cm (11:00, 8 cm FN); 1 cm (9:30, 5 cm FN); 1.2 cm (9:00, 6 cm FN); 0.7 cm (10:00, 6 cm FN); 0.4 cm (10:00, 5 cm FN). Four lymph nodes demonstrated suspicious cortical thickening.  -5/19/23: US-guided CNB of the 11:00 mass showed IDC grade 3; ER >95% LA 85% Her2-negative (1+ IHC). Biopsy of the 9:30 mass showed IDC grade 2-3; ER 80% LA 10% Her2-negative (1+ IHC). A right axillary LN was negative for carcinoma.  -6/30/23: Right mastectomy and SLNBx performed. Multipe foci of grade 3 carcinoma noted (3.1 cm, 2.2 cm, 1.8 cm), no LVI, margins negative, 0/3 LN’s involved IB, pT2 pN0 cM0 G3  Oncotype Dx RS 45 (33% 9-year recurrence risk; >15% chemo benefit).  RSClin: 59% recurrence risk with tamoxifen; 44% chemo benefit.  -8/24/23: Adjuvant TC (docetaxel, cyclophosphamide) initiated. First cycle complicated by admission for nausea/vomiting. Second cycle complicated by admission for angioedema, hives; thought to be secondary to docetaxel.   -10/16/23 Therapy changed to Cyclophosphamide/Methotrexate/5-Flouracil d/t reaction x 2 cycles   -11/09/23 C2/2 CMF -- last dose of systemic chemotherapy     Subjective    Patient returns today in follow up regarding issues unique to being a young person with cancer.    Overall she reports doing fair. Has bariatric surgery scheduled. Main concern is anxiety d/t stopping her medical marijuana edibles, this had been helping her mood. She does not smoke/vape or use ETOH.     Notes past week was particularly difficult, had a hard time getting any motivation to get out of bed. Struggling with taking care of herself, her partner, managing household, children, and dealing with her cancer. Notes stress,  worry, and depressed mood. Also with significant body image changes d/t hair loss and mastectomies.     At our last visit we started duloxetine for mood and hot flashes. She notes no effect on mood yet. Does note decreased frequency of hot flashes and improved sleep. She is also using PRN lorazepam for sleep/anxiety. Recently increased duloxetine to 60mg/nightly dose and is tolerating this well. Remains interested in therapy.     Still notes issues with sexual dysfunction, pain with intercourse, vaginal dryness, decreased interest and arousal. This has been difficult for her relationship to her partner. Is using topical vaginal moisturizer and silicone based lubricant with sex with some relief, has not yet tried topical estrogen suppository.    Social History: Lives in [place] with partner/father of her children Dinesh, 11 y.o. daughter Lambert, 5 y.o. Florecita starting , Gal 3 y.o. son  Previously worked as , currently stays at home with children/runs household  Denies tobacco, does smoke marijuana nightly, rare ETOH estimates 6x/year, no vaping or other recreational drug use.    Objective    BSA: There is no height or weight on file to calculate BSA.  There were no vitals taken for this visit.     Current Outpatient Medications   Medication Instructions    anastrozole (ARIMIDEX) 1 mg, oral, Daily, Swallow whole with a drink of water.    atorvastatin (LIPITOR) 40 mg, oral, Nightly    benzoyl peroxide 5 % external wash Topical, Daily, Lather onto skin folds, leave on 2 minutes, rinse with water    blood sugar diagnostic (OneTouch Verio test strips) strip 100 strips, subcutaneous, Daily    blood-glucose sensor (FreeStyle Cristina 3 Sensor) device 2 each, miscellaneous, Every 14 days    dulaglutide (Trulicity) 3 mg/0.5 mL pen injector Once weekly    DULoxetine (CYMBALTA) 60 mg, oral, Daily, Do not crush or chew. Take in the evening with food. Increase to 60mg daily dosing.    estradiol  "(ESTRACE) 2 g, vaginal, Daily, Use once a day for 2 weeks then as needed there after    ezetimibe (Zetia) 10 mg tablet oral    FreeStyle Cristina 2 Sensor kit CHANGE SENSOR EVERY 14 DAYS AS DIRECTED.    insulin lispro (HUMALOG BHUPINDER KWIKPEN) 40 Units, subcutaneous, 3 times daily PRN, inject up to 20 units before meals per sliding scale    Lantus Solostar U-100 Insulin 40 Units, subcutaneous, 2 times daily    LORazepam (Ativan) 1 mg tablet TAKE 1 TABLET BY MOUTH ONCE DAILY AS NEEDED FOR ANXIETY    metFORMIN XR (GLUCOPHAGE-XR) 1,000 mg, oral, 2 times daily with meals    omega-3 acid ethyl esters (LOVAZA) 2 g, oral, 2 times daily with meals    ondansetron (ZOFRAN) 8 mg, oral, 3 times daily    OneTouch Delica Plus Lancet 33 gauge misc TEST BLOOD GLUCOSE 3 TIMES/ DAY    OneTouch Verio Flex meter misc USE AS DIRECTED.    pen needle, diabetic (BD Ultra-Fine Mini Pen Needle) 31 gauge x 3/16\" needle 200 each, subcutaneous, 4 times daily before meals and nightly, Use 4 a day as directed    valACYclovir (Valtrex) 500 mg tablet 1 tablet, oral, 2 times daily     No visits with results within 1 Week(s) from this visit.   Latest known visit with results is:   Lab on 02/01/2024   Component Date Value Ref Range Status    Glucose 02/01/2024 387 (H)  74 - 99 mg/dL Final    Sodium 02/01/2024 135 (L)  136 - 145 mmol/L Final    Potassium 02/01/2024 4.5  3.5 - 5.3 mmol/L Final    Chloride 02/01/2024 99  98 - 107 mmol/L Final    Bicarbonate 02/01/2024 25  21 - 32 mmol/L Final    Anion Gap 02/01/2024 16  10 - 20 mmol/L Final    Urea Nitrogen 02/01/2024 13  6 - 23 mg/dL Final    Creatinine 02/01/2024 0.66  0.50 - 1.05 mg/dL Final    eGFR 02/01/2024 >90  >60 mL/min/1.73m*2 Final    Calculations of estimated GFR are performed using the 2021 CKD-EPI Study Refit equation without the race variable for the IDMS-Traceable creatinine methods.  https://jasn.joyjournals.org/content/early/2021/09/22/ASN.2258657059    Calcium 02/01/2024 9.6  8.6 - 10.3 " mg/dL Final    Albumin 02/01/2024 4.4  3.4 - 5.0 g/dL Final    Alkaline Phosphatase 02/01/2024 70  33 - 110 U/L Final    Total Protein 02/01/2024 7.7  6.4 - 8.2 g/dL Final    AST 02/01/2024 38  9 - 39 U/L Final    Bilirubin, Total 02/01/2024 0.3  0.0 - 1.2 mg/dL Final    ALT 02/01/2024 64 (H)  7 - 45 U/L Final    Patients treated with Sulfasalazine may generate falsely decreased results for ALT.    Extra Tube 02/01/2024 Hold for add-ons.   Final    Auto resulted.      Physical Exam  Constitutional:       General: She is not in acute distress.     Appearance: Normal appearance. She is normal weight. She is ill-appearing.   Neurological:      General: No focal deficit present.      Mental Status: She is alert and oriented to person, place, and time.   Psychiatric:         Mood and Affect: Mood normal.         Behavior: Behavior normal.         Thought Content: Thought content normal.         Judgment: Judgment normal.     Performance Status:  Symptomatic; fully ambulatory    Assessment/Plan   Nia Harper is a 33 y.o. AA F with a recent diagnosis of G3 IB adenocarcinoma of the breast, ER+. S/p surgery followed by adjuvant chemotherapy with TC x 2 cycles but with angioedema so switched to CMF x 2 additional cycles. Currently on anastrazole and goserelin for endocrine therapy.     #Psychosocial:  - Referral to psychology for 1:1 therapy support to help with emotional management and illness -- again provided patient with phone number to call today  - Increase duloxetine to 60mg at bedtime to help with mood  - Child life referral to help with children/parenting    #Hot Flashes: secondary to ovarian suppression and low circulating estrogen  - Improved with initiation of duloxetine, increased dose as above    #Sexual Dysfunction/Contraception: secondary to ovarian suppression and low circulating estrogen  - Provided patient with sexual concerns handout, encouraged open communication outside of intimacy to discuss patient  decreased interest/sexual dysfunction with partner  - Use replens daily topical vaginal moisturizer  - Use silicone based lubricant with sex  - Has topical estrogen suppositories/applicator but has yet to trial  - We discussed the seriousness of getting pregnant while receiving chemotherapy due to the harmful effects this could have on the  fetus, and on her cancer treatment given the decreased availability of medical  services.   - Options for contraception include intrauterine devices, estrogen containing contraceptives are contraindicated d/t ER+ status -- provided patient with comparison chart of contraception options  - Following with gynecology    #Practical Needs:  - Following with [employer] for financial assistance grants and support  - Has referral in place to [employer] for food insecurity    #Diet/Exercise/Healthy Lifestyle:  - Discussed research demonstrating consumption of a healthy, plant based diet not only decreases cancer risk, but also has been found to improve survival in cancer patients who partake in a healthy diet.   - We reviewed the American Cancer Society guidelines for a healthy diet including mostly plant based foods  with incorporation of plant/lean animal proteins and healthy fats such as the mediterranean diet.   - Patient provided with a copy of ACS guidelines for healthy diet  - Discussed research that demonstrates decreased cancer risk and improved survival in cancer patients who exercise and stay active throughout diagnosis and treatment.  - Encouraged patient to join our 12 week exercise training program for young adults with cancer  - Encouraged patient to continue to abstain from alcohol, tobacco, and recreational drugs    #Risk for infertility:  - Discussed the damaging effect cancer treatment can have on the ovaries.   - Discussed natural age related decline in fertility and menopause that occurs as a result of ovarian aging, and that cancer treatments can accelerate  that progression and diminish ovarian reserve.  - Individuals may experience varying degrees of short or long term ovarian dysfunction and amenorrhea, and that this is dependent upon type of cancer treatment, cumulative dose, and patients age.   - Loss of ovarian function may be permanent or temporary.  - Amenorrhea may be temporary or permanent -- temporary amenorrhea results from destruction of maturing follicles whereas permanent amenorrhea results from depletion of viable primordial follicles.  - Risk to fertility is INTERMEDIATE based on cancer treatment of Docetaxel/Cyclophosphamide  with cyclophosphamide being the primary  of infertility -- this is dose dependent  - Patient elected not to undergo fertility preservation up front and opted for ovarian suppression with goserelin  - Baseline AMH level drawn 08/11/2023 2.464  - Discussed plan for at least 18 months on oral endocrine therapy followed by a 3 month wash out before she should attempt to conceive     Follow up visit in 4-6 weeks     Cancer Staging   Malignant neoplasm of female breast (CMS/HCC)  Staging form: Breast, AJCC 8th Edition  - Pathologic stage from 6/30/2023: Stage IB (pT2(3), pN0(sn), cM0, G3, ER+, TX+, HER2-, Oncotype DX score: 45) - Signed by Jeramie Lancaster MD on 10/10/2023      Oncology History   Malignant neoplasm of female breast (CMS/HCC)   6/30/2023 Cancer Staged    Staging form: Breast, AJCC 8th Edition, Pathologic stage from 6/30/2023: Stage IB (pT2(3), pN0(sn), cM0, G3, ER+, TX+, HER2-, Oncotype DX score: 45) - Signed by Jeramie Lancaster MD on 10/10/2023     8/24/2023 - 9/15/2023 Chemotherapy    TC (DOCEtaxel / Cyclophosphamide), 21 Day Cycles     9/8/2023 Initial Diagnosis    Malignant neoplasm of female breast (CMS/HCC)     10/16/2023 - 11/9/2023 Chemotherapy    CMF (Cyclophosphamide / Methotrexate / Fluorouracil) 21 Day Cycles                        RONEL Pemberton-CNP

## 2024-03-08 NOTE — PROGRESS NOTES
[facility]  Nia Harper female   1990 33 y.o.   [id number]      Chief Complaint  Follow up, right breast sebaceous cyst, history of right breast cancer.     History Of Present Illness  Nia Harper is a very pleasant 33 y.o. female diagnosed on 2023 with right breast multicentric breast cancer, invasive ductal carcinoma (IDC), grade 2-3, ER/CA+, HER2-. On 2023 she underwent a right mastectomy and sentinel lymph node biopsy (0/3). Oncotype 45. She completed adjuvant chemotherapy on 2023. She was started on ovarian suppression and Anastrozole 1mg. She has family history of breast cancer, see below. She presents today for an infected sebaceous cyst under the left breast for one week. It now has purulent drainage. This happens frequently and she would like this removed. She has a personal history of hidradenitis. She will be having bariatric surgery at the end of the month.     BREAST IMAGIN2024 Left breast ultrasound, indicates BI-RADS Category 2.     REPRODUCTIVE HISTORY:  menarche age 10, , first birth age 20,  x 2 months, no OCP's, premenopausal on ovarian suppression therapy, no HRT, scattered fibroglandular tissue     FAMILY CANCER HISTORY:   Maternal Cousin (1): Breast cancer, 30s alive in her 40s  Maternal Cousin (2): Breast cancer, 40s  in her early 40s  Maternal Cousin (3): BRCA negative (no cancer diagnosed)  Maternal grandmother: unknown cancer ()  Maternal grandfather: unknown cancer ()  Paternal grandmother: unknown cancer ()      Review of Systems  Constitutional:  Negative for appetite change, fatigue, fever and unexpected weight change.   HENT:  Negative for ear pain, hearing loss, nosebleeds, sore throat and trouble swallowing.    Eyes:  Negative for discharge, itching and visual disturbance.   Breast: As stated in HPI.  Respiratory:  Negative for cough, chest tightness and shortness of breath.    Cardiovascular:   Negative for chest pain, palpitations and leg swelling.   Gastrointestinal:  Negative for abdominal pain, constipation, diarrhea and nausea.   Endocrine: Negative for cold intolerance and heat intolerance.   Genitourinary:  Negative for dysuria, frequency, hematuria, pelvic pain and vaginal bleeding.   Musculoskeletal:  Negative for arthralgias, back pain, gait problem, joint swelling and myalgias.   Skin:  Negative for color change and rash.   Allergic/Immunologic: Negative for environmental allergies and food allergies.   Neurological:  Negative for dizziness, tremors, speech difficulty, weakness, numbness and headaches.   Hematological:  Does not bruise/bleed easily.   Psychiatric/Behavioral:  Negative for agitation, dysphoric mood and sleep disturbance. The patient is not nervous/anxious.      Past Medical History  She has a past medical history of Abnormal levels of other serum enzymes (2021), Anxiety disorder, unspecified (2019), Body mass index (BMI)40.0-44.9, adult (2019), Breast cancer (CMS/[facility]), Carpal tunnel syndrome, bilateral upper limbs (2019), Depression, unspecified (2021), Glycosuria (2014), antineoplastic chemo, Irregular menstruation, unspecified (2015), Mixed hyperlipidemia (2018), Morbid (severe) obesity due to excess calories (CMS/[facility]) (2019), Myopia, bilateral (09/15/2017), and Type 2 diabetes mellitus without complications (CMS/[facility]) (2022).    Surgical History  She has a past surgical history that includes  section, classic (2019); Other surgical history (2019); MR angio head wo IV contrast (2015); and Mastectomy (Right).    Family History  Cancer-related family history includes Breast cancer in her cousin, father's sister, and paternal cousin; Cancer in her father's brother, father's sister, maternal grandfather, maternal grandmother, and paternal grandmother.     Social History  She reports that she has  never smoked. She has never been exposed to tobacco smoke. She has never used smokeless tobacco. She reports current drug use. Drug: Marijuana. She reports that she does not drink alcohol.    Allergies  Metformin, Cephalexin, and Sulfamethoxazole-trimethoprim    Medications  Current Outpatient Medications   Medication Instructions    anastrozole (ARIMIDEX) 1 mg, oral, Daily, Swallow whole with a drink of water.    atorvastatin (LIPITOR) 40 mg, oral, Nightly    benzoyl peroxide 5 % external wash Topical, Daily, Lather onto skin folds, leave on 2 minutes, rinse with water    blood sugar diagnostic (OneTouch Verio test strips) strip 100 strips, subcutaneous, Daily    blood-glucose sensor (FreeStyle Cristina 3 Sensor) device 2 each, miscellaneous, Every 14 days    clindamycin (CLEOCIN) 300 mg, oral, 3 times daily    dulaglutide (Trulicity) 3 mg/0.5 mL pen injector Once weekly    DULoxetine (CYMBALTA) 60 mg, oral, Daily, Do not crush or chew. Take in the evening with food. Increase to 60mg daily dosing.    estradiol (ESTRACE) 2 g, vaginal, Daily, Use once a day for 2 weeks then as needed there after    ezetimibe (Zetia) 10 mg tablet oral    fluconazole (DIFLUCAN) 150 mg, oral, Once    FreeStyle Cristina 2 Sensor kit CHANGE SENSOR EVERY 14 DAYS AS DIRECTED.    insulin lispro (HUMALOG BHUPINDER KWIKPEN) 40 Units, subcutaneous, 3 times daily PRN, inject up to 20 units before meals per sliding scale    Lantus Solostar U-100 Insulin 40 Units, subcutaneous, 2 times daily    LORazepam (Ativan) 1 mg tablet TAKE 1 TABLET BY MOUTH ONCE DAILY AS NEEDED FOR ANXIETY    metFORMIN XR (GLUCOPHAGE-XR) 1,000 mg, oral, 2 times daily with meals    metroNIDAZOLE (FLAGYL) 500 mg, oral, Every 12 hours    omega-3 acid ethyl esters (LOVAZA) 2 g, oral, 2 times daily with meals    ondansetron (ZOFRAN) 8 mg, oral, 3 times daily    OneTouch Delica Plus Lancet 33 gauge misc TEST BLOOD GLUCOSE 3 TIMES/ DAY    OneTouch Verio Flex meter misc USE AS DIRECTED.     "pen needle, diabetic (BD Ultra-Fine Mini Pen Needle) 31 gauge x 3/16\" needle 200 each, subcutaneous, 4 times daily before meals and nightly, Use 4 a day as directed    valACYclovir (Valtrex) 500 mg tablet 1 tablet, oral, 2 times daily       Last Recorded Vitals  Vitals:    03/11/24 0837   BP: (!) 156/91   Pulse: 102   Temp: 36.1 °C (97 °F)          Physical Exam  Chest:         Patient is alert and oriented x3 and in a relaxed and appropriate mood. Her gait is steady and hand grasps are equal. Sclera is clear. The right breast has been removed with well-healed mastectomy incision. Overlying tissue soft without palpable abnormalities, discrete nodules or masses. The left breast 5:00, 8 cm from the nipple is a 1.0 cm erythematic mass, soft, and round draining purulent drainage. The left nipple appears normal. There is no cervical, supraclavicular or axillary lymphadenopathy. Heart rate and rhythm normal, S1 and S2 appreciated. The lungs are clear to auscultation bilaterally. Abdomen is soft and non-tender.     Relevant Results and Imaging  Study Result    Narrative & Impression   Interpreted By:  Jessica Felton and Tavana Shahrzad   STUDY:  BI US BREAST LIMITED LEFT;  1/29/2024 10:31 am      ACCESSION NUMBER(S):  [id number]      ORDERING CLINICIAN:  KEYANA RIVERO      INDICATION:  History of hidradenitis. Palpable abnormality in the left breast for  3-4 years which has become larger and painful since the week prior.  Patient states now improving. History of stage IB hormone positive  right-sided breast cancer      COMPARISON:  Mammogram 01/04/2024      FINDINGS:  Targeted ultrasound of the left breast was performed using  elastography.      At 5 o'clock 8 cm from the nipple, corresponding to patient's  palpable area of concern, a 3.3 x 1.7 x 1.1 cm heterogeneous  collection is noted with internal echoes, extending to the skin  surface. There is associated predominantly peripheral vascularity.  This collection " demonstrates posterior enhancement and is  predominantly soft on elastography with areas of intermediate to hard  elasticity along the margins. There is thickening of the overlying  skin.      IMPRESSION:  Heterogeneous collection corresponding to patient's palpable area of  concern is most consistent with an abscess. Clinical follow-up until  resolution is recommended. If the area does not resolve then a  follow-up ultrasound is recommended.      BI-RADS CATEGORY:      BI-RADS Category:  2 Benign.  Recommendation:  Clinical follow-up.  Recommended Date:  N/A.  Laterality:  Left.      For any future breast imaging appointments, please call [phone] ([phone]).      I personally reviewed the images/study and I agree with the findings  as stated by Dr. Shannan Riggs. The study was interpreted at  [place], [place].      MACRO:  None      Signed by: Jessica Felton 1/29/2024 11:21 AM  Dictation workstation:   [id number]       Orders  Orders Placed This Encounter   Procedures    Tissue/Wound Culture/Smear     Left inframammary infected sebaceous cyst     Standing Status:   Future     Number of Occurrences:   1     Standing Expiration Date:   3/18/2024     Order Specific Question:   [place] [facility]     Answer:   Immediate       Visit Diagnosis  1. Infected sebaceous cyst  Tissue/Wound Culture/Smear    Tissue/Wound Culture/Smear    clindamycin (Cleocin) 300 mg capsule    fluconazole (Diflucan) 150 mg tablet    metroNIDAZOLE (Flagyl) 500 mg tablet      2. Encounter for follow-up surveillance of breast cancer        3. Hidradenitis suppurativa        4. Mass of left breast, unspecified quadrant        5. Malignant neoplasm of female breast, unspecified estrogen receptor status, unspecified laterality, unspecified site of breast (CMS/[facility])  Referral to [facility]      6. Food insecurity  Referral to [facility]          Assessment/Plan  Breast cancer  surveillance, left breast infected sebaceous cyst, history right breast cancer, s/p right mastectomy, completed adjuvant chemotherapy, on Anastrozole and ovarian suppression therapy, family history of breast cancer, scattered fibroglandular tissue    Plan:  Cultured specimen of infected sebaceous cyst, clindamycin prescribed for infection, Diflucan ordered due to patient susceptibility to yeast infections induced by antibiotic use, flagyl sent prophylactically if patient developed bacterial vaginosis after antibiotic use. Call the office for worsening redness, pain, drainage, or fever. Follow up with all previous appointments as scheduled.    Patient Discussion/Summary  Complete antibiotics as prescribed, will call patient if culture is not sensitive to clindamycin. Call the office for worsening redness, pain, drainage, or fever. Follow up with all previous appointments as scheduled.    You can see your health information, review clinical summaries from office visits & test results online when you follow your health with MY  Chart, a personal health record. To sign up go to [email or web address]. If you need assistance with signing up or trouble getting into your account call [facility] Patient Line 24/7 at [phone].    My office phone number is [phone] if you need to get in touch with me or have additional questions or concerns. Thank you for choosing [facility] and trusting me as your healthcare provider. I look forward to seeing you again at your next office visit. I am honored to be a provider on your health care team and I remain dedicated to helping you achieve your health goals.    Emerita Durand, APRN-CNP

## 2024-03-11 ENCOUNTER — OFFICE VISIT (OUTPATIENT)
Dept: SURGICAL ONCOLOGY | Facility: HOSPITAL | Age: 34
End: 2024-03-11
Payer: COMMERCIAL

## 2024-03-11 VITALS
WEIGHT: 284 LBS | DIASTOLIC BLOOD PRESSURE: 91 MMHG | SYSTOLIC BLOOD PRESSURE: 156 MMHG | BODY MASS INDEX: 40.75 KG/M2 | TEMPERATURE: 97 F | HEART RATE: 102 BPM

## 2024-03-11 DIAGNOSIS — Z85.3 ENCOUNTER FOR FOLLOW-UP SURVEILLANCE OF BREAST CANCER: ICD-10-CM

## 2024-03-11 DIAGNOSIS — C50.919 MALIGNANT NEOPLASM OF FEMALE BREAST, UNSPECIFIED ESTROGEN RECEPTOR STATUS, UNSPECIFIED LATERALITY, UNSPECIFIED SITE OF BREAST (MULTI): ICD-10-CM

## 2024-03-11 DIAGNOSIS — Z08 ENCOUNTER FOR FOLLOW-UP SURVEILLANCE OF BREAST CANCER: ICD-10-CM

## 2024-03-11 DIAGNOSIS — N63.20 MASS OF LEFT BREAST, UNSPECIFIED QUADRANT: ICD-10-CM

## 2024-03-11 DIAGNOSIS — Z59.41 FOOD INSECURITY: ICD-10-CM

## 2024-03-11 DIAGNOSIS — L72.3 INFECTED SEBACEOUS CYST: Primary | ICD-10-CM

## 2024-03-11 DIAGNOSIS — L08.9 INFECTED SEBACEOUS CYST: Primary | ICD-10-CM

## 2024-03-11 DIAGNOSIS — L73.2 HIDRADENITIS SUPPURATIVA: ICD-10-CM

## 2024-03-11 PROCEDURE — 99213 OFFICE O/P EST LOW 20 MIN: CPT | Performed by: NURSE PRACTITIONER

## 2024-03-11 PROCEDURE — 3077F SYST BP >= 140 MM HG: CPT | Performed by: NURSE PRACTITIONER

## 2024-03-11 PROCEDURE — 1036F TOBACCO NON-USER: CPT | Performed by: NURSE PRACTITIONER

## 2024-03-11 PROCEDURE — 87070 CULTURE OTHR SPECIMN AEROBIC: CPT | Performed by: NURSE PRACTITIONER

## 2024-03-11 PROCEDURE — 3080F DIAST BP >= 90 MM HG: CPT | Performed by: NURSE PRACTITIONER

## 2024-03-11 PROCEDURE — 3008F BODY MASS INDEX DOCD: CPT | Performed by: NURSE PRACTITIONER

## 2024-03-11 RX ORDER — FLUCONAZOLE 150 MG/1
150 TABLET ORAL ONCE
Qty: 1 TABLET | Refills: 0 | Status: SHIPPED | OUTPATIENT
Start: 2024-03-11 | End: 2024-03-11

## 2024-03-11 RX ORDER — METRONIDAZOLE 500 MG/1
500 TABLET ORAL EVERY 12 HOURS
Qty: 14 TABLET | Refills: 0 | Status: SHIPPED | OUTPATIENT
Start: 2024-03-11 | End: 2024-03-26 | Stop reason: HOSPADM

## 2024-03-11 RX ORDER — CLINDAMYCIN HYDROCHLORIDE 300 MG/1
300 CAPSULE ORAL 3 TIMES DAILY
Qty: 21 CAPSULE | Refills: 0 | Status: SHIPPED | OUTPATIENT
Start: 2024-03-11 | End: 2024-03-26 | Stop reason: HOSPADM

## 2024-03-11 SDOH — ECONOMIC STABILITY - FOOD INSECURITY: FOOD INSECURITY: Z59.41

## 2024-03-11 ASSESSMENT — PAIN SCALES - GENERAL: PAINLEVEL: 6

## 2024-03-11 NOTE — PATIENT INSTRUCTIONS
Complete antibiotics as prescribed, will call patient if culture is not sensitive to clindamycin. Call the office for worsening redness, pain, drainage, or fever. Follow up with all previous appointments as scheduled.    You can see your health information, review clinical summaries from office visits & test results online when you follow your health with MY  Chart, a personal health record. To sign up go to [email or web address]. If you need assistance with signing up or trouble getting into your account call [facility] Patient Line 24/7 at [phone].    My office phone number is [phone] if you need to get in touch with me or have additional questions or concerns. Thank you for choosing [facility] and trusting me as your healthcare provider. I look forward to seeing you again at your next office visit. I am honored to be a provider on your health care team and I remain dedicated to helping you achieve your health goals.

## 2024-03-13 ENCOUNTER — TELEPHONE (OUTPATIENT)
Dept: SURGICAL ONCOLOGY | Facility: HOSPITAL | Age: 34
End: 2024-03-13

## 2024-03-13 ENCOUNTER — PRE-ADMISSION TESTING (OUTPATIENT)
Dept: PREADMISSION TESTING | Facility: HOSPITAL | Age: 34
End: 2024-03-13
Payer: COMMERCIAL

## 2024-03-13 ENCOUNTER — HOSPITAL ENCOUNTER (OUTPATIENT)
Dept: CARDIOLOGY | Facility: HOSPITAL | Age: 34
Discharge: HOME | End: 2024-03-13
Payer: COMMERCIAL

## 2024-03-13 VITALS
HEART RATE: 86 BPM | DIASTOLIC BLOOD PRESSURE: 81 MMHG | SYSTOLIC BLOOD PRESSURE: 136 MMHG | RESPIRATION RATE: 20 BRPM | HEIGHT: 70 IN | BODY MASS INDEX: 40.23 KG/M2 | TEMPERATURE: 97.2 F | WEIGHT: 281 LBS

## 2024-03-13 DIAGNOSIS — Z98.84 BARIATRIC SURGERY STATUS: ICD-10-CM

## 2024-03-13 DIAGNOSIS — E11.9 DIABETES MELLITUS TYPE 2 WITHOUT RETINOPATHY (MULTI): Primary | ICD-10-CM

## 2024-03-13 LAB
ABO GROUP (TYPE) IN BLOOD: NORMAL
ALBUMIN SERPL BCP-MCNC: 4.8 G/DL (ref 3.4–5)
ALP SERPL-CCNC: 70 U/L (ref 33–110)
ALT SERPL W P-5'-P-CCNC: 84 U/L (ref 7–45)
ANION GAP SERPL CALC-SCNC: 16 MMOL/L (ref 10–20)
ANTIBODY SCREEN: NORMAL
APPEARANCE UR: CLEAR
AST SERPL W P-5'-P-CCNC: 47 U/L (ref 9–39)
BACTERIA #/AREA URNS AUTO: ABNORMAL /HPF
BACTERIA SPEC CULT: ABNORMAL
BILIRUB SERPL-MCNC: 0.5 MG/DL (ref 0–1.2)
BILIRUB UR STRIP.AUTO-MCNC: NEGATIVE MG/DL
BUN SERPL-MCNC: 16 MG/DL (ref 6–23)
CALCIUM SERPL-MCNC: 9.9 MG/DL (ref 8.6–10.6)
CAOX CRY #/AREA UR COMP ASSIST: ABNORMAL /HPF
CHLORIDE SERPL-SCNC: 100 MMOL/L (ref 98–107)
CO2 SERPL-SCNC: 28 MMOL/L (ref 21–32)
COLOR UR: YELLOW
CREAT SERPL-MCNC: 0.64 MG/DL (ref 0.5–1.05)
EGFRCR SERPLBLD CKD-EPI 2021: >90 ML/MIN/1.73M*2
ERYTHROCYTE [DISTWIDTH] IN BLOOD BY AUTOMATED COUNT: 12.8 % (ref 11.5–14.5)
EST. AVERAGE GLUCOSE BLD GHB EST-MCNC: 226 MG/DL
GLUCOSE SERPL-MCNC: 159 MG/DL (ref 74–99)
GLUCOSE UR STRIP.AUTO-MCNC: NORMAL MG/DL
GRAM STN SPEC: ABNORMAL
GRAM STN SPEC: ABNORMAL
HBA1C MFR BLD: 9.5 %
HCT VFR BLD AUTO: 42.8 % (ref 36–46)
HGB BLD-MCNC: 14.3 G/DL (ref 12–16)
INR PPP: 1.1 (ref 0.9–1.1)
KETONES UR STRIP.AUTO-MCNC: ABNORMAL MG/DL
LEUKOCYTE ESTERASE UR QL STRIP.AUTO: NEGATIVE
MCH RBC QN AUTO: 28.5 PG (ref 26–34)
MCHC RBC AUTO-ENTMCNC: 33.4 G/DL (ref 32–36)
MCV RBC AUTO: 85 FL (ref 80–100)
MUCOUS THREADS #/AREA URNS AUTO: ABNORMAL /LPF
NITRITE UR QL STRIP.AUTO: NEGATIVE
NRBC BLD-RTO: 0 /100 WBCS (ref 0–0)
PH UR STRIP.AUTO: 6 [PH]
PLATELET # BLD AUTO: 254 X10*3/UL (ref 150–450)
POTASSIUM SERPL-SCNC: 4.5 MMOL/L (ref 3.5–5.3)
PROT SERPL-MCNC: 8 G/DL (ref 6.4–8.2)
PROT UR STRIP.AUTO-MCNC: ABNORMAL MG/DL
PROTHROMBIN TIME: 12.6 SECONDS (ref 9.8–12.8)
RBC # BLD AUTO: 5.01 X10*6/UL (ref 4–5.2)
RBC # UR STRIP.AUTO: NEGATIVE /UL
RBC #/AREA URNS AUTO: ABNORMAL /HPF
RH FACTOR (ANTIGEN D): NORMAL
SODIUM SERPL-SCNC: 139 MMOL/L (ref 136–145)
SP GR UR STRIP.AUTO: 1.03
SQUAMOUS #/AREA URNS AUTO: ABNORMAL /HPF
UROBILINOGEN UR STRIP.AUTO-MCNC: NORMAL MG/DL
WBC # BLD AUTO: 5 X10*3/UL (ref 4.4–11.3)
WBC #/AREA URNS AUTO: ABNORMAL /HPF

## 2024-03-13 PROCEDURE — 80323 ALKALOIDS NOS: CPT

## 2024-03-13 PROCEDURE — 86901 BLOOD TYPING SEROLOGIC RH(D): CPT

## 2024-03-13 PROCEDURE — 80053 COMPREHEN METABOLIC PANEL: CPT

## 2024-03-13 PROCEDURE — 99205 OFFICE O/P NEW HI 60 MIN: CPT | Performed by: NURSE PRACTITIONER

## 2024-03-13 PROCEDURE — 93010 ELECTROCARDIOGRAM REPORT: CPT | Performed by: INTERNAL MEDICINE

## 2024-03-13 PROCEDURE — 83036 HEMOGLOBIN GLYCOSYLATED A1C: CPT

## 2024-03-13 PROCEDURE — 85610 PROTHROMBIN TIME: CPT

## 2024-03-13 PROCEDURE — 85027 COMPLETE CBC AUTOMATED: CPT

## 2024-03-13 PROCEDURE — 36415 COLL VENOUS BLD VENIPUNCTURE: CPT

## 2024-03-13 PROCEDURE — 81001 URINALYSIS AUTO W/SCOPE: CPT

## 2024-03-13 PROCEDURE — 93005 ELECTROCARDIOGRAM TRACING: CPT

## 2024-03-13 ASSESSMENT — DUKE ACTIVITY SCORE INDEX (DASI)
CAN YOU DO LIGHT WORK AROUND THE HOUSE LIKE DUSTING OR WASHING DISHES: YES
TOTAL_SCORE: 58.2
CAN YOU HAVE SEXUAL RELATIONS: YES
CAN YOU WALK INDOORS, SUCH AS AROUND YOUR HOUSE: YES
CAN YOU PARTICIPATE IN STRENOUS SPORTS LIKE SWIMMING, SINGLES TENNIS, FOOTBALL, BASKETBALL, OR SKIING: YES
CAN YOU TAKE CARE OF YOURSELF (EAT, DRESS, BATHE, OR USE TOILET): YES
CAN YOU PARTICIPATE IN MODERATE RECREATIONAL ACTIVITIES LIKE GOLF, BOWLING, DANCING, DOUBLES TENNIS OR THROWING A BASEBALL OR FOOTBALL: YES
DASI METS SCORE: 9.9
CAN YOU CLIMB A FLIGHT OF STAIRS OR WALK UP A HILL: YES
CAN YOU WALK A BLOCK OR TWO ON LEVEL GROUND: YES
CAN YOU RUN A SHORT DISTANCE: YES
CAN YOU DO MODERATE WORK AROUND THE HOUSE LIKE VACUUMING, SWEEPING FLOORS OR CARRYING GROCERIES: YES
CAN YOU DO HEAVY WORK AROUND THE HOUSE LIKE SCRUBBING FLOORS OR LIFTING AND MOVING HEAVY FURNITURE: YES
CAN YOU DO YARD WORK LIKE RAKING LEAVES, WEEDING OR PUSHING A MOWER: YES

## 2024-03-13 ASSESSMENT — CHADS2 SCORE
PRIOR STROKE OR TIA OR THROMBOEMBOLISM: NO
CHF: NO
CHADS2 SCORE: 1
DIABETES: YES
HYPERTENSION: NO
AGE GREATER THAN OR EQUAL TO 75: NO

## 2024-03-13 ASSESSMENT — ENCOUNTER SYMPTOMS
CONSTITUTIONAL NEGATIVE: 1
NEUROLOGICAL NEGATIVE: 1
RESPIRATORY NEGATIVE: 1
MUSCULOSKELETAL NEGATIVE: 1
ENDOCRINE NEGATIVE: 1
GASTROINTESTINAL NEGATIVE: 1
NECK NEGATIVE: 1
CARDIOVASCULAR NEGATIVE: 1

## 2024-03-13 ASSESSMENT — LIFESTYLE VARIABLES: SMOKING_STATUS: NONSMOKER

## 2024-03-13 NOTE — PREPROCEDURE INSTRUCTIONS
NPO Instructions:    Do not eat any food after midnight the night before your surgery/procedure.  You may have clear liquids until TWO hours before surgery/procedure. This includes water, black tea/coffee, (no milk or cream) apple juice and electrolyte drinks (Gatorade).  You may chew gum up to TWO hours before your surgery/procedure.    Additional Instructions:     Seven/Six Days before Surgery:  Review your medication instructions, stop indicated medications  Five Days before Surgery:  Review your medication instructions, stop indicated medications  Three Days before Surgery:  Review your medication instructions, stop indicated medications  The Day before Surgery:  Review your medication instructions, stop indicated medications  You will be contacted regarding the time of your arrival to facility and surgery time  Do not eat any food after Midnight  Day of Surgery:  Review your medication instructions, take indicated medications  If you have diabetes, please check your fasting blood sugar upon awakening.  If fasting blood sugar is <80 mg/dl, drink 100 ml of apple juice, time limit of 2 hours before  Wear  comfortable loose fitting clothing  Do not use moisturizers, creams, lotions or perfume  All jewelry and valuables should be left at home

## 2024-03-13 NOTE — CPM/PAT H&P
CPM/PAT Evaluation       Name: Nia Harper (Nia Harper)  /Age: 1990/33 y.o.     Visit Type:   In-Person       Chief Complaint: Bariatric surgery status     HPI  Patient is a 33-year-old female with a past medical history significant for hyperlipidemia, diabetes type 2, anxiety, depression, breast cancer s/p chemotherapy, s/p right mastectomy, and morbid obesity.  Patient is being evaluated in [facility] in anticipation of a laparoscopic sleeve gastrectomy with Dr. Ervin on 3-.  Past Medical History:   Diagnosis Date    Abnormal levels of other serum enzymes 2021    Elevated liver enzymes    Anxiety     Anxiety disorder, unspecified 2019    Anxiety and depression    Body mass index (BMI)40.0-44.9, adult 2019    BMI 40.0-44.9, adult    Breast cancer (CMS/HCC)     Carpal tunnel syndrome, bilateral upper limbs 2019    Carpal tunnel syndrome on both sides    Depression, unspecified 2021    Depression    Elevated liver enzymes     Glycosuria 2014    Glucosuria    Hx antineoplastic chemo     Irregular menstruation, unspecified 2015    Missed periods    Mixed hyperlipidemia 2018    Hyperlipemia, mixed    Morbid (severe) obesity due to excess calories (CMS/HCC) 2019    Severe obesity (BMI >= 40)    Myopia, bilateral 09/15/2017    Myopia of both eyes with astigmatism    Type 2 diabetes mellitus without complications (CMS/HCC) 2022    Diabetes    Vision loss     glasses       Past Surgical History:   Procedure Laterality Date     SECTION, CLASSIC  2019     Section    MASTECTOMY Right     MR HEAD ANGIO WO IV CONTRAST  2015    MR HEAD ANGIO WO IV CONTRAST 2015 [facility] ANCILLARY LEGACY    OTHER SURGICAL HISTORY  2019    Surgical Treatment Of Missed  In First Trimester       Patient Sexual activity questions deferred to the physician.    Family History   Problem Relation Name Age of Onset    Heart disease Father       "Cancer Maternal Grandmother      Diabetes type II Maternal Grandmother      Cancer Maternal Grandfather      Cancer Paternal Grandmother      Diabetes type II Mother's Sister          uncontrolled    Breast cancer Father's Sister          two paternal 1st cousins (through paternal uncle, both sisters, both early onset, one ). two paternal great aunts (through PGF's side, both  in their 50s/60s)    Cancer Father's Sister      Cancer Father's Brother      Breast cancer Paternal Cousin          two paternal 1st cousins (through paternal uncle, both sisters, both early onset, one ). two paternal great aunts (through PGF's side, both  in their 50s/60s)    Breast cancer Cousin         Allergies   Allergen Reactions    Metformin Swelling    Cephalexin Hives, Other, Rash and Swelling     \"welts\"    Sulfamethoxazole-Trimethoprim Hives, Rash and Swelling       Prior to Admission medications    Medication Sig Start Date End Date Taking? Authorizing Provider   anastrozole (Arimidex) 1 mg tablet Take 1 tablet (1 mg total) by mouth once daily.  Swallow whole with a drink of water. 24   Petrona Bermeo PA-C   atorvastatin (Lipitor) 40 mg tablet Take 1 tablet (40 mg) by mouth once daily at bedtime. 23  Yasmin Clements MD   benzoyl peroxide 5 % external wash Apply topically once daily. Lather onto skin folds, leave on 2 minutes, rinse with water 10/11/23 10/10/24  Lauren Mcgee MD   blood sugar diagnostic (OneTouch Verio test strips) strip Inject 100 strips under the skin once daily. 23   Yasmin Clements MD   blood-glucose sensor (FreeStyle Cristina 3 Sensor) device 2 each every 14 (fourteen) days. 23   Yasmin Clements MD   clindamycin (Cleocin) 300 mg capsule Take 1 capsule (300 mg) by mouth 3 times a day for 7 days. 3/11/24 3/18/24  RONEL Cheung-CNP   dulaglutide (Trulicity) 3 mg/0.5 mL pen injector Once weekly 23   Yasmin Clements MD "   DULoxetine (Cymbalta) 60 mg DR capsule Take 1 capsule (60 mg) by mouth once daily. Do not crush or chew. Take in the evening with food. Increase to 60mg daily dosing. 3/5/24 4/4/24  ADALI Pemberton   estradiol (Estrace) 0.01 % (0.1 mg/gram) vaginal cream Insert 0.5 Applicatorfuls (2 g) into the vagina once daily. Use once a day for 2 weeks then as needed there after 2/29/24   Petrona Bermeo PA-C   ezetimibe (Zetia) 10 mg tablet Take by mouth. 6/21/23   Historical Provider, MD   fluconazole (Diflucan) 150 mg tablet Take 1 tablet (150 mg) by mouth 1 time for 1 dose. 3/11/24 3/11/24  ADALI Cheung   FreeStyle Crsitina 2 Sensor kit CHANGE SENSOR EVERY 14 DAYS AS DIRECTED. 11/24/23   Historical Provider, MD   insulin glargine (Lantus Solostar U-100 Insulin) 100 unit/mL (3 mL) pen Inject 40 Units under the skin 2 times a day. 2/13/24   Yasmin Clements MD   insulin lispro (HumaLOG Aurelio Kwikpen) 100 unit/mL injection Inject 40 Units under the skin 3 times a day as needed for high blood sugar. inject up to 20 units before meals per sliding scale 11/29/23   Yasmin Clements MD   LORazepam (Ativan) 1 mg tablet TAKE 1 TABLET BY MOUTH ONCE DAILY AS NEEDED FOR ANXIETY 3/5/24 4/4/24  Petrona Bermeo PA-C   metFORMIN  mg 24 hr tablet Take 2 tablets (1,000 mg) by mouth 2 times a day with meals. 11/29/23   Yasmin Clements MD   metroNIDAZOLE (Flagyl) 500 mg tablet Take 1 tablet (500 mg) by mouth every 12 hours for 7 days. 3/11/24 3/18/24  ADALI Cheung   omega-3 acid ethyl esters (Lovaza) 1 gram capsule Take 2 capsules (2 g) by mouth 2 times a day with meals. 11/29/23 1/28/24  Yasmin Clements MD   ondansetron (Zofran) 8 mg tablet Take 1 tablet (8 mg) by mouth 3 times a day.    Historical Provider, MD   OneTouch Delica Plus Lancet 33 gauge misc TEST BLOOD GLUCOSE 3 TIMES/ DAY 8/14/23   Historical Provider, MD   OneTouch Verio Flex meter misc USE AS DIRECTED. 6/21/23    "Historical Provider, MD   pen needle, diabetic (BD Ultra-Fine Mini Pen Needle) 31 gauge x 3/16\" needle Inject 200 each under the skin 4 times a day before meals. Use 4 a day as directed 11/29/23   Yasmin Clements MD   valACYclovir (Valtrex) 500 mg tablet Take 1 tablet (500 mg) by mouth 2 times a day. 6/16/22   Historical Provider, MD RUTLEDGE ROS:   Constitutional:   neg    Neuro/Psych:   neg    Eyes:    use of corrective lenses  Ears:   neg    Nose:   neg    Mouth:   neg    Throat:   neg    Neck:   neg    Cardio:   neg    Respiratory:   neg    Endocrine:   neg    GI:   neg    :   neg    Musculoskeletal:   neg    Hematologic:   neg    Skin:  neg        Physical Exam  Constitutional:       Appearance: She is obese.   HENT:      Head: Normocephalic.      Nose: Nose normal.      Mouth/Throat:      Mouth: Mucous membranes are moist.   Eyes:      Pupils: Pupils are equal, round, and reactive to light.   Cardiovascular:      Rate and Rhythm: Normal rate and regular rhythm.      Pulses: Normal pulses.      Heart sounds: Normal heart sounds.   Pulmonary:      Effort: Pulmonary effort is normal.      Breath sounds: Normal breath sounds.   Abdominal:      Palpations: Abdomen is soft.   Musculoskeletal:         General: Normal range of motion.      Cervical back: Normal range of motion.   Skin:     General: Skin is warm.   Neurological:      General: No focal deficit present.      Mental Status: She is alert and oriented to person, place, and time.   Psychiatric:         Mood and Affect: Mood normal.         Behavior: Behavior normal.          PAT AIRWAY:   Airway:     Mallampati::  II    TM distance::  >3 FB    Neck ROM::  Full  normal        Visit Vitals  /81   Pulse 86   Temp 36.2 °C (97.2 °F)   Resp 20       DASI Risk Score      Flowsheet Row Most Recent Value   DASI SCORE 58.2   METS Score (Will be calculated only when all the questions are answered) 9.9          Caprini DVT Assessment      Flowsheet Row " Most Recent Value   DVT Score 9   Current Status Major surgery planned, lasting 2-3 hours   History Previous malignancy, Prior major surgery   Age Less than 40 years   BMI 31-40 (Obesity)          Modified Frailty Index      Flowsheet Row Most Recent Value   Modified Frailty Index Calculator .0909          CHADS2 Stroke Risk  Current as of 14 minutes ago        N/A 3 - 100%: High Risk   2 - 3%: Medium Risk   0 - 2%: Low Risk     Last Change: N/A          This score determines the patient's risk of having a stroke if the patient has atrial fibrillation.        This score is not applicable to this patient. Components are not calculated.          Revised Cardiac Risk Index      Flowsheet Row Most Recent Value   Revised Cardiac Risk Calculator 1          Apfel Simplified Score      Flowsheet Row Most Recent Value   Apfel Simplified Score Calculator 3          Risk Analysis Index Results This Encounter    No data found in the last 1 encounters.       Stop Bang Score      Flowsheet Row Most Recent Value   Do you snore loudly? 1   Do you often feel tired or fatigued after your sleep? 1   Has anyone ever observed you stop breathing in your sleep? 0   Do you have or are you being treated for high blood pressure? 0   Recent BMI (Calculated) 40.3   Is BMI greater than 35 kg/m2? 1=Yes   Age older than 50 years old? 0=No   Gender - Male 0=No            Assessment and Plan:         Anesthesia:  The patient denies problems with anesthesia in the past such as PONV, prolonged sedation, awareness, dental damage, aspiration, cardiac arrest, difficult intubation, or unexpected hospital admissions.     Neuro:   The patient has no neurological diagnoses or significant findings on chart review, clinical presentation, and evaluation. No grossly apparent perioperative risk. The patient is at increased risk for postoperative delirium secondary to depression.    HEENT/Airway  No diagnoses, significant findings on chart review, clinical  presentation, or evaluation.    Cardiovascular  The patient is scheduled for non-cardiac surgery associated with elevated risk.  The patient has no major cardiac contraindications to non- cardiac surgery.  RCRI  The patient meets 0-1 RCRI criteria and therefore has a less than 1% risk of major adverse cardiac complications.  METS  The patient's functional capacity capacity is greater than 4 METS.  EKG  The patient has no EKG or echocardiographic changes concerning for myocardial ischemia.   Heart Failure  The patient has no known history of heart failure.  Additionally, the patient reports no symptoms of heart failure and demonstrates no signs of heart failure.  Hypertension Evaluation  The patient has no known history of hypertension and has a normal blood pressure today.  Heart Rhythm Evaluation  The patient has no history of arrhythmias.  Heart Valve Evaluation  The patient has no known history of valvular heart disease. The patient has no symptoms or physical exam findings to suggest valvular heart disease.  CARDS EVAL  The patient is not followed by cardiology.    The patient has a 30-day risk for MACE of 1 predictor, 6.0% risk for cardiac death, nonfatal myocardial infarction, and nonfatal cardiac arrest.  ORIN score which indicates a 0.1% risk of intraoperative or 30-day postoperative.    Pulmonary   No significant findings on chart review or clinical presentation and evaluation. The patient is at increased risk of perioperative pulmonary complications secondary to upper abdominal surgery, morbid obesity.    The patient has a stop bang score of 3, which places patient at intermediate risk for having HELIO.    ARISCAT 23, low, 1.6% risk of in-hospital postoperative pulmonary complications  PRODIGY 0, low risk of respiratory depression episode. Patient given PI sheet for preoperative deep breathing exercises.    Hematology  The patient has diagnoses or significant findings on chart review or clinical presentation  and evaluation significant for Breast Cancer s/p mastectomy and chemotherapy.  Antiplatelet management   The patient is not currently receiving antiplatelet therapy.  Anticoagulation management  The patient is not currently receiving anticoagulation therapy.    Caprini score 9, high risk of perioperative VTE.   Patient instructed to ambulate as soon as possible postoperatively to decrease thromboembolic risk. Initiate mechanical DVT prophylaxis as soon as possible and initiate chemical prophylaxis when deemed safe from a bleeding standpoint post surgery.     Transfusion Evaluation  A type and screen was obtained given the likelihood for perioperative transfusion of blood or blood products.    Gastrointestinal  No diagnoses or significant findings on chart review or clinical presentation and evaluation.  Eat 10- 0,  self-perceived oropharyngeal dysphagia scale (0-40)     Genitourinary  No diagnoses or significant findings on chart review or clinical presentation and evaluation.    Renal  The patient has no known history of chronic kidney disease. The patient has specific risk factors associated with increased risk of perioperative renal complications due to diabetes mellitus..     Musculoskeletal  No diagnoses or significant findings on chart review or clinical presentation and evaluation.    Endocrine  Diabetes Evaluation  The patient has a history of diabetes mellitus that currently appears controlled with medication.  Thyroid Disease Evaluation  The patient has no history of thyroid disease.    ID  No diagnoses or significant findings on chart review or clinical presentation and evaluation.    -Preoperative medication instructions were provided and reviewed with the patient.  Any additional testing or evaluation was explained to the patient.  NPO Instructions were discussed, and the patient's questions were answered prior to conclusion of this encounter.   Recent Results (from the past 168 hour(s))   Tissue/Wound  Culture/Smear    Collection Time: 03/11/24  9:11 AM    Specimen: Skin/Superficial Abscess; Tissue/Biopsy   Result Value Ref Range    Tissue/Wound Culture/Smear (1+) Rare Mixed Skin Microorganisms     Gram Stain (4+) Abundant Polymorphonuclear leukocytes (A)     Gram Stain (2+) Few Gram positive cocci (A)    Urinalysis with Reflex Microscopic    Collection Time: 03/13/24 10:22 AM   Result Value Ref Range    Color, Urine Yellow Light-Yellow, Yellow, Dark-Yellow    Appearance, Urine Clear Clear    Specific Gravity, Urine 1.031 1.005 - 1.035    pH, Urine 6.0 5.0, 5.5, 6.0, 6.5, 7.0, 7.5, 8.0    Protein, Urine 20 (TRACE) NEGATIVE, 10 (TRACE), 20 (TRACE) mg/dL    Glucose, Urine Normal Normal mg/dL    Blood, Urine NEGATIVE NEGATIVE    Ketones, Urine 20 (1+) (A) NEGATIVE mg/dL    Bilirubin, Urine NEGATIVE NEGATIVE    Urobilinogen, Urine Normal Normal mg/dL    Nitrite, Urine NEGATIVE NEGATIVE    Leukocyte Esterase, Urine NEGATIVE NEGATIVE   Type And Screen    Collection Time: 03/13/24 10:22 AM   Result Value Ref Range    ABO TYPE O     Rh TYPE POS     ANTIBODY SCREEN NEG    Protime-INR    Collection Time: 03/13/24 10:22 AM   Result Value Ref Range    Protime 12.6 9.8 - 12.8 seconds    INR 1.1 0.9 - 1.1   Comprehensive Metabolic Panel    Collection Time: 03/13/24 10:22 AM   Result Value Ref Range    Glucose 159 (H) 74 - 99 mg/dL    Sodium 139 136 - 145 mmol/L    Potassium 4.5 3.5 - 5.3 mmol/L    Chloride 100 98 - 107 mmol/L    Bicarbonate 28 21 - 32 mmol/L    Anion Gap 16 10 - 20 mmol/L    Urea Nitrogen 16 6 - 23 mg/dL    Creatinine 0.64 0.50 - 1.05 mg/dL    eGFR >90 >60 mL/min/1.73m*2    Calcium 9.9 8.6 - 10.6 mg/dL    Albumin 4.8 3.4 - 5.0 g/dL    Alkaline Phosphatase 70 33 - 110 U/L    Total Protein 8.0 6.4 - 8.2 g/dL    AST 47 (H) 9 - 39 U/L    Bilirubin, Total 0.5 0.0 - 1.2 mg/dL    ALT 84 (H) 7 - 45 U/L   CBC    Collection Time: 03/13/24 10:22 AM   Result Value Ref Range    WBC 5.0 4.4 - 11.3 x10*3/uL    nRBC 0.0 0.0 -  0.0 /100 WBCs    RBC 5.01 4.00 - 5.20 x10*6/uL    Hemoglobin 14.3 12.0 - 16.0 g/dL    Hematocrit 42.8 36.0 - 46.0 %    MCV 85 80 - 100 fL    MCH 28.5 26.0 - 34.0 pg    MCHC 33.4 32.0 - 36.0 g/dL    RDW 12.8 11.5 - 14.5 %    Platelets 254 150 - 450 x10*3/uL   Hemoglobin A1C    Collection Time: 03/13/24 10:22 AM   Result Value Ref Range    Hemoglobin A1C 9.5 (H) see below %    Estimated Average Glucose 226 Not Established mg/dL   Microscopic Only, Urine    Collection Time: 03/13/24 10:22 AM   Result Value Ref Range    WBC, Urine 1-5 1-5, NONE /HPF    RBC, Urine NONE NONE, 1-2, 3-5 /HPF    Squamous Epithelial Cells, Urine 1-9 (SPARSE) Reference range not established. /HPF    Bacteria, Urine 1+ (A) NONE SEEN /HPF    Mucus, Urine 2+ Reference range not established. /LPF    Calcium Oxalate Crystals, Urine 1+ NONE, 1+ /HPF   ECG 12 Lead    Collection Time: 03/13/24  4:06 PM   Result Value Ref Range    Ventricular Rate 79 BPM    Atrial Rate 79 BPM    WI Interval 132 ms    QRS Duration 66 ms    QT Interval 330 ms    QTC Calculation(Bazett) 378 ms    P Axis 47 degrees    R Axis 50 degrees    T Axis 90 degrees    QRS Count 13 beats    Q Onset 227 ms    P Onset 161 ms    P Offset 205 ms    T Offset 392 ms    QTC Fredericia 361 ms

## 2024-03-14 DIAGNOSIS — Z98.84 BARIATRIC SURGERY STATUS: ICD-10-CM

## 2024-03-14 DIAGNOSIS — R11.0 NAUSEA: ICD-10-CM

## 2024-03-14 DIAGNOSIS — E66.01 MORBID OBESITY WITH BMI OF 40.0-44.9, ADULT (MULTI): ICD-10-CM

## 2024-03-14 DIAGNOSIS — G89.18 POST-OP PAIN: ICD-10-CM

## 2024-03-14 LAB
ATRIAL RATE: 79 BPM
P AXIS: 47 DEGREES
P OFFSET: 205 MS
P ONSET: 161 MS
PR INTERVAL: 132 MS
Q ONSET: 227 MS
QRS COUNT: 13 BEATS
QRS DURATION: 66 MS
QT INTERVAL: 330 MS
QTC CALCULATION(BAZETT): 378 MS
QTC FREDERICIA: 361 MS
R AXIS: 50 DEGREES
T AXIS: 90 DEGREES
T OFFSET: 392 MS
VENTRICULAR RATE: 79 BPM

## 2024-03-14 RX ORDER — OMEPRAZOLE 40 MG/1
40 CAPSULE, DELAYED RELEASE ORAL
Qty: 30 CAPSULE | Refills: 5 | Status: CANCELLED | OUTPATIENT
Start: 2024-03-14

## 2024-03-14 RX ORDER — ONDANSETRON 4 MG/1
4 TABLET, ORALLY DISINTEGRATING ORAL EVERY 6 HOURS PRN
Qty: 60 TABLET | Refills: 1 | Status: CANCELLED | OUTPATIENT
Start: 2024-03-14

## 2024-03-14 RX ORDER — OXYCODONE HCL 5 MG/5 ML
5 SOLUTION, ORAL ORAL EVERY 6 HOURS PRN
Qty: 140 ML | Refills: 0 | Status: CANCELLED | OUTPATIENT
Start: 2024-03-14 | End: 2024-03-21

## 2024-03-14 NOTE — PROGRESS NOTES
BARIATRIC SURGERY PREOPERATIVE VISIT    Date: 03/15/24  Time: [unfilled]    Name: Nia Harper    MRN: [id number]    This is a 33 y.o. y.o. female with morbid obesity (Body mass index is 40.18 kg/m².) who plans to undergo Laparoscopic sleeve gastrectomy  39377 surgery. They have completed a rigorous preoperative medical work-up and bariatric surgery educational program.     PMH:   Patient Active Problem List   Diagnosis    Astigmatism of both eyes    Breast skin changes    Radial styloid tenosynovitis of right hand    Depression    Diabetes mellitus type 2 without retinopathy (CMS/HCC)    Diffuse goiter    Elevation of levels of liver transaminase levels    Epidermal inclusion cyst    Herpes simplex virus (HSV) infection    History of malignant neoplasm of breast    Hyperlipemia    Hypertriglyceridemia    Myopia of both eyes    Nexplanon in place    Suspected sleep apnea    Uncontrolled type 2 diabetes mellitus with hyperglycemia (CMS/HCC)    Chronic headaches    BMI 40.0-44.9, adult (CMS/HCC)    Severe obesity (CMS/HCC)    Malignant neoplasm of female breast (CMS/HCC)    Primary hypertension    Angioedema    Anxiety disorder    History of bariatric surgery    Fatty liver    Gallstones    History of cannabis abuse    History of noncompliance with medical treatment    Insufficient sleep syndrome    Sleep disorder breathing    Status post mastectomy    Systolic murmur    Artificial menopause    Acute vaginitis    Encounter for follow-up surveillance of breast cancer    Hidradenitis suppurativa    Suppression of ovarian secretion    Bariatric surgery status    Mass of left breast    Infected sebaceous cyst       PSH:   Past Surgical History:   Procedure Laterality Date     SECTION, CLASSIC  2019     Section    MASTECTOMY Right     MR HEAD ANGIO WO IV CONTRAST  2015    [place] [facility] [place]    OTHER SURGICAL HISTORY  2019    Surgical Treatment Of  Missed  In First Trimester       Social hx:   Social History     Socioeconomic History    Marital status: Single     Spouse name: Not on file    Number of children: Not on file    Years of education: Not on file    Highest education level: Not on file   Occupational History    Not on file   Tobacco Use    Smoking status: Never     Passive exposure: Never    Smokeless tobacco: Never   Vaping Use    Vaping Use: Never used   Substance and Sexual Activity    Alcohol use: Never    Drug use: Not Currently     Types: Marijuana     Comment: gummies    Sexual activity: Defer   Other Topics Concern    Not on file   Social History Narrative    Not on file     Social Determinants of Health     Financial Resource Strain: Not on file   Food Insecurity: Not on file   Transportation Needs: Not on file   Physical Activity: Not on file   Stress: Not on file   Social Connections: Not on file   Intimate Partner Violence: Not on file   Housing Stability: Not on file       Initial weight: 294 pounds  Current weight:   Vitals:    03/15/24 1021   Weight: 127 kg (280 lb)       Preop Clearances:  Cardiac: Cleared  Pulmonary: Cleared  Psych: Cleared      Sleep Study:   Per evaluation by sleep medicine (2023):  # Sleep apnea, unspecified  - recent PSG showed no HELIO based on CMS definition  - she is fairly asymptomatic in terms of HELIO  - no obvious need for PAP therapy at this time  - no restriction to elective surgery from sleep apnea standpoint.    EGD (23):  Impression:     - Normal esophagus.                         - Erythematous mucosa in the gastric body and antrum.                          Biopsied.                         - Normal examined duodenum.    Preadmission testing date: 3/13/24    MEDICATIONS:  Prior to Admission Medications:    Current Outpatient Medications:     anastrozole (Arimidex) 1 mg tablet, Take 1 tablet (1 mg total) by mouth once daily.  Swallow whole with a drink of water., Disp: 90 tablet, Rfl: 3     valACYclovir (Valtrex) 500 mg tablet, Take 1 tablet (500 mg) by mouth 2 times a day., Disp: , Rfl:     atorvastatin (Lipitor) 40 mg tablet, Take 1 tablet (40 mg) by mouth once daily at bedtime., Disp: 90 tablet, Rfl: 3    benzoyl peroxide 5 % external wash, Apply topically once daily. Lather onto skin folds, leave on 2 minutes, rinse with water, Disp: 236 g, Rfl: 11    blood sugar diagnostic (OneTouch Verio test strips) strip, Inject 100 strips under the skin once daily., Disp: 100 strip, Rfl: 1    blood-glucose sensor (FreeStyle Cristina 3 Sensor) device, 2 each every 14 (fourteen) days., Disp: 2 each, Rfl: 11    clindamycin (Cleocin) 300 mg capsule, Take 1 capsule (300 mg) by mouth 3 times a day for 7 days., Disp: 21 capsule, Rfl: 0    dulaglutide (Trulicity) 3 mg/0.5 mL pen injector, Once weekly, Disp: 4 each, Rfl: 11    DULoxetine (Cymbalta) 60 mg DR capsule, Take 1 capsule (60 mg) by mouth once daily. Do not crush or chew. Take in the evening with food. Increase to 60mg daily dosing., Disp: 30 capsule, Rfl: 0    estradiol (Estrace) 0.01 % (0.1 mg/gram) vaginal cream, Insert 0.5 Applicatorfuls (2 g) into the vagina once daily. Use once a day for 2 weeks then as needed there after, Disp: 42.5 g, Rfl: 0    ezetimibe (Zetia) 10 mg tablet, Take by mouth., Disp: , Rfl:     FreeStyle Cristina 2 Sensor kit, CHANGE SENSOR EVERY 14 DAYS AS DIRECTED., Disp: , Rfl:     insulin glargine (Lantus Solostar U-100 Insulin) 100 unit/mL (3 mL) pen, Inject 40 Units under the skin 2 times a day., Disp: 15 mL, Rfl: 3    insulin lispro (HumaLOG Aurelio Kwikpen) 100 unit/mL injection, Inject 40 Units under the skin 3 times a day as needed for high blood sugar. inject up to 20 units before meals per sliding scale, Disp: 15 mL, Rfl: 5    LORazepam (Ativan) 1 mg tablet, TAKE 1 TABLET BY MOUTH ONCE DAILY AS NEEDED FOR ANXIETY, Disp: 30 tablet, Rfl: 0    metFORMIN  mg 24 hr tablet, Take 2 tablets (1,000 mg) by mouth 2 times a day with  "meals., Disp: 180 tablet, Rfl: 1    metroNIDAZOLE (Flagyl) 500 mg tablet, Take 1 tablet (500 mg) by mouth every 12 hours for 7 days., Disp: 14 tablet, Rfl: 0    omega-3 acid ethyl esters (Lovaza) 1 gram capsule, Take 2 capsules (2 g) by mouth 2 times a day with meals., Disp: 120 capsule, Rfl: 1    omeprazole (PriLOSEC) 40 mg DR capsule, Take 1 capsule (40 mg) by mouth once daily in the morning. Take before meals. Do not crush or chew. Open capsule, sprinkle beads on SF jello, pudding or applesauce., Disp: 30 capsule, Rfl: 5    ondansetron (Zofran) 8 mg tablet, Take 1 tablet (8 mg) by mouth 3 times a day., Disp: , Rfl:     ondansetron ODT (Zofran-ODT) 4 mg disintegrating tablet, Take 1 tablet (4 mg) by mouth every 6 hours if needed for nausea or vomiting., Disp: 60 tablet, Rfl: 1    OneTouch Delica Plus Lancet 33 gauge misc, TEST BLOOD GLUCOSE 3 TIMES/ DAY, Disp: , Rfl:     OneTouch Verio Flex meter misc, USE AS DIRECTED., Disp: , Rfl:     oxyCODONE (Roxicodone) 5 mg/5 mL solution, Take 5 mL (5 mg) by mouth every 6 hours if needed for severe pain (7 - 10) or moderate pain (4 - 6) for up to 7 days., Disp: 140 mL, Rfl: 0    pen needle, diabetic (BD Ultra-Fine Mini Pen Needle) 31 gauge x 3/16\" needle, Inject 200 each under the skin 4 times a day before meals. Use 4 a day as directed, Disp: 200 each, Rfl: 3    ALLERGIES:  Allergies   Allergen Reactions    Metformin Swelling    Cephalexin Hives, Other, Rash and Swelling     \"welts\"    Sulfamethoxazole-Trimethoprim Hives, Rash and Swelling       REVIEW OF SYSTEMS:  GENERAL: Obese. Negative for malaise, significant weight loss and fever  NECK: Negative for lumps, goiter, pain and significant neck swelling  RESPIRATORY: Negative for cough, wheezing or shortness of breath.  CARDIOVASCULAR: Negative for chest pain, leg swelling or palpitations.  GI: Negative for abdominal discomfort, blood in stools or black stools or change in bowel habits  : No history of dysuria, " frequency or incontinence  MUSCULOSKELETAL: Negative for joint pain or swelling, back pain or muscle pain.  SKIN: Negative for lesions, rash, and itching.  PSYCH: Negative for sleep disturbance, mood disorder and recent psychosocial stressors.  ENDOCRINE: Negative for cold or heat intolerance, polyuria, polydipsia and goiter.    PHYSICAL EXAM:  Visit Vitals  /83 (BP Location: Left arm, Patient Position: Sitting, BP Cuff Size: Large adult long)   Pulse 98       General appearance: obese  Skin: warm, no erythema or rashes  Lungs: clear to percussion and auscultation  Heart: regular rhythm and S1, S2 normal  Abdomen: soft, non-tender, no masses, no organomegaly  Extremities: Normal exam of the extremities. No swelling or pain.    No results found for this or any previous visit (from the past 24 hour(s)).      IMPRESSION:  Nia Harper is a 33 y.o. y.o. female with a BMI of Body mass index is 40.18 kg/m²..    They have been preoperatively evaluated and deemed to be an appropriate candidate for bariatric surgery.  Surgery Type: Laparoscopic sleeve gastrectomy  11835    All testing reviewed.  All clearances contained.    PLAN:    The risks of Laparoscopic sleeve gastrectomy  34911 surgery including bleeding, leak, wound infection, dehydration, ulcers, internal hernia, DVT/PE, prolonged nausea/vomiting, incomplete resolution of associated medical conditions, reflux, weight regain, vitamin/mineral deficiencies, and death have been explained to the patient and Nia Harper has expressed understanding and acceptance of them.    The benefits of the above surgery including weight loss, improvement/resolution of associated medical and mental health conditions, improved mobility, and decreased mortality have been explained the the patient and Nia Harper has expressed understanding and acceptance of them.    Operative and blood transfusion consent forms were signed by the patient and witnessed today.    Prescriptions  for all required post-operative home medications were sent to the patient's pharmacy today and the patient will pick them up prior to surgery.    Further education was provided on day of surgery instructions and what to expect from the inpatient admission after surgery.    Sage Ervin MD   Bariatric and Minimally Invasive General Surgery

## 2024-03-14 NOTE — H&P (VIEW-ONLY)
BARIATRIC SURGERY PREOPERATIVE VISIT    Date: 03/15/24  Time: [unfilled]    Name: Nia Harper    MRN: [id number]    This is a 33 y.o. y.o. female with morbid obesity (Body mass index is 40.18 kg/m².) who plans to undergo Laparoscopic sleeve gastrectomy  86669 surgery. They have completed a rigorous preoperative medical work-up and bariatric surgery educational program.     PMH:   Patient Active Problem List   Diagnosis    Astigmatism of both eyes    Breast skin changes    Radial styloid tenosynovitis of right hand    Depression    Diabetes mellitus type 2 without retinopathy (CMS/HCC)    Diffuse goiter    Elevation of levels of liver transaminase levels    Epidermal inclusion cyst    Herpes simplex virus (HSV) infection    History of malignant neoplasm of breast    Hyperlipemia    Hypertriglyceridemia    Myopia of both eyes    Nexplanon in place    Suspected sleep apnea    Uncontrolled type 2 diabetes mellitus with hyperglycemia (CMS/HCC)    Chronic headaches    BMI 40.0-44.9, adult (CMS/HCC)    Severe obesity (CMS/HCC)    Malignant neoplasm of female breast (CMS/HCC)    Primary hypertension    Angioedema    Anxiety disorder    History of bariatric surgery    Fatty liver    Gallstones    History of cannabis abuse    History of noncompliance with medical treatment    Insufficient sleep syndrome    Sleep disorder breathing    Status post mastectomy    Systolic murmur    Artificial menopause    Acute vaginitis    Encounter for follow-up surveillance of breast cancer    Hidradenitis suppurativa    Suppression of ovarian secretion    Bariatric surgery status    Mass of left breast    Infected sebaceous cyst       PSH:   Past Surgical History:   Procedure Laterality Date     SECTION, CLASSIC  2019     Section    MASTECTOMY Right     MR HEAD ANGIO WO IV CONTRAST  2015    MR HEAD ANGIO WO IV CONTRAST 2015 [facility] [place] [facility]CILL[facility] LEGAC[facility]    OTHER SURGICAL HISTORY  2019    Surgical Treatment Of  Missed  In First Trimester       Social hx:   Social History     Socioeconomic History    Marital status: Single     Spouse name: Not on file    Number of children: Not on file    Years of education: Not on file    Highest education level: Not on file   Occupational History    Not on file   Tobacco Use    Smoking status: Never     Passive exposure: Never    Smokeless tobacco: Never   Vaping Use    Vaping Use: Never used   Substance and Sexual Activity    Alcohol use: Never    Drug use: Not Currently     Types: Marijuana     Comment: gummies    Sexual activity: Defer   Other Topics Concern    Not on file   Social History Narrative    Not on file     Social Determinants of Health     Financial Resource Strain: Not on file   Food Insecurity: Not on file   Transportation Needs: Not on file   Physical Activity: Not on file   Stress: Not on file   Social Connections: Not on file   Intimate Partner Violence: Not on file   Housing Stability: Not on file       Initial weight: 294 pounds  Current weight:   Vitals:    03/15/24 1021   Weight: 127 kg (280 lb)       Preop Clearances:  Cardiac: Cleared  Pulmonary: Cleared  Psych: Cleared      Sleep Study:   Per evaluation by sleep medicine (2023):  # Sleep apnea, unspecified  - recent PSG showed no HELIO based on CMS definition  - she is fairly asymptomatic in terms of HELIO  - no obvious need for PAP therapy at this time  - no restriction to elective surgery from sleep apnea standpoint.    EGD (23):  Impression:     - Normal esophagus.                         - Erythematous mucosa in the gastric body and antrum.                          Biopsied.                         - Normal examined duodenum.    Preadmission testing date: 3/13/24    MEDICATIONS:  Prior to Admission Medications:    Current Outpatient Medications:     anastrozole (Arimidex) 1 mg tablet, Take 1 tablet (1 mg total) by mouth once daily.  Swallow whole with a drink of water., Disp: 90 tablet, Rfl: 3     valACYclovir (Valtrex) 500 mg tablet, Take 1 tablet (500 mg) by mouth 2 times a day., Disp: , Rfl:     atorvastatin (Lipitor) 40 mg tablet, Take 1 tablet (40 mg) by mouth once daily at bedtime., Disp: 90 tablet, Rfl: 3    benzoyl peroxide 5 % external wash, Apply topically once daily. Lather onto skin folds, leave on 2 minutes, rinse with water, Disp: 236 g, Rfl: 11    blood sugar diagnostic (OneTouch Verio test strips) strip, Inject 100 strips under the skin once daily., Disp: 100 strip, Rfl: 1    blood-glucose sensor (FreeStyle Cristina 3 Sensor) device, 2 each every 14 (fourteen) days., Disp: 2 each, Rfl: 11    clindamycin (Cleocin) 300 mg capsule, Take 1 capsule (300 mg) by mouth 3 times a day for 7 days., Disp: 21 capsule, Rfl: 0    dulaglutide (Trulicity) 3 mg/0.5 mL pen injector, Once weekly, Disp: 4 each, Rfl: 11    DULoxetine (Cymbalta) 60 mg DR capsule, Take 1 capsule (60 mg) by mouth once daily. Do not crush or chew. Take in the evening with food. Increase to 60mg daily dosing., Disp: 30 capsule, Rfl: 0    estradiol (Estrace) 0.01 % (0.1 mg/gram) vaginal cream, Insert 0.5 Applicatorfuls (2 g) into the vagina once daily. Use once a day for 2 weeks then as needed there after, Disp: 42.5 g, Rfl: 0    ezetimibe (Zetia) 10 mg tablet, Take by mouth., Disp: , Rfl:     FreeStyle Cristina 2 Sensor kit, CHANGE SENSOR EVERY 14 DAYS AS DIRECTED., Disp: , Rfl:     insulin glargine (Lantus Solostar U-100 Insulin) 100 unit/mL (3 mL) pen, Inject 40 Units under the skin 2 times a day., Disp: 15 mL, Rfl: 3    insulin lispro (HumaLOG Aurelio Kwikpen) 100 unit/mL injection, Inject 40 Units under the skin 3 times a day as needed for high blood sugar. inject up to 20 units before meals per sliding scale, Disp: 15 mL, Rfl: 5    LORazepam (Ativan) 1 mg tablet, TAKE 1 TABLET BY MOUTH ONCE DAILY AS NEEDED FOR ANXIETY, Disp: 30 tablet, Rfl: 0    metFORMIN  mg 24 hr tablet, Take 2 tablets (1,000 mg) by mouth 2 times a day with  "meals., Disp: 180 tablet, Rfl: 1    metroNIDAZOLE (Flagyl) 500 mg tablet, Take 1 tablet (500 mg) by mouth every 12 hours for 7 days., Disp: 14 tablet, Rfl: 0    omega-3 acid ethyl esters (Lovaza) 1 gram capsule, Take 2 capsules (2 g) by mouth 2 times a day with meals., Disp: 120 capsule, Rfl: 1    omeprazole (PriLOSEC) 40 mg DR capsule, Take 1 capsule (40 mg) by mouth once daily in the morning. Take before meals. Do not crush or chew. Open capsule, sprinkle beads on SF jello, pudding or applesauce., Disp: 30 capsule, Rfl: 5    ondansetron (Zofran) 8 mg tablet, Take 1 tablet (8 mg) by mouth 3 times a day., Disp: , Rfl:     ondansetron ODT (Zofran-ODT) 4 mg disintegrating tablet, Take 1 tablet (4 mg) by mouth every 6 hours if needed for nausea or vomiting., Disp: 60 tablet, Rfl: 1    OneTouch Delica Plus Lancet 33 gauge misc, TEST BLOOD GLUCOSE 3 TIMES/ DAY, Disp: , Rfl:     OneTouch Verio Flex meter misc, USE AS DIRECTED., Disp: , Rfl:     oxyCODONE (Roxicodone) 5 mg/5 mL solution, Take 5 mL (5 mg) by mouth every 6 hours if needed for severe pain (7 - 10) or moderate pain (4 - 6) for up to 7 days., Disp: 140 mL, Rfl: 0    pen needle, diabetic (BD Ultra-Fine Mini Pen Needle) 31 gauge x 3/16\" needle, Inject 200 each under the skin 4 times a day before meals. Use 4 a day as directed, Disp: 200 each, Rfl: 3    ALLERGIES:  Allergies   Allergen Reactions    Metformin Swelling    Cephalexin Hives, Other, Rash and Swelling     \"welts\"    Sulfamethoxazole-Trimethoprim Hives, Rash and Swelling       REVIEW OF SYSTEMS:  GENERAL: Obese. Negative for malaise, significant weight loss and fever  NECK: Negative for lumps, goiter, pain and significant neck swelling  RESPIRATORY: Negative for cough, wheezing or shortness of breath.  CARDIOVASCULAR: Negative for chest pain, leg swelling or palpitations.  GI: Negative for abdominal discomfort, blood in stools or black stools or change in bowel habits  : No history of dysuria, " frequency or incontinence  MUSCULOSKELETAL: Negative for joint pain or swelling, back pain or muscle pain.  SKIN: Negative for lesions, rash, and itching.  PSYCH: Negative for sleep disturbance, mood disorder and recent psychosocial stressors.  ENDOCRINE: Negative for cold or heat intolerance, polyuria, polydipsia and goiter.    PHYSICAL EXAM:  Visit Vitals  /83 (BP Location: Left arm, Patient Position: Sitting, BP Cuff Size: Large adult long)   Pulse 98       General appearance: obese  Skin: warm, no erythema or rashes  Lungs: clear to percussion and auscultation  Heart: regular rhythm and S1, S2 normal  Abdomen: soft, non-tender, no masses, no organomegaly  Extremities: Normal exam of the extremities. No swelling or pain.    No results found for this or any previous visit (from the past 24 hour(s)).      IMPRESSION:  Nia Harper is a 33 y.o. y.o. female with a BMI of Body mass index is 40.18 kg/m²..    They have been preoperatively evaluated and deemed to be an appropriate candidate for bariatric surgery.  Surgery Type: Laparoscopic sleeve gastrectomy  73231    All testing reviewed.  All clearances contained.    PLAN:    The risks of Laparoscopic sleeve gastrectomy  26131 surgery including bleeding, leak, wound infection, dehydration, ulcers, internal hernia, DVT/PE, prolonged nausea/vomiting, incomplete resolution of associated medical conditions, reflux, weight regain, vitamin/mineral deficiencies, and death have been explained to the patient and Nia Harper has expressed understanding and acceptance of them.    The benefits of the above surgery including weight loss, improvement/resolution of associated medical and mental health conditions, improved mobility, and decreased mortality have been explained the the patient and Nia Harper has expressed understanding and acceptance of them.    Operative and blood transfusion consent forms were signed by the patient and witnessed today.    Prescriptions  for all required post-operative home medications were sent to the patient's pharmacy today and the patient will pick them up prior to surgery.    Further education was provided on day of surgery instructions and what to expect from the inpatient admission after surgery.    Sage Ervin MD   Bariatric and Minimally Invasive General Surgery

## 2024-03-15 ENCOUNTER — OFFICE VISIT (OUTPATIENT)
Dept: SURGERY | Facility: CLINIC | Age: 34
End: 2024-03-15
Payer: COMMERCIAL

## 2024-03-15 ENCOUNTER — TELEPHONE (OUTPATIENT)
Dept: HEMATOLOGY/ONCOLOGY | Facility: CLINIC | Age: 34
End: 2024-03-15

## 2024-03-15 VITALS
BODY MASS INDEX: 40.09 KG/M2 | DIASTOLIC BLOOD PRESSURE: 83 MMHG | HEART RATE: 98 BPM | WEIGHT: 280 LBS | HEIGHT: 70 IN | SYSTOLIC BLOOD PRESSURE: 158 MMHG | OXYGEN SATURATION: 97 %

## 2024-03-15 DIAGNOSIS — E78.2 MIXED HYPERLIPIDEMIA: ICD-10-CM

## 2024-03-15 DIAGNOSIS — K76.0 FATTY LIVER: ICD-10-CM

## 2024-03-15 DIAGNOSIS — E11.9 DIABETES MELLITUS TYPE 2 WITHOUT RETINOPATHY (MULTI): ICD-10-CM

## 2024-03-15 DIAGNOSIS — R11.0 NAUSEA: ICD-10-CM

## 2024-03-15 DIAGNOSIS — Z98.84 BARIATRIC SURGERY STATUS: ICD-10-CM

## 2024-03-15 DIAGNOSIS — E66.01 MORBID OBESITY WITH BMI OF 40.0-44.9, ADULT (MULTI): Primary | ICD-10-CM

## 2024-03-15 DIAGNOSIS — G89.18 POST-OP PAIN: ICD-10-CM

## 2024-03-15 DIAGNOSIS — Z71.3 PRE-BARIATRIC SURGERY NUTRITION EVALUATION: ICD-10-CM

## 2024-03-15 PROCEDURE — 99214 OFFICE O/P EST MOD 30 MIN: CPT | Performed by: SURGERY

## 2024-03-15 PROCEDURE — 3008F BODY MASS INDEX DOCD: CPT | Performed by: SURGERY

## 2024-03-15 PROCEDURE — 3046F HEMOGLOBIN A1C LEVEL >9.0%: CPT | Performed by: SURGERY

## 2024-03-15 PROCEDURE — 1036F TOBACCO NON-USER: CPT | Performed by: SURGERY

## 2024-03-15 PROCEDURE — 3079F DIAST BP 80-89 MM HG: CPT | Performed by: SURGERY

## 2024-03-15 PROCEDURE — 3077F SYST BP >= 140 MM HG: CPT | Performed by: SURGERY

## 2024-03-15 RX ORDER — OXYCODONE HCL 5 MG/5 ML
5 SOLUTION, ORAL ORAL EVERY 6 HOURS PRN
Qty: 140 ML | Refills: 0 | Status: ON HOLD | OUTPATIENT
Start: 2024-03-15 | End: 2024-03-26

## 2024-03-15 RX ORDER — ONDANSETRON 4 MG/1
4 TABLET, ORALLY DISINTEGRATING ORAL EVERY 6 HOURS PRN
Qty: 60 TABLET | Refills: 1 | Status: SHIPPED | OUTPATIENT
Start: 2024-03-15

## 2024-03-15 RX ORDER — OMEPRAZOLE 40 MG/1
40 CAPSULE, DELAYED RELEASE ORAL
Qty: 30 CAPSULE | Refills: 5 | Status: SHIPPED | OUTPATIENT
Start: 2024-03-15

## 2024-03-15 NOTE — PROGRESS NOTES
[facility]  Nia Harper female   1990 33 y.o.   [id number]      Chief Complaint  Follow up, right breast sebaceous cyst, history of right breast cancer.     History Of Present Illness  Nia Harper is a very pleasant 33 y.o. female diagnosed on 2023 with right breast multicentric breast cancer, invasive ductal carcinoma (IDC), grade 2-3, ER/SC+, HER2-. On 2023 she underwent a right mastectomy and sentinel lymph node biopsy (0/3). Oncotype 45. She completed adjuvant chemotherapy on 2023. She was started on ovarian suppression and Anastrozole 1mg. She has family history of breast cancer, see below.     She presents today for follow up of infected sebaceous cyst under the left breast for two weeks. She was placed on Clindamycin for seven days and tolerating it will. She has a few days left. The cyst had purulent drainage. This happens frequently and she would like this removed. At this time, she states the cyst infection has improved. No longer red or tender and no visible drainage. She has a personal history of hidradenitis. She will be having bariatric surgery at the end of the month.    BREAST IMAGIN2024 Left breast ultrasound, indicates BI-RADS Category 2.     REPRODUCTIVE HISTORY:  menarche age 10, , first birth age 20,  x 2 months, no OCP's, premenopausal on ovarian suppression therapy, no HRT, scattered fibroglandular tissue     FAMILY CANCER HISTORY:   Maternal Cousin (1): Breast cancer, 30s alive in her 40s  Maternal Cousin (2): Breast cancer, 40s  in her early 40s  Maternal Cousin (3): BRCA negative (no cancer diagnosed)  Maternal grandmother: unknown cancer ()  Maternal grandfather: unknown cancer ()  Paternal grandmother: unknown cancer ()      Review of Systems  Constitutional:  Negative for appetite change, fatigue, fever and unexpected weight change.   HENT:  Negative for ear pain, hearing loss, nosebleeds, sore  throat and trouble swallowing.    Eyes:  Negative for discharge, itching and visual disturbance.   Breast: As stated in HPI.  Respiratory:  Negative for cough, chest tightness and shortness of breath.    Cardiovascular:  Negative for chest pain, palpitations and leg swelling.   Gastrointestinal:  Negative for abdominal pain, constipation, diarrhea and nausea.   Endocrine: Negative for cold intolerance and heat intolerance.   Genitourinary:  Negative for dysuria, frequency, hematuria, pelvic pain and vaginal bleeding.   Musculoskeletal:  Negative for arthralgias, back pain, gait problem, joint swelling and myalgias.   Skin:  Negative for color change and rash.   Allergic/Immunologic: Negative for environmental allergies and food allergies.   Neurological:  Negative for dizziness, tremors, speech difficulty, weakness, numbness and headaches.   Hematological:  Does not bruise/bleed easily.   Psychiatric/Behavioral:  Negative for agitation, dysphoric mood and sleep disturbance. The patient is not nervous/anxious.      Past Medical History  She has a past medical history of Abnormal levels of other serum enzymes (2021), Anxiety, Anxiety disorder, unspecified (2019), Body mass index (BMI)40.0-44.9, adult (2019), Breast cancer (CMS/[facility]), Carpal tunnel syndrome, bilateral upper limbs (2019), Depression, unspecified (2021), Elevated liver enzymes, Glycosuria (2014), antineoplastic chemo, Irregular menstruation, unspecified (2015), Mixed hyperlipidemia (2018), Morbid (severe) obesity due to excess calories (CMS/[facility]) (2019), Myopia, bilateral (09/15/2017), Type 2 diabetes mellitus without complications (CMS/[facility]) (2022), and Vision loss.    Surgical History  She has a past surgical history that includes  section, classic (2019); Other surgical history (2019); MR angio head wo IV contrast (2015); and Mastectomy (Right).    Family  History  Cancer-related family history includes Breast cancer in her cousin, father's sister, and paternal cousin; Cancer in her father's brother, father's sister, maternal grandfather, maternal grandmother, and paternal grandmother.     Social History  She reports that she has never smoked. She has never been exposed to tobacco smoke. She has never used smokeless tobacco. She reports that she does not currently use drugs after having used the following drugs: Marijuana. She reports that she does not drink alcohol.    Allergies  Metformin, Cephalexin, and Sulfamethoxazole-trimethoprim    Medications  Current Outpatient Medications   Medication Instructions    anastrozole (ARIMIDEX) 1 mg, oral, Daily, Swallow whole with a drink of water.    atorvastatin (LIPITOR) 40 mg, oral, Nightly    benzoyl peroxide 5 % external wash Topical, Daily, Lather onto skin folds, leave on 2 minutes, rinse with water    blood sugar diagnostic (OneTouch Verio test strips) strip 100 strips, subcutaneous, Daily    blood-glucose sensor (FreeStyle Cristina 3 Sensor) device 2 each, miscellaneous, Every 14 days    dulaglutide (Trulicity) 3 mg/0.5 mL pen injector Once weekly    DULoxetine (CYMBALTA) 60 mg, oral, Daily, Do not crush or chew. Take in the evening with food. Increase to 60mg daily dosing.    estradiol (ESTRACE) 2 g, vaginal, Daily, Use once a day for 2 weeks then as needed there after    ezetimibe (Zetia) 10 mg tablet oral    FreeStyle Cristina 2 Sensor kit CHANGE SENSOR EVERY 14 DAYS AS DIRECTED.    insulin lispro (HUMALOG BHUPINDER KWIKPEN) 40 Units, subcutaneous, 3 times daily PRN, inject up to 20 units before meals per sliding scale    Lantus Solostar U-100 Insulin 40 Units, subcutaneous, 2 times daily    LORazepam (Ativan) 1 mg tablet TAKE 1 TABLET BY MOUTH ONCE DAILY AS NEEDED FOR ANXIETY    metFORMIN XR (GLUCOPHAGE-XR) 1,000 mg, oral, 2 times daily with meals    omega-3 acid ethyl esters (LOVAZA) 2 g, oral, 2 times daily with meals     "omeprazole (PRILOSEC) 40 mg, oral, Daily before breakfast, Do not crush or chew. Open capsule, sprinkle beads on SF jello, pudding or applesauce.    ondansetron (ZOFRAN) 8 mg, oral, 3 times daily    ondansetron ODT (ZOFRAN-ODT) 4 mg, oral, Every 6 hours PRN    OneTouch Delica Plus Lancet 33 gauge misc TEST BLOOD GLUCOSE 3 TIMES/ DAY    OneTouch Verio Flex meter misc USE AS DIRECTED.    oxyCODONE (ROXICODONE) 5 mg, oral, Every 6 hours PRN    pen needle, diabetic (BD Ultra-Fine Mini Pen Needle) 31 gauge x 3/16\" needle 200 each, subcutaneous, 4 times daily before meals and nightly, Use 4 a day as directed    valACYclovir (Valtrex) 500 mg tablet 1 tablet, oral, 2 times daily       Last Recorded Vitals  Vitals:    03/19/24 0831   BP: 136/88   Pulse: 100   Temp: 36.1 °C (97 °F)          Physical Exam  Chest:         Patient is alert and oriented x3 and in a relaxed and appropriate mood. Her gait is steady and hand grasps are equal. Sclera is clear. The right breast has been removed with well-healed mastectomy incision. Overlying tissue soft without palpable abnormalities, discrete nodules or masses. The left breast 5:00, 8 cm from the nipple is a scabbed healing 1.0 cm sebaceous cyst, no erythema or drainage. The left nipple appears normal. There is no cervical, supraclavicular or axillary lymphadenopathy. Heart rate and rhythm normal, S1 and S2 appreciated. The lungs are clear to auscultation bilaterally. Abdomen is soft and non-tender.     Relevant Results and Imaging  Study Result    Narrative & Impression   Interpreted By:  Jessica Felton,  Sean Campbell   STUDY:  BI US BREAST LIMITED LEFT;  1/29/2024 10:31 am      ACCESSION NUMBER(S):  [id number]      ORDERING CLINICIAN:  KEYANA RIVERO      INDICATION:  History of hidradenitis. Palpable abnormality in the left breast for  3-4 years which has become larger and painful since the week prior.  Patient states now improving. History of stage IB hormone " positive  right-sided breast cancer      COMPARISON:  Mammogram 01/04/2024      FINDINGS:  Targeted ultrasound of the left breast was performed using  elastography.      At 5 o'clock 8 cm from the nipple, corresponding to patient's  palpable area of concern, a 3.3 x 1.7 x 1.1 cm heterogeneous  collection is noted with internal echoes, extending to the skin  surface. There is associated predominantly peripheral vascularity.  This collection demonstrates posterior enhancement and is  predominantly soft on elastography with areas of intermediate to hard  elasticity along the margins. There is thickening of the overlying  skin.      IMPRESSION:  Heterogeneous collection corresponding to patient's palpable area of  concern is most consistent with an abscess. Clinical follow-up until  resolution is recommended. If the area does not resolve then a  follow-up ultrasound is recommended.      BI-RADS CATEGORY:      BI-RADS Category:  2 Benign.  Recommendation:  Clinical follow-up.  Recommended Date:  N/A.  Laterality:  Left.      For any future breast imaging appointments, please call [phone] ([phone]).      I personally reviewed the images/study and I agree with the findings  as stated by Dr. Shannan Riggs. The study was interpreted at  [place], [place].      MACRO:  None      Signed by: Jessica Felton 1/29/2024 11:21 AM  Dictation workstation:   [id number]       Orders  Orders Placed This Encounter   Procedures    BI mammo left screening tomosynthesis     Standing Status:   Future     Standing Expiration Date:   5/19/2025     Order Specific Question:   Is the patient pregnant?     Answer:   No     Order Specific Question:   Reason for exam:     Answer:   screening mammogram     Order Specific Question:   Radiologist to Determine Optimal Study     Answer:   Yes     Order Specific Question:   Release result to [employer]     Answer:   Immediate [1]     Order Specific Question:    Is this exam part of a Research Study? If Yes, link this order to the research study     Answer:   No    Referral to General Surgery     Standing Status:   Future     Standing Expiration Date:   9/19/2024     Referral Priority:   Routine     Referral Type:   Consultation     Referral Reason:   Specialty Services Required     Referred to Provider:   Zhen Thomas MD     Requested Specialty:   General Surgery     Number of Visits Requested:   1       Visit Diagnosis  1. Malignant neoplasm of right breast in female, estrogen receptor positive, unspecified site of breast (CMS/HCC)  BI mammo left screening tomosynthesis      2. Sebaceous cyst  Referral to Breast Surgery    Referral to General Surgery    Clinic Appointment Request Follow Up          Assessment/Plan  Breast cancer surveillance, left breast sebaceous cyst, history right breast cancer, s/p right mastectomy, completed adjuvant chemotherapy, on Anastrozole and ovarian suppression therapy, family history of breast cancer, scattered fibroglandular tissue    Plan:  Complete clindamycin as prescribed. Referral to general surgery to have sebaceous cyst removed. Will follow up in 6 months. Call the office for worsening redness, pain, drainage, or fever. Follow up with all previous appointments as scheduled.    Patient Discussion/Summary  Complete antibiotics as prescribed,schedule an appointment with general surgery. Call the office for worsening redness, pain, drainage, or fever. Follow up with all previous appointments as scheduled.    You can see your health information, review clinical summaries from office visits & test results online when you follow your health with MY  Chart, a personal health record. To sign up go to [email or web address]. If you need assistance with signing up or trouble getting into your account call [employer] Patient Line 24/7 at [phone].    My office phone number is [phone] if you need to get in touch with me or have  additional questions or concerns. Thank you for choosing [facility] and trusting me as your healthcare provider. I look forward to seeing you again at your next office visit. I am honored to be a provider on your health care team and I remain dedicated to helping you achieve your health goals.    Anya Grissom, APRBRENNEN-CNP

## 2024-03-16 LAB
COTININE SERPL-MCNC: <5 NG/ML
NICOTINE SERPL-MCNC: <5 NG/ML

## 2024-03-19 ENCOUNTER — OFFICE VISIT (OUTPATIENT)
Dept: SURGICAL ONCOLOGY | Facility: HOSPITAL | Age: 34
End: 2024-03-19
Payer: COMMERCIAL

## 2024-03-19 VITALS
HEART RATE: 100 BPM | TEMPERATURE: 97 F | SYSTOLIC BLOOD PRESSURE: 136 MMHG | BODY MASS INDEX: 40.32 KG/M2 | WEIGHT: 281 LBS | DIASTOLIC BLOOD PRESSURE: 88 MMHG

## 2024-03-19 DIAGNOSIS — C50.919 MALIGNANT NEOPLASM OF FEMALE BREAST, UNSPECIFIED ESTROGEN RECEPTOR STATUS, UNSPECIFIED LATERALITY, UNSPECIFIED SITE OF BREAST (MULTI): Primary | ICD-10-CM

## 2024-03-19 DIAGNOSIS — C50.911 MALIGNANT NEOPLASM OF RIGHT BREAST IN FEMALE, ESTROGEN RECEPTOR POSITIVE, UNSPECIFIED SITE OF BREAST (MULTI): Primary | ICD-10-CM

## 2024-03-19 DIAGNOSIS — L72.3 SEBACEOUS CYST: ICD-10-CM

## 2024-03-19 DIAGNOSIS — Z17.0 MALIGNANT NEOPLASM OF RIGHT BREAST IN FEMALE, ESTROGEN RECEPTOR POSITIVE, UNSPECIFIED SITE OF BREAST (MULTI): Primary | ICD-10-CM

## 2024-03-19 PROCEDURE — 3008F BODY MASS INDEX DOCD: CPT

## 2024-03-19 PROCEDURE — 3075F SYST BP GE 130 - 139MM HG: CPT

## 2024-03-19 PROCEDURE — 3046F HEMOGLOBIN A1C LEVEL >9.0%: CPT

## 2024-03-19 PROCEDURE — 99214 OFFICE O/P EST MOD 30 MIN: CPT

## 2024-03-19 PROCEDURE — 3079F DIAST BP 80-89 MM HG: CPT

## 2024-03-19 PROCEDURE — 1036F TOBACCO NON-USER: CPT

## 2024-03-19 ASSESSMENT — PAIN SCALES - GENERAL: PAINLEVEL: 0-NO PAIN

## 2024-03-19 NOTE — PATIENT INSTRUCTIONS
Complete antibiotics as prescribed,schedule an appointment with general surgery. Call the office for worsening redness, pain, drainage, or fever. Follow up with all previous appointments as scheduled.    You can see your health information, review clinical summaries from office visits & test results online when you follow your health with MY  Chart, a personal health record. To sign up go to [email or web address]. If you need assistance with signing up or trouble getting into your account call [employer] Patient Line 24/7 at [phone].    My office phone number is [phone] if you need to get in touch with me or have additional questions or concerns. Thank you for choosing [facility] and trusting me as your healthcare provider. I look forward to seeing you again at your next office visit. I am honored to be a provider on your health care team and I remain dedicated to helping you achieve your health goals.

## 2024-03-21 ENCOUNTER — TELEPHONE (OUTPATIENT)
Dept: SURGERY | Facility: HOSPITAL | Age: 34
End: 2024-03-21
Payer: COMMERCIAL

## 2024-03-21 NOTE — TELEPHONE ENCOUNTER
Spoke to patient, let her know to arrive 5:30 am  [facility] for her procedure 3/25

## 2024-03-22 ENCOUNTER — APPOINTMENT (OUTPATIENT)
Dept: HEMATOLOGY/ONCOLOGY | Facility: HOSPITAL | Age: 34
End: 2024-03-22
Payer: COMMERCIAL

## 2024-03-24 ENCOUNTER — ANESTHESIA EVENT (OUTPATIENT)
Dept: OPERATING ROOM | Facility: HOSPITAL | Age: 34
End: 2024-03-24
Payer: COMMERCIAL

## 2024-03-25 ENCOUNTER — HOSPITAL ENCOUNTER (INPATIENT)
Facility: HOSPITAL | Age: 34
LOS: 1 days | Discharge: HOME | End: 2024-03-26
Attending: SURGERY | Admitting: SURGERY
Payer: COMMERCIAL

## 2024-03-25 ENCOUNTER — APPOINTMENT (OUTPATIENT)
Dept: RADIOLOGY | Facility: HOSPITAL | Age: 34
End: 2024-03-25
Payer: COMMERCIAL

## 2024-03-25 ENCOUNTER — ANESTHESIA (OUTPATIENT)
Dept: OPERATING ROOM | Facility: HOSPITAL | Age: 34
End: 2024-03-25
Payer: COMMERCIAL

## 2024-03-25 DIAGNOSIS — G89.18 POST-OP PAIN: ICD-10-CM

## 2024-03-25 DIAGNOSIS — Z98.84 BARIATRIC SURGERY STATUS: ICD-10-CM

## 2024-03-25 DIAGNOSIS — E66.01 OBESITY, CLASS III, BMI 40-49.9 (MORBID OBESITY) (MULTI): Primary | ICD-10-CM

## 2024-03-25 DIAGNOSIS — K76.0 FATTY LIVER: ICD-10-CM

## 2024-03-25 DIAGNOSIS — E66.01 MORBID OBESITY WITH BMI OF 40.0-44.9, ADULT (MULTI): ICD-10-CM

## 2024-03-25 PROBLEM — E66.813 OBESITY, CLASS III, BMI 40-49.9 (MORBID OBESITY): Status: ACTIVE | Noted: 2024-03-25

## 2024-03-25 PROBLEM — E66.9 OBESITY: Status: ACTIVE | Noted: 2024-03-25

## 2024-03-25 LAB
GLUCOSE BLD MANUAL STRIP-MCNC: 144 MG/DL (ref 74–99)
GLUCOSE BLD MANUAL STRIP-MCNC: 200 MG/DL (ref 74–99)
GLUCOSE BLD MANUAL STRIP-MCNC: 205 MG/DL (ref 74–99)
GLUCOSE BLD MANUAL STRIP-MCNC: 205 MG/DL (ref 74–99)
GLUCOSE BLD MANUAL STRIP-MCNC: 217 MG/DL (ref 74–99)
GLUCOSE BLD MANUAL STRIP-MCNC: 219 MG/DL (ref 74–99)
GLUCOSE BLD MANUAL STRIP-MCNC: 234 MG/DL (ref 74–99)
GLUCOSE BLD MANUAL STRIP-MCNC: 254 MG/DL (ref 74–99)
PREGNANCY TEST URINE, POC: NEGATIVE

## 2024-03-25 PROCEDURE — 2500000002 HC RX 250 W HCPCS SELF ADMINISTERED DRUGS (ALT 637 FOR MEDICARE OP, ALT 636 FOR OP/ED): Mod: SE | Performed by: NURSE PRACTITIONER

## 2024-03-25 PROCEDURE — 43775 LAP SLEEVE GASTRECTOMY: CPT | Performed by: SURGERY

## 2024-03-25 PROCEDURE — 2500000004 HC RX 250 GENERAL PHARMACY W/ HCPCS (ALT 636 FOR OP/ED): Mod: SE | Performed by: NURSE PRACTITIONER

## 2024-03-25 PROCEDURE — 3600000004 HC OR TIME - INITIAL BASE CHARGE - PROCEDURE LEVEL FOUR: Performed by: SURGERY

## 2024-03-25 PROCEDURE — 2500000004 HC RX 250 GENERAL PHARMACY W/ HCPCS (ALT 636 FOR OP/ED): Mod: SE | Performed by: ANESTHESIOLOGY

## 2024-03-25 PROCEDURE — 3700000002 HC GENERAL ANESTHESIA TIME - EACH INCREMENTAL 1 MINUTE: Performed by: SURGERY

## 2024-03-25 PROCEDURE — 3600000009 HC OR TIME - EACH INCREMENTAL 1 MINUTE - PROCEDURE LEVEL FOUR: Performed by: SURGERY

## 2024-03-25 PROCEDURE — 7100000002 HC RECOVERY ROOM TIME - EACH INCREMENTAL 1 MINUTE: Performed by: SURGERY

## 2024-03-25 PROCEDURE — G0378 HOSPITAL OBSERVATION PER HR: HCPCS

## 2024-03-25 PROCEDURE — A43775 PR LAP, GAST RESTRICT PROC, LONGITUDINAL GASTRECTOMY: Performed by: ANESTHESIOLOGY

## 2024-03-25 PROCEDURE — 99223 1ST HOSP IP/OBS HIGH 75: CPT | Performed by: STUDENT IN AN ORGANIZED HEALTH CARE EDUCATION/TRAINING PROGRAM

## 2024-03-25 PROCEDURE — 2550000001 HC RX 255 CONTRASTS: Mod: SE | Performed by: SURGERY

## 2024-03-25 PROCEDURE — 74240 X-RAY XM UPR GI TRC 1CNTRST: CPT | Performed by: RADIOLOGY

## 2024-03-25 PROCEDURE — 88307 TISSUE EXAM BY PATHOLOGIST: CPT | Performed by: PATHOLOGY

## 2024-03-25 PROCEDURE — 64450 NJX AA&/STRD OTHER PN/BRANCH: CPT | Performed by: ANESTHESIOLOGY

## 2024-03-25 PROCEDURE — 96372 THER/PROPH/DIAG INJ SC/IM: CPT | Performed by: STUDENT IN AN ORGANIZED HEALTH CARE EDUCATION/TRAINING PROGRAM

## 2024-03-25 PROCEDURE — 2500000005 HC RX 250 GENERAL PHARMACY W/O HCPCS: Mod: SE | Performed by: NURSE PRACTITIONER

## 2024-03-25 PROCEDURE — A43775 PR LAP, GAST RESTRICT PROC, LONGITUDINAL GASTRECTOMY: Performed by: ANESTHESIOLOGIST ASSISTANT

## 2024-03-25 PROCEDURE — 0DB64Z3 EXCISION OF STOMACH, PERCUTANEOUS ENDOSCOPIC APPROACH, VERTICAL: ICD-10-PCS | Performed by: SURGERY

## 2024-03-25 PROCEDURE — 2500000005 HC RX 250 GENERAL PHARMACY W/O HCPCS: Performed by: ANESTHESIOLOGIST ASSISTANT

## 2024-03-25 PROCEDURE — 7100000001 HC RECOVERY ROOM TIME - INITIAL BASE CHARGE: Performed by: SURGERY

## 2024-03-25 PROCEDURE — 74240 X-RAY XM UPR GI TRC 1CNTRST: CPT

## 2024-03-25 PROCEDURE — 81025 URINE PREGNANCY TEST: CPT | Performed by: ANESTHESIOLOGY

## 2024-03-25 PROCEDURE — 2500000004 HC RX 250 GENERAL PHARMACY W/ HCPCS (ALT 636 FOR OP/ED): Performed by: STUDENT IN AN ORGANIZED HEALTH CARE EDUCATION/TRAINING PROGRAM

## 2024-03-25 PROCEDURE — 2500000001 HC RX 250 WO HCPCS SELF ADMINISTERED DRUGS (ALT 637 FOR MEDICARE OP): Mod: SE | Performed by: STUDENT IN AN ORGANIZED HEALTH CARE EDUCATION/TRAINING PROGRAM

## 2024-03-25 PROCEDURE — A4217 STERILE WATER/SALINE, 500 ML: HCPCS | Performed by: SURGERY

## 2024-03-25 PROCEDURE — 2500000005 HC RX 250 GENERAL PHARMACY W/O HCPCS: Mod: SE | Performed by: ANESTHESIOLOGY

## 2024-03-25 PROCEDURE — 2720000007 HC OR 272 NO HCPCS: Performed by: SURGERY

## 2024-03-25 PROCEDURE — 3700000001 HC GENERAL ANESTHESIA TIME - INITIAL BASE CHARGE: Performed by: SURGERY

## 2024-03-25 PROCEDURE — 43235 EGD DIAGNOSTIC BRUSH WASH: CPT | Performed by: SURGERY

## 2024-03-25 PROCEDURE — 2780000003 HC OR 278 NO HCPCS: Performed by: SURGERY

## 2024-03-25 PROCEDURE — 82947 ASSAY GLUCOSE BLOOD QUANT: CPT

## 2024-03-25 PROCEDURE — 2500000004 HC RX 250 GENERAL PHARMACY W/ HCPCS (ALT 636 FOR OP/ED): Performed by: SURGERY

## 2024-03-25 PROCEDURE — P9045 ALBUMIN (HUMAN), 5%, 250 ML: HCPCS | Mod: JZ | Performed by: ANESTHESIOLOGIST ASSISTANT

## 2024-03-25 PROCEDURE — 2500000004 HC RX 250 GENERAL PHARMACY W/ HCPCS (ALT 636 FOR OP/ED): Performed by: ANESTHESIOLOGIST ASSISTANT

## 2024-03-25 PROCEDURE — 88307 TISSUE EXAM BY PATHOLOGIST: CPT | Mod: TC,SUR | Performed by: STUDENT IN AN ORGANIZED HEALTH CARE EDUCATION/TRAINING PROGRAM

## 2024-03-25 PROCEDURE — 2500000004 HC RX 250 GENERAL PHARMACY W/ HCPCS (ALT 636 FOR OP/ED): Mod: SE | Performed by: STUDENT IN AN ORGANIZED HEALTH CARE EDUCATION/TRAINING PROGRAM

## 2024-03-25 RX ORDER — SODIUM CHLORIDE, SODIUM LACTATE, POTASSIUM CHLORIDE, CALCIUM CHLORIDE 600; 310; 30; 20 MG/100ML; MG/100ML; MG/100ML; MG/100ML
100 INJECTION, SOLUTION INTRAVENOUS CONTINUOUS
Status: DISCONTINUED | OUTPATIENT
Start: 2024-03-25 | End: 2024-03-25

## 2024-03-25 RX ORDER — LIDOCAINE HCL/PF 100 MG/5ML
SYRINGE (ML) INTRAVENOUS AS NEEDED
Status: DISCONTINUED | OUTPATIENT
Start: 2024-03-25 | End: 2024-03-25

## 2024-03-25 RX ORDER — DEXTROSE 50 % IN WATER (D50W) INTRAVENOUS SYRINGE
12.5
Status: DISCONTINUED | OUTPATIENT
Start: 2024-03-25 | End: 2024-03-26 | Stop reason: HOSPADM

## 2024-03-25 RX ORDER — SODIUM CHLORIDE, SODIUM LACTATE, POTASSIUM CHLORIDE, CALCIUM CHLORIDE 600; 310; 30; 20 MG/100ML; MG/100ML; MG/100ML; MG/100ML
100 INJECTION, SOLUTION INTRAVENOUS CONTINUOUS
Status: DISCONTINUED | OUTPATIENT
Start: 2024-03-25 | End: 2024-03-25 | Stop reason: HOSPADM

## 2024-03-25 RX ORDER — HEPARIN SODIUM 5000 [USP'U]/ML
5000 INJECTION, SOLUTION INTRAVENOUS; SUBCUTANEOUS EVERY 8 HOURS SCHEDULED
Status: DISCONTINUED | OUTPATIENT
Start: 2024-03-25 | End: 2024-03-26 | Stop reason: HOSPADM

## 2024-03-25 RX ORDER — ROPIVACAINE HYDROCHLORIDE 5 MG/ML
INJECTION, SOLUTION EPIDURAL; INFILTRATION; PERINEURAL AS NEEDED
Status: DISCONTINUED | OUTPATIENT
Start: 2024-03-25 | End: 2024-03-25

## 2024-03-25 RX ORDER — ALBUTEROL SULFATE 0.83 MG/ML
2.5 SOLUTION RESPIRATORY (INHALATION) ONCE AS NEEDED
Status: DISCONTINUED | OUTPATIENT
Start: 2024-03-25 | End: 2024-03-25 | Stop reason: HOSPADM

## 2024-03-25 RX ORDER — NALOXONE HYDROCHLORIDE 0.4 MG/ML
0.2 INJECTION, SOLUTION INTRAMUSCULAR; INTRAVENOUS; SUBCUTANEOUS EVERY 5 MIN PRN
Status: DISCONTINUED | OUTPATIENT
Start: 2024-03-25 | End: 2024-03-26 | Stop reason: HOSPADM

## 2024-03-25 RX ORDER — PROPOFOL 10 MG/ML
INJECTION, EMULSION INTRAVENOUS AS NEEDED
Status: DISCONTINUED | OUTPATIENT
Start: 2024-03-25 | End: 2024-03-25

## 2024-03-25 RX ORDER — ESOMEPRAZOLE MAGNESIUM 40 MG/1
40 GRANULE, DELAYED RELEASE ORAL
Status: DISCONTINUED | OUTPATIENT
Start: 2024-03-26 | End: 2024-03-26 | Stop reason: HOSPADM

## 2024-03-25 RX ORDER — ONDANSETRON HYDROCHLORIDE 2 MG/ML
INJECTION, SOLUTION INTRAVENOUS AS NEEDED
Status: DISCONTINUED | OUTPATIENT
Start: 2024-03-25 | End: 2024-03-25

## 2024-03-25 RX ORDER — GLYCOPYRROLATE 0.2 MG/ML
INJECTION INTRAMUSCULAR; INTRAVENOUS AS NEEDED
Status: DISCONTINUED | OUTPATIENT
Start: 2024-03-25 | End: 2024-03-25

## 2024-03-25 RX ORDER — DEXTROSE 50 % IN WATER (D50W) INTRAVENOUS SYRINGE
25
Status: DISCONTINUED | OUTPATIENT
Start: 2024-03-25 | End: 2024-03-26 | Stop reason: HOSPADM

## 2024-03-25 RX ORDER — HYDROMORPHONE HYDROCHLORIDE 1 MG/ML
0.5 INJECTION, SOLUTION INTRAMUSCULAR; INTRAVENOUS; SUBCUTANEOUS EVERY 5 MIN PRN
Status: DISCONTINUED | OUTPATIENT
Start: 2024-03-25 | End: 2024-03-25 | Stop reason: HOSPADM

## 2024-03-25 RX ORDER — FENTANYL CITRATE 50 UG/ML
INJECTION, SOLUTION INTRAMUSCULAR; INTRAVENOUS AS NEEDED
Status: DISCONTINUED | OUTPATIENT
Start: 2024-03-25 | End: 2024-03-25

## 2024-03-25 RX ORDER — SODIUM CHLORIDE 0.9 G/100ML
IRRIGANT IRRIGATION AS NEEDED
Status: DISCONTINUED | OUTPATIENT
Start: 2024-03-25 | End: 2024-03-25 | Stop reason: HOSPADM

## 2024-03-25 RX ORDER — KETOROLAC TROMETHAMINE 30 MG/ML
15 INJECTION, SOLUTION INTRAMUSCULAR; INTRAVENOUS EVERY 6 HOURS SCHEDULED
Status: DISCONTINUED | OUTPATIENT
Start: 2024-03-25 | End: 2024-03-26 | Stop reason: HOSPADM

## 2024-03-25 RX ORDER — MIDAZOLAM HYDROCHLORIDE 1 MG/ML
INJECTION INTRAMUSCULAR; INTRAVENOUS AS NEEDED
Status: DISCONTINUED | OUTPATIENT
Start: 2024-03-25 | End: 2024-03-25

## 2024-03-25 RX ORDER — HYDROMORPHONE HYDROCHLORIDE 1 MG/ML
0.2 INJECTION, SOLUTION INTRAMUSCULAR; INTRAVENOUS; SUBCUTANEOUS EVERY 5 MIN PRN
Status: DISCONTINUED | OUTPATIENT
Start: 2024-03-25 | End: 2024-03-25 | Stop reason: HOSPADM

## 2024-03-25 RX ORDER — HYDROMORPHONE HYDROCHLORIDE 1 MG/ML
0.2 INJECTION, SOLUTION INTRAMUSCULAR; INTRAVENOUS; SUBCUTANEOUS EVERY 2 HOUR PRN
Status: DISCONTINUED | OUTPATIENT
Start: 2024-03-25 | End: 2024-03-25

## 2024-03-25 RX ORDER — ONDANSETRON HYDROCHLORIDE 2 MG/ML
4 INJECTION, SOLUTION INTRAVENOUS ONCE AS NEEDED
Status: DISCONTINUED | OUTPATIENT
Start: 2024-03-25 | End: 2024-03-25 | Stop reason: HOSPADM

## 2024-03-25 RX ORDER — HYDROMORPHONE HYDROCHLORIDE 1 MG/ML
INJECTION, SOLUTION INTRAMUSCULAR; INTRAVENOUS; SUBCUTANEOUS AS NEEDED
Status: DISCONTINUED | OUTPATIENT
Start: 2024-03-25 | End: 2024-03-25

## 2024-03-25 RX ORDER — INSULIN LISPRO 100 [IU]/ML
0-5 INJECTION, SOLUTION INTRAVENOUS; SUBCUTANEOUS
Status: DISCONTINUED | OUTPATIENT
Start: 2024-03-25 | End: 2024-03-25

## 2024-03-25 RX ORDER — METOCLOPRAMIDE HYDROCHLORIDE 5 MG/ML
10 INJECTION INTRAMUSCULAR; INTRAVENOUS EVERY 6 HOURS PRN
Status: DISCONTINUED | OUTPATIENT
Start: 2024-03-25 | End: 2024-03-26 | Stop reason: HOSPADM

## 2024-03-25 RX ORDER — INSULIN GLARGINE 100 [IU]/ML
8 INJECTION, SOLUTION SUBCUTANEOUS NIGHTLY
Status: DISCONTINUED | OUTPATIENT
Start: 2024-03-25 | End: 2024-03-25

## 2024-03-25 RX ORDER — PHENYLEPHRINE HCL IN 0.9% NACL 0.4MG/10ML
SYRINGE (ML) INTRAVENOUS AS NEEDED
Status: DISCONTINUED | OUTPATIENT
Start: 2024-03-25 | End: 2024-03-25

## 2024-03-25 RX ORDER — CEFAZOLIN 1 G/1
INJECTION, POWDER, FOR SOLUTION INTRAVENOUS AS NEEDED
Status: DISCONTINUED | OUTPATIENT
Start: 2024-03-25 | End: 2024-03-25

## 2024-03-25 RX ORDER — ALBUMIN HUMAN 50 G/1000ML
SOLUTION INTRAVENOUS AS NEEDED
Status: DISCONTINUED | OUTPATIENT
Start: 2024-03-25 | End: 2024-03-25

## 2024-03-25 RX ORDER — PANTOPRAZOLE SODIUM 40 MG/1
40 TABLET, DELAYED RELEASE ORAL
Status: DISCONTINUED | OUTPATIENT
Start: 2024-03-26 | End: 2024-03-25

## 2024-03-25 RX ORDER — SIMETHICONE 80 MG
80 TABLET,CHEWABLE ORAL EVERY 4 HOURS PRN
Status: DISCONTINUED | OUTPATIENT
Start: 2024-03-25 | End: 2024-03-26 | Stop reason: HOSPADM

## 2024-03-25 RX ORDER — SCOLOPAMINE TRANSDERMAL SYSTEM 1 MG/1
1 PATCH, EXTENDED RELEASE TRANSDERMAL ONCE
Status: DISCONTINUED | OUTPATIENT
Start: 2024-03-25 | End: 2024-03-25

## 2024-03-25 RX ORDER — METHOCARBAMOL 100 MG/ML
1000 INJECTION, SOLUTION INTRAMUSCULAR; INTRAVENOUS EVERY 8 HOURS
Status: DISCONTINUED | OUTPATIENT
Start: 2024-03-25 | End: 2024-03-26 | Stop reason: HOSPADM

## 2024-03-25 RX ORDER — LABETALOL HYDROCHLORIDE 5 MG/ML
5 INJECTION, SOLUTION INTRAVENOUS ONCE
Status: COMPLETED | OUTPATIENT
Start: 2024-03-25 | End: 2024-03-25

## 2024-03-25 RX ORDER — HYDROMORPHONE HYDROCHLORIDE 1 MG/ML
0.4 INJECTION, SOLUTION INTRAMUSCULAR; INTRAVENOUS; SUBCUTANEOUS EVERY 4 HOURS PRN
Status: DISCONTINUED | OUTPATIENT
Start: 2024-03-25 | End: 2024-03-26 | Stop reason: HOSPADM

## 2024-03-25 RX ORDER — ROCURONIUM BROMIDE 10 MG/ML
INJECTION, SOLUTION INTRAVENOUS AS NEEDED
Status: DISCONTINUED | OUTPATIENT
Start: 2024-03-25 | End: 2024-03-25

## 2024-03-25 RX ORDER — PANTOPRAZOLE SODIUM 40 MG/10ML
40 INJECTION, POWDER, LYOPHILIZED, FOR SOLUTION INTRAVENOUS
Status: DISCONTINUED | OUTPATIENT
Start: 2024-03-26 | End: 2024-03-26 | Stop reason: HOSPADM

## 2024-03-25 RX ORDER — ONDANSETRON 4 MG/1
4 TABLET, ORALLY DISINTEGRATING ORAL EVERY 8 HOURS PRN
Status: DISCONTINUED | OUTPATIENT
Start: 2024-03-25 | End: 2024-03-26

## 2024-03-25 RX ORDER — ONDANSETRON HYDROCHLORIDE 2 MG/ML
4 INJECTION, SOLUTION INTRAVENOUS EVERY 8 HOURS PRN
Status: DISCONTINUED | OUTPATIENT
Start: 2024-03-25 | End: 2024-03-26

## 2024-03-25 RX ORDER — OXYCODONE HCL 5 MG/5 ML
5 SOLUTION, ORAL ORAL EVERY 6 HOURS PRN
Status: DISCONTINUED | OUTPATIENT
Start: 2024-03-25 | End: 2024-03-26 | Stop reason: HOSPADM

## 2024-03-25 RX ORDER — NORETHINDRONE AND ETHINYL ESTRADIOL 0.5-0.035
KIT ORAL AS NEEDED
Status: DISCONTINUED | OUTPATIENT
Start: 2024-03-25 | End: 2024-03-25

## 2024-03-25 RX ORDER — SODIUM CHLORIDE, SODIUM LACTATE, POTASSIUM CHLORIDE, CALCIUM CHLORIDE 600; 310; 30; 20 MG/100ML; MG/100ML; MG/100ML; MG/100ML
150 INJECTION, SOLUTION INTRAVENOUS CONTINUOUS
Status: DISCONTINUED | OUTPATIENT
Start: 2024-03-25 | End: 2024-03-26 | Stop reason: HOSPADM

## 2024-03-25 RX ORDER — OXYCODONE HCL 5 MG/5 ML
10 SOLUTION, ORAL ORAL EVERY 6 HOURS PRN
Status: DISCONTINUED | OUTPATIENT
Start: 2024-03-25 | End: 2024-03-26 | Stop reason: HOSPADM

## 2024-03-25 RX ORDER — LIDOCAINE 560 MG/1
1 PATCH PERCUTANEOUS; TOPICAL; TRANSDERMAL DAILY
Status: DISCONTINUED | OUTPATIENT
Start: 2024-03-25 | End: 2024-03-26 | Stop reason: HOSPADM

## 2024-03-25 RX ORDER — INSULIN GLARGINE 100 [IU]/ML
10 INJECTION, SOLUTION SUBCUTANEOUS NIGHTLY
Status: DISCONTINUED | OUTPATIENT
Start: 2024-03-25 | End: 2024-03-26 | Stop reason: HOSPADM

## 2024-03-25 RX ORDER — HEPARIN SODIUM 5000 [USP'U]/ML
5000 INJECTION, SOLUTION INTRAVENOUS; SUBCUTANEOUS ONCE
Status: COMPLETED | OUTPATIENT
Start: 2024-03-25 | End: 2024-03-25

## 2024-03-25 RX ORDER — INSULIN LISPRO 100 [IU]/ML
0-5 INJECTION, SOLUTION INTRAVENOUS; SUBCUTANEOUS EVERY 6 HOURS
Status: DISCONTINUED | OUTPATIENT
Start: 2024-03-25 | End: 2024-03-26

## 2024-03-25 RX ORDER — ACETAMINOPHEN 160 MG/5ML
650 SOLUTION ORAL EVERY 4 HOURS
Status: DISCONTINUED | OUTPATIENT
Start: 2024-03-25 | End: 2024-03-26 | Stop reason: HOSPADM

## 2024-03-25 RX ADMIN — HYDROMORPHONE HYDROCHLORIDE 1 MG: 1 INJECTION, SOLUTION INTRAMUSCULAR; INTRAVENOUS; SUBCUTANEOUS at 09:10

## 2024-03-25 RX ADMIN — Medication 120 MCG: at 08:04

## 2024-03-25 RX ADMIN — Medication 200 MCG: at 08:12

## 2024-03-25 RX ADMIN — EPHEDRINE SULFATE 10 MG: 50 INJECTION, SOLUTION INTRAVENOUS at 08:00

## 2024-03-25 RX ADMIN — Medication 160 MCG: at 08:32

## 2024-03-25 RX ADMIN — ACETAMINOPHEN 650 MG: 650 SOLUTION ORAL at 21:00

## 2024-03-25 RX ADMIN — OXYCODONE HYDROCHLORIDE 10 MG: 5 SOLUTION ORAL at 20:59

## 2024-03-25 RX ADMIN — PROMETHAZINE HYDROCHLORIDE 6.25 MG: 25 INJECTION INTRAMUSCULAR; INTRAVENOUS at 09:22

## 2024-03-25 RX ADMIN — INSULIN LISPRO 1 UNITS: 100 INJECTION, SOLUTION INTRAVENOUS; SUBCUTANEOUS at 23:38

## 2024-03-25 RX ADMIN — CEFAZOLIN 3 G: 1 INJECTION, POWDER, FOR SOLUTION INTRAMUSCULAR; INTRAVENOUS at 17:29

## 2024-03-25 RX ADMIN — SODIUM CHLORIDE, POTASSIUM CHLORIDE, SODIUM LACTATE AND CALCIUM CHLORIDE 150 ML/HR: 600; 310; 30; 20 INJECTION, SOLUTION INTRAVENOUS at 13:46

## 2024-03-25 RX ADMIN — LIDOCAINE 1 PATCH: 4 PATCH TOPICAL at 15:28

## 2024-03-25 RX ADMIN — SODIUM CHLORIDE, POTASSIUM CHLORIDE, SODIUM LACTATE AND CALCIUM CHLORIDE: 600; 310; 30; 20 INJECTION, SOLUTION INTRAVENOUS at 08:42

## 2024-03-25 RX ADMIN — ALBUMIN HUMAN 250 ML: 0.05 INJECTION, SOLUTION INTRAVENOUS at 08:13

## 2024-03-25 RX ADMIN — HEPARIN SODIUM 5000 UNITS: 5000 INJECTION INTRAVENOUS; SUBCUTANEOUS at 06:56

## 2024-03-25 RX ADMIN — INSULIN LISPRO 3 UNITS: 100 INJECTION, SOLUTION INTRAVENOUS; SUBCUTANEOUS at 13:38

## 2024-03-25 RX ADMIN — KETOROLAC TROMETHAMINE 15 MG: 30 INJECTION, SOLUTION INTRAMUSCULAR at 17:30

## 2024-03-25 RX ADMIN — MIDAZOLAM HYDROCHLORIDE 2 MG: 1 INJECTION, SOLUTION INTRAMUSCULAR; INTRAVENOUS at 07:26

## 2024-03-25 RX ADMIN — SODIUM CHLORIDE, POTASSIUM CHLORIDE, SODIUM LACTATE AND CALCIUM CHLORIDE 150 ML/HR: 600; 310; 30; 20 INJECTION, SOLUTION INTRAVENOUS at 23:41

## 2024-03-25 RX ADMIN — Medication 2 L/MIN: at 10:00

## 2024-03-25 RX ADMIN — ALBUMIN HUMAN 250 ML: 0.05 INJECTION, SOLUTION INTRAVENOUS at 08:23

## 2024-03-25 RX ADMIN — FENTANYL CITRATE 50 MCG: 50 INJECTION, SOLUTION INTRAMUSCULAR; INTRAVENOUS at 07:54

## 2024-03-25 RX ADMIN — ONDANSETRON 4 MG: 2 INJECTION INTRAMUSCULAR; INTRAVENOUS at 18:21

## 2024-03-25 RX ADMIN — LABETALOL HYDROCHLORIDE 5 MG: 5 INJECTION, SOLUTION INTRAVENOUS at 09:56

## 2024-03-25 RX ADMIN — SODIUM CHLORIDE, POTASSIUM CHLORIDE, SODIUM LACTATE AND CALCIUM CHLORIDE 100 ML/HR: 600; 310; 30; 20 INJECTION, SOLUTION INTRAVENOUS at 09:50

## 2024-03-25 RX ADMIN — Medication 160 MCG: at 08:22

## 2024-03-25 RX ADMIN — CEFAZOLIN 3 G: 330 INJECTION, POWDER, FOR SOLUTION INTRAMUSCULAR; INTRAVENOUS at 07:43

## 2024-03-25 RX ADMIN — METHOCARBAMOL 1000 MG: 100 INJECTION, SOLUTION INTRAMUSCULAR; INTRAVENOUS at 20:59

## 2024-03-25 RX ADMIN — DEXAMETHASONE SODIUM PHOSPHATE 4 MG: 4 INJECTION, SOLUTION INTRA-ARTICULAR; INTRALESIONAL; INTRAMUSCULAR; INTRAVENOUS; SOFT TISSUE at 08:24

## 2024-03-25 RX ADMIN — METOCLOPRAMIDE 10 MG: 5 INJECTION, SOLUTION INTRAMUSCULAR; INTRAVENOUS at 20:46

## 2024-03-25 RX ADMIN — HEPARIN SODIUM 5000 UNITS: 5000 INJECTION INTRAVENOUS; SUBCUTANEOUS at 15:27

## 2024-03-25 RX ADMIN — METHOCARBAMOL 1000 MG: 100 INJECTION, SOLUTION INTRAMUSCULAR; INTRAVENOUS at 13:36

## 2024-03-25 RX ADMIN — KETOROLAC TROMETHAMINE 15 MG: 30 INJECTION, SOLUTION INTRAMUSCULAR at 23:37

## 2024-03-25 RX ADMIN — EPHEDRINE SULFATE 10 MG: 50 INJECTION, SOLUTION INTRAVENOUS at 08:04

## 2024-03-25 RX ADMIN — ROCURONIUM BROMIDE 10 MG: 10 INJECTION INTRAVENOUS at 08:28

## 2024-03-25 RX ADMIN — KETOROLAC TROMETHAMINE 15 MG: 30 INJECTION, SOLUTION INTRAMUSCULAR at 13:37

## 2024-03-25 RX ADMIN — CEFAZOLIN 3 G: 1 INJECTION, POWDER, FOR SOLUTION INTRAMUSCULAR; INTRAVENOUS at 23:37

## 2024-03-25 RX ADMIN — SCOPALAMINE 1 PATCH: 1 PATCH, EXTENDED RELEASE TRANSDERMAL at 06:56

## 2024-03-25 RX ADMIN — ROPIVACAINE HYDROCHLORIDE 30 ML: 5 INJECTION, SOLUTION EPIDURAL; INFILTRATION; PERINEURAL at 06:40

## 2024-03-25 RX ADMIN — ROCURONIUM BROMIDE 60 MG: 10 INJECTION INTRAVENOUS at 07:34

## 2024-03-25 RX ADMIN — SODIUM CHLORIDE, POTASSIUM CHLORIDE, SODIUM LACTATE AND CALCIUM CHLORIDE 150 ML/HR: 600; 310; 30; 20 INJECTION, SOLUTION INTRAVENOUS at 18:26

## 2024-03-25 RX ADMIN — METOCLOPRAMIDE 10 MG: 5 INJECTION, SOLUTION INTRAMUSCULAR; INTRAVENOUS at 13:37

## 2024-03-25 RX ADMIN — DIATRIZOATE MEGLUMINE AND DIATRIZOATE SODIUM 110 ML: 660; 100 LIQUID ORAL; RECTAL at 13:30

## 2024-03-25 RX ADMIN — ROCURONIUM BROMIDE 20 MG: 10 INJECTION INTRAVENOUS at 08:06

## 2024-03-25 RX ADMIN — GLYCOPYRROLATE 0.2 MG: 1 INJECTION INTRAMUSCULAR; INTRAVENOUS at 07:59

## 2024-03-25 RX ADMIN — ONDANSETRON 4 MG: 2 INJECTION INTRAMUSCULAR; INTRAVENOUS at 08:41

## 2024-03-25 RX ADMIN — ACETAMINOPHEN 650 MG: 650 SOLUTION ORAL at 17:30

## 2024-03-25 RX ADMIN — Medication 200 MCG: at 08:14

## 2024-03-25 RX ADMIN — ACETAMINOPHEN 650 MG: 650 SOLUTION ORAL at 13:37

## 2024-03-25 RX ADMIN — SODIUM CHLORIDE, POTASSIUM CHLORIDE, SODIUM LACTATE AND CALCIUM CHLORIDE: 600; 310; 30; 20 INJECTION, SOLUTION INTRAVENOUS at 07:26

## 2024-03-25 RX ADMIN — SUGAMMADEX 200 MG: 100 INJECTION, SOLUTION INTRAVENOUS at 09:04

## 2024-03-25 RX ADMIN — LIDOCAINE HYDROCHLORIDE 50 MG: 20 INJECTION INTRAVENOUS at 07:34

## 2024-03-25 RX ADMIN — INSULIN LISPRO 2 UNITS: 100 INJECTION, SOLUTION INTRAVENOUS; SUBCUTANEOUS at 18:21

## 2024-03-25 RX ADMIN — INSULIN GLARGINE 10 UNITS: 100 INJECTION, SOLUTION SUBCUTANEOUS at 20:47

## 2024-03-25 RX ADMIN — PROPOFOL 200 MG: 10 INJECTION, EMULSION INTRAVENOUS at 07:34

## 2024-03-25 RX ADMIN — Medication 160 MCG: at 08:06

## 2024-03-25 RX ADMIN — Medication 120 MCG: at 08:08

## 2024-03-25 RX ADMIN — HEPARIN SODIUM 5000 UNITS: 5000 INJECTION INTRAVENOUS; SUBCUTANEOUS at 23:37

## 2024-03-25 SDOH — SOCIAL STABILITY: SOCIAL INSECURITY: ABUSE: ADULT

## 2024-03-25 SDOH — ECONOMIC STABILITY: FOOD INSECURITY: WITHIN THE PAST 12 MONTHS, THE FOOD YOU BOUGHT JUST DIDN'T LAST AND YOU DIDN'T HAVE MONEY TO GET MORE.: NEVER TRUE

## 2024-03-25 SDOH — SOCIAL STABILITY: SOCIAL INSECURITY: ARE YOU OR HAVE YOU BEEN THREATENED OR ABUSED PHYSICALLY, EMOTIONALLY, OR SEXUALLY BY ANYONE?: NO

## 2024-03-25 SDOH — ECONOMIC STABILITY: FOOD INSECURITY: WITHIN THE PAST 12 MONTHS, YOU WORRIED THAT YOUR FOOD WOULD RUN OUT BEFORE YOU GOT MONEY TO BUY MORE.: NEVER TRUE

## 2024-03-25 SDOH — SOCIAL STABILITY: SOCIAL INSECURITY: DO YOU FEEL UNSAFE GOING BACK TO THE PLACE WHERE YOU ARE LIVING?: NO

## 2024-03-25 SDOH — SOCIAL STABILITY: SOCIAL INSECURITY: DO YOU FEEL ANYONE HAS EXPLOITED OR TAKEN ADVANTAGE OF YOU FINANCIALLY OR OF YOUR PERSONAL PROPERTY?: NO

## 2024-03-25 SDOH — SOCIAL STABILITY: SOCIAL INSECURITY: DOES ANYONE TRY TO KEEP YOU FROM HAVING/CONTACTING OTHER FRIENDS OR DOING THINGS OUTSIDE YOUR HOME?: NO

## 2024-03-25 SDOH — SOCIAL STABILITY: SOCIAL INSECURITY: WERE YOU ABLE TO COMPLETE ALL THE BEHAVIORAL HEALTH SCREENINGS?: YES

## 2024-03-25 SDOH — SOCIAL STABILITY: SOCIAL INSECURITY: HAVE YOU HAD THOUGHTS OF HARMING ANYONE ELSE?: NO

## 2024-03-25 SDOH — SOCIAL STABILITY: SOCIAL INSECURITY: ARE THERE ANY APPARENT SIGNS OF INJURIES/BEHAVIORS THAT COULD BE RELATED TO ABUSE/NEGLECT?: NO

## 2024-03-25 SDOH — SOCIAL STABILITY: SOCIAL INSECURITY: HAS ANYONE EVER THREATENED TO HURT YOUR FAMILY OR YOUR PETS?: NO

## 2024-03-25 ASSESSMENT — COGNITIVE AND FUNCTIONAL STATUS - GENERAL
PATIENT BASELINE BEDBOUND: NO
DAILY ACTIVITIY SCORE: 23
STANDING UP FROM CHAIR USING ARMS: A LITTLE
DAILY ACTIVITIY SCORE: 24
MOVING TO AND FROM BED TO CHAIR: A LITTLE
MOVING TO AND FROM BED TO CHAIR: A LITTLE
TOILETING: A LITTLE
MOBILITY SCORE: 20
WALKING IN HOSPITAL ROOM: A LITTLE
WALKING IN HOSPITAL ROOM: A LITTLE
DAILY ACTIVITIY SCORE: 24
CLIMB 3 TO 5 STEPS WITH RAILING: A LITTLE
MOVING TO AND FROM BED TO CHAIR: A LITTLE
STANDING UP FROM CHAIR USING ARMS: A LITTLE
MOVING TO AND FROM BED TO CHAIR: A LITTLE
CLIMB 3 TO 5 STEPS WITH RAILING: A LITTLE
MOBILITY SCORE: 22
MOBILITY SCORE: 20
CLIMB 3 TO 5 STEPS WITH RAILING: A LITTLE
STANDING UP FROM CHAIR USING ARMS: A LITTLE
DAILY ACTIVITIY SCORE: 20
WALKING IN HOSPITAL ROOM: A LITTLE
DAILY ACTIVITIY SCORE: 24
TOILETING: A LITTLE
HELP NEEDED FOR BATHING: A LITTLE
CLIMB 3 TO 5 STEPS WITH RAILING: A LITTLE
DRESSING REGULAR UPPER BODY CLOTHING: A LITTLE
STANDING UP FROM CHAIR USING ARMS: A LITTLE
DRESSING REGULAR LOWER BODY CLOTHING: A LITTLE
WALKING IN HOSPITAL ROOM: A LITTLE
WALKING IN HOSPITAL ROOM: A LITTLE
CLIMB 3 TO 5 STEPS WITH RAILING: A LITTLE

## 2024-03-25 ASSESSMENT — ACTIVITIES OF DAILY LIVING (ADL)
FEEDING YOURSELF: INDEPENDENT
PATIENT'S MEMORY ADEQUATE TO SAFELY COMPLETE DAILY ACTIVITIES?: YES
HEARING - RIGHT EAR: FUNCTIONAL
WALKS IN HOME: NEEDS ASSISTANCE
LACK_OF_TRANSPORTATION: PATIENT DECLINED
TOILETING: INDEPENDENT
LACK_OF_TRANSPORTATION: PATIENT DECLINED
DRESSING YOURSELF: INDEPENDENT
HEARING - LEFT EAR: FUNCTIONAL
ADEQUATE_TO_COMPLETE_ADL: YES
JUDGMENT_ADEQUATE_SAFELY_COMPLETE_DAILY_ACTIVITIES: YES
BATHING: INDEPENDENT
GROOMING: INDEPENDENT

## 2024-03-25 ASSESSMENT — LIFESTYLE VARIABLES
HOW MANY STANDARD DRINKS CONTAINING ALCOHOL DO YOU HAVE ON A TYPICAL DAY: PATIENT DOES NOT DRINK
SUBSTANCE_ABUSE_PAST_12_MONTHS: NO
PRESCIPTION_ABUSE_PAST_12_MONTHS: NO
SKIP TO QUESTIONS 9-10: 1
AUDIT-C TOTAL SCORE: 0
HOW OFTEN DO YOU HAVE A DRINK CONTAINING ALCOHOL: NEVER
HOW OFTEN DO YOU HAVE 6 OR MORE DRINKS ON ONE OCCASION: NEVER
AUDIT-C TOTAL SCORE: 0

## 2024-03-25 ASSESSMENT — PATIENT HEALTH QUESTIONNAIRE - PHQ9
1. LITTLE INTEREST OR PLEASURE IN DOING THINGS: NOT AT ALL
2. FEELING DOWN, DEPRESSED OR HOPELESS: NOT AT ALL
SUM OF ALL RESPONSES TO PHQ9 QUESTIONS 1 & 2: 0

## 2024-03-25 ASSESSMENT — PAIN - FUNCTIONAL ASSESSMENT
PAIN_FUNCTIONAL_ASSESSMENT: 0-10

## 2024-03-25 ASSESSMENT — SOCIAL DETERMINANTS OF HEALTH (SDOH)
WITHIN THE LAST YEAR, HAVE YOU BEEN HUMILIATED OR EMOTIONALLY ABUSED IN OTHER WAYS BY YOUR PARTNER OR EX-PARTNER?: NO
IN THE PAST 12 MONTHS, HAS THE ELECTRIC, GAS, OIL, OR WATER COMPANY THREATENED TO SHUT OFF SERVICE IN YOUR HOME?: NO
WITHIN THE LAST YEAR, HAVE YOU BEEN AFRAID OF YOUR PARTNER OR EX-PARTNER?: NO
WITHIN THE LAST YEAR, HAVE YOU BEEN KICKED, HIT, SLAPPED, OR OTHERWISE PHYSICALLY HURT BY YOUR PARTNER OR EX-PARTNER?: NO
WITHIN THE LAST YEAR, HAVE TO BEEN RAPED OR FORCED TO HAVE ANY KIND OF SEXUAL ACTIVITY BY YOUR PARTNER OR EX-PARTNER?: NO

## 2024-03-25 ASSESSMENT — PAIN SCALES - GENERAL
PAINLEVEL_OUTOF10: 4
PAINLEVEL_OUTOF10: 0 - NO PAIN
PAINLEVEL_OUTOF10: 5 - MODERATE PAIN
PAINLEVEL_OUTOF10: 0 - NO PAIN
PAINLEVEL_OUTOF10: 10 - WORST POSSIBLE PAIN
PAINLEVEL_OUTOF10: 0 - NO PAIN
PAINLEVEL_OUTOF10: 8
PAINLEVEL_OUTOF10: 0 - NO PAIN

## 2024-03-25 ASSESSMENT — COLUMBIA-SUICIDE SEVERITY RATING SCALE - C-SSRS
1. IN THE PAST MONTH, HAVE YOU WISHED YOU WERE DEAD OR WISHED YOU COULD GO TO SLEEP AND NOT WAKE UP?: NO
6. HAVE YOU EVER DONE ANYTHING, STARTED TO DO ANYTHING, OR PREPARED TO DO ANYTHING TO END YOUR LIFE?: NO
2. HAVE YOU ACTUALLY HAD ANY THOUGHTS OF KILLING YOURSELF?: NO

## 2024-03-25 ASSESSMENT — PAIN SCALES - WONG BAKER: WONGBAKER_NUMERICALRESPONSE: HURTS LITTLE MORE

## 2024-03-25 NOTE — BRIEF OP NOTE
Date: 3/25/2024  OR Location: [facility] OR    Name: Nia Harper, : 1990, Age: 33 y.o., MRN: [id number], Sex: female    Diagnosis  Pre-op Diagnosis     * Bariatric surgery status [Z98.84] Post-op Diagnosis     * Bariatric surgery status [Z98.84]     Procedures  Gastric Sleeve Laparoscopic  19352 - ID LAPS GSTRC RSTRICTIV PX LONGITUDINAL GASTRECTOMY      Surgeons      * Sage Ervin - Primary    Resident/Fellow/Other Assistant:  Surgeon(s) and Role:     * Dimas Pan MD - Resident - Assisting    Procedure Summary  Anesthesia: General  ASA: III  Anesthesia Staff: Anesthesiologist: Josafat Florence MD  C-AA: JD Staton  LIANET: Leah Tompkins  Estimated Blood Loss: 5mL  Intra-op Medications:   Administrations occurring from 0715 to 0905 on 24:   Medication Name Total Dose   sodium chloride 0.9 % irrigation solution 3,000 mL   lactated Ringer's infusion Cannot be calculated   vancomycin (Vancocin) 2,000 mg in dextrose 5 % in water (D5W) 500 mL IV 3,000 mg              Anesthesia Record               Intraprocedure I/O Totals          Intake    Dexmedetomidine 0.00 mL    The total shown is the total volume documented since Anesthesia Start was filed.    lactated Ringer's infusion 1000.00 mL    vancomycin (Vancocin) 2,000 mg in dextrose 5 % in water (D5W) 500 mL IV 10.00 mL    Total Intake 1010 mL          Specimen:   ID Type Source Tests Collected by Time   1 : GASTRIC REMNANT Tissue ABSCESS SURGICAL PATHOLOGY EXAM Sage Ervin MD 3/25/2024 08        Staff:   Circulator: Martin Richter RN  Scrub Person: Mino Gilliland          Findings: grossly normal anatomy    Complications:  None; patient tolerated the procedure well.     Disposition: PACU - hemodynamically stable.  Condition: stable  Specimens Collected:   ID Type Source Tests Collected by Time   1 : GASTRIC REMNANT Tissue ABSCESS SURGICAL PATHOLOGY EXAM Sage Ervin MD 3/25/2024 0805     Attending Attestation: I was present and  scrubbed for the entire procedure.    Sage Erivn  Phone Number: [phone]

## 2024-03-25 NOTE — ANESTHESIA PROCEDURE NOTES
Peripheral Block    Patient location during procedure: pre-op  Start time: 3/25/2024 6:31 AM  End time: 3/25/2024 6:45 AM  Reason for block: at surgeon's request  Staffing  Performed: resident   Authorized by: Josafat Florence MD    Performed by: Darian Rizo MD  Preanesthetic Checklist  Completed: patient identified, IV checked, site marked, risks and benefits discussed, surgical consent, monitors and equipment checked, pre-op evaluation and timeout performed   Timeout performed at: 3/25/2024 6:30 AM  Peripheral Block  Patient position: left lateral decubitus  Prep: ChloraPrep  Patient monitoring: heart rate and continuous pulse ox  Block type: QL  Injection technique: single-shot  Guidance: ultrasound guided  Local infiltration: lidocaine  Needle  Needle type: Tuohy   Needle gauge: 26 G  Needle length: 8 cm  Needle localization: ultrasound guidance     image stored in chart  Assessment  Injection assessment: negative aspiration for heme, no paresthesia on injection, incremental injection and local visualized surrounding nerve on ultrasound  Paresthesia pain: none  Heart rate change: no  Slow fractionated injection: no  Additional Notes  QL single shot. informed consent obtained. risks and benefits discussed. ASA monitors placed, timeout performed. Pt positioned first left lateral, then right lateral, prepped with chlorhexidine, draped with sterile towels. Ultrasound guidance used with visualization of the needle throughout duration of the procedure. Aspiration was negative. A total of 30 cc 0.5% ropivacaine, 100mcg epinephrine, and 4mg decadron injected between both sides. Patient tolerated procedure well.     Timeout by Inna ORLANDO

## 2024-03-25 NOTE — ANESTHESIA PROCEDURE NOTES
Airway  Date/Time: 3/25/2024 7:36 AM  Urgency: elective    Airway not difficult    Staffing  Performed: LIANET   Authorized by: Josafat Florence MD    Performed by: JD Staton  Patient location during procedure: OR    Indications and Patient Condition  Indications for airway management: anesthesia  Spontaneous Ventilation: absent  Sedation level: deep  Preoxygenated: yes  Patient position: sniffing  Mask difficulty assessment: 1 - vent by mask    Final Airway Details  Final airway type: endotracheal airway      Successful airway: ETT  Cuffed: yes   Successful intubation technique: direct laryngoscopy  Facilitating devices/methods: intubating stylet and cricoid pressure  Endotracheal tube insertion site: oral  Blade: Ursula  Blade size: #3  ETT size (mm): 7.0  Cormack-Lehane Classification: grade IIa - partial view of glottis  Placement verified by: chest auscultation and capnometry   Measured from: lips  ETT to lips (cm): 23  Number of attempts at approach: 1    Additional Comments  Intubated by LIANET Valdez-2

## 2024-03-25 NOTE — CONSULTS
Consults  Acute Pain Service    Nia Harper is a 33 y.o. year old female patient who presents for gastric sleeve laparoscopic with Dr. Mendoza. Acute Pain consulted for block for postoperative pain control.     Anticipated Postop Pain Issues -   Palliative: typically relieved with IV analgesics and regional local anesthetics  Provocative: typically with movement  Quality: typically burning and aching  Radiation: typically none  Severity: typically severe 8-10/10  Timing: typically constant    Past Medical History:   Diagnosis Date    Abnormal levels of other serum enzymes 2021    Elevated liver enzymes    Anxiety     Anxiety disorder, unspecified 2019    Anxiety and depression    Body mass index (BMI)40.0-44.9, adult 2019    BMI 40.0-44.9, adult    Breast cancer (CMS/[facility])     Carpal tunnel syndrome, bilateral upper limbs 2019    Carpal tunnel syndrome on both sides    Depression, unspecified 2021    Depression    Elevated liver enzymes     Glycosuria 2014    Glucosuria    Hx antineoplastic chemo     Irregular menstruation, unspecified 2015    Missed periods    Mixed hyperlipidemia 2018    Hyperlipemia, mixed    Morbid (severe) obesity due to excess calories (CMS/HCC) 2019    Severe obesity (BMI >= 40)    Myopia, bilateral 09/15/2017    Myopia of both eyes with astigmatism    Type 2 diabetes mellitus without complications (CMS/[facility]) 2022    Diabetes    Vision loss     glasses        Past Surgical History:   Procedure Laterality Date     SECTION, CLASSIC  2019     Section    MASTECTOMY Right     MR HEAD ANGIO WO IV CONTRAST  2015    MR HEAD ANGIO WO IV CONTRAST 2015 [facility] ANCILLARY LEGACY    OTHER SURGICAL HISTORY  2019    Surgical Treatment Of Missed  In First Trimester        Family History   Problem Relation Name Age of Onset    Heart disease Father      Cancer Maternal Grandmother      Diabetes type II Maternal  "Grandmother      Cancer Maternal Grandfather      Cancer Paternal Grandmother      Diabetes type II Mother's Sister          uncontrolled    Breast cancer Father's Sister          two paternal 1st cousins (through paternal uncle, both sisters, both early onset, one ). two paternal great aunts (through PGF's side, both  in their 50s/60s)    Cancer Father's Sister      Cancer Father's Brother      Breast cancer Paternal Cousin          two paternal 1st cousins (through paternal uncle, both sisters, both early onset, one ). two paternal great aunts (through PGF's side, both  in their 50s/60s)    Breast cancer Cousin          Social History     Socioeconomic History    Marital status: Single     Spouse name: Not on file    Number of children: Not on file    Years of education: Not on file    Highest education level: Not on file   Occupational History    Not on file   Tobacco Use    Smoking status: Never     Passive exposure: Never    Smokeless tobacco: Never   Vaping Use    Vaping Use: Never used   Substance and Sexual Activity    Alcohol use: Never    Drug use: Not Currently     Types: Marijuana     Comment: last used 4 weeks ago    Sexual activity: Defer   Other Topics Concern    Not on file   Social History Narrative    Not on file     Social Determinants of Health     Financial Resource Strain: Not on file   Food Insecurity: Not on file   Transportation Needs: Not on file   Physical Activity: Not on file   Stress: Not on file   Social Connections: Not on file   Intimate Partner Violence: Not on file   Housing Stability: Not on file        Allergies   Allergen Reactions    Metformin Swelling    Cephalexin Hives, Other, Rash and Swelling     \"welts\"    Sulfamethoxazole-Trimethoprim Hives, Rash and Swelling         Review of Systems  Gen: No fatigue, anorexia, insomnia, fever.   Eyes: No vision loss, double vision, drainage, eye pain.   ENT: No pharyngitis, dry mouth, no hearing changes or ear " discharge  Cardiac: No chest pain, palpitations, syncope, near syncope.   Pulmonary: No shortness of breath, cough, hemoptysis.   Heme/lymph: No swollen glands, fever, bleeding.   GI: No abdominal pain, change in bowel habits, melena, hematemesis, hematochezia, nausea, vomiting, diarrhea.   : No discharge, dysuria, frequency, urgency, hematuria.  Endo: No polyuria or weight loss.   Musculoskeletal: Negative for any pain or loss of ROM/weakness  Skin: No rashes or lesions  Neuro: Normal speech, no numbness or weakness. No gait difficulties  Review of systems is otherwise negative unless stated above or in history of present illness.    Physical Exam:  Constitutional:  no distress, alert and cooperative  Eyes: clear sclera  Head/Neck: No apparent injury, trachea midline  Respiratory/Thorax: Patent airways, thorax symmetric, breathing comfortably  Cardiovascular: no pitting edema  Gastrointestinal: Nondistended  Musculoskeletal: ROM intact  Extremities: no clubbing  Neurological: alert, barajas x4  Psychological: Appropriate affect    Results for orders placed or performed during the hospital encounter of 03/25/24 (from the past 24 hour(s))   POCT GLUCOSE   Result Value Ref Range    POCT Glucose 144 (H) 74 - 99 mg/dL   POCT pregnancy, urine manually resulted   Result Value Ref Range    Preg Test, Ur Negative Negative      Nia Harper is a 33 y.o. year old female patient who presents for gastric sleeve laparoscopic with Dr. Mendoza. Acute Pain consulted for block for postoperative pain control.     Plan:    - Bilateral quadratus lumborum single shot blocks performed preoperatively on 3/25/24  - Acute pain service will follow POD1 if inpatient  - Rest of pain management per primary team    Acute Pain Resident  pg [phone] ph [phone]

## 2024-03-25 NOTE — ADDENDUM NOTE
Addendum  created 03/25/24 1102 by Tj Ross, [facility]    Intraprocedure Meds edited, Orders acknowledged in Narrator

## 2024-03-25 NOTE — OP NOTE
Gastric Sleeve Laparoscopic Operative Note     Date: 3/25/2024  OR Location: [facility] OR    Name: Nia Harper, : 1990, Age: 33 y.o., MRN: [id number], Sex: female    Diagnosis  Pre-op Diagnosis     * Bariatric surgery status [Z98.84] Post-op Diagnosis     * Bariatric surgery status [Z98.84]     Procedures  Gastric Sleeve Laparoscopic  40651 - [place] LAPS GSTRC RSTRICTIV PX LONGITUDINAL GASTRECTOMY      Surgeons      * Sage Ervin - Primary    Resident/Fellow/Other Assistant:  Surgeon(s) and Role:     * Isabelle De La Rosa MD - Assisting     * Dimas Pan MD - Resident - Assisting    Procedure Summary  Anesthesia: General  ASA: III  Anesthesia Staff: Anesthesiologist: Josafat Florence MD  C-AA: JD Staton  LIANET: Leah Tompkins  Estimated Blood Loss: 5 mL  Intra-op Medications:   Administrations occurring from 0715 to 0905 on 24:   Medication Name Total Dose   sodium chloride 0.9 % irrigation solution 3,000 mL   lactated Ringer's infusion Cannot be calculated              Anesthesia Record               Intraprocedure I/O Totals          Intake    Dexmedetomidine 0.00 mL    The total shown is the total volume documented since Anesthesia Start was filed.    lactated Ringer's infusion 1000.00 mL    Total Intake 1000 mL          Specimen:   ID Type Source Tests Collected by Time   1 : GASTRIC REMNANT Tissue GASTRIC SURGICAL PATHOLOGY EXAM Sage Ervin MD 3/25/2024 0805        Staff:   Circulator: Martin Richter RN  Scrub Person: Mino Gilliland         Drains and/or Catheters: * None in log *    Tourniquet Times:         Implants:     Findings: normal anatomy, negative leak test     Indications: Nia Harper is an 33 y.o. female who is having surgery for Bariatric surgery status [Z98.84].     The patient was seen in the preoperative area. The risks, benefits, complications, treatment options, non-operative alternatives, expected recovery and outcomes were discussed with the patient. The possibilities  of reaction to medication, pulmonary aspiration, injury to surrounding structures, bleeding, recurrent infection, the need for additional procedures, failure to diagnose a condition, and creating a complication requiring transfusion or operation were discussed with the patient. The patient concurred with the proposed plan, giving informed consent.  The site of surgery was properly noted/marked if necessary per policy. The patient has been actively warmed in preoperative area. Preoperative antibiotics have been ordered and given within 1 hours of incision. Venous thrombosis prophylaxis have been ordered including bilateral sequential compression devices and chemical prophylaxis    Procedure Details: Patient was scheduled for sleeve gastrectomy on elective basis after completing all requirements and obtaining appropriate clearances. On the day of surgery after verification of informed consent patient was given subcutaneous heparin, preoperative antibiotics were started, patient was taken to operating room and placed in supine position on the table. Surgical timeout was done. SCDs were placed. General anesthesia was established with endotracheal intubation by anesthesiologist, standard, sterile preparation and draping of abdominal wall was performed.  Entry into abdominal cavity was obtained with 0 degree scope using 5 mm optical trocar in the left upper quadrant without technical problems. Pneumoperitoneum was established to 15 mmHg, there was no iatrogenic injuries at the entry site. Scope was switched with 30 degree.  12 mm port was placed in para-umbilical area and a 5 mm port was placed in the right upper quadrant. 5 mm port was placed in left axillary line. Left lobe of liver was retracted with Nathonson through epigastrium. Hiatus was inspected there was no evidence of gross hiatal hernia. Greater omentum was dissected with the LigaSure starting at the prepyloric area all the way to the fundus, gastric fundus  was completely mobilized. Short gastric vessels were divided. Left stephanie was dissected to make sure there was no posterior hiatal hernia which there was none. A 36 fr visiGI tube was inserted by anesthesiologist and guided into pyloric channel. After this sleeve gastrectomy was performed. First Black load was fired 5 cm from the pylorus on the outer edge of the bougie in the gastric antrum, then a black load against the incisura making sure no narrowing was created and then superiorly green and blue loads were used all the way to the angle of His, leaving a cuff of gastric tissue. All loads were reinforced with seamguard. Staple line was inspected and no technical issues were noted. Pylorus was occluded and air was insufflated through the ViSiGi-tube. Sleeve was submerged underwater there was good distention of the sleeve and there was no evidence of any air bubbles. The tube was removed and an endoscopy was performed with negative leak test and no internal active bleeding and there was no technical issues in the lumen of the sleeve. Fluid was aspirated. Specimen was retrieved through the 15 mm port site and the fascial defect was closed with interrupted 0 Vicryl sutures using Endo passer. There were adhesions of small bowel in pelvis below umbilical area away from our entry site. They were too dense, and left in situ. Final satisfactory examination abdominal cavity was performed, there was no iatrogenic injuries or any active bleeding. Trocars were removed and pneumoperitoneum was discontinued. Specimen extraction site was copiously irrigated. Instrument, needle, sponge counts were correct. Skin was closed with 3-0 Monocril. liquiband adhesive was applied. Patient tolerated the operation well, was extubated in the OR, was transferred recovery room in stable condition.   Complications:  None; patient tolerated the procedure well.    Disposition: PACU - hemodynamically stable.  Condition: stable         Additional  Details:     Attending Attestation:     Sage Ervin  Phone Number: [phone]

## 2024-03-25 NOTE — ANESTHESIA POSTPROCEDURE EVALUATION
Patient: Nia Harper    Procedure Summary       Date: 03/25/24 Room / Location: [facility] OR 12 / Virtual [facility] OR    Anesthesia Start: 0725 Anesthesia Stop: 0915    Procedure: Gastric Sleeve Laparoscopic Diagnosis:       Bariatric surgery status      (Bariatric surgery status [Z98.84])    Surgeons: Sage Ervin MD Responsible Provider: Josafat Florence MD    Anesthesia Type: general ASA Status: 3            Anesthesia Type: general    Vitals Value Taken Time   /89 03/25/24 0915   Temp 36.2 03/25/24 0915   Pulse 124 03/25/24 0912   Resp 16 03/25/24 0912   SpO2 100 % 03/25/24 0912   Vitals shown include unvalidated device data.    Anesthesia Post Evaluation    Patient location during evaluation: bedside  Patient participation: complete - patient participated  Level of consciousness: awake  Pain management: adequate  Airway patency: patent  Cardiovascular status: acceptable  Respiratory status: acceptable  Hydration status: acceptable  Postoperative Nausea and Vomiting: none      No notable events documented.

## 2024-03-25 NOTE — SIGNIFICANT EVENT
Post-Op Check  POD#0 s/p laparoscopic gastric sleeve    S: Reports incisional pain and nausea. No emesis. Requesting pain medication     O:   Vitals:    03/25/24 1319   BP: 130/83   Pulse: 110   Resp: 18   Temp: 36.1 °C (97 °F)   SpO2: 100%       General: no acute distress   CV: sinus tachycardia   Pulm: unlabored on RA   Abd: obese, soft, appropriately tender, nondistended, abdominal lap sites with dermabond D/I, well approximated  Extremities: warm, dry, SCDs     A/P: 33 year old female s/p laparoscopic gastric sleeve on 3/25 with Dr. Ervin. Reports of incisional pain and nausea immediately post-op.      - 2 oz sip of water/crystal light until tomorrow   - Multimodal pain regimen   - scopolamine patch/PRN zofran/reglan for nausea   - encourage OOB/ambulation/IS  - Will continue to monitor    Qi RODNEY CNP  [facility]  [id number]

## 2024-03-25 NOTE — ANESTHESIA PREPROCEDURE EVALUATION
Patient: Nia Harper    Procedure Information       Date/Time: 24 0715    Procedure: Gastric Sleeve Laparoscopic    Location: [facility] OR 12 / [facility]    Surgeons: Sage Ervin MD            Relevant Problems   Anesthesia (within normal limits)      Cardiovascular   (+) Hyperlipemia   (+) Hypertriglyceridemia   (+) Primary hypertension   (+) Systolic murmur      Endocrine   (+) Diabetes mellitus type 2 without retinopathy (CMS/HCC)   (+) Diffuse goiter   (+) Severe obesity (CMS/HCC)      /Renal   (+) Fatty liver      Neuro/Psych   (+) Anxiety disorder   (+) Chronic headaches   (+) Depression      Pulmonary (within normal limits)      GI/Hepatic   (+) Fatty liver   (+) Gallstones      Infectious Disease   (+) Herpes simplex virus (HSV) infection   (+) Hidradenitis suppurativa   (+) Infected sebaceous cyst      Hematology and Neoplasia   (+) Breast cancer (CMS/HCC)     Past Surgical History:   Procedure Laterality Date   •  SECTION, CLASSIC  2019     Section   • MASTECTOMY Right    • MR HEAD ANGIO WO IV CONTRAST  2015    MR HEAD ANGIO WO IV CONTRAST 2015 [facility]   • OTHER SURGICAL HISTORY  2019    Surgical Treatment Of Missed  In First Trimester     Not applicable    Pt is NPO after midnight.    Clinical information reviewed:   Tobacco  Allergies  Meds  Problems  Med Hx  Surg Hx  OB Status    Fam Hx  Soc Hx        NPO Detail:  NPO/Void Status  Date of Last Liquid: 24  Time of Last Liquid: 0300  Date of Last Solid: 24  Time of Last Solid: 0000  Last Intake Type: Clear fluids         Physical Exam    Airway  Mallampati: III  TM distance: >3 FB  Neck ROM: full     Cardiovascular - normal exam  Rhythm: regular  Rate: normal     Dental - normal exam     Pulmonary - normal exam     Abdominal        Anesthesia Plan    History of general anesthesia?: yes  History of complications of general anesthesia?: no    ASA 3      general     intravenous induction   Postoperative administration of opioids is intended.  Anesthetic plan and risks discussed with patient.  Use of blood products discussed with patient who.    Plan discussed with CAA and attending.

## 2024-03-25 NOTE — CARE PLAN
The patient's goals for the shift include  Patient's pain will remain low.    The clinical goals for the shift include patient will remain HDS this admission

## 2024-03-26 VITALS
HEIGHT: 70 IN | RESPIRATION RATE: 18 BRPM | BODY MASS INDEX: 39.36 KG/M2 | TEMPERATURE: 96.6 F | OXYGEN SATURATION: 97 % | HEART RATE: 78 BPM | WEIGHT: 274.91 LBS | DIASTOLIC BLOOD PRESSURE: 77 MMHG | SYSTOLIC BLOOD PRESSURE: 132 MMHG

## 2024-03-26 LAB
ALBUMIN SERPL BCP-MCNC: 4.7 G/DL (ref 3.4–5)
ALP SERPL-CCNC: 53 U/L (ref 33–110)
ALT SERPL W P-5'-P-CCNC: 83 U/L (ref 7–45)
ANION GAP SERPL CALC-SCNC: 18 MMOL/L (ref 10–20)
AST SERPL W P-5'-P-CCNC: 42 U/L (ref 9–39)
BASOPHILS # BLD AUTO: 0.01 X10*3/UL (ref 0–0.1)
BASOPHILS NFR BLD AUTO: 0.2 %
BILIRUB SERPL-MCNC: 0.5 MG/DL (ref 0–1.2)
BUN SERPL-MCNC: 10 MG/DL (ref 6–23)
CALCIUM SERPL-MCNC: 10 MG/DL (ref 8.6–10.6)
CHLORIDE SERPL-SCNC: 100 MMOL/L (ref 98–107)
CO2 SERPL-SCNC: 24 MMOL/L (ref 21–32)
CREAT SERPL-MCNC: 0.59 MG/DL (ref 0.5–1.05)
EGFRCR SERPLBLD CKD-EPI 2021: >90 ML/MIN/1.73M*2
EOSINOPHIL # BLD AUTO: 0 X10*3/UL (ref 0–0.7)
EOSINOPHIL NFR BLD AUTO: 0 %
ERYTHROCYTE [DISTWIDTH] IN BLOOD BY AUTOMATED COUNT: 13 % (ref 11.5–14.5)
GLUCOSE BLD MANUAL STRIP-MCNC: 171 MG/DL (ref 74–99)
GLUCOSE BLD MANUAL STRIP-MCNC: 172 MG/DL (ref 74–99)
GLUCOSE BLD MANUAL STRIP-MCNC: 197 MG/DL (ref 74–99)
GLUCOSE BLD MANUAL STRIP-MCNC: 208 MG/DL (ref 74–99)
GLUCOSE SERPL-MCNC: 167 MG/DL (ref 74–99)
HCT VFR BLD AUTO: 40.2 % (ref 36–46)
HGB BLD-MCNC: 13.1 G/DL (ref 12–16)
IMM GRANULOCYTES # BLD AUTO: 0.02 X10*3/UL (ref 0–0.7)
IMM GRANULOCYTES NFR BLD AUTO: 0.3 % (ref 0–0.9)
LYMPHOCYTES # BLD AUTO: 1.48 X10*3/UL (ref 1.2–4.8)
LYMPHOCYTES NFR BLD AUTO: 22.9 %
MAGNESIUM SERPL-MCNC: 1.84 MG/DL (ref 1.6–2.4)
MCH RBC QN AUTO: 28.5 PG (ref 26–34)
MCHC RBC AUTO-ENTMCNC: 32.6 G/DL (ref 32–36)
MCV RBC AUTO: 88 FL (ref 80–100)
MONOCYTES # BLD AUTO: 0.49 X10*3/UL (ref 0.1–1)
MONOCYTES NFR BLD AUTO: 7.6 %
NEUTROPHILS # BLD AUTO: 4.45 X10*3/UL (ref 1.2–7.7)
NEUTROPHILS NFR BLD AUTO: 69 %
NRBC BLD-RTO: 0 /100 WBCS (ref 0–0)
PLATELET # BLD AUTO: 221 X10*3/UL (ref 150–450)
POTASSIUM SERPL-SCNC: 3.8 MMOL/L (ref 3.5–5.3)
PROT SERPL-MCNC: 8 G/DL (ref 6.4–8.2)
RBC # BLD AUTO: 4.59 X10*6/UL (ref 4–5.2)
SODIUM SERPL-SCNC: 138 MMOL/L (ref 136–145)
WBC # BLD AUTO: 6.5 X10*3/UL (ref 4.4–11.3)

## 2024-03-26 PROCEDURE — 99232 SBSQ HOSP IP/OBS MODERATE 35: CPT | Performed by: NURSE PRACTITIONER

## 2024-03-26 PROCEDURE — 83735 ASSAY OF MAGNESIUM: CPT | Performed by: NURSE PRACTITIONER

## 2024-03-26 PROCEDURE — 99024 POSTOP FOLLOW-UP VISIT: CPT | Performed by: NURSE PRACTITIONER

## 2024-03-26 PROCEDURE — 2500000002 HC RX 250 W HCPCS SELF ADMINISTERED DRUGS (ALT 637 FOR MEDICARE OP, ALT 636 FOR OP/ED): Mod: SE | Performed by: NURSE PRACTITIONER

## 2024-03-26 PROCEDURE — 85025 COMPLETE CBC W/AUTO DIFF WBC: CPT | Performed by: STUDENT IN AN ORGANIZED HEALTH CARE EDUCATION/TRAINING PROGRAM

## 2024-03-26 PROCEDURE — 2500000005 HC RX 250 GENERAL PHARMACY W/O HCPCS: Mod: SE | Performed by: NURSE PRACTITIONER

## 2024-03-26 PROCEDURE — 2500000004 HC RX 250 GENERAL PHARMACY W/ HCPCS (ALT 636 FOR OP/ED): Mod: SE | Performed by: NURSE PRACTITIONER

## 2024-03-26 PROCEDURE — 36415 COLL VENOUS BLD VENIPUNCTURE: CPT | Performed by: STUDENT IN AN ORGANIZED HEALTH CARE EDUCATION/TRAINING PROGRAM

## 2024-03-26 PROCEDURE — 1100000001 HC PRIVATE ROOM DAILY

## 2024-03-26 PROCEDURE — 99231 SBSQ HOSP IP/OBS SF/LOW 25: CPT | Performed by: STUDENT IN AN ORGANIZED HEALTH CARE EDUCATION/TRAINING PROGRAM

## 2024-03-26 PROCEDURE — 2500000001 HC RX 250 WO HCPCS SELF ADMINISTERED DRUGS (ALT 637 FOR MEDICARE OP): Mod: SE | Performed by: STUDENT IN AN ORGANIZED HEALTH CARE EDUCATION/TRAINING PROGRAM

## 2024-03-26 PROCEDURE — C9113 INJ PANTOPRAZOLE SODIUM, VIA: HCPCS | Mod: SE | Performed by: STUDENT IN AN ORGANIZED HEALTH CARE EDUCATION/TRAINING PROGRAM

## 2024-03-26 PROCEDURE — 80053 COMPREHEN METABOLIC PANEL: CPT | Performed by: STUDENT IN AN ORGANIZED HEALTH CARE EDUCATION/TRAINING PROGRAM

## 2024-03-26 PROCEDURE — 2500000004 HC RX 250 GENERAL PHARMACY W/ HCPCS (ALT 636 FOR OP/ED): Mod: SE | Performed by: STUDENT IN AN ORGANIZED HEALTH CARE EDUCATION/TRAINING PROGRAM

## 2024-03-26 PROCEDURE — 96372 THER/PROPH/DIAG INJ SC/IM: CPT | Performed by: STUDENT IN AN ORGANIZED HEALTH CARE EDUCATION/TRAINING PROGRAM

## 2024-03-26 PROCEDURE — 2500000004 HC RX 250 GENERAL PHARMACY W/ HCPCS (ALT 636 FOR OP/ED): Mod: SE

## 2024-03-26 PROCEDURE — 82947 ASSAY GLUCOSE BLOOD QUANT: CPT

## 2024-03-26 RX ORDER — ONDANSETRON HYDROCHLORIDE 2 MG/ML
4 INJECTION, SOLUTION INTRAVENOUS EVERY 6 HOURS PRN
Status: DISCONTINUED | OUTPATIENT
Start: 2024-03-26 | End: 2024-03-26

## 2024-03-26 RX ORDER — ONDANSETRON 4 MG/1
4 TABLET, ORALLY DISINTEGRATING ORAL EVERY 6 HOURS PRN
Status: DISCONTINUED | OUTPATIENT
Start: 2024-03-26 | End: 2024-03-26 | Stop reason: HOSPADM

## 2024-03-26 RX ORDER — INSULIN LISPRO 100 [IU]/ML
0-10 INJECTION, SOLUTION INTRAVENOUS; SUBCUTANEOUS EVERY 6 HOURS
Status: DISCONTINUED | OUTPATIENT
Start: 2024-03-26 | End: 2024-03-26 | Stop reason: HOSPADM

## 2024-03-26 RX ORDER — HYDROMORPHONE HYDROCHLORIDE 1 MG/ML
0.4 INJECTION, SOLUTION INTRAMUSCULAR; INTRAVENOUS; SUBCUTANEOUS ONCE
Status: COMPLETED | OUTPATIENT
Start: 2024-03-26 | End: 2024-03-26

## 2024-03-26 RX ORDER — ONDANSETRON 4 MG/1
4 TABLET, ORALLY DISINTEGRATING ORAL EVERY 8 HOURS PRN
Status: DISCONTINUED | OUTPATIENT
Start: 2024-03-26 | End: 2024-03-26

## 2024-03-26 RX ORDER — ONDANSETRON HYDROCHLORIDE 2 MG/ML
4 INJECTION, SOLUTION INTRAVENOUS EVERY 6 HOURS PRN
Status: DISCONTINUED | OUTPATIENT
Start: 2024-03-26 | End: 2024-03-26 | Stop reason: HOSPADM

## 2024-03-26 RX ORDER — OXYCODONE HCL 5 MG/5 ML
5 SOLUTION, ORAL ORAL EVERY 6 HOURS PRN
Qty: 140 ML | Refills: 0
Start: 2024-03-26

## 2024-03-26 RX ORDER — DIPHENHYDRAMINE HCL 25 MG
25 TABLET ORAL NIGHTLY PRN
COMMUNITY

## 2024-03-26 RX ADMIN — PANTOPRAZOLE SODIUM 40 MG: 40 INJECTION, POWDER, FOR SOLUTION INTRAVENOUS at 06:00

## 2024-03-26 RX ADMIN — HEPARIN SODIUM 5000 UNITS: 5000 INJECTION INTRAVENOUS; SUBCUTANEOUS at 14:32

## 2024-03-26 RX ADMIN — SODIUM CHLORIDE, POTASSIUM CHLORIDE, SODIUM LACTATE AND CALCIUM CHLORIDE 150 ML/HR: 600; 310; 30; 20 INJECTION, SOLUTION INTRAVENOUS at 06:52

## 2024-03-26 RX ADMIN — HEPARIN SODIUM 5000 UNITS: 5000 INJECTION INTRAVENOUS; SUBCUTANEOUS at 06:00

## 2024-03-26 RX ADMIN — METHOCARBAMOL 1000 MG: 100 INJECTION, SOLUTION INTRAMUSCULAR; INTRAVENOUS at 05:54

## 2024-03-26 RX ADMIN — ACETAMINOPHEN 650 MG: 650 SOLUTION ORAL at 05:54

## 2024-03-26 RX ADMIN — ONDANSETRON 4 MG: 2 INJECTION INTRAMUSCULAR; INTRAVENOUS at 02:07

## 2024-03-26 RX ADMIN — INSULIN LISPRO 2 UNITS: 100 INJECTION, SOLUTION INTRAVENOUS; SUBCUTANEOUS at 14:32

## 2024-03-26 RX ADMIN — ONDANSETRON 4 MG: 2 INJECTION INTRAMUSCULAR; INTRAVENOUS at 09:11

## 2024-03-26 RX ADMIN — LIDOCAINE 1 PATCH: 4 PATCH TOPICAL at 08:51

## 2024-03-26 RX ADMIN — OXYCODONE HYDROCHLORIDE 10 MG: 5 SOLUTION ORAL at 04:09

## 2024-03-26 RX ADMIN — HYDROMORPHONE HYDROCHLORIDE 0.4 MG: 1 INJECTION, SOLUTION INTRAMUSCULAR; INTRAVENOUS; SUBCUTANEOUS at 02:23

## 2024-03-26 RX ADMIN — HYDROMORPHONE HYDROCHLORIDE 0.4 MG: 1 INJECTION, SOLUTION INTRAMUSCULAR; INTRAVENOUS; SUBCUTANEOUS at 08:51

## 2024-03-26 RX ADMIN — KETOROLAC TROMETHAMINE 15 MG: 30 INJECTION, SOLUTION INTRAMUSCULAR at 05:54

## 2024-03-26 RX ADMIN — METOCLOPRAMIDE 10 MG: 5 INJECTION, SOLUTION INTRAMUSCULAR; INTRAVENOUS at 06:05

## 2024-03-26 RX ADMIN — SODIUM CHLORIDE, POTASSIUM CHLORIDE, SODIUM LACTATE AND CALCIUM CHLORIDE 150 ML/HR: 600; 310; 30; 20 INJECTION, SOLUTION INTRAVENOUS at 12:32

## 2024-03-26 RX ADMIN — KETOROLAC TROMETHAMINE 15 MG: 30 INJECTION, SOLUTION INTRAMUSCULAR at 12:32

## 2024-03-26 RX ADMIN — INSULIN LISPRO 1 UNITS: 100 INJECTION, SOLUTION INTRAVENOUS; SUBCUTANEOUS at 05:57

## 2024-03-26 RX ADMIN — METHOCARBAMOL 1000 MG: 100 INJECTION, SOLUTION INTRAMUSCULAR; INTRAVENOUS at 14:32

## 2024-03-26 RX ADMIN — METOCLOPRAMIDE 10 MG: 5 INJECTION, SOLUTION INTRAMUSCULAR; INTRAVENOUS at 12:32

## 2024-03-26 ASSESSMENT — PAIN SCALES - GENERAL
PAINLEVEL_OUTOF10: 8
PAINLEVEL_OUTOF10: 5 - MODERATE PAIN
PAINLEVEL_OUTOF10: 8
PAINLEVEL_OUTOF10: 4
PAINLEVEL_OUTOF10: 8
PAINLEVEL_OUTOF10: 8

## 2024-03-26 ASSESSMENT — PAIN - FUNCTIONAL ASSESSMENT
PAIN_FUNCTIONAL_ASSESSMENT: 0-10

## 2024-03-26 ASSESSMENT — PAIN DESCRIPTION - DESCRIPTORS: DESCRIPTORS: ACHING;SORE

## 2024-03-26 ASSESSMENT — PAIN DESCRIPTION - LOCATION: LOCATION: ABDOMEN

## 2024-03-26 ASSESSMENT — PAIN DESCRIPTION - ORIENTATION: ORIENTATION: MID

## 2024-03-26 NOTE — PROGRESS NOTES
Pharmacy Admission Order Reconciliation Review    Nia Harper is a 33 y.o. female admitted for Bariatric surgery status. Pharmacy reviewed the patient's unreconciled admission medications.    Prior to admission medications that were reviewed and acted on by the pharmacist include:  Benadryl   These medications have been reconciled.     Any other unreconcilied medications have been addressed and will be ordered or held by the patient's medical team. Medications addressed by the pharmacist may be added or changed by the patient's medical team at any time.    Germania Luz, PharmD  Transitions of Care Pharmacist  [facility]  Please reach out via Secure Chat for questions, or if no response call [phone]

## 2024-03-26 NOTE — PROGRESS NOTES
Pharmacy Medication History Review    Nia Harper is a 33 y.o. female admitted for Bariatric surgery status. Pharmacy reviewed the patient's [id number]-wjnurgsvb medications and allergies for accuracy.    The list below reflects the updated PTA list. Comments regarding how patient may be taking medications differently can be found in the Admit Orders Activity  Prior to Admission Medications   Prescriptions Last Dose Informant Taking?   DULoxetine (Cymbalta) 60 mg DR capsule Past Week  Yes   Sig: Take 1 capsule (60 mg) by mouth once daily. Do not crush or chew. Take in the evening with food. Increase to 60mg daily dosing.   LORazepam (Ativan) 1 mg tablet Past Week  Yes   Sig: TAKE 1 TABLET BY MOUTH ONCE DAILY AS NEEDED FOR ANXIETY   anastrozole (Arimidex) 1 mg tablet 3/24/2024  Yes   Sig: Take 1 tablet (1 mg total) by mouth once daily.  Swallow whole with a drink of water.   atorvastatin (Lipitor) 40 mg tablet 3/24/2024  Yes   Sig: Take 1 tablet (40 mg) by mouth once daily at bedtime.   benzoyl peroxide 5 % external wash Past Month  Yes   Sig: Apply topically once daily. Lather onto skin folds, leave on 2 minutes, rinse with water   diphenhydrAMINE (Sominex) 25 mg tablet   Yes   Sig: Take 1 tablet (25 mg) by mouth as needed at bedtime for sleep.   dulaglutide (Trulicity) 3 mg/0.5 mL pen injector Past Week  Yes   Sig: Once weekly   Patient taking differently: Once weekly on wednesday   estradiol (Estrace) 0.01 % (0.1 mg/gram) vaginal cream   No; new to pt. Not started yet   Sig: Insert 0.5 Applicatorfuls (2 g) into the vagina once daily. Use once a day for 2 weeks then as needed there after   ezetimibe (Zetia) 10 mg tablet Past Week  Yes   Sig: Take 1 tablet (10 mg) by mouth once daily.   insulin glargine (Lantus Solostar U-100 Insulin) 100 unit/mL (3 mL) pen 3/24/2024  Yes   Sig: Inject 40 Units under the skin 2 times a day.   insulin lispro (HumaLOG Aurelio Kwikpen) 100 unit/mL injection Past Week  Yes   Sig: Inject  "40 Units under the skin 3 times a day as needed for high blood sugar. inject up to 20 units before meals per sliding scale   metFORMIN  mg 24 hr tablet Past Week  Yes   Sig: Take 2 tablets (1,000 mg) by mouth 2 times a day with meals.   omega-3 acid ethyl esters (Lovaza) 1 gram capsule   Yes   Sig: Take 2 capsules (2 g) by mouth 2 times a day with meals.   omeprazole (PriLOSEC) 40 mg DR capsule Not Taking  No; new to pt- not started yet   Sig: Take 1 capsule (40 mg) by mouth once daily in the morning. Take before meals. Do not crush or chew. Open capsule, sprinkle beads on SF jello, pudding or applesauce.   ondansetron ODT (Zofran-ODT) 4 mg disintegrating tablet   Yes   Sig: Take 1 tablet (4 mg) by mouth every 6 hours if needed for nausea or vomiting.   oxyCODONE (Roxicodone) 5 mg/5 mL solution   No   Sig: Take 5 mL (5 mg) by mouth every 6 hours if needed for severe pain (7 - 10) or moderate pain (4 - 6) for up to 7 days.   valACYclovir (Valtrex) 500 mg tablet Past Week  Yes; takes as needed for outbreaks   Sig: Take 1 tablet (500 mg) by mouth 2 times a day.      Facility-Administered Medications: None        The list below reflects the updated allergy list. Please review each documented allergy for additional clarification and justification.  Allergies  Reviewed by Germania Luz, PharmD on 3/26/2024        Severity Reactions Comments    Metformin Not Specified Swelling Immediate release only- currently taking ER without problems    Cephalexin Low Hives, Other, Rash, Swelling \"welts\"    Sulfamethoxazole-trimethoprim Low Hives, Rash, Swelling             Patient accepts M2B at discharge. Pharmacy has been updated to [facility].    Sources used: Pharmacy dispense history, OARRs, patient interview (great historian- knew indication, doses and frequency), endocrine note from 11/29/23 and oncology note from 2/29    Below are additional concerns with the patient's PTA list.  -states that Estrace and Prilosec is new " to pt and hasn't started taking at home yet    Medications ADDED:  Benadryl   Medications CHANGED:  none  Medications REMOVED:   none      Germania Luz, PharmD, [facility]  Transitions of Care Pharmacist  [facility]  Please reach out via Secure Chat for questions, or if no response call [phone] or vocera [facility]

## 2024-03-26 NOTE — PROGRESS NOTES
Discharge planning note:       Nia Harper is a 33 y.o. female on day 1 of admission presenting with Bariatric surgery status.    Met with the patient at the bedside confirmed all information on the demographics page. Lives in a home with 3 children and boyfriend Has 3 steps to enter her home and 15 up to BR/BA. No DME or homecare prior to admission. Confirmed PCP Olga Garcia and preference [facility] , if needed for services.         Kelly Ortiz RN TCC

## 2024-03-26 NOTE — DISCHARGE INSTRUCTIONS
Medications:  -Medications should be crushed and mixed with full liquids. Capsules may be opened and mixed with full liquids.  -Extended release medications should not be taken. Please contact your primary care physician to convert extended release medications to immediate release form.  -Take 650mg Tylenol (liquid preferred) every 6 hours for pain. Take between doses of your opioid (oxycodone) pain medication. Try to limit the use of your opioid pain medication and only take as needed.  - Take omeprazole daily as prescribed- open capsules and sprinkle over applesauce or pudding.   - Take zofran tablets as needed to prevent nausea or vomiting.    -Slowly add your vitamins to your daily medication regimen as tolerated. You do not have to take them within the first few days after surgery if they are causing you nausea or other problems.  - Check your glucose at home prior to restarting full insulin/diabetic regimen. Diabetic medication/insulin needs are typically reduced after bariatric surgery and with liquid diet. Recommend taking 1/2 of insulin regimen and follow up with your PCP or endocrinologist for any needed medication adjustments.   -If you have medications that you still cannot tolerate by your first visit with your surgeon or dietitian, please discuss this.

## 2024-03-26 NOTE — PROGRESS NOTES
Acute Pain Service  Nia Harper is a 33 y.o. year old female patient who presents for gastric sleeve laparoscopic with Dr. Mendoza. Acute Pain consulted for block for postoperative pain control.     Postop Pain HPI -   Palliative: relieved with IV analgesics and regional local anesthetics  Provocative: movement  Quality:  burning and aching  Radiation:  none  Severity:  8/10; worsened overnight  Timing: constant    24-HOUR OPIOID CONSUMPTION:  Dilaudid 0.4mg z1  Oxycodone 10mg x2    Scheduled medications  acetaminophen, 650 mg, oral, q4h  esomeprazole, 40 mg, nasoduodenal tube, Daily before breakfast   Or  pantoprazole, 40 mg, intravenous, Daily before breakfast  heparin (porcine), 5,000 Units, subcutaneous, q8h RUBY  insulin glargine, 10 Units, subcutaneous, Nightly  insulin lispro, 0-5 Units, subcutaneous, q6h  ketorolac, 15 mg, intravenous, q6h RUBY  lidocaine, 1 patch, transdermal, Daily  methocarbamol, 1,000 mg, intravenous, q8h  vancomycin, 2,000 mg, intravenous, Once      Continuous medications  lactated Ringer's, 150 mL/hr, Last Rate: 150 mL/hr (03/26/24 0652)      PRN medications  PRN medications: dextrose, dextrose, glucagon, glucagon, HYDROmorphone, metoclopramide, naloxone, ondansetron ODT **OR** ondansetron, oxyCODONE, oxyCODONE, oxygen, simethicone     Physical Exam:  Constitutional:  no distress, alert and cooperative  Eyes: clear sclera  Head/Neck: No apparent injury, trachea midline  Respiratory/Thorax: Patent airways, thorax symmetric, breathing comfortably  Cardiovascular: no pitting edema  Gastrointestinal: Nondistended  Musculoskeletal: ROM intact  Extremities: no clubbing  Neurological: alert, barajas x4  Psychological: Appropriate affect    Results for orders placed or performed during the hospital encounter of 03/25/24 (from the past 24 hour(s))   POCT GLUCOSE   Result Value Ref Range    POCT Glucose 234 (H) 74 - 99 mg/dL   POCT GLUCOSE   Result Value Ref Range    POCT Glucose 254 (H) 74 - 99  mg/dL   POCT GLUCOSE   Result Value Ref Range    POCT Glucose 219 (H) 74 - 99 mg/dL   POCT GLUCOSE   Result Value Ref Range    POCT Glucose 217 (H) 74 - 99 mg/dL   POCT GLUCOSE   Result Value Ref Range    POCT Glucose 205 (H) 74 - 99 mg/dL   POCT GLUCOSE   Result Value Ref Range    POCT Glucose 200 (H) 74 - 99 mg/dL   POCT GLUCOSE   Result Value Ref Range    POCT Glucose 171 (H) 74 - 99 mg/dL   Comprehensive metabolic panel   Result Value Ref Range    Glucose 167 (H) 74 - 99 mg/dL    Sodium 138 136 - 145 mmol/L    Potassium 3.8 3.5 - 5.3 mmol/L    Chloride 100 98 - 107 mmol/L    Bicarbonate 24 21 - 32 mmol/L    Anion Gap 18 10 - 20 mmol/L    Urea Nitrogen 10 6 - 23 mg/dL    Creatinine 0.59 0.50 - 1.05 mg/dL    eGFR >90 >60 mL/min/1.73m*2    Calcium 10.0 8.6 - 10.6 mg/dL    Albumin 4.7 3.4 - 5.0 g/dL    Alkaline Phosphatase 53 33 - 110 U/L    Total Protein 8.0 6.4 - 8.2 g/dL    AST 42 (H) 9 - 39 U/L    Bilirubin, Total 0.5 0.0 - 1.2 mg/dL    ALT 83 (H) 7 - 45 U/L   CBC and Auto Differential   Result Value Ref Range    WBC 6.5 4.4 - 11.3 x10*3/uL    nRBC 0.0 0.0 - 0.0 /100 WBCs    RBC 4.59 4.00 - 5.20 x10*6/uL    Hemoglobin 13.1 12.0 - 16.0 g/dL    Hematocrit 40.2 36.0 - 46.0 %    MCV 88 80 - 100 fL    MCH 28.5 26.0 - 34.0 pg    MCHC 32.6 32.0 - 36.0 g/dL    RDW 13.0 11.5 - 14.5 %    Platelets 221 150 - 450 x10*3/uL    Neutrophils % 69.0 40.0 - 80.0 %    Immature Granulocytes %, Automated 0.3 0.0 - 0.9 %    Lymphocytes % 22.9 13.0 - 44.0 %    Monocytes % 7.6 2.0 - 10.0 %    Eosinophils % 0.0 0.0 - 6.0 %    Basophils % 0.2 0.0 - 2.0 %    Neutrophils Absolute 4.45 1.20 - 7.70 x10*3/uL    Immature Granulocytes Absolute, Automated 0.02 0.00 - 0.70 x10*3/uL    Lymphocytes Absolute 1.48 1.20 - 4.80 x10*3/uL    Monocytes Absolute 0.49 0.10 - 1.00 x10*3/uL    Eosinophils Absolute 0.00 0.00 - 0.70 x10*3/uL    Basophils Absolute 0.01 0.00 - 0.10 x10*3/uL   Magnesium   Result Value Ref Range    Magnesium 1.84 1.60 - 2.40 mg/dL    POCT GLUCOSE   Result Value Ref Range    POCT Glucose 172 (H) 74 - 99 mg/dL        Nia Harper is a 33 y.o. year old female patient who presents for gastric sleeve laparoscopic with Dr. Mendoza. Acute Pain consulted for block for postoperative pain control.      Plan:  - Bilateral quadratus lumborum single shot blocks performed preoperatively on 3/25/24  - Acute pain service will sign off  - Rest of pain management per primary team     Acute Pain Resident  pg [phone] ph [phone]

## 2024-03-26 NOTE — CONSULTS
"Nutrition Diet Education  Provider consult order     Education Documentation  Nutrition/Diet, taught by Cristiane Ortiz RDN, LD at 3/26/2024 12:04 PM.  Learner: Patient  Readiness: Acceptance  Method: Explanation, Handout  Response: Verbalizes Understanding        Met with patient this morning. Provided patient with \"nutrition guidelines for gastric bypass and sleeve gastrectomy\" handout. Discussed with patient the need for a sugar free diet, no fizzy drinks, to sip slowly. Discussed to avoid straws and indicated page 10 in the handbook as a guide when discharging. Clarified with the patient, that pt has protein shakes at home already, pt confirmed. Pt denied any questions. Pt will follow up with their current outpatient dietitian.      Follow Up:     Time Spent (min): 30 minutes  Last Date of Nutrition Visit: 03/26/24  Nutrition Follow-Up Needed?: Dietitian to reassess per policy  Follow up Comment: bariatric education  "

## 2024-03-26 NOTE — PROGRESS NOTES
"Nia Harper is a 33 y.o. female on day 1 of admission presenting with Bariatric surgery status. S/p laparoscopic gastric sleeve on 3/25     Subjective   150cc/emesis early this AM after taking liquid medication. Has been intermittently drinking 2 oz sips of water. Still with incisional pain but improved from yesterday.     Objective     Physical Exam  Vitals reviewed.   Constitutional:       General: She is not in acute distress.  HENT:      Head: Normocephalic and atraumatic.   Eyes:      Extraocular Movements: Extraocular movements intact.   Cardiovascular:      Rate and Rhythm: Normal rate and regular rhythm.   Pulmonary:      Effort: Pulmonary effort is normal. No respiratory distress.      Comments: On room air   Abdominal:      General: There is no distension.      Palpations: Abdomen is soft.      Comments: Obese, appropriately tender to palpation, abdominal lap sites with dermabond D/I, well approximated.    Skin:     General: Skin is warm and dry.   Neurological:      Mental Status: She is alert and oriented to person, place, and time.   Psychiatric:         Mood and Affect: Mood normal.         Behavior: Behavior normal.       Last Recorded Vitals  Blood pressure 149/89, pulse 100, temperature 36.7 °C (98.1 °F), temperature source Temporal, resp. rate 18, height 1.778 m (5' 10\"), weight 125 kg (274 lb 14.6 oz), SpO2 97 %.  Intake/Output last 3 Shifts:  I/O last 3 completed shifts:  In: 4065 (32.6 mL/kg) [I.V.:3865 (31 mL/kg); IV Piggyback:200]  Out: 5200 (41.7 mL/kg) [Urine:5050 (1.1 mL/kg/hr); Emesis/NG output:150]  Weight: 124.7 kg     Relevant Results  Scheduled medications  acetaminophen, 650 mg, oral, q4h  esomeprazole, 40 mg, nasoduodenal tube, Daily before breakfast   Or  pantoprazole, 40 mg, intravenous, Daily before breakfast  heparin (porcine), 5,000 Units, subcutaneous, q8h RUBY  insulin glargine, 10 Units, subcutaneous, Nightly  insulin lispro, 0-5 Units, subcutaneous, q6h  ketorolac, 15 mg, " intravenous, q6h RUBY  lidocaine, 1 patch, transdermal, Daily  methocarbamol, 1,000 mg, intravenous, q8h  vancomycin, 2,000 mg, intravenous, Once      Continuous medications  lactated Ringer's, 150 mL/hr, Last Rate: 150 mL/hr (03/26/24 0652)      PRN medications  PRN medications: dextrose, dextrose, glucagon, glucagon, HYDROmorphone, metoclopramide, naloxone, ondansetron ODT **OR** ondansetron, oxyCODONE, oxyCODONE, oxygen, simethicone    Results for orders placed or performed during the hospital encounter of 03/25/24 (from the past 24 hour(s))   POCT GLUCOSE   Result Value Ref Range    POCT Glucose 205 (H) 74 - 99 mg/dL   POCT GLUCOSE   Result Value Ref Range    POCT Glucose 234 (H) 74 - 99 mg/dL   POCT GLUCOSE   Result Value Ref Range    POCT Glucose 254 (H) 74 - 99 mg/dL   POCT GLUCOSE   Result Value Ref Range    POCT Glucose 219 (H) 74 - 99 mg/dL   POCT GLUCOSE   Result Value Ref Range    POCT Glucose 217 (H) 74 - 99 mg/dL   POCT GLUCOSE   Result Value Ref Range    POCT Glucose 205 (H) 74 - 99 mg/dL   POCT GLUCOSE   Result Value Ref Range    POCT Glucose 200 (H) 74 - 99 mg/dL   POCT GLUCOSE   Result Value Ref Range    POCT Glucose 171 (H) 74 - 99 mg/dL   Comprehensive metabolic panel   Result Value Ref Range    Glucose 167 (H) 74 - 99 mg/dL    Sodium 138 136 - 145 mmol/L    Potassium 3.8 3.5 - 5.3 mmol/L    Chloride 100 98 - 107 mmol/L    Bicarbonate 24 21 - 32 mmol/L    Anion Gap 18 10 - 20 mmol/L    Urea Nitrogen 10 6 - 23 mg/dL    Creatinine 0.59 0.50 - 1.05 mg/dL    eGFR >90 >60 mL/min/1.73m*2    Calcium 10.0 8.6 - 10.6 mg/dL    Albumin 4.7 3.4 - 5.0 g/dL    Alkaline Phosphatase 53 33 - 110 U/L    Total Protein 8.0 6.4 - 8.2 g/dL    AST 42 (H) 9 - 39 U/L    Bilirubin, Total 0.5 0.0 - 1.2 mg/dL    ALT 83 (H) 7 - 45 U/L   CBC and Auto Differential   Result Value Ref Range    WBC 6.5 4.4 - 11.3 x10*3/uL    nRBC 0.0 0.0 - 0.0 /100 WBCs    RBC 4.59 4.00 - 5.20 x10*6/uL    Hemoglobin 13.1 12.0 - 16.0 g/dL     Hematocrit 40.2 36.0 - 46.0 %    MCV 88 80 - 100 fL    MCH 28.5 26.0 - 34.0 pg    MCHC 32.6 32.0 - 36.0 g/dL    RDW 13.0 11.5 - 14.5 %    Platelets 221 150 - 450 x10*3/uL    Neutrophils % 69.0 40.0 - 80.0 %    Immature Granulocytes %, Automated 0.3 0.0 - 0.9 %    Lymphocytes % 22.9 13.0 - 44.0 %    Monocytes % 7.6 2.0 - 10.0 %    Eosinophils % 0.0 0.0 - 6.0 %    Basophils % 0.2 0.0 - 2.0 %    Neutrophils Absolute 4.45 1.20 - 7.70 x10*3/uL    Immature Granulocytes Absolute, Automated 0.02 0.00 - 0.70 x10*3/uL    Lymphocytes Absolute 1.48 1.20 - 4.80 x10*3/uL    Monocytes Absolute 0.49 0.10 - 1.00 x10*3/uL    Eosinophils Absolute 0.00 0.00 - 0.70 x10*3/uL    Basophils Absolute 0.01 0.00 - 0.10 x10*3/uL   Magnesium   Result Value Ref Range    Magnesium 1.84 1.60 - 2.40 mg/dL   POCT GLUCOSE   Result Value Ref Range    POCT Glucose 172 (H) 74 - 99 mg/dL      FL upper GI w KUB    Result Date: 3/26/2024  Interpreted By:  Wu Suresh  and Jesse Verma STUDY: FL UPPER GI W KUB;  3/25/2024 1:00 pm   INDICATION: 34 y/o   F with  Signs/Symptoms:post bariatric surgery.   COMPARISON: None.   ACCESSION NUMBER(S): [id number]   ORDERING CLINICIAN: SYLVESTER LEON   TECHNIQUE: Images of the lower esophagus, stomach, duodenum and proximal small bowel were obtained.   CONTRAST MEDIA: Oral water soluble contrast.   FLUOROSCOPY TIME: Fluoroscopy time:  1.4 minutes.   FINDINGS: ESOPHAGUS: Lower esophagus unremarkable.   STOMACH: There is normal postoperative appearance of the gastric sleeve. There is a normal narrow lumen of the proximal and mid gastric lumen. There is flow into the distal body and antrum of the stomach. There is flow into the duodenal bulb and C-loop   There is no evidence for a postoperative leak. There is no evidence for gastric obstruction.   EMPTYING INTO THE DUODENUM: Normal.   DUODENUM: Normal.   GASTROESOPHAGEAL REFLUX: None observed.       Unremarkable upper GI examination of  postoperative gastric sleeve.   I personally reviewed the images/study and I agree with the findings as stated by Bayron Muhammad MD (Radiology Resident). This study was interpreted at [facility], [place], [place].   MACRO: None   Signed by: Wu Barr 3/26/2024 12:11 AM Dictation workstation:   [id number]           Assessment/Plan   Principal Problem:    Bariatric surgery status  Active Problems:    Post-op pain    Obesity      33 year old female with h/o obesity with co-morbidities presented for elective laparoscopic gastric sleeve on 3/25 with Dr. Ervin. Post-operative UGI study completed without evidence of leak.     PLAN:   Neurology: post operative pain  - scheduled tylenol   - liquid oxycodone 5/10 PRN moderate-severe pain   - Dilaudid PRN available for breakthrough pain   - scheduled robaxin   - toradol x 6 doses   - lidocaine patch     Cardiovascular: h/o HLD   -Vital signs every 4 hours    Pulmonology:  -IS x10 every hour   -Maintain SpO2 >92 % RA     GI: obesity s/p laparoscopic gastric sleeve   - Bariatric Diet- 4 oz full/Max 8oz clear + full liquids per hour   - Zofran/reglan PRN nausea/scopolamine patch   - PPI daily  - simethicone PRN gas     :  - Strict I&O   - replace electrolytes as needed  -mIVF: LR @ 150ml/hr     Heme: H/H WNL   -Monitor for signs of acute blood loss  -Trend CBC as needed     Endocrine: h/o T2DM, A1c 9.5%   - POCT glucose every 6 hours   - insulin lispro sliding scale  - insulin glargine 10 units at bedtime ( home dose 40 units BID )   - holding home metformin, trulicity    DVT Prophylaxis:  -Continue subcutaneous heparin, SCDs, ambulation/OOB     Disposition:  - RNF  - Plan for possible DC home today pending overall progress/diet tolerance       Plan of care discussed with surgical team, attending Dr Ervin.     RONEL Jean Baptiste, CNP  [facility]  [phone]

## 2024-03-26 NOTE — HOSPITAL COURSE
33 year old female with h/o obesity presented for elective laparoscopic sleeve gastrectomy on 3/25 with Dr. Ervin. Procedure was tolerated well. Post-operative upper GI study completed without evidence of leak. Diet was advanced to include bariatric clear and full liquids and tolerated. Upon discharge, pain was controlled, tolerating 5-6 oz of bariatric liquid per hour, ambulating, and voiding. She was discharged home with outpatient surgical follow up scheduled.

## 2024-03-27 ENCOUNTER — TELEPHONE (OUTPATIENT)
Dept: SURGERY | Facility: HOSPITAL | Age: 34
End: 2024-03-27
Payer: COMMERCIAL

## 2024-03-27 ENCOUNTER — TELEPHONE (OUTPATIENT)
Dept: PRIMARY CARE | Facility: CLINIC | Age: 34
End: 2024-03-27

## 2024-03-27 NOTE — TELEPHONE ENCOUNTER
Attempted to contact patient for post-operative follow up call. Unable to reach patient at this time. Kumbuyahart message sent with reminders. Left voice message with contact information.

## 2024-03-27 NOTE — DISCHARGE SUMMARY
Discharge Diagnosis  Bariatric surgery status     Issues Requiring Follow-Up  Post-operative follow up     Test Results Pending At Discharge  Pending Labs       Order Current Status    Surgical Pathology Exam In process            Hospital Course  33 year old female with h/o obesity presented for elective laparoscopic sleeve gastrectomy on 3/25 with Dr. Ervin. Procedure was tolerated well. Post-operative upper GI study completed without evidence of leak. Diet was advanced to include bariatric clear and full liquids and tolerated. Upon discharge, pain was controlled, tolerating 5-6 oz of bariatric liquid per hour, ambulating, and voiding. She was discharged home with outpatient surgical follow up scheduled.     Pertinent Physical Exam At Time of Discharge  Physical Exam  Vitals reviewed.   Constitutional:       General: She is not in acute distress.  HENT:      Head: Normocephalic and atraumatic.   Eyes:      Extraocular Movements: Extraocular movements intact.   Cardiovascular:      Rate and Rhythm: Normal rate and regular rhythm.   Pulmonary:      Effort: Pulmonary effort is normal. No respiratory distress.      Comments: On room air   Abdominal:      General: There is no distension.      Palpations: Abdomen is soft.      Comments: Obese, appropriately tender to palpation, abdominal lap sites with dermabond D/I    Skin:     General: Skin is warm and dry.   Neurological:      Mental Status: She is alert and oriented to person, place, and time.   Psychiatric:         Mood and Affect: Mood normal.         Behavior: Behavior normal.       Home Medications     Medication List      START taking these medications     omeprazole 40 mg DR capsule; Commonly known as: PriLOSEC; Take 1 capsule   (40 mg) by mouth once daily in the morning. Take before meals. Do not   crush or chew. Open capsule, sprinkle beads on SF jello, pudding or   applesauce.   ondansetron ODT 4 mg disintegrating tablet; Commonly known as:   Zofran-ODT;  Take 1 tablet (4 mg) by mouth every 6 hours if needed for   nausea or vomiting.     CHANGE how you take these medications     Trulicity 3 mg/0.5 mL pen injector; Generic drug: dulaglutide; Once   weekly; What changed: additional instructions     CONTINUE taking these medications     anastrozole 1 mg tablet; Commonly known as: Arimidex; Take 1 tablet (1   mg total) by mouth once daily.  Swallow whole with a drink of water.   atorvastatin 40 mg tablet; Commonly known as: Lipitor; Take 1 tablet (40   mg) by mouth once daily at bedtime.   benzoyl peroxide 5 % external wash; Apply topically once daily. Lather   onto skin folds, leave on 2 minutes, rinse with water   diphenhydrAMINE 25 mg tablet; Commonly known as: Sominex   DULoxetine 60 mg DR capsule; Commonly known as: Cymbalta; Take 1 capsule   (60 mg) by mouth once daily. Do not crush or chew. Take in the evening   with food. Increase to 60mg daily dosing.   estradiol 0.01 % (0.1 mg/gram) vaginal cream; Commonly known as:   Estrace; Insert 0.5 Applicatorfuls (2 g) into the vagina once daily. Use   once a day for 2 weeks then as needed there after   ezetimibe 10 mg tablet; Commonly known as: Zetia   FreeStyle Cristina 2 Sensor kit; Generic drug: flash glucose sensor kit   FreeStyle Cristina 3 Sensor device; Generic drug: blood-glucose sensor; 2   each every 14 (fourteen) days.   insulin lispro 100 unit/mL injection; Commonly known as: HumaLOG Aurelio   Kwikpen; Inject 40 Units under the skin 3 times a day as needed for high   blood sugar. inject up to 20 units before meals per sliding scale   Lantus Solostar U-100 Insulin 100 unit/mL (3 mL) pen; Generic drug:   insulin glargine; Inject 40 Units under the skin 2 times a day.   LORazepam 1 mg tablet; Commonly known as: Ativan; TAKE 1 TABLET BY MOUTH   ONCE DAILY AS NEEDED FOR ANXIETY   metFORMIN  mg 24 hr tablet; Commonly known as: Glucophage-XR; Take   2 tablets (1,000 mg) by mouth 2 times a day with meals.   omega-3  "acid ethyl esters 1 gram capsule; Commonly known as: Lovaza;   Take 2 capsules (2 g) by mouth 2 times a day with meals.   OneTouch Delica Plus Lancet 33 gauge misc; Generic drug: lancets   OneTouch Verio Flex meter misc; Generic drug: blood-glucose meter   OneTouch Verio test strips strip; Generic drug: blood sugar diagnostic;   Inject 100 strips under the skin once daily.   oxyCODONE 5 mg/5 mL solution; Commonly known as: Roxicodone; Take 5 mL   (5 mg) by mouth every 6 hours if needed for severe pain (7 - 10) or   moderate pain (4 - 6).   pen needle, diabetic 31 gauge x 3/16\" needle; Commonly known as: BD   Ultra-Fine Mini Pen Needle; Inject 200 each under the skin 4 times a day   before meals. Use 4 a day as directed   valACYclovir 500 mg tablet; Commonly known as: Valtrex     STOP taking these medications     clindamycin 300 mg capsule; Commonly known as: Cleocin   metroNIDAZOLE 500 mg tablet; Commonly known as: Flagyl       Outpatient Follow-Up  Future Appointments   Date Time Provider Department Center   4/1/2024  7:15 AM RONEL Pierson-CNP [id number] [facility]   4/1/2024  8:30 AM Imani Coffman RD [id number] [facility]   4/4/2024  8:30 AM Marcie Kent MD [id number] [facility] 4/5/2024 11:30 AM RONEL Pemberton-CNP [id number] [facility]   4/8/2024 10:00 AM Zhen Thomas MD [id number] [facility]   5/3/2024  8:30 AM Imani Coffman RD [id number] [place]   5/3/2024 10:00 AM ADALI Pierson [id number] [facility]   5/15/2024 10:15 AM Lauren Mcgee MD [id number] [facility]   5/23/2024  8:30 AM INF 33 ARNEX [facility] [id number] [facility]   6/21/2024 10:30 AM Imani Coffman RD [id number] [facility]   6/21/2024 11:00 AM ATIYA PiersonCNP [id number] [place]   9/20/2024  8:30 AM An[facility] Grissom, [facility]   1/6/2025  8:00 AM [facility] MAMMO 3 [facility]       Qi Singh, APRN-CNP  "

## 2024-03-27 NOTE — TELEPHONE ENCOUNTER
Call returned from patient. She states she had one episode of emesis after taking pain medication last night, but denies further nausea or vomiting. She states her pain is controlled with oxycodone q6 hours. Reviewed that she can use tylenol and lidocaine patches if needed, as well. She is aiming for at least 6 ounces of fluid every hour and is moving frequently. She is passing gas and has had 2 bowel movements. She is taking her omeprazole and sprinkling it on applesauce. Review contact information and upcoming appointments. All questions answered at this time. Encouraged to call with questions and concerns.

## 2024-03-28 ENCOUNTER — APPOINTMENT (OUTPATIENT)
Dept: HEMATOLOGY/ONCOLOGY | Facility: HOSPITAL | Age: 34
End: 2024-03-28
Payer: COMMERCIAL

## 2024-03-30 LAB
LABORATORY COMMENT REPORT: NORMAL
PATH REPORT.FINAL DX SPEC: NORMAL
PATH REPORT.GROSS SPEC: NORMAL
PATH REPORT.RELEVANT HX SPEC: NORMAL
PATH REPORT.TOTAL CANCER: NORMAL

## 2024-03-31 PROBLEM — Z90.3 S/P GASTRIC SLEEVE PROCEDURE: Status: ACTIVE | Noted: 2024-03-31

## 2024-03-31 NOTE — PROGRESS NOTES
BARIATRIC SURGERY CLINIC  Comprehensive Weight Management        Patient: Nia Harper   MRN: [id number]     Index Surgery:   Surgeon: Dr. Ervin  Procedure: LSG  Date: 03/25/2024    Virtual or Telephone Consent:  A telephone visit (audio or visual only) between the patient (at the originating site) and the provider (at the distant site) was utilized to provide this telehealth service. Verbal consent was requested and obtained from Nia Harper on this date, 04/01/2024 at 715 AM for a bariatric telehealth visit.     HPI   Nia Harper is a 33 y.o. female presenting for follow up visit one week s/p LSG with Dr. Ervin. Initial Weight is 294 lbs, BMI 40.3. Class 3 Obesity.     Diet:  - liquid diet, transitioning to puree diet - meeting with RD later this afternoon  - achieving protein and fluid goals: yes - both     Exercise:  -  walking, increasing activity as tolerated.     Concerns related to:  Nausea/Vomiting: no, denies - has prn med for nausea   Reflux/heartburn: denies  Abdominal Pain: denies, incisions well approximated  Diarrhea/Constipation- bowel function is regular    Daily Supplements:  Calcium: Calcium Citrate w/ vitamin D (1200 - 1500mg) (centrum)  Multivitamin & Minerals: 2 per day (flinstones)  Vitamin B12: 500 mcg/day   Iron/Other: iron supplement   PPI: taking    Primary Medical Hx:  Patient Active Problem List   Diagnosis    Astigmatism of both eyes    Breast skin changes    Radial styloid tenosynovitis of right hand    Depression    Diabetes mellitus type 2 without retinopathy (CMS/HCC)    Diffuse goiter    Elevation of levels of liver transaminase levels    Epidermal inclusion cyst    Herpes simplex virus (HSV) infection    History of malignant neoplasm of breast    Hyperlipemia    Hypertriglyceridemia    Myopia of both eyes    Nexplanon in place    Suspected sleep apnea    Uncontrolled type 2 diabetes mellitus with hyperglycemia (CMS/HCC)    Chronic headaches    BMI 40.0-44.9, adult (CMS/HCC)     Severe obesity (CMS/HCC)    Breast cancer (CMS/HCC)    Primary hypertension    Angioedema    Anxiety disorder    History of bariatric surgery    Fatty liver    Gallstones    History of cannabis abuse    History of noncompliance with medical treatment    Insufficient sleep syndrome    Sleep disorder breathing    Status post mastectomy    Systolic murmur    Artificial menopause    Acute vaginitis    Encounter for follow-up surveillance of breast cancer    Hidradenitis suppurativa    Suppression of ovarian secretion    Bariatric surgery status    Mass of left breast    Infected sebaceous cyst    Sebaceous cyst    Obesity, Class III, BMI 40-49.9 (morbid obesity) (CMS/HCC)    Post-op pain    Obesity    S/P gastric sleeve procedure      Past Surgical History:   Procedure Laterality Date     SECTION, CLASSIC  2019     Section    MASTECTOMY Right     MR HEAD ANGIO WO IV CONTRAST  2015    MR HEAD ANGIO WO IV CONTRAST 2015 [facility]    OTHER SURGICAL HISTORY  2019    Surgical Treatment Of Missed  In First Trimester      Social History     Socioeconomic History    Marital status: Single     Spouse name: Not on file    Number of children: Not on file    Years of education: Not on file    Highest education level: Not on file   Occupational History    Not on file   Tobacco Use    Smoking status: Never     Passive exposure: Never    Smokeless tobacco: Never   Vaping Use    Vaping Use: Never used   Substance and Sexual Activity    Alcohol use: Never    Drug use: Not Currently     Types: Marijuana     Comment: last used 4 weeks ago    Sexual activity: Defer   Other Topics Concern    Not on file   Social History Narrative    Not on file     Social Determinants of Health     Financial Resource Strain: Patient Declined (3/25/2024)    Overall Financial Resource Strain (CARDIA)     Difficulty of Paying Living Expenses: Patient declined   Food Insecurity: No Food Insecurity  (3/25/2024)    Hunger Vital Sign     Worried About Running Out of Food in the Last Year: Never true     Ran Out of Food in the Last Year: Never true   Transportation Needs: Patient Declined (3/25/2024)    PRAPARE - Transportation     Lack of Transportation (Medical): Patient declined     Lack of Transportation (Non-Medical): Patient declined   Physical Activity: Not on file   Stress: No Stress Concern Present (3/25/2024)    [employer] - Occupational Stress Questionnaire     Feeling of Stress : Only a little   Social Connections: Not on file   Intimate Partner Violence: Not At Risk (3/25/2024)    Humiliation, Afraid, Rape, and Kick questionnaire     Fear of Current or Ex-Partner: No     Emotionally Abused: No     Physically Abused: No     Sexually Abused: No   Housing Stability: Patient Declined (3/25/2024)    Housing Stability Vital Sign     Unable to Pay for Housing in the Last Year: Patient declined     Number of Places Lived in the Last Year: 1     Unstable Housing in the Last Year: Patient declined     Family History   Problem Relation Name Age of Onset    Heart disease Father      Cancer Maternal Grandmother      Diabetes type II Maternal Grandmother      Cancer Maternal Grandfather      Cancer Paternal Grandmother      Diabetes type II Mother's Sister          uncontrolled    Breast cancer Father's Sister          two paternal 1st cousins (through paternal uncle, both sisters, both early onset, one ). two paternal great aunts (through PGF's side, both  in their 50s/60s)    Cancer Father's Sister      Cancer Father's Brother      Breast cancer Paternal Cousin          two paternal 1st cousins (through paternal uncle, both sisters, both early onset, one ). two paternal great aunts (through PGF's side, both  in their 50s/60s)    Breast cancer Cousin        Current Outpatient Medications on File Prior to Visit   Medication Sig Dispense Refill    anastrozole  (Arimidex) 1 mg tablet Take 1 tablet (1 mg total) by mouth once daily.  Swallow whole with a drink of water. 90 tablet 3    atorvastatin (Lipitor) 40 mg tablet Take 1 tablet (40 mg) by mouth once daily at bedtime. 90 tablet 3    benzoyl peroxide 5 % external wash Apply topically once daily. Lather onto skin folds, leave on 2 minutes, rinse with water 236 g 11    blood sugar diagnostic (OneTouch Verio test strips) strip Inject 100 strips under the skin once daily. 100 strip 1    blood-glucose sensor (FreeStyle Cristina 3 Sensor) device 2 each every 14 (fourteen) days. 2 each 11    diphenhydrAMINE (Sominex) 25 mg tablet Take 1 tablet (25 mg) by mouth as needed at bedtime for sleep.      dulaglutide (Trulicity) 3 mg/0.5 mL pen injector Once weekly (Patient taking differently: Once weekly on wednesday) 4 each 11    DULoxetine (Cymbalta) 60 mg DR capsule Take 1 capsule (60 mg) by mouth once daily. Do not crush or chew. Take in the evening with food. Increase to 60mg daily dosing. 30 capsule 0    estradiol (Estrace) 0.01 % (0.1 mg/gram) vaginal cream Insert 0.5 Applicatorfuls (2 g) into the vagina once daily. Use once a day for 2 weeks then as needed there after 42.5 g 0    ezetimibe (Zetia) 10 mg tablet Take 1 tablet (10 mg) by mouth once daily.      FreeStyle Cristina 2 Sensor kit CHANGE SENSOR EVERY 14 DAYS AS DIRECTED.      insulin glargine (Lantus Solostar U-100 Insulin) 100 unit/mL (3 mL) pen Inject 40 Units under the skin 2 times a day. 15 mL 3    insulin lispro (HumaLOG Aurelio Kwikpen) 100 unit/mL injection Inject 40 Units under the skin 3 times a day as needed for high blood sugar. inject up to 20 units before meals per sliding scale 15 mL 5    LORazepam (Ativan) 1 mg tablet TAKE 1 TABLET BY MOUTH ONCE DAILY AS NEEDED FOR ANXIETY 30 tablet 0    metFORMIN  mg 24 hr tablet Take 2 tablets (1,000 mg) by mouth 2 times a day with meals. 180 tablet 1    omega-3 acid ethyl esters (Lovaza) 1 gram capsule Take 2 capsules  "(2 g) by mouth 2 times a day with meals. 120 capsule 1    omeprazole (PriLOSEC) 40 mg DR capsule Take 1 capsule (40 mg) by mouth once daily in the morning. Take before meals. Do not crush or chew. Open capsule, sprinkle beads on SF jello, pudding or applesauce. 30 capsule 5    ondansetron ODT (Zofran-ODT) 4 mg disintegrating tablet Take 1 tablet (4 mg) by mouth every 6 hours if needed for nausea or vomiting. 60 tablet 1    OneTouch Delica Plus Lancet 33 gauge misc TEST BLOOD GLUCOSE 3 TIMES/ DAY      OneTouch Verio Flex meter misc USE AS DIRECTED.      oxyCODONE (Roxicodone) 5 mg/5 mL solution Take 5 mL (5 mg) by mouth every 6 hours if needed for severe pain (7 - 10) or moderate pain (4 - 6). 140 mL 0    pen needle, diabetic (BD Ultra-Fine Mini Pen Needle) 31 gauge x 3/16\" needle Inject 200 each under the skin 4 times a day before meals. Use 4 a day as directed (Patient not taking: Reported on 3/25/2024) 200 each 3    valACYclovir (Valtrex) 500 mg tablet Take 1 tablet (500 mg) by mouth 2 times a day. As needed for outbreaks      [DISCONTINUED] clindamycin (Cleocin) 300 mg capsule Take 1 capsule (300 mg) by mouth 3 times a day for 7 days. 21 capsule 0    [DISCONTINUED] metroNIDAZOLE (Flagyl) 500 mg tablet Take 1 tablet (500 mg) by mouth every 12 hours for 7 days. 14 tablet 0    [DISCONTINUED] ondansetron (Zofran) 8 mg tablet Take 1 tablet (8 mg) by mouth 3 times a day.      [DISCONTINUED] oxyCODONE (Roxicodone) 5 mg/5 mL solution Take 5 mL (5 mg) by mouth every 6 hours if needed for severe pain (7 - 10) or moderate pain (4 - 6) for up to 7 days. 140 mL 0     No current facility-administered medications on file prior to visit.      Allergies   Allergen Reactions    Metformin Swelling     Immediate release only- currently taking ER without problems    Cephalexin Hives, Other, Rash and Swelling     \"welts\"    Sulfamethoxazole-Trimethoprim Hives, Rash and Swelling     REVIEW OF SYSTEMS:  Negative unless otherwise " "reviewed in HPI.    PHYSICAL EXAM  Physical Exam   Ht: 5'10\" Wt: 267 lbs  BMI: Body mass index is 38.31 kg/m².  General- No acute distress, well appearing and well nourished.   Eyes Conjunctiva and lids: No erythema, swelling or discharge  Neck appears supple  CV: reg rate/rhythm (self report)  Pulmonary: Respiratory effort: Normal respiration. unlabored  Abdomen: Central adiposity  Neurologic - Coordination: Not assessed  Extremities: No clubbing, cyanosis  Psychiatric - Orientation to person, place, and time: Normal. Mood and affect: Normal.    ASSESSMENT & PLAN  33 y.o. female presenting for fuv s/p LSG 03/25/2024. Patient is one week post op. No acute complications or issues. Achieving protein and fluid goals, having regular BM. She is taking appropriate bariatric vitamins/supplements. Incisions are well are approximated and pain is minimal. She reports energy has improved over past few days. She is increasing activity as tolerated, meeting with RD later today.     Weight Impression:  -Initial BMI: 40.3   -Initial Weight: 294 lbs   -Today's Weight: 267 lbs   -Current BMI: Body mass index is 38.31 kg/m².  -%Total Weight Loss: -9.18%    Problem List Items Addressed This Visit       Diabetes mellitus type 2 without retinopathy (CMS/HCC) - Primary     Recommend controlled CHO Diet to improve glycemic control.    Carbohydrate portions at meals <40g per meal depending on activity level reviewed.  Avoid sugar sweetened beverages  Engage in regular aerobic exercise at least 150 minutes per week for CV benefit.  Encourage self monitoring of blood glucose values  Optimal values of blood glucose:  Fasting < 90  PreMeal < 100  30 minutes post meal < 140  60 minutes post meal < 120  90 minutes post meal < 100    Discussed roles of combining dietary protein, increased dietary fiber and reduction of simple carbohydrates to benefit glycemic control.   Discussed importance of regular exercise for glycemic control.   Ongoing " follow up with PCP, Endo and speciality providers for retinopathy & nephropathy screening encouraged annually.          Primary hypertension     Goal BP <120/80  Continue follow up with PCP for mgmt of antiHTN regimen  Encourage adherence to medications if prescribed for BP control  DASH/Mediterranean Diet lifestyle encouraged.  Weight reduction of 5-10% of clinical significance to improve BP control  Continues to work on lifestyle change goals to support blood pressure control and weight reduction.  Continue to follow up with your primary care physician         History of bariatric surgery    Obesity     - Reviewed impact of diet, physical activity, stress & sleep to cardio metabolic health risks and ways to modify current lifestyle to reduce cardiometabolic health risks associated with obesity.  - Counseled on benefits of increased regular exercise, both aerobic & resistance training.  Reviewed benefits of resistance training to enhance/maintain lean body mass.  - Counseled on dietary modifications to support weight reduction, reduced calorie diet, encourage adequate protein, increased dietary fiber from fruit and vegetable to improve appetite/satiety regulation and quality of diet.  Discussed impact of processed foods and liquid calories to weight gain & contribution to dysfunctional appetite signals.   -  Reviewed importance of intensification of lifestyle therapy in weight reduction and maintenance, set behavioral modification goals of nutrition, physical activity or behavioral practices to benefit weight reduction.    - Discussed self monitoring practices to ensure reduce calorie diet, emphasizing increased dietary protein & fiber.  Reviewed protein targets per meal as recommendation to help with satiety and lean mass maintenance.           S/P gastric sleeve procedure     S/p LSG 1 week    Initial Wt: 294 lbs  Initial BMI: 40.3  Patient denies any N,V,F,D,CP or SOB.   Tolerating liquid diet   ambulating and  eliminating well  pain under control   incisions healing well   no sign of infection   taking Vitamins and PPI    imp: doing well    recs:   advance diet as advised   advance activity as advised   cont Vitamin supplementation, PPI   FU with dietitian   RTC 5 weeks.              Please see Patient Instructions for today's relevant referrals, orders and instructions.  > 50% of time spent counseling on lifestyle modifications to support weight loss.      Follow Up: Follow up in about 6 weeks (around 5/13/2024).     Oma Horowitz, APRN-CNP

## 2024-03-31 NOTE — PATIENT INSTRUCTIONS
Schedule Referrals placed today by calling [phone]  If lab work ordered at your visit today please complete prior to next appointment at any  lab facility.     Lab results with be communicated via  Epic MYChart.  If you need assistance setting up your  katenahart: [phone]    If appointment was not scheduled before leaving today please call [phone].  FOR VIRTUAL VISITS PLEASE BE AWARE OF THE FOLLOWING:    - You need a reliable scale to weigh yourself at home for follow up visits  - You need a blood pressure cuff to monitor blood pressure & heart rate at home (can be purchased over the counter, on Amazon, drug store, etc).    - For virtual visits you  must be in the [facility]  - YOU CANNOT BE DRIVING, please remember this is an appointment and should be treated the same as if you were in the office for follow up appointment.      Remember to continue to track your nutrition intake, ensure you are getting 60g protein and 64 oz fluid daily  Do not eat and drink at the same time.   Follow up with your bariatric dietitian as much as you need for support with dietary advancement.     I encourage you to begin increasing duration and intensity of exercise.    Goal > 200 minutes per week of aerobic exercise - (this can be achieved by increasing exercise to at least 45 minutes 5 or more days per week of activities like brisk walking, jogging, elliptical, biking, swimming, etc)     Take your omeprazole, open the capsule and sprinkle onto sugar free applesauce, pudding, etc. Do not swallow whole.  Take your Multivitamin twice daily, B12 daily and calcium tablets 1200-1500mg/day.    Be sure you are following up with your primary care provider to discuss your other medications and how to take after surgery.  Medications must be smaller than the size of an M&M - please discuss with your primary prescribing provider alternative options (liquids, chewable options, or if your medicines can be cut in half) if  medication size is larger than this.      Follow-up in 6 weeks for your 3 mo post op visit.

## 2024-03-31 NOTE — ASSESSMENT & PLAN NOTE
Goal BP <120/80  Continue follow up with PCP for mgmt of antiHTN regimen  Encourage adherence to medications if prescribed for BP control  DASH/Mediterranean Diet lifestyle encouraged.  Weight reduction of 5-10% of clinical significance to improve BP control  Continues to work on lifestyle change goals to support blood pressure control and weight reduction.  Continue to follow up with your primary care physician

## 2024-03-31 NOTE — ASSESSMENT & PLAN NOTE
Recommend controlled CHO Diet to improve glycemic control.    Carbohydrate portions at meals <40g per meal depending on activity level reviewed.  Avoid sugar sweetened beverages  Engage in regular aerobic exercise at least 150 minutes per week for CV benefit.  Encourage self monitoring of blood glucose values  Optimal values of blood glucose:  Fasting < 90  PreMeal < 100  30 minutes post meal < 140  60 minutes post meal < 120  90 minutes post meal < 100    Discussed roles of combining dietary protein, increased dietary fiber and reduction of simple carbohydrates to benefit glycemic control.   Discussed importance of regular exercise for glycemic control.   Ongoing follow up with PCP, Endo and speciality providers for retinopathy & nephropathy screening encouraged annually.

## 2024-03-31 NOTE — ASSESSMENT & PLAN NOTE
- Reviewed impact of diet, physical activity, stress & sleep to cardio metabolic health risks and ways to modify current lifestyle to reduce cardiometabolic health risks associated with obesity.  - Counseled on benefits of increased regular exercise, both aerobic & resistance training.  Reviewed benefits of resistance training to enhance/maintain lean body mass.  - Counseled on dietary modifications to support weight reduction, reduced calorie diet, encourage adequate protein, increased dietary fiber from fruit and vegetable to improve appetite/satiety regulation and quality of diet.  Discussed impact of processed foods and liquid calories to weight gain & contribution to dysfunctional appetite signals.   -  Reviewed importance of intensification of lifestyle therapy in weight reduction and maintenance, set behavioral modification goals of nutrition, physical activity or behavioral practices to benefit weight reduction.    - Discussed self monitoring practices to ensure reduce calorie diet, emphasizing increased dietary protein & fiber.  Reviewed protein targets per meal as recommendation to help with satiety and lean mass maintenance.

## 2024-03-31 NOTE — ASSESSMENT & PLAN NOTE
S/p LSG 1 week    Initial Wt: 294 lbs  Initial BMI: 40.3  Patient denies any N,V,F,D,CP or SOB.   Tolerating liquid diet   ambulating and eliminating well  pain under control   incisions healing well   no sign of infection   taking Vitamins and PPI    imp: doing well    recs:   advance diet as advised   advance activity as advised   cont Vitamin supplementation, PPI   FU with dietitian   RTC 5 weeks.

## 2024-04-01 ENCOUNTER — NUTRITION (OUTPATIENT)
Dept: SURGERY | Facility: HOSPITAL | Age: 34
End: 2024-04-01
Payer: COMMERCIAL

## 2024-04-01 ENCOUNTER — OFFICE VISIT (OUTPATIENT)
Dept: SURGERY | Facility: CLINIC | Age: 34
End: 2024-04-01
Payer: COMMERCIAL

## 2024-04-01 VITALS — WEIGHT: 267 LBS | BODY MASS INDEX: 38.22 KG/M2 | HEIGHT: 70 IN

## 2024-04-01 DIAGNOSIS — E11.9 DIABETES MELLITUS TYPE 2 WITHOUT RETINOPATHY (MULTI): Primary | ICD-10-CM

## 2024-04-01 DIAGNOSIS — Z90.3 S/P GASTRIC SLEEVE PROCEDURE: Primary | ICD-10-CM

## 2024-04-01 DIAGNOSIS — Z90.3 S/P GASTRIC SLEEVE PROCEDURE: ICD-10-CM

## 2024-04-01 DIAGNOSIS — Z98.84 HISTORY OF BARIATRIC SURGERY: ICD-10-CM

## 2024-04-01 DIAGNOSIS — I10 PRIMARY HYPERTENSION: ICD-10-CM

## 2024-04-01 DIAGNOSIS — E66.9 OBESITY WITH SERIOUS COMORBIDITY, UNSPECIFIED CLASSIFICATION, UNSPECIFIED OBESITY TYPE: ICD-10-CM

## 2024-04-01 PROCEDURE — 3008F BODY MASS INDEX DOCD: CPT

## 2024-04-01 PROCEDURE — 3046F HEMOGLOBIN A1C LEVEL >9.0%: CPT

## 2024-04-01 PROCEDURE — 99024 POSTOP FOLLOW-UP VISIT: CPT

## 2024-04-01 NOTE — PROGRESS NOTES
Surgery Date:  3/25/24  Surgeon:  Aziza  Procedure:  LSG    ASSESSMENT:  Current weight:  267 lbs Ht: 70 in.        BMI: 38.3  Previous weight:   290 lbs  Initial start weight:  294 lbs  EBW: 120 lbs  Total weight change: -27 lbs  %EBW Lost: 22.5%    PROGRESS:    Nutrition Interventions for last encounter (date):   1. Drink 64 oz of fluid daily  2. Drink enough protein shakes to meet your goal of 60-70 g of protein per day  3. Take 2 chewable multivitamins, 1061-5440 mg of calcium citrate,  and 500 mcg of B12 daily  4. Get up and walk frequently throughout the day.     CHANGES IN TREATMENT:   Patient met goals:  Yes   Actions to meet previous goals: MET    24 hour food recall: full; liquids, 60 oz, 60 gram protein  Beverages: water, broth, 16oz CL, gatorade zero, protein shake  Alcohol: none      Vitamins:  x2 Flintstones MVI, x3 500 mg Calcium, 500 mcg B12  Medications: see list  Physical Activity: walking throughout the day  Nausea/Vomiting/Diarrhea/Constipation: denies    READINESS TO LEARN:  Motivation to learn:           Interested      Understanding of instruction: Good   Anticipated Compliance:   Good     Family Support: Unable to assess-family not present   Patient presents with post-op sleeve gastrectomy. Pt is 1 week postop.   Self reported weight today. Patient has lost 27 pounds since initial assessment accounting for 22.5% loss excess body weight.    Patient tolerating full liquid diet.  Protein intake is adequate for post-op individual. Fluid consumption is in adequate. Patient is supplementing recommended vitamin/minerals. Pt states  no concerns and/or difficulties.   Reviewed the puree stage transition and patient advised she can start this week. Reminded pt. to continue to record all PO intake in food journal and to start following 30-30-30 rule.  Encouraged monthly SG meetings.      Malnutrition Screening  Significant unintentional weight loss? n/a  Eating less than 75% of usual intake for more  than 2 weeks? n/a      Nutrition Diagnosis:   1. Increased protein and nutrition needs related to altered GI function as evidenced by pt. s/p sleeve gastrectomy  2. Food- and nutrition-related knowledge deficit related to lack of prior exposure to surgical weight loss information as evidenced by diet recall.     Nutrition Interventions:   1. Modify type and amount of food and nutrients within meals and snacks.  2. Comprehensive Nutrition Education  -Nutrition education materials: SG Schedule, today's recap, transition to Puree stage         Recommendations:    1. Continue to drink your protein shakes to meet your goal of 60-70 g of protein per day. Begin measuring how much protein you can eat on the soft diet so that you know when to start weaning off of your protein shakes.   2. Continue to drink 64 oz. of zero calorie beverages per day  3. Continue no drinking 30 min before, during the meal and for 30 minutes after the meal  4. Continue to walk every hour. Some people can tolerate short intervals of walking. Start with 5-10 minutes and build your way up to 20-30 minutes/day over the next 4 weeks.   5. You may begin puree stage and follow for 2 weeks. On 4/16/24 begin to transition to the soft stage and follow for 2 weeks. Please refer to pages 12-17 in your Nutrition Guidelines Booklet for diet instructions.  6. Remember to eat slowly and chew thoroughly  7. Try one new food at a time to test for any intolerances.   8. Continue to take all of your vitamins and minerals.   9.         Attend our Virtual Support Group Monthly!    Nutrition Monitoring and Evaluation:   1-2 pounds weight loss per week  Criteria: weight check, food recall  Need for Follow-up: 6 weeks post op       Imani Coffman RD, LD  Clinical Dietitian - Bariatric Surgery  Department of General Surgery  Phone: [phone]

## 2024-04-02 ENCOUNTER — TELEMEDICINE (OUTPATIENT)
Dept: PRIMARY CARE | Facility: CLINIC | Age: 34
End: 2024-04-02
Payer: COMMERCIAL

## 2024-04-02 DIAGNOSIS — Z90.3 S/P GASTRIC SLEEVE PROCEDURE: Primary | ICD-10-CM

## 2024-04-02 DIAGNOSIS — Z09 HOSPITAL DISCHARGE FOLLOW-UP: ICD-10-CM

## 2024-04-02 PROCEDURE — 99213 OFFICE O/P EST LOW 20 MIN: CPT | Performed by: FAMILY MEDICINE

## 2024-04-02 PROCEDURE — 3046F HEMOGLOBIN A1C LEVEL >9.0%: CPT | Performed by: FAMILY MEDICINE

## 2024-04-02 PROCEDURE — 3008F BODY MASS INDEX DOCD: CPT | Performed by: FAMILY MEDICINE

## 2024-04-02 NOTE — ASSESSMENT & PLAN NOTE
-Since the patient was discharged, her pain has been well-controlled.  She is advancing her diet as tolerated and as instructed.  Bowel movement with slightly constipated over the past couple days and she had to use some MiraLAX as needed.  -Follow-up with the surgeon as scheduled.

## 2024-04-02 NOTE — PROGRESS NOTES
Subjective   Patient ID: Nia Harper is a 33 y.o. female who presents for post hospitalization follow-up.  HPI    Subjective   Patient ID: Nia Harper is a 33 y.o. female who presents for Hospital Follow-up.  HPI  The patient is a 33 year old Female with a PMHx of DMII on insulin, marijuana use, ER+/NE+ breast CA Dx 6/23 s/p right mastectomy (8/24/2023), laparoscopic gastric sleeve, who presents for a post hospitalization follow-up visit (3/25/24-3/26/24) for laparoscopic sleeve gastrectomy.     Hospital course.  33 year old female with h/o obesity presented for elective laparoscopic sleeve gastrectomy on 3/25 with Dr. Ervin. Procedure was tolerated well. Post-operative upper GI study completed without evidence of leak. Diet was advanced to include bariatric clear and full liquids and tolerated. Upon discharge, pain was controlled, tolerating 5-6 oz of bariatric liquid per hour, ambulating, and voiding. She was discharged home with outpatient surgical follow up scheduled.     Since the patient was discharged, her pain has been well-controlled.  She is advancing her diet as tolerated and as instructed.  Bowel movement with slightly constipated over the past couple days and she had to use some MiraLAX as needed.    A review of system was completed.  All systems were reviewed and were normal, except for the ones that are listed in the HPI.    Objective   Physical Exam    Assessment/Plan   Problem List Items Addressed This Visit       S/P gastric sleeve procedure - Primary     -Since the patient was discharged, her pain has been well-controlled.  She is advancing her diet as tolerated and as instructed.  Bowel movement with slightly constipated over the past couple days and she had to use some MiraLAX as needed.  -Follow-up with the surgeon as scheduled.         Hospital discharge follow-up     -Since the patient was discharged, her pain has been well-controlled.  She is advancing her diet as tolerated and as  instructed.  Bowel movement with slightly constipated over the past couple days and she had to use some MiraLAX as needed.  -Follow-up with the surgeon as scheduled.         Patient to return to office in 3 months for reassessment.

## 2024-04-03 ENCOUNTER — TELEPHONE (OUTPATIENT)
Dept: SURGERY | Facility: HOSPITAL | Age: 34
End: 2024-04-03
Payer: COMMERCIAL

## 2024-04-03 DIAGNOSIS — Z90.3 S/P GASTRIC SLEEVE PROCEDURE: ICD-10-CM

## 2024-04-03 DIAGNOSIS — B37.9 YEAST INFECTION: ICD-10-CM

## 2024-04-03 DIAGNOSIS — Z85.3 HISTORY OF MALIGNANT NEOPLASM OF BREAST: Primary | ICD-10-CM

## 2024-04-03 RX ORDER — FLUCONAZOLE 150 MG/1
150 TABLET ORAL ONCE
Qty: 1 TABLET | Refills: 0 | Status: SHIPPED | OUTPATIENT
Start: 2024-04-03 | End: 2024-04-03

## 2024-04-03 NOTE — TELEPHONE ENCOUNTER
Received call from patient. She is concerned she had a yeast infection after receiving antibiotics in the hospital., which she has experience before. Reviewed with fellow. Fluconazole will be sent to pharmacy with recommendation to follow up with PCP if needed. Patient notified.

## 2024-04-04 ENCOUNTER — APPOINTMENT (OUTPATIENT)
Dept: BEHAVIORAL HEALTH | Facility: HOSPITAL | Age: 34
End: 2024-04-04
Payer: COMMERCIAL

## 2024-04-05 ENCOUNTER — TELEMEDICINE (OUTPATIENT)
Dept: HEMATOLOGY/ONCOLOGY | Facility: HOSPITAL | Age: 34
End: 2024-04-05
Payer: COMMERCIAL

## 2024-04-05 DIAGNOSIS — Z51.5 ENCOUNTER FOR PALLIATIVE CARE: ICD-10-CM

## 2024-04-05 DIAGNOSIS — F32.A DEPRESSION, UNSPECIFIED DEPRESSION TYPE: ICD-10-CM

## 2024-04-05 DIAGNOSIS — F41.9 ANXIETY: ICD-10-CM

## 2024-04-05 DIAGNOSIS — Z85.3 HISTORY OF MALIGNANT NEOPLASM OF BREAST: Primary | ICD-10-CM

## 2024-04-05 DIAGNOSIS — F41.1 GENERALIZED ANXIETY DISORDER: ICD-10-CM

## 2024-04-05 DIAGNOSIS — C50.919 MALIGNANT NEOPLASM OF FEMALE BREAST, UNSPECIFIED ESTROGEN RECEPTOR STATUS, UNSPECIFIED LATERALITY, UNSPECIFIED SITE OF BREAST (MULTI): ICD-10-CM

## 2024-04-05 PROCEDURE — 3046F HEMOGLOBIN A1C LEVEL >9.0%: CPT | Performed by: NURSE PRACTITIONER

## 2024-04-05 PROCEDURE — 3008F BODY MASS INDEX DOCD: CPT | Performed by: NURSE PRACTITIONER

## 2024-04-05 PROCEDURE — 99214 OFFICE O/P EST MOD 30 MIN: CPT | Performed by: NURSE PRACTITIONER

## 2024-04-05 NOTE — PROGRESS NOTES
Patient ID: Nia Harper is a 33 y.o. female.    Cancer History:          Breast         AJCC Edition: 8th (AJCC), Diagnosis Date: Jun 2023, IB, pT2 pN0 cM0 G3     Treatment Synopsis:    Treatment Synopsis:   -Enlarging right breast lumps.  -5/15/23: Dx MG/US demonstrated multiple masses: 1.1 cm (11:00, 8 cm FN); 1 cm (9:30, 5 cm FN); 1.2 cm (9:00, 6 cm FN); 0.7 cm (10:00, 6 cm FN); 0.4 cm (10:00, 5 cm FN). Four lymph nodes demonstrated suspicious cortical thickening.  -5/19/23: US-guided CNB of the 11:00 mass showed IDC grade 3; ER >95% UT 85% Her2-negative (1+ IHC). Biopsy of the 9:30 mass showed IDC grade 2-3; ER 80% UT 10% Her2-negative (1+ IHC). A right axillary LN was negative for carcinoma.  -6/30/23: Right mastectomy and SLNBx performed. Multipe foci of grade 3 carcinoma noted (3.1 cm, 2.2 cm, 1.8 cm), no LVI, margins negative, 0/3 LN’s involved IB, pT2 pN0 cM0 G3  Oncotype Dx RS 45 (33% 9-year recurrence risk; >15% chemo benefit).  RSClin: 59% recurrence risk with tamoxifen; 44% chemo benefit.  -8/24/23: Adjuvant TC (docetaxel, cyclophosphamide) initiated. First cycle complicated by admission for nausea/vomiting. Second cycle complicated by admission for angioedema, hives; thought to be secondary to docetaxel.   -10/16/23 Therapy changed to Cyclophosphamide/Methotrexate/5-Flouracil d/t reaction x 2 cycles   -11/09/23 C2/2 CMF -- last dose of systemic chemotherapy     Subjective    Patient returns today in follow up regarding issues unique to being a young person with cancer.    Had her bariatric surgery 03/26/24 -- relays first week was difficult recovering at home. Notes improvement this week, in energy and pain.     Notes stopping duloxetine and metformin d/t size of pill being contraindicated to swallow after bariatric surgery. Relays feeling much worse off duloxetine, more mood swings, worse anxiety, poor sleep.     Social History: Lives in [place] with partner/father of her children Dinesh, 11  y.o. daughter Lambert, 5 y.o. [employer]ore[employer] starting , Gal 3 y.o. son  Previously worked as , currently stays at home with children/runs household  Denies tobacco, does smoke marijuana nightly, rare ETOH estimates 6x/year, no vaping or other recreational drug use.    Objective    BSA: There is no height or weight on file to calculate BSA.  There were no vitals taken for this visit.     Current Outpatient Medications   Medication Instructions    anastrozole (ARIMIDEX) 1 mg, oral, Daily, Swallow whole with a drink of water.    atorvastatin (LIPITOR) 40 mg, oral, Nightly    benzoyl peroxide 5 % external wash Topical, Daily, Lather onto skin folds, leave on 2 minutes, rinse with water    blood sugar diagnostic (OneTouch Verio test strips) strip 100 strips, subcutaneous, Daily    blood-glucose sensor (FreeStyle Cristina 3 Sensor) device 2 each, miscellaneous, Every 14 days    diphenhydrAMINE (SOMINEX) 25 mg, oral, Nightly PRN    dulaglutide (Trulicity) 3 mg/0.5 mL pen injector Once weekly    DULoxetine (CYMBALTA) 60 mg, oral, Daily, Do not crush or chew. Take in the evening with food. Increase to 60mg daily dosing.    estradiol (ESTRACE) 2 g, vaginal, Daily, Use once a day for 2 weeks then as needed there after    ezetimibe (ZETIA) 10 mg, oral, Daily    FreeStyle Cristina 2 Sensor kit CHANGE SENSOR EVERY 14 DAYS AS DIRECTED.    insulin lispro (HUMALOG BHUPINDER KWIKPEN) 40 Units, subcutaneous, 3 times daily PRN, inject up to 20 units before meals per sliding scale    Lantus Solostar U-100 Insulin 40 Units, subcutaneous, 2 times daily    LORazepam (Ativan) 1 mg tablet TAKE 1 TABLET BY MOUTH ONCE DAILY AS NEEDED FOR ANXIETY    metFORMIN XR (GLUCOPHAGE-XR) 1,000 mg, oral, 2 times daily with meals    omega-3 acid ethyl esters (LOVAZA) 2 g, oral, 2 times daily with meals    omeprazole (PRILOSEC) 40 mg, oral, Daily before breakfast, Do not crush or chew. Open capsule, sprinkle beads on SF jello, pudding or  "applesauce.    ondansetron ODT (ZOFRAN-ODT) 4 mg, oral, Every 6 hours PRN    OneTouch Delica Plus Lancet 33 gauge misc TEST BLOOD GLUCOSE 3 TIMES/ DAY    OneTouch Verio Flex meter misc USE AS DIRECTED.    oxyCODONE (ROXICODONE) 5 mg, oral, Every 6 hours PRN    pen needle, diabetic (BD Ultra-Fine Mini Pen Needle) 31 gauge x 3/16\" needle 200 each, subcutaneous, 4 times daily before meals and nightly, Use 4 a day as directed    valACYclovir (Valtrex) 500 mg tablet 1 tablet, oral, 2 times daily, As needed for outbreaks     No visits with results within 1 Week(s) from this visit.   Latest known visit with results is:   Admission on 03/25/2024, Discharged on 03/26/2024   Component Date Value Ref Range Status    POCT Glucose 03/25/2024 144 (H)  74 - 99 mg/dL Final    Preg Test, Ur 03/25/2024 Negative  Negative Final    Case Report 03/25/2024    Final                    Value:Surgical Pathology                                Case: [id number]                                  Authorizing Provider:  Isabelle De La Rosa MD          Collected:           03/25/2024 0805              Ordering Location:     [facility]       Received:            03/25/2024 [address] [place] [address] OR                                                             Pathologist:           Natasha Diaz MD PhD                                                              Specimen:    STOMACH SLEEVE RESECTION, GASTRIC REMNANT                                                  FINAL DIAGNOSIS 03/25/2024    Final                    Value:This result contains rich text formatting which cannot be displayed here.      03/25/2024    Final                    Value:This result contains rich text formatting which cannot be displayed here.    Clinical History 03/25/2024    Final                    Value:This result contains rich text formatting which cannot be displayed here.    Gross Description 03/25/2024    Final                    " Value:This result contains rich text formatting which cannot be displayed here.    POCT Glucose 03/25/2024 205 (H)  74 - 99 mg/dL Final    RN/MD NOTIFIED    POCT Glucose 03/25/2024 234 (H)  74 - 99 mg/dL Final    POCT Glucose 03/25/2024 254 (H)  74 - 99 mg/dL Final    POCT Glucose 03/25/2024 219 (H)  74 - 99 mg/dL Final    Glucose 03/26/2024 167 (H)  74 - 99 mg/dL Final    Sodium 03/26/2024 138  136 - 145 mmol/L Final    Potassium 03/26/2024 3.8  3.5 - 5.3 mmol/L Final    Chloride 03/26/2024 100  98 - 107 mmol/L Final    Bicarbonate 03/26/2024 24  21 - 32 mmol/L Final    Anion Gap 03/26/2024 18  10 - 20 mmol/L Final    Urea Nitrogen 03/26/2024 10  6 - 23 mg/dL Final    Creatinine 03/26/2024 0.59  0.50 - 1.05 mg/dL Final    eGFR 03/26/2024 >90  >60 mL/min/1.73m*2 Final    Calculations of estimated GFR are performed using the 2021 CKD-EPI Study Refit equation without the race variable for the IDMS-Traceable creatinine methods.  https://jasn.asnjournals.org/content/early/2021/09/22/ASN.9277736279    Calcium 03/26/2024 10.0  8.6 - 10.6 mg/dL Final    Albumin 03/26/2024 4.7  3.4 - 5.0 g/dL Final    Alkaline Phosphatase 03/26/2024 53  33 - 110 U/L Final    Total Protein 03/26/2024 8.0  6.4 - 8.2 g/dL Final    AST 03/26/2024 42 (H)  9 - 39 U/L Final    Bilirubin, Total 03/26/2024 0.5  0.0 - 1.2 mg/dL Final    ALT 03/26/2024 83 (H)  7 - 45 U/L Final    Patients treated with Sulfasalazine may generate falsely decreased results for ALT.    WBC 03/26/2024 6.5  4.4 - 11.3 x10*3/uL Final    nRBC 03/26/2024 0.0  0.0 - 0.0 /100 WBCs Final    RBC 03/26/2024 4.59  4.00 - 5.20 x10*6/uL Final    Hemoglobin 03/26/2024 13.1  12.0 - 16.0 g/dL Final    Hematocrit 03/26/2024 40.2  36.0 - 46.0 % Final    MCV 03/26/2024 88  80 - 100 fL Final    MCH 03/26/2024 28.5  26.0 - 34.0 pg Final    MCHC 03/26/2024 32.6  32.0 - 36.0 g/dL Final    RDW 03/26/2024 13.0  11.5 - 14.5 % Final    Platelets 03/26/2024 221  150 - 450 x10*3/uL Final     Neutrophils % 03/26/2024 69.0  40.0 - 80.0 % Final    Immature Granulocytes %, Automated 03/26/2024 0.3  0.0 - 0.9 % Final    Immature Granulocyte Count (IG) includes promyelocytes, myelocytes and metamyelocytes but does not include bands. Percent differential counts (%) should be interpreted in the context of the absolute cell counts (cells/UL).    Lymphocytes % 03/26/2024 22.9  13.0 - 44.0 % Final    Monocytes % 03/26/2024 7.6  2.0 - 10.0 % Final    Eosinophils % 03/26/2024 0.0  0.0 - 6.0 % Final    Basophils % 03/26/2024 0.2  0.0 - 2.0 % Final    Neutrophils Absolute 03/26/2024 4.45  1.20 - 7.70 x10*3/uL Final    Percent differential counts (%) should be interpreted in the context of the absolute cell counts (cells/uL).    Immature Granulocytes Absolute, Au* 03/26/2024 0.02  0.00 - 0.70 x10*3/uL Final    Lymphocytes Absolute 03/26/2024 1.48  1.20 - 4.80 x10*3/uL Final    Monocytes Absolute 03/26/2024 0.49  0.10 - 1.00 x10*3/uL Final    Eosinophils Absolute 03/26/2024 0.00  0.00 - 0.70 x10*3/uL Final    Basophils Absolute 03/26/2024 0.01  0.00 - 0.10 x10*3/uL Final    Magnesium 03/26/2024 1.84  1.60 - 2.40 mg/dL Final    POCT Glucose 03/25/2024 217 (H)  74 - 99 mg/dL Final    POCT Glucose 03/25/2024 205 (H)  74 - 99 mg/dL Final    POCT Glucose 03/25/2024 200 (H)  74 - 99 mg/dL Final    POCT Glucose 03/26/2024 171 (H)  74 - 99 mg/dL Final    POCT Glucose 03/26/2024 172 (H)  74 - 99 mg/dL Final    POCT Glucose 03/26/2024 208 (H)  74 - 99 mg/dL Final    POCT Glucose 03/26/2024 197 (H)  74 - 99 mg/dL Final      Physical Exam  Constitutional:       General: She is not in acute distress.     Appearance: Normal appearance. She is normal weight. She is ill-appearing.   Neurological:      General: No focal deficit present.      Mental Status: She is alert and oriented to person, place, and time.   Psychiatric:         Mood and Affect: Mood normal.         Behavior: Behavior normal.         Thought Content: Thought content  normal.         Judgment: Judgment normal.     Performance Status:  Symptomatic; fully ambulatory    Assessment/Plan   Nia Harper is a 33 y.o. AA F with a recent diagnosis of G3 IB adenocarcinoma of the breast, ER+. S/p surgery followed by adjuvant chemotherapy with TC x 2 cycles but with angioedema so switched to CMF x 2 additional cycles. Currently on anastrazole and goserelin for endocrine therapy.     #Psychosocial:  - Referral to psychology for 1:1 therapy support to help with emotional management and illness -- again provided patient with phone number to call today  - Reached out to bariatric surgery team re holding duloxetine if/when she can resume, tolerated 60mg dose  - Child life referral to help with children/parenting    #Hot Flashes: secondary to ovarian suppression and low circulating estrogen  - Improved with initiation of duloxetine, will resume if able     #Sexual Dysfunction/Contraception: secondary to ovarian suppression and low circulating estrogen  - Provided patient with sexual concerns handout, encouraged open communication outside of intimacy to discuss patient decreased interest/sexual dysfunction with partner  - Use replens daily topical vaginal moisturizer  - Use silicone based lubricant with sex  - Has topical estrogen suppositories/applicator but has yet to trial  - We discussed the seriousness of getting pregnant while receiving chemotherapy due to the harmful effects this could have on the  fetus, and on her cancer treatment given the decreased availability of medical  services.   - Options for contraception include intrauterine devices, estrogen containing contraceptives are contraindicated d/t ER+ status -- provided patient with comparison chart of contraception options  - Following with gynecology    #Practical Needs:  - Following with [employer] for financial assistance grants and support  - Has referral in place to [employer] for [employer]  insecurity    #Diet/Exercise/Healthy Lifestyle:  - Discussed research demonstrating consumption of a healthy, plant based diet not only decreases cancer risk, but also has been found to improve survival in cancer patients who partake in a healthy diet.   - We reviewed the American Cancer Society guidelines for a healthy diet including mostly plant based foods  with incorporation of plant/lean animal proteins and healthy fats such as the mediterranean diet.   - Patient provided with a copy of ACS guidelines for healthy diet  - Discussed research that demonstrates decreased cancer risk and improved survival in cancer patients who exercise and stay active throughout diagnosis and treatment.  - Encouraged patient to join our 12 week exercise training program for young adults with cancer  - Encouraged patient to continue to abstain from alcohol, tobacco, and recreational drugs    #Risk for infertility:  - Discussed the damaging effect cancer treatment can have on the ovaries.   - Discussed natural age related decline in fertility and menopause that occurs as a result of ovarian aging, and that cancer treatments can accelerate that progression and diminish ovarian reserve.  - Individuals may experience varying degrees of short or long term ovarian dysfunction and amenorrhea, and that this is dependent upon type of cancer treatment, cumulative dose, and patients age.   - Loss of ovarian function may be permanent or temporary.  - Amenorrhea may be temporary or permanent -- temporary amenorrhea results from destruction of maturing follicles whereas permanent amenorrhea results from depletion of viable primordial follicles.  - Risk to fertility is INTERMEDIATE based on cancer treatment of Docetaxel/Cyclophosphamide  with cyclophosphamide being the primary  of infertility -- this is dose dependent  - Patient elected not to undergo fertility preservation up front and opted for ovarian suppression with goserelin  -  Baseline AMH level drawn 08/11/2023 2.464  - Discussed plan for at least 18 months on oral endocrine therapy followed by a 3 month wash out before she should attempt to conceive     Follow up visit in 2 months      Cancer Staging   Breast cancer (CMS/[facility])  Staging form: Breast, AJCC 8th Edition  - Pathologic stage from 6/30/2023: Stage IB (pT2(3), pN0(sn), cM0, G3, ER+, VA+, HER2-, Oncotype DX score: 45) - Signed by Jeramie Lancaster MD on 10/10/2023      Oncology History   Breast cancer (CMS/HCC)   6/30/2023 Cancer Staged    Staging form: Breast, AJCC 8th Edition, Pathologic stage from 6/30/2023: Stage IB (pT2(3), pN0(sn), cM0, G3, ER+, VA+, HER2-, Oncotype DX score: 45) - Signed by Jeramie Lancaster MD on 10/10/2023     8/24/2023 - 9/15/2023 Chemotherapy    TC (DOCEtaxel / Cyclophosphamide), 21 Day Cycles     9/8/2023 Initial Diagnosis    Malignant neoplasm of female breast (CMS/HCC)     10/16/2023 - 11/9/2023 Chemotherapy    CMF (Cyclophosphamide / Methotrexate / Fluorouracil) 21 Day Cycles                        RONEL Pemberton-CNP

## 2024-04-08 ENCOUNTER — APPOINTMENT (OUTPATIENT)
Dept: HEMATOLOGY/ONCOLOGY | Facility: HOSPITAL | Age: 34
End: 2024-04-08
Payer: COMMERCIAL

## 2024-04-08 ENCOUNTER — APPOINTMENT (OUTPATIENT)
Dept: SURGERY | Facility: CLINIC | Age: 34
End: 2024-04-08
Payer: COMMERCIAL

## 2024-04-08 ENCOUNTER — APPOINTMENT (OUTPATIENT)
Dept: PLASTIC SURGERY | Facility: CLINIC | Age: 34
End: 2024-04-08
Payer: COMMERCIAL

## 2024-04-09 ENCOUNTER — TELEPHONE (OUTPATIENT)
Dept: SURGERY | Facility: CLINIC | Age: 34
End: 2024-04-09
Payer: COMMERCIAL

## 2024-04-09 NOTE — TELEPHONE ENCOUNTER
"Pt email: \"Dru Amanda. I wasn't sure who tonresxh our to regarding my up coming soft diet? I'm not sure what I'm supposed to eat and what not to eat. If you could let me know that would be great. Thank you \"        Hi!     You can begin your SOFT diet on 4/16. You will be on this diet for 2 weeks.     Key Reminders for the SOFT stage:              -    Use Moist cooking methods like using the crock pot, slower cooker, pressure cooker, and bakeware with lids. Avoid grilling, frying, and microwaving.            -   All foods should be soft enough to mash with a fork.            -   Take small bites and chew until it is pureed in the mouth before swallowing. Take 20-30 minutes to finish your meal.            -   Tack all of your food and protein intake. Use a food scale to help keep track. You want to work on weaning off your protein shakes.            -   AVOID raw vegetables, raw fruits, nuts, tough meats, stringy vegetables, rice, bread, and pastas.  Your pouch cannot handle these foods.            -   Continue to follow the 30-30-30 rule, take your CHEWABLE vitamins, and meet your fluid goals. Be sure to continue to track your fluid intake.            -   Please refer to the guidelines packet.               I hope this helps! GAETANO Soria MS, RD, LD  "

## 2024-04-11 ENCOUNTER — INFUSION (OUTPATIENT)
Dept: HEMATOLOGY/ONCOLOGY | Facility: HOSPITAL | Age: 34
End: 2024-04-11
Payer: COMMERCIAL

## 2024-04-11 VITALS
TEMPERATURE: 98.6 F | SYSTOLIC BLOOD PRESSURE: 130 MMHG | HEART RATE: 94 BPM | DIASTOLIC BLOOD PRESSURE: 68 MMHG | RESPIRATION RATE: 18 BRPM | OXYGEN SATURATION: 99 %

## 2024-04-11 DIAGNOSIS — C50.919 MALIGNANT NEOPLASM OF FEMALE BREAST, UNSPECIFIED ESTROGEN RECEPTOR STATUS, UNSPECIFIED LATERALITY, UNSPECIFIED SITE OF BREAST (MULTI): ICD-10-CM

## 2024-04-11 PROCEDURE — 2500000005 HC RX 250 GENERAL PHARMACY W/O HCPCS: Performed by: INTERNAL MEDICINE

## 2024-04-11 PROCEDURE — 2500000004 HC RX 250 GENERAL PHARMACY W/ HCPCS (ALT 636 FOR OP/ED): Mod: JZ | Performed by: INTERNAL MEDICINE

## 2024-04-11 PROCEDURE — 96372 THER/PROPH/DIAG INJ SC/IM: CPT

## 2024-04-11 RX ORDER — LIDOCAINE HYDROCHLORIDE 10 MG/ML
2 INJECTION, SOLUTION EPIDURAL; INFILTRATION; INTRACAUDAL; PERINEURAL ONCE
Status: CANCELLED | OUTPATIENT
Start: 2024-04-25

## 2024-04-11 RX ORDER — FAMOTIDINE 10 MG/ML
20 INJECTION INTRAVENOUS ONCE AS NEEDED
Status: CANCELLED | OUTPATIENT
Start: 2024-04-25

## 2024-04-11 RX ORDER — DIPHENHYDRAMINE HYDROCHLORIDE 50 MG/ML
50 INJECTION INTRAMUSCULAR; INTRAVENOUS AS NEEDED
Status: CANCELLED | OUTPATIENT
Start: 2024-04-25

## 2024-04-11 RX ORDER — ALBUTEROL SULFATE 0.83 MG/ML
3 SOLUTION RESPIRATORY (INHALATION) AS NEEDED
Status: CANCELLED | OUTPATIENT
Start: 2024-04-25

## 2024-04-11 RX ORDER — EPINEPHRINE 0.3 MG/.3ML
0.3 INJECTION SUBCUTANEOUS EVERY 5 MIN PRN
Status: CANCELLED | OUTPATIENT
Start: 2024-04-25

## 2024-04-11 RX ORDER — LIDOCAINE HYDROCHLORIDE 10 MG/ML
2 INJECTION, SOLUTION EPIDURAL; INFILTRATION; INTRACAUDAL; PERINEURAL ONCE
Status: COMPLETED | OUTPATIENT
Start: 2024-04-11 | End: 2024-04-11

## 2024-04-11 RX ADMIN — GOSERELIN ACETATE 3.6 MG: 3.6 IMPLANT SUBCUTANEOUS at 16:26

## 2024-04-11 RX ADMIN — LIDOCAINE HYDROCHLORIDE 20 MG: 10 INJECTION, SOLUTION EPIDURAL; INFILTRATION; INTRACAUDAL; PERINEURAL at 16:26

## 2024-04-11 ASSESSMENT — PAIN SCALES - GENERAL: PAINLEVEL: 0-NO PAIN

## 2024-04-11 NOTE — PROGRESS NOTES
Nia Harper is a 33 y.o. female who presents in stable condition for Zoladex injection.    Since her last visit, she has been doing well.  Overall, she states her energy level is stable. She is currently on soft diet due to recent gastric sleeve surgery.  She has no other questions or concerns at this time.     Patient tolerated zoladex injection to left lower abdomen and has been educated with the overall plan of therapy. AVS & future schedule provided. Patient discharged in stable condition with daughter.    Next goserelin (Zoladex) injection due & scheduled to be given 5/11/2024.    Reviewed and approved by YARON WALLER on 4/11/24 at 4:39 PM.

## 2024-04-18 ENCOUNTER — OFFICE VISIT (OUTPATIENT)
Dept: PLASTIC SURGERY | Facility: CLINIC | Age: 34
End: 2024-04-18
Payer: COMMERCIAL

## 2024-04-18 VITALS
BODY MASS INDEX: 38.22 KG/M2 | SYSTOLIC BLOOD PRESSURE: 126 MMHG | HEIGHT: 70 IN | DIASTOLIC BLOOD PRESSURE: 76 MMHG | HEART RATE: 73 BPM | WEIGHT: 267 LBS

## 2024-04-18 DIAGNOSIS — Z90.11 S/P PARTIAL MASTECTOMY, RIGHT: Primary | ICD-10-CM

## 2024-04-18 PROCEDURE — 99213 OFFICE O/P EST LOW 20 MIN: CPT | Performed by: SURGERY

## 2024-04-18 PROCEDURE — 3078F DIAST BP <80 MM HG: CPT | Performed by: SURGERY

## 2024-04-18 PROCEDURE — 3074F SYST BP LT 130 MM HG: CPT | Performed by: SURGERY

## 2024-04-18 PROCEDURE — 3008F BODY MASS INDEX DOCD: CPT | Performed by: SURGERY

## 2024-04-18 PROCEDURE — 3046F HEMOGLOBIN A1C LEVEL >9.0%: CPT | Performed by: SURGERY

## 2024-04-18 NOTE — PROGRESS NOTES
Department of Plastic and Reconstructive Surgery            [facility]    Date: 4/18/2024        Yoko Harper is a 33 y.o. female who was originally seen in June 2023 biopsy-proven invasive ductal carcinoma right breast at multiple locations.  At that time, her BMI was 43, the patient was instructed she was not a candidate for immediate reconstruction.  On 6/30/2023 she underwent right partial mastectomy with sentinel lymph node biopsy.  This was performed by Dr. Tamir Henderson.  She underwent chemo docetaxel/cyclophosphamide with ongoing goserelin (started 8/18/23)   She underwent laparoscopic sleeve gastrectomy, with Dr. Ervin on 3/25/2024.  Her maximum weight was approximately 300 pounds.  Current weight is 266 pounds, her goal is to be close to 200    Objective    There were no vitals filed for this visit.    Gen: interactive and pleasant  Head: NCAT  Eyes: EOMI, PERRLA  Mouth: MMM  Throat: trachea midline  Cor: RRR  Pulm: nonlabored breathing  Abd: s/nt/nd  Neuro: AAOx3  Ext: extremities perfused    There is no height or weight on file to calculate BMI.      Focused exam of the:   Breast, sternal notch: NAC = 34 cm; NAC = 9 cm, breast width 18 cm  Breast absent with excess skin, and lateral dogear breast with 18 cm    Assessment/Plan       Nia is a 33 y.o. female who presents to discuss delayed breast reconstruction, and also possibly body contouring.    She indicates interest in free deep inferior epigastric  flap surgery, I asked her over several questions what interested in the surgery, she indicates she is interested in the surgery for the donor site modifications.  She is also considering a prophylactic completion mastectomy contralateral side.    We discussed:  1.  Free deep inferior epigastric  flap surgery, unilateral, bilateral  2.  Prophylactic contralateral mastectomy with bilateral tissue expander placement, and eventual second stage surgery with implant  exchange and possible panniculectomy/abdominoplasty      I discussed with her several options, my discussion was guarded with the caveat that she is only approximately 1 month out from gastric sleeve surgery, and any abdominally-based surgery would be affected by future weight loss.    I believe it is prudent for her to wait to obtain a more stable weight, and continue our conversation.  She will consider these options in the meantime.    Plan:   Follow-up visit in 3 months    I spent 30 minutes with this patient. Greater than 50% of this time was spent in the counselling and/or coordination of care of this patient.  This note was created using voice recognition software and was not corrected for typographical or grammatical errors.    Signature: Luis Carlos Borrero MD   Date: 4/18/2024

## 2024-04-19 NOTE — OP NOTE
PREOPERATIVE DIAGNOSIS:  Bleeding after a right mastectomy with hematoma.    POSTOPERATIVE DIAGNOSIS:  Bleeding after a right mastectomy with hematoma.    OPERATION/PROCEDURE:  Exploration of wound with evacuation of hematoma and ligation of  bleedersing.     SURGEON:  Tamir Henderson MD.    ASSISTANT(S):  Dimas Pan, PGY 2.    ANESTHESIA:  General.    CLINICAL NOTE:  This woman had a right mastectomy and sentinel lymph node biopsy, and  postoperatively approximately 6 or 7 hours after surgery, she was  seen postoperatively and had what appeared to be a hematoma.  Her  dressing had come off, which was a pressure dressing.  The drain  bottle was full, this was emptied and then we expressed some blood  out from the hematoma.  Hematoma seemed to be better and the drainage  slowed down.  She was observed for a few hours and she refilled up her  drain and had recurrence of the hematoma.  She was therefore brought  to Surgery for exploration.     OPERATIVE NOTE:  The patient in the supine position.  After adequate general  anesthesia was obtained, the dressing was removed.  The drain was cut  and removed later during the surgery.  She was prepped with Betadine  and draped in the usual manner.  We opened the incision with a  scalpel, cutting the sutures, immediately encountered clot and blood.   This was suctioned out and the clot removed.  There was clot both on  the anterior chest and skin and in the axilla.  Removed the clot.  There was a small vessel quite medial with some slow bleeding.  This  was tied with 3-0 Vicryl, and then on exploration of the axilla,  there was a small arterial bleeder that was tied with 3-0 Vicryl.  We  irrigated the area with some pressure to continue to remove the clot  and rubbed the clot off.  We re-examined the area, could see no  active bleeders.  There did appear to be some oozing on the muscle,  which was slight and in the axilla, which was treated with Bovie  cautery.   We placed tapes in the area and held them and then  re-examined and did not see any active bleeding.  We re-examined the  area.  We then placed two 19-Korean channel drains, one along the  chest wall starting inferiorly and coming up and the other going into  the axilla, and then draping over the upper chest wall.  We placed  Peri on the muscle superiorly and inferiorly and into the axilla.  We sutured the drains with 3-0 Prolene sutures.  We reapproximated  the skin with interrupted 2-0 Vicryl sutures burying the knot and  closed the skin with a running 3-0 undyed PDS subcuticular stitch and  Steri-Strips, fluff dressing, and Ace wrap were applied.  She  tolerated the procedure well and was taken to recovery room in  satisfactory condition.       Tamir Henderson MD    DD:  07/01/2023 01:06:22 EST  DT:  07/01/2023 01:35:00 EST  DICTATION NUMBER:  [id number]  INTERNAL JOB NUMBER:  [id number]    CC:  MD MANNIE LarsenWA [facility]       Electronic Signatures:  Tamir Henderson) (Signed on 03-Jul-2023 10:47)   Authored   Unsigned, Draft (SYS GENERATED) (Entered on 01-Jul-2023 01:35)   Entered     Last Updated: 03-Jul-2023 10:47 by Tamir Henderson)

## 2024-04-25 ENCOUNTER — APPOINTMENT (OUTPATIENT)
Dept: HEMATOLOGY/ONCOLOGY | Facility: HOSPITAL | Age: 34
End: 2024-04-25
Payer: COMMERCIAL

## 2024-04-30 NOTE — ASSESSMENT & PLAN NOTE
S/p LSG 6 wk     Initial Wt: 294 lbs  Initial BMI: 40.3  Patient denies any N,V,F,D,CP or SOB.   Tolerating soft diet   ambulating and eliminating well  no sign of infection   taking Vitamins and PPI     imp: doing well     recs:   advance diet as advised   advance activity as advised   cont Vitamin supplementation, PPI   FU with dietitian

## 2024-04-30 NOTE — PROGRESS NOTES
BARIATRIC SURGERY CLINIC  Comprehensive Weight Management        Patient: Nia Harper   MRN: [id number]      Index Surgery:   Surgeon: Dr. Ervin  Procedure: LSG  Date: 03/25/2024     Virtual or Telephone Consent:  A telephone visit (audio or visual only) between the patient (at the originating site) and the provider (at the distant site) was utilized to provide this telehealth service. Verbal consent was requested and obtained from Nia Harper on this date, 05/03/24 for a bariatric telehealth visit.      HPI   Nia Harper is a 33 y.o. female presenting for follow up visit 6 week pov s/p LSG with Dr. Ervin. Initial Weight is 294 lbs, BMI 40.3. Class 3 Obesity.      Diet:  - soft food diet  - achieving protein and fluid goals: yes - both, occasionally uses shakes      Exercise:  -  walking, increasing activity as tolerated.      Concerns related to:  Nausea/Vomiting: no, denies   Reflux/heartburn: denies  Abdominal Pain: denies   Diarrhea/Constipation- bowel function is regular     Daily Supplements:  Calcium: Calcium Citrate w/ vitamin D (1200 - 1500mg) (centrum)  Multivitamin & Minerals: 2 per day   Vitamin B12: 500 mcg/day   Iron/Other: iron supplement   PPI: taking    Primary Medical Hx:  Patient Active Problem List   Diagnosis    Astigmatism of both eyes    Breast skin changes    Radial styloid tenosynovitis of right hand    Depression    Diabetes mellitus type 2 without retinopathy (Multi)    Diffuse goiter    Elevation of levels of liver transaminase levels    Epidermal inclusion cyst    Herpes simplex virus (HSV) infection    History of malignant neoplasm of breast    Hyperlipemia    Hypertriglyceridemia    Myopia of both eyes    Nexplanon in place    Suspected sleep apnea    Uncontrolled type 2 diabetes mellitus with hyperglycemia (Multi)    Chronic headaches    BMI 40.0-44.9, adult (Multi)    Severe obesity (Multi)    Breast cancer (Multi)    Primary hypertension    Angioedema    Anxiety disorder     History of bariatric surgery    Fatty liver    Gallstones    History of cannabis abuse    History of noncompliance with medical treatment    Insufficient sleep syndrome    Sleep disorder breathing    Status post mastectomy    Systolic murmur    Artificial menopause    Acute vaginitis    Encounter for follow-up surveillance of breast cancer    Hidradenitis suppurativa    Suppression of ovarian secretion    Bariatric surgery status    Mass of left breast    Infected sebaceous cyst    Sebaceous cyst    Obesity, Class III, BMI 40-49.9 (morbid obesity) (Multi)    Post-op pain    Obesity    S/P gastric sleeve procedure    Hospital discharge follow-up      Past Surgical History:   Procedure Laterality Date     SECTION, CLASSIC  2019     Section    MASTECTOMY Right     MR HEAD ANGIO WO IV CONTRAST  2015    MR HEAD ANGIO WO IV CONTRAST 2015 [facility]    OTHER SURGICAL HISTORY  2019    Surgical Treatment Of Missed  In First Trimester      Social History     Socioeconomic History    Marital status: Single     Spouse name: Not on file    Number of children: Not on file    Years of education: Not on file    Highest education level: Not on file   Occupational History    Not on file   Tobacco Use    Smoking status: Never     Passive exposure: Never    Smokeless tobacco: Never   Vaping Use    Vaping status: Never Used   Substance and Sexual Activity    Alcohol use: Never    Drug use: Not Currently     Types: Marijuana     Comment: last used 4 weeks ago    Sexual activity: Defer   Other Topics Concern    Not on file   Social History Narrative    Not on file     Social Determinants of Health     Financial Resource Strain: Patient Declined (3/25/2024)    Overall Financial Resource Strain (CARDIA)     Difficulty of Paying Living Expenses: Patient declined   Food Insecurity: No Food Insecurity (3/25/2024)    Hunger Vital Sign     Worried About Running Out of Food in the Last Year:  Never true     Ran Out of Food in the Last Year: Never true   Transportation Needs: Patient Declined (3/25/2024)    PRAPARE - Transportation     Lack of Transportation (Medical): Patient declined     Lack of Transportation (Non-Medical): Patient declined   Physical Activity: Not on file   Stress: No Stress Concern Present (3/25/2024)    [employer] [place] [employer] - Occupational Stress Questionnaire     Feeling of Stress : Only a little   Social Connections: Not on file   Intimate Partner Violence: Not At Risk (3/25/2024)    Humiliation, Afraid, Rape, and Kick questionnaire     Fear of Current or Ex-Partner: No     Emotionally Abused: No     Physically Abused: No     Sexually Abused: No   Housing Stability: Patient Declined (3/25/2024)    Housing Stability Vital Sign     Unable to Pay for Housing in the Last Year: Patient declined     Number of Places Lived in the Last Year: 1     Unstable Housing in the Last Year: Patient declined     Family History   Problem Relation Name Age of Onset    Heart disease Father      Cancer Maternal Grandmother      Diabetes type II Maternal Grandmother      Cancer Maternal Grandfather      Cancer Paternal Grandmother      Diabetes type II Mother's Sister          uncontrolled    Breast cancer Father's Sister          two paternal 1st cousins (through paternal uncle, both sisters, both early onset, one ). two paternal great aunts (through PGF's side, both  in their 50s/60s)    Cancer Father's Sister      Cancer Father's Brother      Breast cancer Paternal Cousin          two paternal 1st cousins (through paternal uncle, both sisters, both early onset, one ). two paternal great aunts (through PGF's side, both  in their 50s/60s)    Breast cancer Cousin        Current Outpatient Medications on File Prior to Visit   Medication Sig Dispense Refill    anastrozole (Arimidex) 1 mg tablet Take 1 tablet (1 mg total) by mouth once daily.  Swallow whole with a  drink of water. 90 tablet 3    atorvastatin (Lipitor) 40 mg tablet Take 1 tablet (40 mg) by mouth once daily at bedtime. 90 tablet 3    benzoyl peroxide 5 % external wash Apply topically once daily. Lather onto skin folds, leave on 2 minutes, rinse with water 236 g 11    blood sugar diagnostic (OneTouch Verio test strips) strip Inject 100 strips under the skin once daily. 100 strip 1    blood-glucose sensor (FreeStyle Cristina 3 Sensor) device 2 each every 14 (fourteen) days. 2 each 11    diphenhydrAMINE (Sominex) 25 mg tablet Take 1 tablet (25 mg) by mouth as needed at bedtime for sleep.      dulaglutide (Trulicity) 3 mg/0.5 mL pen injector Once weekly (Patient taking differently: Once weekly on wednesday) 4 each 11    DULoxetine (Cymbalta) 60 mg DR capsule Take 1 capsule (60 mg) by mouth once daily. Do not crush or chew. Take in the evening with food. Increase to 60mg daily dosing. 30 capsule 0    DULoxetine 60 mg capsule, delayed rel sprinkle Take 60 mg by mouth once daily for 15 days. 15 capsule 0    estradiol (Estrace) 0.01 % (0.1 mg/gram) vaginal cream Insert 0.5 Applicatorfuls (2 g) into the vagina once daily. Use once a day for 2 weeks then as needed there after 42.5 g 0    ezetimibe (Zetia) 10 mg tablet Take 1 tablet (10 mg) by mouth once daily.      FreeStyle Cristina 2 Sensor kit CHANGE SENSOR EVERY 14 DAYS AS DIRECTED.      insulin glargine (Lantus Solostar U-100 Insulin) 100 unit/mL (3 mL) pen Inject 40 Units under the skin 2 times a day. 15 mL 3    insulin lispro (HumaLOG Aurelio Kwikpen) 100 unit/mL injection Inject 40 Units under the skin 3 times a day as needed for high blood sugar. inject up to 20 units before meals per sliding scale 15 mL 5    LORazepam (Ativan) 1 mg tablet TAKE 1 TABLET BY MOUTH ONCE DAILY AS NEEDED FOR ANXIETY 30 tablet 0    metFORMIN  mg 24 hr tablet Take 2 tablets (1,000 mg) by mouth 2 times a day with meals. 180 tablet 1    omega-3 acid ethyl esters (Lovaza) 1 gram capsule  "Take 2 capsules (2 g) by mouth 2 times a day with meals. 120 capsule 1    omeprazole (PriLOSEC) 40 mg DR capsule Take 1 capsule (40 mg) by mouth once daily in the morning. Take before meals. Do not crush or chew. Open capsule, sprinkle beads on SF jello, pudding or applesauce. 30 capsule 5    ondansetron ODT (Zofran-ODT) 4 mg disintegrating tablet Take 1 tablet (4 mg) by mouth every 6 hours if needed for nausea or vomiting. 60 tablet 1    OneTouch Delica Plus Lancet 33 gauge misc TEST BLOOD GLUCOSE 3 TIMES/ DAY      OneTouch Verio Flex meter misc USE AS DIRECTED.      oxyCODONE (Roxicodone) 5 mg/5 mL solution Take 5 mL (5 mg) by mouth every 6 hours if needed for severe pain (7 - 10) or moderate pain (4 - 6). 140 mL 0    pen needle, diabetic (BD Ultra-Fine Mini Pen Needle) 31 gauge x 3/16\" needle Inject 200 each under the skin 4 times a day before meals. Use 4 a day as directed (Patient not taking: Reported on 3/25/2024) 200 each 3    valACYclovir (Valtrex) 500 mg tablet Take 1 tablet (500 mg) by mouth 2 times a day. As needed for outbreaks      [DISCONTINUED] LORazepam (Ativan) 1 mg tablet TAKE 1 TABLET BY MOUTH ONCE DAILY AS NEEDED FOR ANXIETY 30 tablet 0     No current facility-administered medications on file prior to visit.      Allergies   Allergen Reactions    Metformin Swelling     Immediate release only- currently taking ER without problems    Cephalexin Hives, Other, Rash and Swelling     \"welts\"    Sulfamethoxazole-Trimethoprim Hives, Rash and Swelling     REVIEW OF SYSTEMS:  Negative unless otherwise reviewed in HPI.    PHYSICAL EXAM   Physical Exam   Ht: 5'10\"           Wt: 262 lbs      BMI: 37.59  General- No acute distress, well appearing and well nourished.   Eyes Conjunctiva and lids: No erythema, swelling or discharge  Neck appears supple  CV: reg rate/rhythm (self report)  Pulmonary: Respiratory effort: Normal respiration. unlabored  Abdomen: Central adiposity  Neurologic - Coordination: Not " assessed  Extremities: No clubbing, cyanosis  Psychiatric - Orientation to person, place, and time: Normal. Mood and affect: Normal.     ASSESSMENT & PLAN   33 y.o. female presenting for 6 wk fuv s/p LSG 03/25/2024. No acute complications or issues. Achieving protein and fluid goals, having regular BM. She is taking appropriate bariatric vitamins/supplements. She reports energy has improved over past few days. She is increasing activity as tolerated, and has follow up scheduled with bariatric RD.      Weight Impression:  -Initial BMI: 40.3   -Initial Weight: 294 lbs   -Today's Weight: 262 lbs   -Current BMI: Body mass index is 38.31 kg/m².  -%Total Weight Loss: -11%    Problem List Items Addressed This Visit       Diabetes mellitus type 2 without retinopathy (Multi) - Primary     Recommend controlled CHO Diet to improve glycemic control.    Carbohydrate portions at meals <40g per meal depending on activity level reviewed.  Avoid sugar sweetened beverages  Engage in regular aerobic exercise at least 150 minutes per week for CV benefit.  Encourage self monitoring of blood glucose values  Optimal values of blood glucose:  Fasting < 90  PreMeal < 100  30 minutes post meal < 140  60 minutes post meal < 120  90 minutes post meal < 100    Discussed roles of combining dietary protein, increased dietary fiber and reduction of simple carbohydrates to benefit glycemic control.   Discussed importance of regular exercise for glycemic control.   Ongoing follow up with PCP, Endo and speciality providers for retinopathy & nephropathy screening encouraged annually.          Relevant Orders    Comprehensive Metabolic Panel    Primary hypertension     Goal BP <120/80  Continue follow up with PCP for mgmt of antiHTN regimen  Encourage adherence to medications if prescribed for BP control  DASH/Mediterranean Diet lifestyle encouraged.  Weight reduction of 5-10% of clinical significance to improve BP control  Continues to work on  lifestyle change goals to support blood pressure control and weight reduction.  Continue to follow up with your primary care physician           History of bariatric surgery     - Reviewed impact of diet, physical activity, stress & sleep to cardio metabolic health risks and ways to modify current lifestyle to reduce cardiometabolic health risks associated with obesity.  - Counseled on benefits of increased regular exercise, both aerobic & resistance training.  Reviewed benefits of resistance training to enhance/maintain lean body mass.  - Counseled on dietary modifications to support weight reduction, reduced calorie diet, encourage adequate protein, increased dietary fiber from fruit and vegetable to improve appetite/satiety regulation and quality of diet.  Discussed impact of processed foods and liquid calories to weight gain & contribution to dysfunctional appetite signals.   -  Reviewed importance of intensification of lifestyle therapy in weight reduction and maintenance, set behavioral modification goals of nutrition, physical activity or behavioral practices to benefit weight reduction.    - Discussed self monitoring practices to ensure reduce calorie diet, emphasizing increased dietary protein & fiber.  Reviewed protein targets per meal as recommendation to help with satiety and lean mass maintenance.           S/P gastric sleeve procedure     S/p LSG 6 wk     Initial Wt: 294 lbs  Initial BMI: 40.3  Patient denies any N,V,F,D,CP or SOB.   Tolerating soft diet   ambulating and eliminating well  no sign of infection   taking Vitamins and PPI     imp: doing well     recs:   advance diet as advised   advance activity as advised   cont Vitamin supplementation, PPI   FU with dietitian          Relevant Orders    CBC and Auto Differential    Comprehensive Metabolic Panel     Other Visit Diagnoses       Postoperative malabsorption (HHS-HCC)        Relevant Orders    Ferritin    Folate    Iron and TIBC    Vitamin  B12    Vitamin D 25-Hydroxy,Total (for eval of Vitamin D levels)           Please see Patient Instructions for today's relevant referrals, orders and instructions.  > 50% of time spent counseling on lifestyle modifications to support weight loss.      Follow Up: Follow up for scheduled 6/21/24 for 3 mo pov.     Oma Horowitz, APRN-CNP

## 2024-04-30 NOTE — PROGRESS NOTES
Surgery Date:  3/25/24  Surgeon:  Aziza  Procedure:  LSG      ASSESSMENT:  Current weight:     262 lbs             Ht:    70  in.        BMI:  37.59  Previous weight:   267 lbs  Initial start weight:   294 lbs  EBW:  120 lbs  Total weight change:  -32 lbs  %EBW Lost: 26.6%    PROGRESS:    Nutrition Interventions for last encounter (4/1/24):   1.          Continue to drink your protein shakes to meet your goal of 60-70 g of protein per day. Begin measuring how much protein you can eat on the soft diet so that you know when to start weaning off of your protein shakes.   2.          Continue to drink 64 oz. of zero calorie beverages per day  3.          Continue no drinking 30 min before, during the meal and for 30 minutes after the meal  4.          Continue to walk every hour. Some people can tolerate short intervals of walking. Start with 5-10 minutes and build your way up to 20-30 minutes/day over the next 4 weeks.   5.          You may begin puree stage and follow for 2 weeks. On 4/16/24 begin to transition to the soft stage and follow for 2 weeks. Please refer to pages 12-17 in your Nutrition Guidelines Booklet for diet instructions.  6.          Remember to eat slowly and chew thoroughly  7.          Try one new food at a time to test for any intolerances.   8.          Continue to take all of your vitamins and minerals.   9.         Attend our Virtual Support Group Monthly!    CHANGES IN TREATMENT:   Patient met goals:  Partially   Actions to meet previous goals:     24 hour food recall: SOFT/Regular, 50g protein, 64oz fluid  Breakfast:  protein drink, banana  Snack:   Lunch: tuna   Snack:     Dinner:  tuna, cucumbers  Snack:   Beverages:  water   Alcohol: none      Vitamins:  x2 Flint[employer] MVI, x3 500 mg Calcium, 500 mcg B12   Medications: see list  Physical Activity: power walking 45-60 minutes 5 days/week   Nausea/Vomiting/Diarrhea/Constipation: denies    READINESS TO LEARN:  Motivation to learn:            Interested      Understanding of instruction: Good   Anticipated Compliance: Good      Family Support: Unable to assess-family not present     Patient presents with post-op  sleeve gastrectomy. Pt is 6 weeks postop.    Weight today is self reported. Patient has lost 32 pounds since initial assessment accounting for 26.6% loss excess body weight.    Tolerating Soft diet without difficulty.  Protein intake is not adequate for post-op individual. Pt encouraged to I'm for 2 oz protein at meals, use a food scale to more accurately track intake at meals. Recommended to add 1-2 high protein snacks to help meet goal. Fluid consumption is adequate. Patient is supplementing recommended vitamin/minerals. Pt states no concerns/difficulties.  Discussed the transition diet. Reminded pt to eat slowly, chew thoroughly and to try on new food at a time. Reminded to follow 30-30-30 rule. Encouraged monthly SG meetings.          Malnutrition Screening  Significant unintentional weight loss? n/a  Eating less than 75% of usual intake for more than 2 weeks? n/a    Nutrition Diagnosis:   1. Increased protein and nutrition needs related to altered GI function as evidenced by pt. s/p sleeve gastrectomy.  2. Food- and nutrition-related knowledge deficit related to lack of prior exposure to surgical weight loss information as evidenced by diet recall.     Nutrition Interventions:   1. Modify type and amount of food and nutrients within meals and snacks.  2. Comprehensive Nutrition Education  -Nutrition education materials: SG Schedule, today's recap        Recommendations:    1. Great Job! You have lost over 30 pounds since joining the program!  2. Continue to eat protein rich foods first at meals/snacks to meet your goal of  70-80 g of protein per day. Use a food scale and weigh out 2-3 ounces of protein to try to eat for meals.   3.  You may begin trying raw fruits, raw vegetables and nuts. Continue to avoid breads, pastas, rice, and tough  meats such as beef and pork.  4.  Try one new food at a time and remember to chew, chew, chew!  5. Drink 64 oz. of zero calorie beverages per day  6. Continue no drinking 30 min before, during the meal and for 30 minutes after the meal  7.  Continue to walk 45-60 minutes 5 days/week.   8.  Continue current vit/min regimen  9. Attend our Virtual Support Group monthly!    Nutrition Monitoring and Evaluation:   1-2 pounds weight loss per week  Criteria: weight check, food recall  Need for Follow-up: 3 months

## 2024-04-30 NOTE — PATIENT INSTRUCTIONS
Remember to continue to track your nutrition intake, ensure you are getting 60g protein and 64 oz fluid daily  Do not eat and drink at the same time.   Follow up with your bariatric dietitian as much as you need for support with dietary advancement.     I encourage you to begin increasing duration and intensity of exercise.    Goal > 200 minutes per week of aerobic exercise - (this can be achieved by increasing exercise to at least 45 minutes 5 or more days per week of activities like brisk walking, jogging, elliptical, biking, swimming, etc)     Take your omeprazole, open the capsule and sprinkle onto sugar free applesauce, pudding, etc. Do not swallow whole.  Take your Multivitamin twice daily, B12 daily and calcium tablets 1200-1500mg/day.    Be sure you are following up with your primary care provider to discuss your other medications and how to take after surgery.  Medications must be smaller than the size of an M&M - please discuss with your primary prescribing provider alternative options (liquids, chewable options, or if your medicines can be cut in half) if medication size is larger than this.      Follow-up in 6 weeks for your 3 mo post op visit.

## 2024-05-02 ENCOUNTER — TELEPHONE (OUTPATIENT)
Dept: HEMATOLOGY/ONCOLOGY | Facility: CLINIC | Age: 34
End: 2024-05-02
Payer: COMMERCIAL

## 2024-05-02 DIAGNOSIS — F41.9 ANXIETY: ICD-10-CM

## 2024-05-02 RX ORDER — LORAZEPAM 1 MG/1
1 TABLET ORAL DAILY PRN
Qty: 30 TABLET | Refills: 0 | Status: SHIPPED | OUTPATIENT
Start: 2024-05-02 | End: 2024-06-06 | Stop reason: SDUPTHER

## 2024-05-03 ENCOUNTER — NUTRITION (OUTPATIENT)
Dept: SURGERY | Facility: CLINIC | Age: 34
End: 2024-05-03
Payer: COMMERCIAL

## 2024-05-03 ENCOUNTER — OFFICE VISIT (OUTPATIENT)
Dept: SURGERY | Facility: CLINIC | Age: 34
End: 2024-05-03
Payer: COMMERCIAL

## 2024-05-03 VITALS — BODY MASS INDEX: 37.59 KG/M2 | WEIGHT: 262 LBS

## 2024-05-03 DIAGNOSIS — Z90.3 S/P GASTRIC SLEEVE PROCEDURE: Primary | ICD-10-CM

## 2024-05-03 DIAGNOSIS — Z90.3 S/P GASTRIC SLEEVE PROCEDURE: ICD-10-CM

## 2024-05-03 DIAGNOSIS — E11.9 DIABETES MELLITUS TYPE 2 WITHOUT RETINOPATHY (MULTI): Primary | ICD-10-CM

## 2024-05-03 DIAGNOSIS — Z98.84 HISTORY OF BARIATRIC SURGERY: ICD-10-CM

## 2024-05-03 DIAGNOSIS — I10 PRIMARY HYPERTENSION: ICD-10-CM

## 2024-05-03 DIAGNOSIS — K91.2 POSTOPERATIVE MALABSORPTION (HHS-HCC): ICD-10-CM

## 2024-05-03 DIAGNOSIS — E66.9 OBESITY WITH SERIOUS COMORBIDITY, UNSPECIFIED CLASSIFICATION, UNSPECIFIED OBESITY TYPE: ICD-10-CM

## 2024-05-03 PROCEDURE — 3008F BODY MASS INDEX DOCD: CPT

## 2024-05-03 PROCEDURE — 99024 POSTOP FOLLOW-UP VISIT: CPT

## 2024-05-03 PROCEDURE — 3046F HEMOGLOBIN A1C LEVEL >9.0%: CPT

## 2024-05-07 NOTE — PROGRESS NOTES
Patient ID: Nia Harper is a 33 y.o. female.  MRN: [id number]  : 1990  The patient presents to clinic today for her history of right-sided breast cancer.     Cancer Staging   Breast cancer (Multi)  Staging form: Breast, AJCC 8th Edition  - Pathologic stage from 2023: Stage IB (pT2(3), pN0(sn), cM0, G3, ER+, NY+, HER2-, Oncotype DX score: 45) - Signed by Jeramie Lancaster MD on 10/10/2023    Diagnostic/Therapeutic History:  -Enlarging right breast lumps.  -5/15/23: Dx MG/US demonstrated multiple masses: 1.1 cm (11:00, 8 cm FN); 1 cm (9:30, 5 cm FN); 1.2 cm (9:00, 6 cm FN); 0.7 cm (10:00, 6 cm FN); 0.4 cm (10:00, 5 cm FN). Four lymph nodes demonstrated suspicious cortical thickening.  -23: US-guided CNB of the 11:00 mass showed IDC grade 3; ER >95% NY 85% Her2-negative (1+ IHC). Biopsy of the 9:30 mass showed IDC grade 2-3; ER 80% NY 10% Her2-negative (1+ IHC). A right axillary LN was negative for carcinoma.  -23: Right mastectomy and SLNBx performed. Multipe foci of grade 3 carcinoma noted (3.1 cm, 2.2 cm, 1.8 cm), no LVI, margins negative, 0/3 LN’s involved IB, pT2 pN0 cM0 G3  Oncotype Dx RS 45 (33% 9-year recurrence risk; >15% chemo benefit).  RSClin: 59% recurrence risk with tamoxifen; 44% chemo benefit.  -23: Adjuvant TC (docetaxel, cyclophosphamide) initiated. First cycle complicated by admission for nausea/vomiting. Second cycle complicated by admission for angioedema, hives; thought to be secondary to docetaxel. Switched to CMF (cyclophosphamide, methotrexate, fluorouracil) on 10/16/23. Second (and last) cycle given 23.    History of Present Illness (HPI)/Interval History:  Nia Harper presents for routine FUV. On anastrozole. She noted her mood and hot flashes have been worse since running out of the Cymbalta last week.     She denies any new breast cancer concerns. She does note have a bothersome sebaceous cyst at the left inframammary fold.     She denies any chest pain or  breathing issues.     She denies any vision changes or headache issues, dizziness, loss of balance or falls.     She denies any new or unexplained bone aches or pains.    She denies any skin lesions or masses.    OF note, completed bariatric surgery in March 2024, healing well. She reports an up and down appetite, but improving.     She sleep is okay. Hot flashes as noted above. Recently started estrace vaginal cream, will assess vaginal dryness after this.     She would like to get reconstruction in the future, would like referral to breast surgery     Review of Systems:  14-point ROS otherwise negative, as per HPI/Interval History.    Past Medical History:   Diagnosis Date    Abnormal levels of other serum enzymes 08/26/2021    Elevated liver enzymes    Anxiety     Anxiety disorder, unspecified 08/13/2019    Anxiety and depression    Body mass index (BMI)40.0-44.9, adult 06/11/2019    BMI 40.0-44.9, adult    Breast cancer (Multi)     Carpal tunnel syndrome, bilateral upper limbs 05/01/2019    Carpal tunnel syndrome on both sides    Depression, unspecified 12/29/2021    Depression    Elevated liver enzymes     Glycosuria 06/30/2014    Glucosuria    Hx antineoplastic chemo     Irregular menstruation, unspecified 12/21/2015    Missed periods    Mixed hyperlipidemia 07/30/2018    Hyperlipemia, mixed    Morbid (severe) obesity due to excess calories (Multi) 06/11/2019    Severe obesity (BMI >= 40)    Myopia, bilateral 09/15/2017    Myopia of both eyes with astigmatism    Type 2 diabetes mellitus without complications (Multi) 09/14/2022    Diabetes    Vision loss     glasses     Patient Active Problem List   Diagnosis    Astigmatism of both eyes    Breast skin changes    Radial styloid tenosynovitis of right hand    Depression    Diabetes mellitus type 2 without retinopathy (Multi)    Diffuse goiter    Elevation of levels of liver transaminase levels    Epidermal inclusion cyst    Herpes simplex virus (HSV) infection     History of malignant neoplasm of breast    Hyperlipemia    Hypertriglyceridemia    Myopia of both eyes    Nexplanon in place    Suspected sleep apnea    Uncontrolled type 2 diabetes mellitus with hyperglycemia (Multi)    Chronic headaches    BMI 40.0-44.9, adult (Multi)    Severe obesity (Multi)    Breast cancer (Multi)    Primary hypertension    Angioedema    Anxiety disorder    History of bariatric surgery    Fatty liver    Gallstones    History of cannabis abuse    History of noncompliance with medical treatment    Insufficient sleep syndrome    Sleep disorder breathing    Status post mastectomy    Systolic murmur    Artificial menopause    Acute vaginitis    Encounter for follow-up surveillance of breast cancer    Hidradenitis suppurativa    Suppression of ovarian secretion    Bariatric surgery status    Mass of left breast    Infected sebaceous cyst    Sebaceous cyst    Obesity, Class III, BMI 40-49.9 (morbid obesity) (Multi)    Post-op pain    Obesity    S/P gastric sleeve procedure    Hospital discharge follow-up        Past Surgical History:   Procedure Laterality Date     SECTION, CLASSIC  2019     Section    MASTECTOMY Right     MR HEAD ANGIO WO IV CONTRAST  2015    MR HEAD ANGIO WO IV CONTRAST 2015 [facility]    OTHER SURGICAL HISTORY  2019    Surgical Treatment Of Missed  In First Trimester       Social History     Socioeconomic History    Marital status: Single     Spouse name: Not on file    Number of children: Not on file    Years of education: Not on file    Highest education level: Not on file   Occupational History    Not on file   Tobacco Use    Smoking status: Never     Passive exposure: Never    Smokeless tobacco: Never   Vaping Use    Vaping status: Never Used   Substance and Sexual Activity    Alcohol use: Never    Drug use: Not Currently     Types: Marijuana     Comment: last used 4 weeks ago    Sexual activity: Defer   Other Topics  Concern    Not on file   Social History Narrative    Not on file     Social Determinants of Health     Financial Resource Strain: Patient Declined (3/25/2024)    Overall Financial Resource Strain (CARDIA)     Difficulty of Paying Living Expenses: Patient declined   Food Insecurity: No Food Insecurity (3/25/2024)    Hunger Vital Sign     Worried About Running Out of Food in the Last Year: Never true     Ran Out of Food in the Last Year: Never true   Transportation Needs: Patient Declined (3/25/2024)    PRAPARE - Transportation     Lack of Transportation (Medical): Patient declined     Lack of Transportation (Non-Medical): Patient declined   Physical Activity: Not on file   Stress: No Stress Concern Present (3/25/2024)    [employer] - Occupational Stress Questionnaire     Feeling of Stress : Only a little   Social Connections: Not on file   Intimate Partner Violence: Not At Risk (3/25/2024)    Humiliation, Afraid, Rape, and Kick questionnaire     Fear of Current or Ex-Partner: No     Emotionally Abused: No     Physically Abused: No     Sexually Abused: No   Housing Stability: Patient Declined (3/25/2024)    Housing Stability Vital Sign     Unable to Pay for Housing in the Last Year: Patient declined     Number of Places Lived in the Last Year: 1     Unstable Housing in the Last Year: Patient declined       Family History   Problem Relation Name Age of Onset    Heart disease Father      Cancer Maternal Grandmother      Diabetes type II Maternal Grandmother      Cancer Maternal Grandfather      Cancer Paternal Grandmother      Diabetes type II Mother's Sister          uncontrolled    Breast cancer Father's Sister          two paternal 1st cousins (through paternal uncle, both sisters, both early onset, one ). two paternal great aunts (through PGF's side, both  in their 50s/60s)    Cancer Father's Sister      Cancer Father's Brother      Breast cancer Paternal Cousin          two  "paternal 1st cousins (through paternal uncle, both sisters, both early onset, one ). two paternal great aunts (through PGF's side, both  in their 50s/60s)    Breast cancer Cousin         Allergies:   Allergies   Allergen Reactions    Metformin Swelling     Immediate release only- currently taking ER without problems    Cephalexin Hives, Other, Rash and Swelling     \"welts\"    Sulfamethoxazole-Trimethoprim Hives, Rash and Swelling         Current Outpatient Medications:     anastrozole (Arimidex) 1 mg tablet, Take 1 tablet (1 mg total) by mouth once daily.  Swallow whole with a drink of water., Disp: 90 tablet, Rfl: 3    atorvastatin (Lipitor) 40 mg tablet, Take 1 tablet (40 mg) by mouth once daily at bedtime., Disp: 90 tablet, Rfl: 3    benzoyl peroxide 5 % external wash, Apply topically once daily. Lather onto skin folds, leave on 2 minutes, rinse with water, Disp: 236 g, Rfl: 11    blood sugar diagnostic (OneTouch Verio test strips) strip, Inject 100 strips under the skin once daily., Disp: 100 strip, Rfl: 1    blood-glucose sensor (FreeStyle Cristina 3 Sensor) device, 2 each every 14 (fourteen) days., Disp: 2 each, Rfl: 11    diphenhydrAMINE (Sominex) 25 mg tablet, Take 1 tablet (25 mg) by mouth as needed at bedtime for sleep., Disp: , Rfl:     dulaglutide (Trulicity) 3 mg/0.5 mL pen injector, Once weekly (Patient taking differently: Once weekly on wednesday), Disp: 4 each, Rfl: 11    estradiol (Estrace) 0.01 % (0.1 mg/gram) vaginal cream, Insert 0.5 Applicatorfuls (2 g) into the vagina once daily. Use once a day for 2 weeks then as needed there after, Disp: 42.5 g, Rfl: 0    ezetimibe (Zetia) 10 mg tablet, Take 1 tablet (10 mg) by mouth once daily., Disp: , Rfl:     FreeStyle Crisitna 2 Sensor kit, CHANGE SENSOR EVERY 14 DAYS AS DIRECTED., Disp: , Rfl:     insulin glargine (Lantus Solostar U-100 Insulin) 100 unit/mL (3 mL) pen, Inject 40 Units under the skin 2 times a day., Disp: 15 mL, Rfl: 3    insulin " "lispro (HumaLOG Aurelio Kwikpen) 100 unit/mL injection, Inject 40 Units under the skin 3 times a day as needed for high blood sugar. inject up to 20 units before meals per sliding scale, Disp: 15 mL, Rfl: 5    LORazepam (Ativan) 1 mg tablet, TAKE 1 TABLET BY MOUTH ONCE DAILY AS NEEDED FOR ANXIETY, Disp: 30 tablet, Rfl: 0    metFORMIN  mg 24 hr tablet, Take 2 tablets (1,000 mg) by mouth 2 times a day with meals., Disp: 180 tablet, Rfl: 1    omeprazole (PriLOSEC) 40 mg DR capsule, Take 1 capsule (40 mg) by mouth once daily in the morning. Take before meals. Do not crush or chew. Open capsule, sprinkle beads on SF jello, pudding or applesauce., Disp: 30 capsule, Rfl: 5    ondansetron ODT (Zofran-ODT) 4 mg disintegrating tablet, Take 1 tablet (4 mg) by mouth every 6 hours if needed for nausea or vomiting., Disp: 60 tablet, Rfl: 1    OneTouch Delica Plus Lancet 33 gauge misc, TEST BLOOD GLUCOSE 3 TIMES/ DAY, Disp: , Rfl:     OneTouch Verio Flex meter misc, USE AS DIRECTED., Disp: , Rfl:     pen needle, diabetic (BD Ultra-Fine Mini Pen Needle) 31 gauge x 3/16\" needle, Inject 200 each under the skin 4 times a day before meals. Use 4 a day as directed, Disp: 200 each, Rfl: 3    valACYclovir (Valtrex) 500 mg tablet, Take 1 tablet (500 mg) by mouth 2 times a day. As needed for outbreaks, Disp: , Rfl:     DULoxetine (Cymbalta) 60 mg DR capsule, Take 1 capsule (60 mg) by mouth once daily. Do not crush or chew. Take in the evening with food. Increase to 60mg daily dosing., Disp: 90 capsule, Rfl: 1    omega-3 acid ethyl esters (Lovaza) 1 gram capsule, Take 2 capsules (2 g) by mouth 2 times a day with meals., Disp: 120 capsule, Rfl: 1    oxyCODONE (Roxicodone) 5 mg/5 mL solution, Take 5 mL (5 mg) by mouth every 6 hours if needed for severe pain (7 - 10) or moderate pain (4 - 6). (Patient not taking: Reported on 5/9/2024), Disp: 140 mL, Rfl: 0     Objective    BSA: 2.46 meters squared  /64   Pulse 85   Temp 36.1 °C (97 " "°F) (Temporal)   Resp 20   Ht 1.778 m (5' 10\")   Wt 123 kg (270 lb 1 oz)   SpO2 95%   BMI 38.75 kg/m²     Performance Status:  The ECOG performance scale today is ECO- Fully active, able to carry on all pre-disease performance w/o restriction.    Physical Exam  Vitals reviewed.   Constitutional:       General: She is awake. She is not in acute distress.     Appearance: Normal appearance. She is not ill-appearing.   HENT:      Head: Normocephalic and atraumatic.      Mouth/Throat:      Pharynx: Oropharynx is clear. No oropharyngeal exudate.   Eyes:      General: No scleral icterus.     Conjunctiva/sclera: Conjunctivae normal.   Neck:      Trachea: Trachea and phonation normal. No tracheal tenderness.   Cardiovascular:      Rate and Rhythm: Normal rate and regular rhythm.      Heart sounds: No murmur heard.     No friction rub. No gallop.   Pulmonary:      Effort: Pulmonary effort is normal. No respiratory distress.      Breath sounds: Normal breath sounds. No stridor. No wheezing, rhonchi or rales.   Chest:      Chest wall: No mass, lacerations, deformity or tenderness.   Breasts:     Right: Absent.      Left: Skin change (inframammary fold sebaceous cyst currently not imflammed) present. No swelling, mass, nipple discharge or tenderness.      Comments: S/p right mastectomy would surgical scarring present   Abdominal:      General: Abdomen is flat. There is no distension.      Palpations: Abdomen is soft. There is no mass.      Tenderness: There is no abdominal tenderness.   Musculoskeletal:         General: No swelling, tenderness or deformity. Normal range of motion.      Cervical back: Normal range of motion and neck supple. No rigidity.   Lymphadenopathy:      Cervical: No cervical adenopathy.      Upper Body:      Right upper body: No supraclavicular, axillary or pectoral adenopathy.      Left upper body: No supraclavicular, axillary or pectoral adenopathy.   Skin:     General: Skin is warm and dry.     "  Coloration: Skin is not jaundiced.      Findings: No lesion or rash.      Comments: No hives noted today.   Neurological:      General: No focal deficit present.      Mental Status: She is alert and oriented to person, place, and time.      Motor: No weakness.      Gait: Gait normal.   Psychiatric:         Mood and Affect: Mood normal.         Behavior: Behavior normal.         Thought Content: Thought content normal.         Judgment: Judgment normal.         Laboratory Data:  Lab Results   Component Value Date    WBC 5.5 05/09/2024    HGB 13.1 05/09/2024    HCT 40.1 05/09/2024    MCV 87 05/09/2024     05/09/2024    ANC 15.37 (H) 09/26/2023       Chemistry    Lab Results   Component Value Date/Time     05/09/2024 1300    K 4.2 05/09/2024 1300     05/09/2024 1300    CO2 31 05/09/2024 1300    BUN 17 05/09/2024 1300    CREATININE 0.74 05/09/2024 1300    Lab Results   Component Value Date/Time    CALCIUM 10.0 05/09/2024 1300    ALKPHOS 73 05/09/2024 1300    AST 28 05/09/2024 1300    ALT 43 05/09/2024 1300    BILITOT 0.3 05/09/2024 1300           Component      Latest Ref Rng 12/7/2023   FOLLICLE STIMULATING HORMONE      IU/L 3.5    Estradiol      pg/mL <19    Vitamin D, 25-Hydroxy, Total      30 - 100 ng/mL 30        Radiology: N/A    Pathology: N/A      Assessment/Plan:  Nia Harper is a 33 y.o. female with a history of right breast cancer, who presents today for follow-up evaluation.    Breast cancer, right, stage IB (ER/WA+, Her2-).  High-risk for recurrence, based on Oncotype Dx and clinical features.  Very poor tolerance of TC (2 cycles), including severe allergic reaction. Completed CMF x2 11/9/23.  Grade 2 hot flashes: recently ran out of cymbalta, was working well prior. Refill sent will restart ASAP   Grade 2 vaginal dryness: just started estrace cream this week, will continue to assess   - Continue ovarian suppression started 8/2023   - Continue anastrozole 1 mg every day  - No role for  adjuvant RT.  - Left mammogram 1/29/2024: cat 2    There is no evidence of breast cancer recurrence based on her clinical exam today.     Allergic reaction.  - Likely delayed reaction to docetaxel, but pegfilgrastim also possible.  - Avoid further TC/Neulasta. Switched therapy, as above.  - Off therapy. No further hives.    Premenopausal status. Continue goserelin q4 weeks, initiated 8/18/23.    Insulin-dependent T2DM.  - Following with Endocrinology.    S/p bariatric surgery   - Healing well, diet progressing, appropriate weight loss given this/appetite is still adequate   - Follows with surgery team     Disposition.  - FUV ~4 months with Petrona Bermeo PA-C and goserelin q28 days   - She has our contact information and was instructed to call with concerns/questions in the interim.    Orders Placed This Encounter   Procedures    Referral to Breast Surgery     Petrona Bermeo PA-C

## 2024-05-09 ENCOUNTER — INFUSION (OUTPATIENT)
Dept: HEMATOLOGY/ONCOLOGY | Facility: HOSPITAL | Age: 34
End: 2024-05-09
Payer: COMMERCIAL

## 2024-05-09 ENCOUNTER — LAB (OUTPATIENT)
Dept: LAB | Facility: HOSPITAL | Age: 34
End: 2024-05-09
Payer: COMMERCIAL

## 2024-05-09 ENCOUNTER — OFFICE VISIT (OUTPATIENT)
Dept: HEMATOLOGY/ONCOLOGY | Facility: HOSPITAL | Age: 34
End: 2024-05-09
Payer: COMMERCIAL

## 2024-05-09 VITALS
OXYGEN SATURATION: 97 % | RESPIRATION RATE: 18 BRPM | SYSTOLIC BLOOD PRESSURE: 104 MMHG | DIASTOLIC BLOOD PRESSURE: 52 MMHG | BODY MASS INDEX: 38.72 KG/M2 | WEIGHT: 269.84 LBS | TEMPERATURE: 96.6 F | HEART RATE: 79 BPM

## 2024-05-09 VITALS
DIASTOLIC BLOOD PRESSURE: 64 MMHG | SYSTOLIC BLOOD PRESSURE: 107 MMHG | OXYGEN SATURATION: 95 % | TEMPERATURE: 97 F | BODY MASS INDEX: 38.66 KG/M2 | HEART RATE: 85 BPM | HEIGHT: 70 IN | WEIGHT: 270.06 LBS | RESPIRATION RATE: 20 BRPM

## 2024-05-09 DIAGNOSIS — C50.911 MALIGNANT NEOPLASM OF RIGHT BREAST IN FEMALE, ESTROGEN RECEPTOR POSITIVE, UNSPECIFIED SITE OF BREAST (MULTI): ICD-10-CM

## 2024-05-09 DIAGNOSIS — Z17.0 MALIGNANT NEOPLASM OF RIGHT BREAST IN FEMALE, ESTROGEN RECEPTOR POSITIVE, UNSPECIFIED SITE OF BREAST (MULTI): ICD-10-CM

## 2024-05-09 DIAGNOSIS — C50.919 MALIGNANT NEOPLASM OF FEMALE BREAST, UNSPECIFIED ESTROGEN RECEPTOR STATUS, UNSPECIFIED LATERALITY, UNSPECIFIED SITE OF BREAST (MULTI): ICD-10-CM

## 2024-05-09 DIAGNOSIS — Z85.3 HISTORY OF MALIGNANT NEOPLASM OF BREAST: ICD-10-CM

## 2024-05-09 DIAGNOSIS — Z90.3 S/P GASTRIC SLEEVE PROCEDURE: ICD-10-CM

## 2024-05-09 DIAGNOSIS — K91.2 POSTOPERATIVE MALABSORPTION (HHS-HCC): ICD-10-CM

## 2024-05-09 DIAGNOSIS — F41.9 ANXIETY: ICD-10-CM

## 2024-05-09 DIAGNOSIS — E11.9 DIABETES MELLITUS TYPE 2 WITHOUT RETINOPATHY (MULTI): ICD-10-CM

## 2024-05-09 LAB
25(OH)D3 SERPL-MCNC: 37 NG/ML (ref 30–100)
ALBUMIN SERPL BCP-MCNC: 4.6 G/DL (ref 3.4–5)
ALP SERPL-CCNC: 73 U/L (ref 33–110)
ALT SERPL W P-5'-P-CCNC: 43 U/L (ref 7–45)
ANION GAP SERPL CALC-SCNC: 11 MMOL/L (ref 10–20)
AST SERPL W P-5'-P-CCNC: 28 U/L (ref 9–39)
BASOPHILS # BLD AUTO: 0.02 X10*3/UL (ref 0–0.1)
BASOPHILS NFR BLD AUTO: 0.4 %
BILIRUB SERPL-MCNC: 0.3 MG/DL (ref 0–1.2)
BUN SERPL-MCNC: 17 MG/DL (ref 6–23)
CALCIUM SERPL-MCNC: 10 MG/DL (ref 8.6–10.3)
CHLORIDE SERPL-SCNC: 104 MMOL/L (ref 98–107)
CO2 SERPL-SCNC: 31 MMOL/L (ref 21–32)
CREAT SERPL-MCNC: 0.74 MG/DL (ref 0.5–1.05)
EGFRCR SERPLBLD CKD-EPI 2021: >90 ML/MIN/1.73M*2
EOSINOPHIL # BLD AUTO: 0.09 X10*3/UL (ref 0–0.7)
EOSINOPHIL NFR BLD AUTO: 1.6 %
ERYTHROCYTE [DISTWIDTH] IN BLOOD BY AUTOMATED COUNT: 13.4 % (ref 11.5–14.5)
FERRITIN SERPL-MCNC: 124 NG/ML (ref 8–150)
FOLATE SERPL-MCNC: 12.6 NG/ML
GLUCOSE SERPL-MCNC: 118 MG/DL (ref 74–99)
HCT VFR BLD AUTO: 40.1 % (ref 36–46)
HGB BLD-MCNC: 13.1 G/DL (ref 12–16)
IMM GRANULOCYTES # BLD AUTO: 0.05 X10*3/UL (ref 0–0.7)
IMM GRANULOCYTES NFR BLD AUTO: 0.9 % (ref 0–0.9)
IRON SATN MFR SERPL: 30 % (ref 25–45)
IRON SERPL-MCNC: 119 UG/DL (ref 35–150)
LYMPHOCYTES # BLD AUTO: 2.22 X10*3/UL (ref 1.2–4.8)
LYMPHOCYTES NFR BLD AUTO: 40.6 %
MCH RBC QN AUTO: 28.4 PG (ref 26–34)
MCHC RBC AUTO-ENTMCNC: 32.7 G/DL (ref 32–36)
MCV RBC AUTO: 87 FL (ref 80–100)
MONOCYTES # BLD AUTO: 0.28 X10*3/UL (ref 0.1–1)
MONOCYTES NFR BLD AUTO: 5.1 %
NEUTROPHILS # BLD AUTO: 2.81 X10*3/UL (ref 1.2–7.7)
NEUTROPHILS NFR BLD AUTO: 51.4 %
NRBC BLD-RTO: 0 /100 WBCS (ref 0–0)
PLATELET # BLD AUTO: 233 X10*3/UL (ref 150–450)
POTASSIUM SERPL-SCNC: 4.2 MMOL/L (ref 3.5–5.3)
PROT SERPL-MCNC: 8.1 G/DL (ref 6.4–8.2)
RBC # BLD AUTO: 4.61 X10*6/UL (ref 4–5.2)
SODIUM SERPL-SCNC: 142 MMOL/L (ref 136–145)
TIBC SERPL-MCNC: 392 UG/DL (ref 240–445)
UIBC SERPL-MCNC: 273 UG/DL (ref 110–370)
VIT B12 SERPL-MCNC: 1340 PG/ML (ref 211–911)
WBC # BLD AUTO: 5.5 X10*3/UL (ref 4.4–11.3)

## 2024-05-09 PROCEDURE — 82607 VITAMIN B-12: CPT

## 2024-05-09 PROCEDURE — 83540 ASSAY OF IRON: CPT

## 2024-05-09 PROCEDURE — 2500000005 HC RX 250 GENERAL PHARMACY W/O HCPCS: Performed by: INTERNAL MEDICINE

## 2024-05-09 PROCEDURE — 82746 ASSAY OF FOLIC ACID SERUM: CPT

## 2024-05-09 PROCEDURE — 3008F BODY MASS INDEX DOCD: CPT

## 2024-05-09 PROCEDURE — 2500000004 HC RX 250 GENERAL PHARMACY W/ HCPCS (ALT 636 FOR OP/ED): Mod: JZ | Performed by: INTERNAL MEDICINE

## 2024-05-09 PROCEDURE — 3046F HEMOGLOBIN A1C LEVEL >9.0%: CPT

## 2024-05-09 PROCEDURE — 82728 ASSAY OF FERRITIN: CPT

## 2024-05-09 PROCEDURE — 99214 OFFICE O/P EST MOD 30 MIN: CPT

## 2024-05-09 PROCEDURE — 96402 CHEMO HORMON ANTINEOPL SQ/IM: CPT

## 2024-05-09 PROCEDURE — 3074F SYST BP LT 130 MM HG: CPT

## 2024-05-09 PROCEDURE — 85025 COMPLETE CBC W/AUTO DIFF WBC: CPT

## 2024-05-09 PROCEDURE — 84075 ASSAY ALKALINE PHOSPHATASE: CPT

## 2024-05-09 PROCEDURE — 36415 COLL VENOUS BLD VENIPUNCTURE: CPT

## 2024-05-09 PROCEDURE — 3078F DIAST BP <80 MM HG: CPT

## 2024-05-09 PROCEDURE — 82306 VITAMIN D 25 HYDROXY: CPT

## 2024-05-09 RX ORDER — DIPHENHYDRAMINE HYDROCHLORIDE 50 MG/ML
50 INJECTION INTRAMUSCULAR; INTRAVENOUS AS NEEDED
Status: CANCELLED | OUTPATIENT
Start: 2024-05-23

## 2024-05-09 RX ORDER — DULOXETIN HYDROCHLORIDE 60 MG/1
60 CAPSULE, DELAYED RELEASE ORAL DAILY
Qty: 90 CAPSULE | Refills: 1 | Status: SHIPPED | OUTPATIENT
Start: 2024-05-09

## 2024-05-09 RX ORDER — LIDOCAINE HYDROCHLORIDE 10 MG/ML
2 INJECTION, SOLUTION EPIDURAL; INFILTRATION; INTRACAUDAL; PERINEURAL ONCE
Status: CANCELLED | OUTPATIENT
Start: 2024-05-23

## 2024-05-09 RX ORDER — EPINEPHRINE 0.3 MG/.3ML
0.3 INJECTION SUBCUTANEOUS EVERY 5 MIN PRN
Status: CANCELLED | OUTPATIENT
Start: 2024-05-23

## 2024-05-09 RX ORDER — FAMOTIDINE 10 MG/ML
20 INJECTION INTRAVENOUS ONCE AS NEEDED
Status: CANCELLED | OUTPATIENT
Start: 2024-05-23

## 2024-05-09 RX ORDER — LIDOCAINE HYDROCHLORIDE 10 MG/ML
2 INJECTION, SOLUTION EPIDURAL; INFILTRATION; INTRACAUDAL; PERINEURAL ONCE
Status: COMPLETED | OUTPATIENT
Start: 2024-05-09 | End: 2024-05-09

## 2024-05-09 RX ORDER — ALBUTEROL SULFATE 0.83 MG/ML
3 SOLUTION RESPIRATORY (INHALATION) AS NEEDED
Status: CANCELLED | OUTPATIENT
Start: 2024-05-23

## 2024-05-09 RX ADMIN — LIDOCAINE HYDROCHLORIDE 20 MG: 10 INJECTION, SOLUTION EPIDURAL; INFILTRATION; INTRACAUDAL; PERINEURAL at 14:13

## 2024-05-09 RX ADMIN — GOSERELIN ACETATE 3.6 MG: 3.6 IMPLANT SUBCUTANEOUS at 14:13

## 2024-05-09 ASSESSMENT — PAIN SCALES - GENERAL
PAINLEVEL: 0-NO PAIN
PAINLEVEL: 0-NO PAIN

## 2024-05-09 NOTE — PROGRESS NOTES
Nia Harper is a 33 y.o. female who presents in stable condition for zoladex injection.    Since her last visit, she has been doing well.  Overall, she states her energy level is stable.  Her appetite & hydration status has been good.  She reports night sweats.  She has no other concerns.    Patient tolerated zoladex injection to right lower abdomen and has been educated with the overall plan of therapy. She has no other questions or concerns at this time. AVS & lab results provided. She will look at her future appointment to be scheduled on [facility]. Patient discharged in stable condition.     Reviewed and approved by YARON WALLER on 5/9/24 at 1430

## 2024-05-13 DIAGNOSIS — E11.9 DIABETES MELLITUS TYPE 2 WITHOUT RETINOPATHY (MULTI): ICD-10-CM

## 2024-05-13 PROCEDURE — RXMED WILLOW AMBULATORY MEDICATION CHARGE

## 2024-05-13 RX ORDER — INSULIN GLARGINE 100 [IU]/ML
40 INJECTION, SOLUTION SUBCUTANEOUS 2 TIMES DAILY
Qty: 30 ML | Refills: 3 | Status: SHIPPED | OUTPATIENT
Start: 2024-05-13

## 2024-05-14 ENCOUNTER — PHARMACY VISIT (OUTPATIENT)
Dept: PHARMACY | Facility: CLINIC | Age: 34
End: 2024-05-14
Payer: MEDICAID

## 2024-05-22 ENCOUNTER — TELEPHONE (OUTPATIENT)
Dept: OBSTETRICS AND GYNECOLOGY | Facility: CLINIC | Age: 34
End: 2024-05-22
Payer: COMMERCIAL

## 2024-05-22 DIAGNOSIS — N76.0 BACTERIAL VAGINOSIS: ICD-10-CM

## 2024-05-22 DIAGNOSIS — B96.89 BACTERIAL VAGINOSIS: ICD-10-CM

## 2024-05-22 RX ORDER — METRONIDAZOLE 500 MG/1
500 TABLET ORAL 2 TIMES DAILY
Qty: 14 TABLET | Refills: 0 | Status: CANCELLED | OUTPATIENT
Start: 2024-05-22 | End: 2024-05-29

## 2024-05-22 NOTE — TELEPHONE ENCOUNTER
contacted pt  name and  verified  Pt scheduled for appt with [facility] for recurrent BV  2024 @ 1030 [place]  pt verbalized understanding  no further questions or concerns at this time

## 2024-05-23 ENCOUNTER — OFFICE VISIT (OUTPATIENT)
Dept: OBSTETRICS AND GYNECOLOGY | Facility: CLINIC | Age: 34
End: 2024-05-23
Payer: COMMERCIAL

## 2024-05-23 ENCOUNTER — APPOINTMENT (OUTPATIENT)
Dept: HEMATOLOGY/ONCOLOGY | Facility: HOSPITAL | Age: 34
End: 2024-05-23
Payer: COMMERCIAL

## 2024-05-23 VITALS
WEIGHT: 263 LBS | HEIGHT: 70 IN | BODY MASS INDEX: 37.65 KG/M2 | DIASTOLIC BLOOD PRESSURE: 57 MMHG | SYSTOLIC BLOOD PRESSURE: 111 MMHG

## 2024-05-23 DIAGNOSIS — N89.8 VAGINAL DISCHARGE: ICD-10-CM

## 2024-05-23 DIAGNOSIS — N89.8 VAGINAL DISCHARGE: Primary | ICD-10-CM

## 2024-05-23 PROCEDURE — 87205 SMEAR GRAM STAIN: CPT

## 2024-05-23 PROCEDURE — 3046F HEMOGLOBIN A1C LEVEL >9.0%: CPT | Performed by: OBSTETRICS & GYNECOLOGY

## 2024-05-23 PROCEDURE — 3008F BODY MASS INDEX DOCD: CPT | Performed by: OBSTETRICS & GYNECOLOGY

## 2024-05-23 PROCEDURE — 1036F TOBACCO NON-USER: CPT | Performed by: OBSTETRICS & GYNECOLOGY

## 2024-05-23 PROCEDURE — 99214 OFFICE O/P EST MOD 30 MIN: CPT | Performed by: OBSTETRICS & GYNECOLOGY

## 2024-05-23 PROCEDURE — 3078F DIAST BP <80 MM HG: CPT | Performed by: OBSTETRICS & GYNECOLOGY

## 2024-05-23 PROCEDURE — 3074F SYST BP LT 130 MM HG: CPT | Performed by: OBSTETRICS & GYNECOLOGY

## 2024-05-23 RX ORDER — BUTYROSPERMUM PARKII(SHEA BUTTER), SIMMONDSIA CHINENSIS (JOJOBA) SEED OIL, ALOE BARBADENSIS LEAF EXTRACT .01; 1; 3.5 G/100G; G/100G; G/100G
1 LIQUID TOPICAL DAILY
Qty: 90 CAPSULE | Refills: 3 | Status: SHIPPED | OUTPATIENT
Start: 2024-05-23 | End: 2024-05-24

## 2024-05-23 NOTE — PROGRESS NOTES
Nia Harper is a 33 y.o. female who presents with a chief complaint of poss infection today (recurrent)      SUBJECTIVE  Patient presents complaining of malodorous vaginal discharge.  States this is been recurrent.  She is currently on hormone suppressants for breast cancer.  She states that its thick and it is a malodorous discharge.  She has not tried anything over-the-counter for this    Past Medical History:   Diagnosis Date    Abnormal levels of other serum enzymes 2021    Elevated liver enzymes    Anxiety     Anxiety disorder, unspecified 2019    Anxiety and depression    Body mass index (BMI)40.0-44.9, adult 2019    BMI 40.0-44.9, adult    Breast cancer (Multi)     Carpal tunnel syndrome, bilateral upper limbs 2019    Carpal tunnel syndrome on both sides    Depression, unspecified 2021    Depression    Elevated liver enzymes     Glycosuria 2014    Glucosuria    Hx antineoplastic chemo     Irregular menstruation, unspecified 2015    Missed periods    Mixed hyperlipidemia 2018    Hyperlipemia, mixed    Morbid (severe) obesity due to excess calories (Multi) 2019    Severe obesity (BMI >= 40)    Myopia, bilateral 09/15/2017    Myopia of both eyes with astigmatism    Type 2 diabetes mellitus without complications (Multi) 2022    Diabetes    Vision loss     glasses     Past Surgical History:   Procedure Laterality Date     SECTION, CLASSIC  2019     Section    MASTECTOMY Right     MR HEAD ANGIO WO IV CONTRAST  2015    MR HEAD ANGIO WO IV CONTRAST 2015 [facility]    OTHER SURGICAL HISTORY  2019    Surgical Treatment Of Missed  In First Trimester     Social History     Socioeconomic History    Marital status: Single     Spouse name: None    Number of children: None    Years of education: None    Highest education level: None   Occupational History    None   Tobacco Use    Smoking status: Never      Passive exposure: Never    Smokeless tobacco: Never   Vaping Use    Vaping status: Never Used   Substance and Sexual Activity    Alcohol use: Never    Drug use: Not Currently     Types: Marijuana     Comment: last used 4 weeks ago    Sexual activity: Defer   Other Topics Concern    None   Social History Narrative    None     Social Determinants of Health     Financial Resource Strain: Patient Declined (3/25/2024)    Overall Financial Resource Strain (CARDIA)     Difficulty of Paying Living Expenses: Patient declined   Food Insecurity: No Food Insecurity (3/25/2024)    Hunger Vital Sign     Worried About Running Out of Food in the Last Year: Never true     Ran Out of Food in the Last Year: Never true   Transportation Needs: Patient Declined (3/25/2024)    PRAPARE - Transportation     Lack of Transportation (Medical): Patient declined     Lack of Transportation (Non-Medical): Patient declined   Physical Activity: Not on file   Stress: No Stress Concern Present (3/25/2024)    [employer] [employer] - Occupational Stress Questionnaire     Feeling of Stress : Only a little   Social Connections: Not on file   Intimate Partner Violence: Not At Risk (3/25/2024)    Humiliation, Afraid, Rape, and Kick questionnaire     Fear of Current or Ex-Partner: No     Emotionally Abused: No     Physically Abused: No     Sexually Abused: No   Housing Stability: Patient Declined (3/25/2024)    Housing Stability Vital Sign     Unable to Pay for Housing in the Last Year: Patient declined     Number of Places Lived in the Last Year: 1     Unstable Housing in the Last Year: Patient declined     Family History   Problem Relation Name Age of Onset    Heart disease Father      Cancer Maternal Grandmother      Diabetes type II Maternal Grandmother      Cancer Maternal Grandfather      Cancer Paternal Grandmother      Diabetes type II Mother's Sister          uncontrolled    Breast cancer Father's Sister          two paternal 1st  "cousins (through paternal uncle, both sisters, both early onset, one ). two paternal great aunts (through PGF's side, both  in their 50s/60s)    Cancer Father's Sister      Cancer Father's Brother      Breast cancer Paternal Cousin          two paternal 1st cousins (through paternal uncle, both sisters, both early onset, one ). two paternal great aunts (through PGF's side, both  in their 50s/60s)    Breast cancer Cousin         OB History   No obstetric history on file.       OBJECTIVE  Allergies   Allergen Reactions    Metformin Swelling     Immediate release only- currently taking ER without problems    Cephalexin Hives, Other, Rash and Swelling     \"welts\"    Sulfamethoxazole-Trimethoprim Hives, Rash and Swelling      (Not in a hospital admission)       Review of Systems  History obtained from the patient  General ROS: negative  Psychological ROS: negative  Gastrointestinal ROS: negative  Musculoskeletal ROS: negative  Physical Exam  General Appearance: awake, alert, oriented, in no acute distress, well developed, well nourished, and in no acute distress  Skin: there are no suspicious lesions or rashes of concern, skin color, texture, turgor are normal; there are no bruises, rashes or lesions.  Head/Face: NCAT  Eyes: No gross abnormalities., PERRL, and EOMI  Neck: neck- supple, no mass, non-tender  Back: no pain to palpation  Abdomen: Soft, non-tender, normal bowel sounds; no bruits, organomegaly or masses.  Extremities: Extremities warm to touch, pink, with no edema.  Musculoskeletal: negative    /57   Ht 1.778 m (5' 10\")   Wt 119 kg (263 lb)   BMI 37.74 kg/m²    Problem List Items Addressed This Visit    None  Visit Diagnoses       Vaginal discharge    -  Primary    Relevant Medications    L.acidoph,plant-B.animal,long (ProbioMax Daily DF) 30 billion cell capsule,delayed release(DR/EC)    Other Relevant Orders    Vaginitis Gram Stain For Bacterial Vaginosis + Yeast         "

## 2024-05-24 DIAGNOSIS — E11.9 DIABETES MELLITUS TYPE 2 WITHOUT RETINOPATHY (MULTI): ICD-10-CM

## 2024-05-24 LAB
CLUE CELLS VAG LPF-#/AREA: ABNORMAL /[LPF]
NUGENT SCORE: 7
YEAST VAG WET PREP-#/AREA: ABNORMAL

## 2024-05-24 RX ORDER — BUTYROSPERMUM PARKII(SHEA BUTTER), SIMMONDSIA CHINENSIS (JOJOBA) SEED OIL, ALOE BARBADENSIS LEAF EXTRACT .01; 1; 3.5 G/100G; G/100G; G/100G
1 LIQUID TOPICAL DAILY
Qty: 90 CAPSULE | Refills: 3 | Status: SHIPPED | OUTPATIENT
Start: 2024-05-24

## 2024-05-24 NOTE — TELEPHONE ENCOUNTER
Patient called in stating she saw in my chart that she is positive for BV. Patient would like RX sent to pharmacy.

## 2024-05-29 ENCOUNTER — TELEPHONE (OUTPATIENT)
Dept: OBSTETRICS AND GYNECOLOGY | Facility: CLINIC | Age: 34
End: 2024-05-29
Payer: COMMERCIAL

## 2024-05-29 DIAGNOSIS — N89.8 VAGINAL DISCHARGE: Primary | ICD-10-CM

## 2024-05-29 RX ORDER — CLINDAMYCIN PHOSPHATE 20 MG/G
1 CREAM VAGINAL NIGHTLY
Qty: 40 G | Refills: 0 | Status: SHIPPED | OUTPATIENT
Start: 2024-05-29 | End: 2024-06-01

## 2024-05-30 ENCOUNTER — APPOINTMENT (OUTPATIENT)
Dept: OBSTETRICS AND GYNECOLOGY | Facility: CLINIC | Age: 34
End: 2024-05-30
Payer: COMMERCIAL

## 2024-06-04 ENCOUNTER — OFFICE VISIT (OUTPATIENT)
Dept: HEMATOLOGY/ONCOLOGY | Facility: HOSPITAL | Age: 34
End: 2024-06-04
Payer: COMMERCIAL

## 2024-06-04 VITALS
SYSTOLIC BLOOD PRESSURE: 109 MMHG | BODY MASS INDEX: 37.77 KG/M2 | HEART RATE: 91 BPM | TEMPERATURE: 100.6 F | DIASTOLIC BLOOD PRESSURE: 59 MMHG | RESPIRATION RATE: 16 BRPM | WEIGHT: 263.2 LBS | OXYGEN SATURATION: 99 %

## 2024-06-04 DIAGNOSIS — F32.A DEPRESSION, UNSPECIFIED DEPRESSION TYPE: ICD-10-CM

## 2024-06-04 DIAGNOSIS — Z51.5 ENCOUNTER FOR PALLIATIVE CARE: ICD-10-CM

## 2024-06-04 DIAGNOSIS — Z85.3 HISTORY OF MALIGNANT NEOPLASM OF BREAST: Primary | ICD-10-CM

## 2024-06-04 DIAGNOSIS — F41.1 GENERALIZED ANXIETY DISORDER: ICD-10-CM

## 2024-06-04 PROCEDURE — 3046F HEMOGLOBIN A1C LEVEL >9.0%: CPT | Performed by: NURSE PRACTITIONER

## 2024-06-04 PROCEDURE — 3074F SYST BP LT 130 MM HG: CPT | Performed by: NURSE PRACTITIONER

## 2024-06-04 PROCEDURE — 3078F DIAST BP <80 MM HG: CPT | Performed by: NURSE PRACTITIONER

## 2024-06-04 PROCEDURE — 99215 OFFICE O/P EST HI 40 MIN: CPT | Performed by: NURSE PRACTITIONER

## 2024-06-04 PROCEDURE — 3008F BODY MASS INDEX DOCD: CPT | Performed by: NURSE PRACTITIONER

## 2024-06-04 ASSESSMENT — PAIN SCALES - GENERAL: PAINLEVEL: 0-NO PAIN

## 2024-06-04 NOTE — PROGRESS NOTES
Patient ID: Nia Harper is a 34 y.o. female.    Cancer History:          Breast         AJCC Edition: 8th (AJCC), Diagnosis Date: Jun 2023, IB, pT2 pN0 cM0 G3     Treatment Synopsis:    Treatment Synopsis:   -Enlarging right breast lumps.  -5/15/23: Dx MG/US demonstrated multiple masses: 1.1 cm (11:00, 8 cm FN); 1 cm (9:30, 5 cm FN); 1.2 cm (9:00, 6 cm FN); 0.7 cm (10:00, 6 cm FN); 0.4 cm (10:00, 5 cm FN). Four lymph nodes demonstrated suspicious cortical thickening.  -5/19/23: US-guided CNB of the 11:00 mass showed IDC grade 3; ER >95% CO 85% Her2-negative (1+ IHC). Biopsy of the 9:30 mass showed IDC grade 2-3; ER 80% CO 10% Her2-negative (1+ IHC). A right axillary LN was negative for carcinoma.  -6/30/23: Right mastectomy and SLNBx performed. Multipe foci of grade 3 carcinoma noted (3.1 cm, 2.2 cm, 1.8 cm), no LVI, margins negative, 0/3 LN’s involved IB, pT2 pN0 cM0 G3  Oncotype Dx RS 45 (33% 9-year recurrence risk; >15% chemo benefit).  RSClin: 59% recurrence risk with tamoxifen; 44% chemo benefit.  -8/24/23: Adjuvant TC (docetaxel, cyclophosphamide) initiated. First cycle complicated by admission for nausea/vomiting. Second cycle complicated by admission for angioedema, hives; thought to be secondary to docetaxel.   -10/16/23 Therapy changed to Cyclophosphamide/Methotrexate/5-Flouracil d/t reaction x 2 cycles   -11/09/23 C2/2 CMF -- last dose of systemic chemotherapy     Subjective    Patient returns today in follow up regarding issues unique to being a young person with cancer.    Had her bariatric surgery 03/26/24 -- has lost 50lbs in the past 2 months, is very focused on eating portions/macro-nutrients as directed. Says she feels full quickly. Is also exercising daily - walking/running 3 miles 6/7 days of the week. Notes greater energy, sleep is improved.     Anxiety improved from prior - very bad post-op. Remains on duloxetine 60mg at bedtime. At times feels overwhelmed and over stimulated primarily as it  relates to caring for her children and managing her household, relays having very little time to herself - feels children and partner all need things from her and her needs come last if at all. Few mood swings, notes not being as tearful. Is seeing psychiatry later this week regarding prescription of lorazepam which was previously prescribed by oncology.     Still with issues of sexual dysfunction, decreased interest and arousal, still with vaginal dryness, is using daily topical replens, has not tried topical estrogen. Seeing gynecology re bacterial overgrowth, has annual exam follow up.  Social History: Lives in [place] with partner/father of her children Dinesh, 11 y.o. daughter Lambert, 5 y.o. [employer] starting , Gal 3 y.o. son special needs  Previously worked as , currently stays at home with children/runs household  Denies tobacco, does smoke marijuana nightly, rare ETOH estimates 6x/year, no vaping or other recreational drug use.    Objective    BSA: 2.42 meters squared  /59 (BP Location: Left arm, Patient Position: Sitting)   Pulse 91   Temp (!) 38.1 °C (100.6 °F) (Temporal)   Resp 16   Wt 119 kg (263 lb 3.2 oz)   SpO2 99%   BMI 37.77 kg/m²      Current Outpatient Medications   Medication Instructions    anastrozole (ARIMIDEX) 1 mg, oral, Daily, Swallow whole with a drink of water.    atorvastatin (LIPITOR) 40 mg, oral, Nightly    benzoyl peroxide 5 % external wash Topical, Daily, Lather onto skin folds, leave on 2 minutes, rinse with water    blood sugar diagnostic (OneTouch Verio test strips) strip 100 strips, subcutaneous, Daily    blood-glucose sensor (FreeStyle Cristina 3 Sensor) device 2 each, miscellaneous, Every 14 days    diphenhydrAMINE (SOMINEX) 25 mg, oral, Nightly PRN    DULoxetine (CYMBALTA) 60 mg, oral, Daily, Do not crush or chew. Take in the evening with food. Increase to 60mg daily dosing.    estradiol (ESTRACE) 2 g, vaginal, Daily, Use once a day  "for 2 weeks then as needed there after    ezetimibe (ZETIA) 10 mg, oral, Daily    FreeStyle Cristina 2 Sensor kit CHANGE SENSOR EVERY 14 DAYS AS DIRECTED.    insulin lispro (HUMALOG BHUPINDER KWIKPEN) 40 Units, subcutaneous, 3 times daily PRN, inject up to 20 units before meals per sliding scale    Lantus Solostar U-100 Insulin 40 Units, subcutaneous, 2 times daily    LORazepam (Ativan) 1 mg tablet TAKE 1 TABLET BY MOUTH ONCE DAILY AS NEEDED FOR ANXIETY    metFORMIN XR (GLUCOPHAGE-XR) 1,000 mg, oral, 2 times daily (morning and late afternoon)    omega-3 acid ethyl esters (LOVAZA) 2 g, oral, 2 times daily (morning and late afternoon)    omeprazole (PRILOSEC) 40 mg, oral, Daily before breakfast, Do not crush or chew. Open capsule, sprinkle beads on SF jello, pudding or applesauce.    ondansetron ODT (ZOFRAN-ODT) 4 mg, oral, Every 6 hours PRN    OneTouch Delica Plus Lancet 33 gauge misc TEST BLOOD GLUCOSE 3 TIMES/ DAY    OneTouch Verio Flex meter misc USE AS DIRECTED.    oxyCODONE (ROXICODONE) 5 mg, oral, Every 6 hours PRN    pen needle, diabetic (BD Ultra-Fine Mini Pen Needle) 31 gauge x 3/16\" needle 200 each, subcutaneous, 4 times daily before meals and nightly, Use 4 a day as directed    ProbioMax Daily DF 30 billion cell capsule,delayed release(DR/EC) 1 capsule, oral, Daily    Trulicity 1.5 mg, subcutaneous, Once Weekly    valACYclovir (Valtrex) 500 mg tablet 1 tablet, oral, 2 times daily, As needed for outbreaks     No visits with results within 1 Week(s) from this visit.   Latest known visit with results is:   Office Visit on 05/23/2024   Component Date Value Ref Range Status    Claude Score 05/23/2024 7 (H)  0 - 3 Final    Interpretation of the Claude Score  0-3.....Normal vaginal microbiota  4-6.....Intermediate results  7-10....Bacterial vaginosis    Yeast 05/23/2024 ABSENT  ABSENT Final    Clue Cells 05/23/2024 ABSENT  ABSENT Final      Physical Exam  Constitutional:       General: She is not in acute distress.     " Appearance: Normal appearance. She is normal weight. She is not ill-appearing.   Neurological:      General: No focal deficit present.      Mental Status: She is alert and oriented to person, place, and time.   Psychiatric:         Mood and Affect: Mood normal.         Behavior: Behavior normal.         Thought Content: Thought content normal.         Judgment: Judgment normal.     Performance Status:  Symptomatic; fully ambulatory    Assessment/Plan   Nia Harper is a 34 y.o. AA F with a recent diagnosis of G3 IB adenocarcinoma of the breast, ER+. S/p surgery followed by adjuvant chemotherapy with TC x 2 cycles but with angioedema so switched to CMF x 2 additional cycles. Currently on anastrazole and goserelin for endocrine therapy.     #Psychosocial:  - Has appointment with psychiatry for medication management - on duloxetine for hot flashes/mood  - Have previously referred to psychology but has not yet made appointment  - Connected with child-life to help with adjustment/parenting    #Hot Flashes: secondary to ovarian suppression and low circulating estrogen  - Improved with initiation of duloxetine    #Sexual Dysfunction/Contraception: secondary to ovarian suppression and low circulating estrogen  - Provided patient with sexual concerns handout, encouraged open communication outside of intimacy to discuss patient decreased interest/sexual dysfunction with partner  - Use replens daily topical vaginal moisturizer  - Use silicone based lubricant with sex  - Has topical estrogen suppositories/applicator but has yet to trial  - We discussed the seriousness of getting pregnant while receiving anastrazole due to the harmful effects this could have on the  fetus, and on her cancer treatment given the decreased availability of medical  services.   - Options for contraception include intrauterine devices, estrogen containing contraceptives are contraindicated d/t ER+ status -- provided patient with comparison  chart of contraception options  - Following with gynecology    #Practical Needs:  - Following with [employer] for financial assistance grants and support  - Has referral in place to [employer] for food insecurity    #Diet/Exercise/Healthy Lifestyle:  - Discussed research demonstrating consumption of a healthy, plant based diet not only decreases cancer risk, but also has been found to improve survival in cancer patients who partake in a healthy diet.   - We reviewed the American [employer] Society guidelines for a healthy diet including mostly plant based foods  with incorporation of plant/lean animal proteins and healthy fats such as the mediterranean diet.   - Patient following bariatric surgery diet  - Discussed research that demonstrates decreased cancer risk and improved survival in cancer patients who exercise and stay active throughout diagnosis and treatment.  - Encouraged patient to continue exercise >150min/week  - Encouraged patient to continue to abstain from alcohol, tobacco, and recreational drugs    #Risk for infertility:  - Previously discussed the damaging effect cancer treatment can have on the ovaries.   - Previously discussed natural age related decline in fertility and menopause that occurs as a result of ovarian aging, and that cancer treatments can accelerate that progression and diminish ovarian reserve.  - Individuals may experience varying degrees of short or long term ovarian dysfunction and amenorrhea, and that this is dependent upon type of cancer treatment, cumulative dose, and patients age.   - Loss of ovarian function may be permanent or temporary.  - Amenorrhea may be temporary or permanent -- temporary amenorrhea results from destruction of maturing follicles whereas permanent amenorrhea results from depletion of viable primordial follicles.  - Risk to fertility is INTERMEDIATE based on cancer treatment of Docetaxel/Cyclophosphamide  with cyclophosphamide being the primary  of  infertility -- this is dose dependent  - Patient elected not to undergo fertility preservation up front and opted for ovarian suppression with goserelin  - Baseline AMH level drawn 08/11/2023 2.464  - Repeat AMH at one year out from therapy 11/2024  - Discussed plan for at least 18 months on oral endocrine therapy followed by a 3 month wash out before she should attempt to conceive     Follow up visit in 3 months      Cancer Staging   Breast cancer (Multi)  Staging form: Breast, AJCC 8th Edition  - Pathologic stage from 6/30/2023: Stage IB (pT2(3), pN0(sn), cM0, G3, ER+, ID+, HER2-, Oncotype DX score: 45) - Signed by Jeramie Lancaster MD on 10/10/2023      Oncology History   Breast cancer (Multi)   6/30/2023 Cancer Staged    Staging form: Breast, AJCC 8th Edition, Pathologic stage from 6/30/2023: Stage IB (pT2(3), pN0(sn), cM0, G3, ER+, ID+, HER2-, Oncotype DX score: 45) - Signed by Jeramie Lancaster MD on 10/10/2023     8/24/2023 - 9/15/2023 Chemotherapy    TC (DOCEtaxel / Cyclophosphamide), 21 Day Cycles     9/8/2023 Initial Diagnosis    Malignant neoplasm of female breast (CMS/HCC)     10/16/2023 - 11/9/2023 Chemotherapy    CMF (Cyclophosphamide / Methotrexate / Fluorouracil) 21 Day Cycles                        RONEL Pemberton-CNP

## 2024-06-05 RX ORDER — LACTOBACILLUS COMBINATION NO.4 3B CELL
1 CAPSULE ORAL DAILY
COMMUNITY
Start: 2024-05-24

## 2024-06-06 ENCOUNTER — CONSULT (OUTPATIENT)
Dept: BEHAVIORAL HEALTH | Facility: HOSPITAL | Age: 34
End: 2024-06-06
Payer: COMMERCIAL

## 2024-06-06 ENCOUNTER — INFUSION (OUTPATIENT)
Dept: HEMATOLOGY/ONCOLOGY | Facility: HOSPITAL | Age: 34
End: 2024-06-06
Payer: COMMERCIAL

## 2024-06-06 ENCOUNTER — APPOINTMENT (OUTPATIENT)
Dept: OBSTETRICS AND GYNECOLOGY | Facility: CLINIC | Age: 34
End: 2024-06-06
Payer: COMMERCIAL

## 2024-06-06 ENCOUNTER — APPOINTMENT (OUTPATIENT)
Dept: HEMATOLOGY/ONCOLOGY | Facility: HOSPITAL | Age: 34
End: 2024-06-06
Payer: COMMERCIAL

## 2024-06-06 VITALS
RESPIRATION RATE: 18 BRPM | TEMPERATURE: 95.7 F | OXYGEN SATURATION: 98 % | BODY MASS INDEX: 37.39 KG/M2 | HEART RATE: 88 BPM | WEIGHT: 260.58 LBS | SYSTOLIC BLOOD PRESSURE: 131 MMHG | DIASTOLIC BLOOD PRESSURE: 72 MMHG

## 2024-06-06 DIAGNOSIS — F33.0 MILD EPISODE OF RECURRENT MAJOR DEPRESSIVE DISORDER (CMS-HCC): ICD-10-CM

## 2024-06-06 DIAGNOSIS — F41.0 ANXIETY ATTACK: ICD-10-CM

## 2024-06-06 DIAGNOSIS — Z17.0 MALIGNANT NEOPLASM OF RIGHT BREAST IN FEMALE, ESTROGEN RECEPTOR POSITIVE, UNSPECIFIED SITE OF BREAST (MULTI): ICD-10-CM

## 2024-06-06 DIAGNOSIS — F41.1 GAD (GENERALIZED ANXIETY DISORDER): ICD-10-CM

## 2024-06-06 DIAGNOSIS — C50.919 MALIGNANT NEOPLASM OF FEMALE BREAST, UNSPECIFIED ESTROGEN RECEPTOR STATUS, UNSPECIFIED LATERALITY, UNSPECIFIED SITE OF BREAST (MULTI): ICD-10-CM

## 2024-06-06 DIAGNOSIS — F32.A DEPRESSION, UNSPECIFIED DEPRESSION TYPE: ICD-10-CM

## 2024-06-06 DIAGNOSIS — C50.911 MALIGNANT NEOPLASM OF RIGHT BREAST IN FEMALE, ESTROGEN RECEPTOR POSITIVE, UNSPECIFIED SITE OF BREAST (MULTI): ICD-10-CM

## 2024-06-06 DIAGNOSIS — F34.1 PERSISTENT DEPRESSIVE DISORDER: ICD-10-CM

## 2024-06-06 PROCEDURE — 90792 PSYCH DIAG EVAL W/MED SRVCS: CPT | Performed by: PSYCHIATRY & NEUROLOGY

## 2024-06-06 PROCEDURE — 90792 PSYCH DIAG EVAL W/MED SRVCS: CPT | Mod: AM | Performed by: PSYCHIATRY & NEUROLOGY

## 2024-06-06 PROCEDURE — 96402 CHEMO HORMON ANTINEOPL SQ/IM: CPT

## 2024-06-06 PROCEDURE — 2500000004 HC RX 250 GENERAL PHARMACY W/ HCPCS (ALT 636 FOR OP/ED): Mod: JZ | Performed by: INTERNAL MEDICINE

## 2024-06-06 PROCEDURE — 2500000005 HC RX 250 GENERAL PHARMACY W/O HCPCS: Performed by: INTERNAL MEDICINE

## 2024-06-06 RX ORDER — ALBUTEROL SULFATE 0.83 MG/ML
3 SOLUTION RESPIRATORY (INHALATION) AS NEEDED
OUTPATIENT
Start: 2024-06-20

## 2024-06-06 RX ORDER — DULOXETIN HYDROCHLORIDE 30 MG/1
30 CAPSULE, DELAYED RELEASE ORAL DAILY
Qty: 30 CAPSULE | Refills: 2 | Status: SHIPPED | OUTPATIENT
Start: 2024-06-06 | End: 2024-09-04

## 2024-06-06 RX ORDER — LIDOCAINE HYDROCHLORIDE 10 MG/ML
2 INJECTION, SOLUTION EPIDURAL; INFILTRATION; INTRACAUDAL; PERINEURAL ONCE
OUTPATIENT
Start: 2024-06-20

## 2024-06-06 RX ORDER — LORAZEPAM 1 MG/1
1 TABLET ORAL DAILY PRN
Qty: 30 TABLET | Refills: 0 | Status: SHIPPED | OUTPATIENT
Start: 2024-06-06 | End: 2024-07-06

## 2024-06-06 RX ORDER — EPINEPHRINE 0.3 MG/.3ML
0.3 INJECTION SUBCUTANEOUS EVERY 5 MIN PRN
OUTPATIENT
Start: 2024-06-20

## 2024-06-06 RX ORDER — FAMOTIDINE 10 MG/ML
20 INJECTION INTRAVENOUS ONCE AS NEEDED
OUTPATIENT
Start: 2024-06-20

## 2024-06-06 RX ORDER — LIDOCAINE HYDROCHLORIDE 10 MG/ML
2 INJECTION, SOLUTION EPIDURAL; INFILTRATION; INTRACAUDAL; PERINEURAL ONCE
Status: COMPLETED | OUTPATIENT
Start: 2024-06-06 | End: 2024-06-06

## 2024-06-06 RX ORDER — TRAZODONE HYDROCHLORIDE 50 MG/1
50 TABLET ORAL NIGHTLY
Qty: 30 TABLET | Refills: 2 | Status: SHIPPED | OUTPATIENT
Start: 2024-06-06 | End: 2024-09-04

## 2024-06-06 RX ORDER — DIPHENHYDRAMINE HYDROCHLORIDE 50 MG/ML
50 INJECTION INTRAMUSCULAR; INTRAVENOUS AS NEEDED
OUTPATIENT
Start: 2024-06-20

## 2024-06-06 RX ADMIN — GOSERELIN ACETATE 3.6 MG: 3.6 IMPLANT SUBCUTANEOUS at 11:05

## 2024-06-06 RX ADMIN — LIDOCAINE HYDROCHLORIDE 20 MG: 10 INJECTION, SOLUTION EPIDURAL; INFILTRATION; INTRACAUDAL; PERINEURAL at 10:54

## 2024-06-06 ASSESSMENT — PAIN SCALES - GENERAL: PAINLEVEL: 0-NO PAIN

## 2024-06-06 NOTE — PROGRESS NOTES
Pt arrived ambulatory to infusion for treatment of zoladex.  Denies any new or worsening symptoms. Tolerated injection without issue. Discharged in stable condition.

## 2024-06-06 NOTE — PROGRESS NOTES
"Outpatient Psychiatry    Subjective   Nia Harper, a 34 y.o. female, presenting to Psychiatry for evaluation.  Patient is referred by Olga Hancock MD/Dr. Lancaster     HPI:  35yo F h/o anxiety and depression, breast cancer s/p R mastectomy and chemo now on anastrozole. Pt also recently had gastric sleeve and has lost 50#. DMT2 on insulin, HTN here for NPV.     Pt reports long standing h/o depression since teens. Did have several SA then but felt was more for attention, however, would often downplay sx and did not establish care after. Relates to loss in family over the years and low self esteem about weight. Seen by [facility] several years ago during pregnancy but did not take meds recommended. Since then, she was dx with breast cancer 1 year ago and has been taking duloxetine and lorazepam with some benefit. She did recently go off duloxetine related to bariatric surgery but resumed this about 1.5months ago. Pt notes trouble sleeping, will wake up and then clean, though this is often after falling asleep early. She denies any recent SI nothing she is scared to die.     In addition to depression, pt reports anxiety that has increased over the years though starting after younger kids were born. Describes generalized anxiety, constant worries and \"what ifs\" and catastrophic thinking. She also describes urge for clean and order and can find self in rabbit hole of constantly finding more to clean. Does also frequently check doors, check stove. Does not like going to mall alone after seeing incident at mall several years ago.     She reports her partner is helpful with the kids, 2 have special needs. Youngest son was in the NICU for 4.5months related to prematurity. However, she does note stress in relationship and limited time for herself. Often feels unaccomplished due to no degree or career. Also feels constant worry about her health    She denies any h/o vinny, no psychosis, no substance use hx    Psychiatric " Review Of Systems:  Depressive Symptoms: pt describes a long pattern of depressed mood that can get more intense at times though is usually able to function. Trouble sleeping. Has had SI/SA in teens but none more recently   Manic Symptoms: negative  Anxiety Symptoms: General Anxiety Disorder (ARNULFO)ARNULFO Behaviors: difficult to control worry, excessive anxiety/worry, difficulty concentrating, restlessness, sleep disturbance, and catastrophizing  also notes anxiety around clean and order. Feels compelled and can cause to be late but not spending hours on ritual  Psychotic Symptoms: none  Other Symptoms: denies h/o binging/restricting. Notes mostly ate wrong foods    Current Medications:    Current Outpatient Medications:     anastrozole (Arimidex) 1 mg tablet, Take 1 tablet (1 mg total) by mouth once daily.  Swallow whole with a drink of water., Disp: 90 tablet, Rfl: 3    atorvastatin (Lipitor) 40 mg tablet, Take 1 tablet (40 mg) by mouth once daily at bedtime., Disp: 90 tablet, Rfl: 3    benzoyl peroxide 5 % external wash, Apply topically once daily. Lather onto skin folds, leave on 2 minutes, rinse with water, Disp: 236 g, Rfl: 11    blood sugar diagnostic (OneTouch Verio test strips) strip, Inject 100 strips under the skin once daily., Disp: 100 strip, Rfl: 1    blood-glucose sensor (FreeStyle Cristina 3 Sensor) device, 2 each every 14 (fourteen) days., Disp: 2 each, Rfl: 11    diphenhydrAMINE (Sominex) 25 mg tablet, Take 1 tablet (25 mg) by mouth as needed at bedtime for sleep., Disp: , Rfl:     dulaglutide (Trulicity) 1.5 mg/0.5 mL pen injector injection, Inject 1.5 mg under the skin 1 (one) time per week., Disp: 2 mL, Rfl: 11    DULoxetine (Cymbalta) 60 mg DR capsule, Take 1 capsule (60 mg) by mouth once daily. Do not crush or chew. Take in the evening with food. Increase to 60mg daily dosing., Disp: 90 capsule, Rfl: 1    estradiol (Estrace) 0.01 % (0.1 mg/gram) vaginal cream, Insert 0.5 Applicatorfuls (2 g) into the  "vagina once daily. Use once a day for 2 weeks then as needed there after, Disp: 42.5 g, Rfl: 0    ezetimibe (Zetia) 10 mg tablet, Take 1 tablet (10 mg) by mouth once daily., Disp: , Rfl:     FreeStyle Cristina 2 Sensor kit, CHANGE SENSOR EVERY 14 DAYS AS DIRECTED., Disp: , Rfl:     insulin glargine (Lantus Solostar U-100 Insulin) 100 unit/mL (3 mL) pen, Inject 40 Units under the skin 2 times a day., Disp: 30 mL, Rfl: 3    insulin lispro (HumaLOG Aurelio Kwikpen) 100 unit/mL injection, Inject 40 Units under the skin 3 times a day as needed for high blood sugar. inject up to 20 units before meals per sliding scale, Disp: 15 mL, Rfl: 5    metFORMIN  mg 24 hr tablet, Take 2 tablets (1,000 mg) by mouth 2 times a day with meals., Disp: 180 tablet, Rfl: 1    omeprazole (PriLOSEC) 40 mg DR capsule, Take 1 capsule (40 mg) by mouth once daily in the morning. Take before meals. Do not crush or chew. Open capsule, sprinkle beads on SF jello, pudding or applesauce., Disp: 30 capsule, Rfl: 5    ondansetron ODT (Zofran-ODT) 4 mg disintegrating tablet, Take 1 tablet (4 mg) by mouth every 6 hours if needed for nausea or vomiting., Disp: 60 tablet, Rfl: 1    OneTouch Delica Plus Lancet 33 gauge misc, TEST BLOOD GLUCOSE 3 TIMES/ DAY, Disp: , Rfl:     OneTouch Verio Flex meter misc, USE AS DIRECTED., Disp: , Rfl:     oxyCODONE (Roxicodone) 5 mg/5 mL solution, Take 5 mL (5 mg) by mouth every 6 hours if needed for severe pain (7 - 10) or moderate pain (4 - 6)., Disp: 140 mL, Rfl: 0    pen needle, diabetic (BD Ultra-Fine Mini Pen Needle) 31 gauge x 3/16\" needle, Inject 200 each under the skin 4 times a day before meals. Use 4 a day as directed, Disp: 200 each, Rfl: 3    ProbioMax Daily DF 30 billion cell capsule,delayed release(DR/EC), TAKE 1 CAPSULE BY MOUTH EVERY DAY, Disp: 90 capsule, Rfl: 3    Probiotic 3 billion cell capsule, Take 1 capsule by mouth once daily., Disp: , Rfl:     valACYclovir (Valtrex) 500 mg tablet, Take 1 tablet " (500 mg) by mouth 2 times a day. As needed for outbreaks, Disp: , Rfl:     LORazepam (Ativan) 1 mg tablet, TAKE 1 TABLET BY MOUTH ONCE DAILY AS NEEDED FOR ANXIETY, Disp: 30 tablet, Rfl: 0    omega-3 acid ethyl esters (Lovaza) 1 gram capsule, Take 2 capsules (2 g) by mouth 2 times a day with meals., Disp: 120 capsule, Rfl: 1    Medical History:  Past Medical History:   Diagnosis Date    Abnormal levels of other serum enzymes 08/26/2021    Elevated liver enzymes    Anxiety     Anxiety disorder, unspecified 08/13/2019    Anxiety and depression    Body mass index (BMI)40.0-44.9, adult 06/11/2019    BMI 40.0-44.9, adult    Breast cancer (Multi)     Carpal tunnel syndrome, bilateral upper limbs 05/01/2019    Carpal tunnel syndrome on both sides    Depression, unspecified 12/29/2021    Depression    Elevated liver enzymes     Glycosuria 06/30/2014    Glucosuria    Hx antineoplastic chemo     Irregular menstruation, unspecified 12/21/2015    Missed periods    Mixed hyperlipidemia 07/30/2018    Hyperlipemia, mixed    Morbid (severe) obesity due to excess calories (Multi) 06/11/2019    Severe obesity (BMI >= 40)    Myopia, bilateral 09/15/2017    Myopia of both eyes with astigmatism    Type 2 diabetes mellitus without complications (Multi) 09/14/2022    Diabetes    Vision loss     glasses       Past Psychiatric History:   Diagnoses: anxiety, depression  Previous Psychiatrist: saw  once during pregnancy  Therapy: none  Current psychiatric medications:  Past psychiatric medications:  Past psychiatric treatments:   Hospitalizations: [facility] 3 days after SA age 17  Suicide attempts: 16yo--overdose and cut wrists, 16yo admitted to [facility] x3 days after another overdose  Family psychiatric history: GM had significant depression hx, often in and out of hospital, mom with anxiety. No family h/o suicides    Social History:   Currently lives:  Education: C student, graduated HS with some college  History of Learning  Problem:  Work/Finances: mostly stays home, 2 kids with needs, previously worked in cleaning. Dose some catering  Marital history/children: 13yo, 7yo 5yo, lives with partner   Current stressors: health, special needs children  Social support: partner  Legal History: none   History: none  History of violence: none  Access to Weapons: has gun but kept locked  Guardian/POA/Payee:  none    Substance Use History:  Tobacco use: never   Use of alcohol: occasional, social use  Use of caffeine: coffee 1-2 /day before surgery  Use of other substances: previous used THC gummies, helped with anxiety though less helpful since bariatric surgery  Legal consequences of substance use: none  Substance use disorder treatment: none         Medical Review Of Systems:  +wt loss since surgery 48#  +hot flashes, some improvement with duloxetine  No N/V  No seizures  No bleeding  No change in vision      OARRS: Last filled lorazepam 1mg #30 for 30 days on 5/2/24      Recent Results (from the past 8760 hour(s))   Drug Screen, Urine With Reflex to Confirmation    Collection Time: 08/11/23 10:18 AM   Result Value Ref Range    DRUG SCREEN COMMENT URINE SEE BELOW     Amphetamine Screen, Urine PRESUMPTIVE NEGATIVE NEGATIVE    Barbiturate Screen, Urine PRESUMPTIVE NEGATIVE NEGATIVE    BENZODIAZEPINE (PRESENCE) IN URINE BY SCREEN METHOD PRESUMPTIVE NEGATIVE NEGATIVE    Cannabinoid Screen, Urine PRESUMPTIVE POSITIVE (A) NEGATIVE    Cocaine Screen, Urine PRESUMPTIVE NEGATIVE NEGATIVE    Fentanyl, Ur PRESUMPTIVE NEGATIVE NEGATIVE    Methadone Screen, Urine PRESUMPTIVE NEGATIVE NEGATIVE    Opiate Screen, Urine PRESUMPTIVE NEGATIVE NEGATIVE    Oxycodone Screen, Ur PRESUMPTIVE NEGATIVE NEGATIVE    PCP Screen, Urine PRESUMPTIVE NEGATIVE NEGATIVE   DRUG SCREEN,URINE    Collection Time: 06/28/23  9:33 AM   Result Value Ref Range    DRUG SCREEN COMMENT URINE CANCELED      Hot flashes and mood swings        Controlled Substance Agreement:  Date of the  "Last Agreement: 6/6/24  Reviewed Controlled Substance Agreement including but not limited to the benefits, risks, and alternatives to treatment with a Controlled Substance medication(s).    Benzodiazepines:  What is the patient's goal of therapy? Improved management of anxiety  Is this being achieved with current treatment? New patient          Objective   Mental Status Exam  Appearance: casually dressed F in dress, appropriate g/h  Attitude: cooperative and engaged  Behavior: good eye contact  Motor Activity: normal gait and station  Speech: regular rate/volume/prosody. No dysarthria, no aphasia  Mood: \"down\"  Affect: congruent, appropriate range to circumstance  Thought Process: on topic, no JOSH  Thought Content: no delusions, no SI/HI, +catastrophic thoughts  Thought Perception: no AVH, not RTIS  Cognition: alert, oriented to person, place, time. Attn intact. VALENTIN avg  Insight: fair   Judgment: intact       Vitals:  Vitals:    06/06/24 0924   BP: 131/72   Pulse: 88   Resp: 18   Temp: 35.4 °C (95.7 °F)   SpO2: 98%       Office Visit on 05/23/2024   Component Date Value Ref Range Status    Claude Score 05/23/2024 7 (H)  0 - 3 Final    Interpretation of the Claude Score  0-3.....Normal vaginal microbiota  4-6.....Intermediate results  7-10....Bacterial vaginosis    Yeast 05/23/2024 ABSENT  ABSENT Final    Clue Cells 05/23/2024 ABSENT  ABSENT Final   Lab on 05/09/2024   Component Date Value Ref Range Status    WBC 05/09/2024 5.5  4.4 - 11.3 x10*3/uL Final    nRBC 05/09/2024 0.0  0.0 - 0.0 /100 WBCs Final    RBC 05/09/2024 4.61  4.00 - 5.20 x10*6/uL Final    Hemoglobin 05/09/2024 13.1  12.0 - 16.0 g/dL Final    Hematocrit 05/09/2024 40.1  36.0 - 46.0 % Final    MCV 05/09/2024 87  80 - 100 fL Final    MCH 05/09/2024 28.4  26.0 - 34.0 pg Final    MCHC 05/09/2024 32.7  32.0 - 36.0 g/dL Final    RDW 05/09/2024 13.4  11.5 - 14.5 % Final    Platelets 05/09/2024 233  150 - 450 x10*3/uL Final    Neutrophils % 05/09/2024 51.4  " 40.0 - 80.0 % Final    Immature Granulocytes %, Automated 05/09/2024 0.9  0.0 - 0.9 % Final    Immature Granulocyte Count (IG) includes promyelocytes, myelocytes and metamyelocytes but does not include bands. Percent differential counts (%) should be interpreted in the context of the absolute cell counts (cells/UL).    Lymphocytes % 05/09/2024 40.6  13.0 - 44.0 % Final    Monocytes % 05/09/2024 5.1  2.0 - 10.0 % Final    Eosinophils % 05/09/2024 1.6  0.0 - 6.0 % Final    Basophils % 05/09/2024 0.4  0.0 - 2.0 % Final    Neutrophils Absolute 05/09/2024 2.81  1.20 - 7.70 x10*3/uL Final    Percent differential counts (%) should be interpreted in the context of the absolute cell counts (cells/uL).    Immature Granulocytes Absolute, Au* 05/09/2024 0.05  0.00 - 0.70 x10*3/uL Final    Lymphocytes Absolute 05/09/2024 2.22  1.20 - 4.80 x10*3/uL Final    Monocytes Absolute 05/09/2024 0.28  0.10 - 1.00 x10*3/uL Final    Eosinophils Absolute 05/09/2024 0.09  0.00 - 0.70 x10*3/uL Final    Basophils Absolute 05/09/2024 0.02  0.00 - 0.10 x10*3/uL Final    Glucose 05/09/2024 118 (H)  74 - 99 mg/dL Final    Sodium 05/09/2024 142  136 - 145 mmol/L Final    Potassium 05/09/2024 4.2  3.5 - 5.3 mmol/L Final    Chloride 05/09/2024 104  98 - 107 mmol/L Final    Bicarbonate 05/09/2024 31  21 - 32 mmol/L Final    Anion Gap 05/09/2024 11  10 - 20 mmol/L Final    Urea Nitrogen 05/09/2024 17  6 - 23 mg/dL Final    Creatinine 05/09/2024 0.74  0.50 - 1.05 mg/dL Final    eGFR 05/09/2024 >90  >60 mL/min/1.73m*2 Final    Calculations of estimated GFR are performed using the 2021 CKD-EPI Study Refit equation without the race variable for the IDMS-Traceable creatinine methods.  https://jasn.asnjournals.org/content/early/2021/09/22/ASN.0713418578    Calcium 05/09/2024 10.0  8.6 - 10.3 mg/dL Final    Albumin 05/09/2024 4.6  3.4 - 5.0 g/dL Final    Alkaline Phosphatase 05/09/2024 73  33 - 110 U/L Final    Total Protein 05/09/2024 8.1  6.4 - 8.2 g/dL  Final    AST 05/09/2024 28  9 - 39 U/L Final    Bilirubin, Total 05/09/2024 0.3  0.0 - 1.2 mg/dL Final    ALT 05/09/2024 43  7 - 45 U/L Final    Patients treated with Sulfasalazine may generate falsely decreased results for ALT.    Ferritin 05/09/2024 124  8 - 150 ng/mL Final    Folate, Serum 05/09/2024 12.6  >5.0 ng/mL Final    Iron 05/09/2024 119  35 - 150 ug/dL Final    UIBC 05/09/2024 273  110 - 370 ug/dL Final    TIBC 05/09/2024 392  240 - 445 ug/dL Final    % Saturation 05/09/2024 30  25 - 45 % Final    Vitamin B12 05/09/2024 1,340 (H)  211 - 911 pg/mL Final    Vitamin D, 25-Hydroxy, Total 05/09/2024 37  30 - 100 ng/mL Final               Assessment/Plan   Patient Discussion:  INCREASE to duloxetine 90mg daily, can take in split doses  START trazodone 25mg at bedtime, can increase to 50mg based on response--call if not sufficient    CONTINUE with lorazepam 1mg as needed for anxiety, avoid daily use     RETURN to clinic 8/1 at noon for in person FUV    CALL with questions/concerns [phone]    Assessment:   34 y.o.  F h/o anxiety and depression, breast cancer s/p R mastectomy and chemo now on anastrozole. Pt also recently had gastric sleeve and has lost 50#. DMT2 on insulin, HTN seen 6/6/2024 for NPV for anxiety and depression    Pt describes long standing depression consistent with persistent depressive disorder with likely MDD episodes at times. She also describes generalized anxiety and some OCD tendencies around cleaning. There is no h/o vinny or psychosis. She recently had gastric sleeve surgery but does not describe h/o binging or restricting behaviors. She is currently on duloxetine and is open to further titration, trazodone for sleep. Will continue with lorazepam for as needed anxiety.     Diagnosis:   Persistent Depressive Disorder  MDD, recurrent, currently mild  ARNULFO with OCD tendencies  Anxiety attacks    Treatment Plan/Recommendations:   1. Safety Assessment: remote h/o SA in teens. None  currently, reports scared to die and kids are protective. Does have gun but this is locked. Reviewed safety. RF include sleep, anxiety, past attempts, depression PF include partner, young kids, future oriented, seeking treatment, no substance. Low risk     2. Psych: persistent depression, MDD, ARNULFO, anxiety attacks  INCREASE to duloxetine 90mg PO daily, can take in split doses  START trazodone 25mg PO at bedtime, can increase to 50mg PO at bedtime  CONTINUE lorazepam 1mg PO daily PRN anxiety, r/b/ae discussed including tolerance/dependence, rebound anxiety  CSA signed 6/6/24  UDS 8/11/23    REFER for therapy  Plan for supportive therapy     3. Medical:   breast CA s/p mastectomy on anastrozole, +hot flashes  S/p bariatric surgery 3/2024 (gastric sleeve) discussed absorption of capsules may be affected  DMT2, HTN  Notes and labs reviewed    4. 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 +partner. Discussed setting time for self         Prep time: 5 minutes  Direct patient time: 50 minutes  Documentation time: 10 minutes  Total time: 65 minutes    Marcie Kent MD

## 2024-06-07 ENCOUNTER — OFFICE VISIT (OUTPATIENT)
Dept: OBSTETRICS AND GYNECOLOGY | Facility: CLINIC | Age: 34
End: 2024-06-07
Payer: COMMERCIAL

## 2024-06-07 VITALS
SYSTOLIC BLOOD PRESSURE: 126 MMHG | DIASTOLIC BLOOD PRESSURE: 68 MMHG | WEIGHT: 262 LBS | HEIGHT: 70 IN | BODY MASS INDEX: 37.51 KG/M2

## 2024-06-07 DIAGNOSIS — Z12.4 PAP SMEAR FOR CERVICAL CANCER SCREENING: ICD-10-CM

## 2024-06-07 DIAGNOSIS — Z01.419 WELL WOMAN EXAM WITH ROUTINE GYNECOLOGICAL EXAM: Primary | ICD-10-CM

## 2024-06-07 PROCEDURE — 3008F BODY MASS INDEX DOCD: CPT | Performed by: OBSTETRICS & GYNECOLOGY

## 2024-06-07 PROCEDURE — 3078F DIAST BP <80 MM HG: CPT | Performed by: OBSTETRICS & GYNECOLOGY

## 2024-06-07 PROCEDURE — 99395 PREV VISIT EST AGE 18-39: CPT | Performed by: OBSTETRICS & GYNECOLOGY

## 2024-06-07 PROCEDURE — 1036F TOBACCO NON-USER: CPT | Performed by: OBSTETRICS & GYNECOLOGY

## 2024-06-07 PROCEDURE — 3046F HEMOGLOBIN A1C LEVEL >9.0%: CPT | Performed by: OBSTETRICS & GYNECOLOGY

## 2024-06-07 PROCEDURE — 3074F SYST BP LT 130 MM HG: CPT | Performed by: OBSTETRICS & GYNECOLOGY

## 2024-06-07 PROCEDURE — 88175 CYTOPATH C/V AUTO FLUID REDO: CPT

## 2024-06-07 PROCEDURE — 87624 HPV HI-RISK TYP POOLED RSLT: CPT

## 2024-06-07 NOTE — PROGRESS NOTES
Subjective   Patient ID: Nia Harper is a 34 y.o. female who presents for Annual Exam (Last PAP= 5/20/2021 NEGATIVE //Maame. H MA II/).  Due for Pap (hx abnormal).   Diagnosed with breast cancer last year, had right mastectomy. Follows with breast.   Exercising regularly.   Not currently using contraception. Discussed using condoms. Is on ovarian suppressing meds.     Review of Systems   All other systems reviewed and are negative.    Objective   Physical Exam  Vitals reviewed.   Constitutional:       Appearance: Normal appearance.   HENT:      Head: Normocephalic and atraumatic.      Right Ear: External ear normal.      Left Ear: External ear normal.      Nose: Nose normal.      Mouth/Throat:      Mouth: Mucous membranes are moist.   Eyes:      Extraocular Movements: Extraocular movements intact.   Neck:      Thyroid: No thyroid mass or thyromegaly.   Cardiovascular:      Rate and Rhythm: Normal rate and regular rhythm.      Heart sounds: Normal heart sounds.   Pulmonary:      Effort: Pulmonary effort is normal.      Breath sounds: Normal breath sounds.   Chest:   Breasts:     Right: Absent.      Left: Normal.   Abdominal:      General: Abdomen is flat. Bowel sounds are normal.      Palpations: Abdomen is soft.      Tenderness: There is no abdominal tenderness. There is no right CVA tenderness or left CVA tenderness.   Genitourinary:     General: Normal vulva.      Exam position: Lithotomy position.      Labia:         Right: No lesion.         Left: No lesion.       Urethra: No prolapse, urethral swelling or urethral lesion.      Vagina: Normal.      Cervix: Normal.      Uterus: Normal.       Adnexa: Right adnexa normal and left adnexa normal.   Musculoskeletal:         General: Normal range of motion.      Right shoulder: Normal.      Left shoulder: Normal.      Cervical back: Normal, normal range of motion and neck supple.      Thoracic back: Normal.      Lumbar back: Normal.      Right hip: Normal.       Left hip: Normal.      Right upper leg: Normal.      Left upper leg: Normal.      Right knee: Normal.      Left knee: Normal.      Right lower leg: Normal.      Left lower leg: Normal.   Lymphadenopathy:      Upper Body:      Right upper body: No axillary adenopathy.      Left upper body: No axillary adenopathy.      Lower Body: No right inguinal adenopathy. No left inguinal adenopathy.   Skin:     General: Skin is warm and dry.   Neurological:      General: No focal deficit present.      Mental Status: She is alert and oriented to person, place, and time.      Cranial Nerves: Cranial nerves 2-12 are intact.      Motor: Motor function is intact.   Psychiatric:         Attention and Perception: Attention and perception normal.         Mood and Affect: Mood and affect normal.         Behavior: Behavior normal.         Cognition and Memory: Cognition normal.         Judgment: Judgment normal.       Assessment/Plan   Problem List Items Addressed This Visit    None  Visit Diagnoses         Codes    Well woman exam with routine gynecological exam    -  Primary Z01.419    Pap smear for cervical cancer screening     Z12.4    Relevant Orders    THINPREP PAP TEST (>30)          Bethany cAuña MD 06/07/24 11:15 AM

## 2024-06-18 NOTE — PROGRESS NOTES
Surgery Date:  3/25/24  Surgeon:  Aziza  Procedure:  LSG      ASSESSMENT:  Current weight:        245 lbs  - home scale,  256 lb@ at office 6/20/24       Ht:    70      in.           BMI: 35.15  Previous weight:  262 lbs  Initial start weight:   297.4 lbs  EBW: 120 lbs     Total weight change: -52 lbs  %EBW Lost: 43.3%    PROGRESS:    Nutrition Interventions for last encounter (5/3/24)  1. Great Job! You have lost over 30 pounds since joining the program!  2. Continue to eat protein rich foods first at meals/snacks to meet your goal of  70-80 g of protein per day. Use a food scale and weigh out 2-3 ounces of protein to try to eat for meals.   3.  You may begin trying raw fruits, raw vegetables and nuts. Continue to avoid breads, pastas, rice, and tough meats such as beef and pork.  4.  Try one new food at a time and remember to chew, chew, chew!  5. Drink 64 oz. of zero calorie beverages per day  6. Continue no drinking 30 min before, during the meal and for 30 minutes after the meal  7.  Continue to walk 45-60 minutes 5 days/week.   8.  Continue current vit/min regimen  9. Attend our Virtual Support Group monthly!    CHANGES IN TREATMENT:   Patient met goals: Yes          Actions to meet previous goals:     24 hour food recall:    Breakfast:  banana and protein shake   Snack:   Lunch:  slim terrell x2, banana  Snack:     Dinner:  protein drink   Snack:   Beverages:  water   Alcohol:  none      Vitamins: x2 Flintstones MVI, x3 500 mg Calcium, 500 mcg B12   Medications: see list  Physical Activity: 6 days/week - jogging 1 mile, power walking 2 mile 45-55 minutes   Nausea/Vomiting/Diarrhea/Constipation: none    READINESS TO LEARN:  Motivation to learn:           Interested      Understanding of instruction: Good       Anticipated Compliance: Good         Family Support: Unable to assess-family not present   Patient presents with post-op sleeve gastrectomy.  Pt is 3 months postop.    Weight today is self reported. Patient  has lost 52 pounds since initial assessment accounting for 43% loss excess body weight.    Tolerating a Regular diet without difficulty.  Protein intake is adequate for post-op individual. Fluid consumption is adequate. Patient is supplementing recommended vitamin/minerals. Pt states concerns with being on the go more, not being able to have as much protein food, relying on mostly protein drinks. Recommended to start meal planning and use meal planner books to help her stock up on options for the week to have for on the go. Encouraged monthly SG meetings.      Appointment conducted via phone d/t COVID-19 protocol. Patient's weight is self-reported.    Malnutrition Screening  Significant unintentional weight loss? n/a  Eating less than 75% of usual intake for more than 2 weeks? n/a      Nutrition Diagnosis:   1. Increased protein and nutrition needs related to altered GI function as evidenced by pt. s/p  sleeve gastrectomy.   2. Food- and nutrition-related knowledge deficit related to lack of prior exposure to surgical weight loss information as evidenced by diet recall.       Nutrition Interventions:   1. Modify type and amount of food and nutrients within meals and snacks.  2. Comprehensive Nutrition Education  -Nutrition education materials: SG Schedule, today's recap, 6 meals FAST      Recommendations:    1.         Great Job! You have lost  over 50 pounds since starting this program!  2. Continue to eat 3 meals and 2 snacks daily. Aim for 2-3 ounces of protein at meals, 1-2 oz at snacks to meet your daily protein goal of  g of protein per day.   3. Continue to drink 64 oz. of zero calorie beverages per day  4. Continue no drinking 30 min before, during the meal and for 30 minutes after the meal  5. Continue to follow vit/min regimen   6. Continue to exercise  7.         Attend our Virtual Support Group monthly!    Nutrition Monitoring and Evaluation:   Weight loss1-2 pounds per week  Criteria: weight  check, food recall  Need for Follow-up: 6 months post op

## 2024-06-19 LAB
CYTOLOGY CMNT CVX/VAG CYTO-IMP: NORMAL
HPV HR 12 DNA GENITAL QL NAA+PROBE: NEGATIVE
HPV HR GENOTYPES PNL CVX NAA+PROBE: NEGATIVE
HPV16 DNA SPEC QL NAA+PROBE: NEGATIVE
HPV18 DNA SPEC QL NAA+PROBE: NEGATIVE
LAB AP HPV GENOTYPE QUESTION: YES
LAB AP HPV HR: NORMAL
LABORATORY COMMENT REPORT: NORMAL
PATH REPORT.TOTAL CANCER: NORMAL

## 2024-06-20 ENCOUNTER — APPOINTMENT (OUTPATIENT)
Dept: HEMATOLOGY/ONCOLOGY | Facility: HOSPITAL | Age: 34
End: 2024-06-20
Payer: COMMERCIAL

## 2024-06-20 ENCOUNTER — APPOINTMENT (OUTPATIENT)
Dept: PLASTIC SURGERY | Facility: CLINIC | Age: 34
End: 2024-06-20
Payer: COMMERCIAL

## 2024-06-20 VITALS
BODY MASS INDEX: 36.65 KG/M2 | WEIGHT: 256 LBS | HEART RATE: 88 BPM | SYSTOLIC BLOOD PRESSURE: 123 MMHG | DIASTOLIC BLOOD PRESSURE: 75 MMHG | HEIGHT: 70 IN

## 2024-06-20 DIAGNOSIS — Z90.11 S/P PARTIAL MASTECTOMY, RIGHT: ICD-10-CM

## 2024-06-20 PROCEDURE — 3008F BODY MASS INDEX DOCD: CPT | Performed by: SURGERY

## 2024-06-20 PROCEDURE — 3074F SYST BP LT 130 MM HG: CPT | Performed by: SURGERY

## 2024-06-20 PROCEDURE — 3046F HEMOGLOBIN A1C LEVEL >9.0%: CPT | Performed by: SURGERY

## 2024-06-20 PROCEDURE — 3078F DIAST BP <80 MM HG: CPT | Performed by: SURGERY

## 2024-06-20 PROCEDURE — 99213 OFFICE O/P EST LOW 20 MIN: CPT | Performed by: SURGERY

## 2024-06-20 NOTE — LETTER
June 20, 2024     Meggan Cerrato MD  [id number]  Department Of Surgery-Plastic Surgery  [facility] [address]    Patient: Nia Harper   YOB: 1990   Date of Visit: 6/20/2024       Dear Dr. Meggan Cerrato MD:    Thank you for referring Nia Harper to me for evaluation. Below are my notes for this consultation.  If you have questions, please do not hesitate to call me. I look forward to following your patient along with you.       Sincerely,     Luis Carlos Borrero MD      CC: Migdalia Schroeder, DO  ______________________________________________________________________________________                    Department of Plastic and Reconstructive Surgery            [facility]    Date: 6/20/2024        Subjective  Nia Harper is a 34 y.o. female who was originally seen in June 2023 biopsy-proven invasive ductal carcinoma right breast at multiple locations.    At that time, her BMI was 43, the patient was instructed she was not a candidate for immediate reconstruction.  On 6/30/2023 she underwent right partial mastectomy with sentinel lymph node biopsy.  This was performed by Dr. Tamir Henderson.  She underwent chemo docetaxel/cyclophosphamide with ongoing goserelin (started 8/18/23)   She underwent laparoscopic sleeve gastrectomy, with Dr. Ervin on 3/25/2024.    Her maximum weight was approximately 300 pounds.  Current weight is 262 pounds, her goal is to be close to 200    We previously met to discuss delayed reconstructive options including possible delayed contralateral prophylactic mastectomy.  She was interested in free HELENE flap, we discussed that weight loss may be optimal.  When we last saw her her weight was 266 pounds, today her weight is 256 pounds    Objective   Vitals:    06/20/24 0850   BP: 123/75   Pulse: 88       Gen: interactive and pleasant  Head: NCAT  Eyes: EOMI, PERRLA  Mouth: MMM  Throat: trachea midline  Cor: RRR  Pulm: nonlabored breathing  Abd: s/nt/nd  Neuro: AAOx3  Ext: extremities  perfused    Body mass index is 37.59 kg/m².      Focused exam of the:   Breast, sternal notch: NAC = 34 cm; NAC = 9 cm, breast width 18 cm  Breast absent with excess skin, and lateral dogear breast width 18 cm    Assessment/Plan      Nia is a 34 y.o. female who presents to discuss delayed breast reconstruction, and also possibly body contouring.    She previously indicated interest in free deep inferior epigastric  flap surgery, I asked her over several questions what interests her in the surgery, she indicates she is interested in the surgery for the donor site modifications.  She is also considering a prophylactic completion mastectomy contralateral side.    On our previous visit we discussed:  1.  Free deep inferior epigastric  flap surgery, unilateral, bilateral  2.  Prophylactic contralateral mastectomy with bilateral tissue expander placement, and eventual second stage surgery with implant exchange and possible panniculectomy/abdominoplasty    She is now further out from her gastric sleeve surgery.  And is slowly losing weight to obtain a more optimal BMI.  She has considered our previous discussion, and is interested in moving forward with a contralateral prophylactic mastectomy and bilateral free HELENE flaps.    To that end I will have her engage with Dr. Schroeder for discussion regarding prophylactic mastectomy.  And I will have her meet with our partner Dr. Nieto to discuss timing of bilateral Helene flap surgery.  We may be able to perform both procedures at the same time, however given for surgeon availability and may be more efficient for her to have her mastectomy, followed by elective bilateral Helene.    Plan:   Refer to Dr. Nieto and Dr. Schroeder to discuss the above-mentioned plan.    I spent 30 minutes with this patient. Greater than 50% of this time was spent in the counselling and/or coordination of care of this patient.  This note was created using voice recognition  software and was not corrected for typographical or grammatical errors.    Signature: Luis Carlos Borrero MD   Date: 6/20/2024

## 2024-06-20 NOTE — PROGRESS NOTES
Department of Plastic and Reconstructive Surgery            [facility]    Date: 6/20/2024        Yoko Harper is a 34 y.o. female who was originally seen in June 2023 biopsy-proven invasive ductal carcinoma right breast at multiple locations.    At that time, her BMI was 43, the patient was instructed she was not a candidate for immediate reconstruction.  On 6/30/2023 she underwent right partial mastectomy with sentinel lymph node biopsy.  This was performed by Dr. Tamir Henderson.  She underwent chemo docetaxel/cyclophosphamide with ongoing goserelin (started 8/18/23)   She underwent laparoscopic sleeve gastrectomy, with Dr. Ervin on 3/25/2024.    Her maximum weight was approximately 300 pounds.  Current weight is 262 pounds, her goal is to be close to 200    We previously met to discuss delayed reconstructive options including possible delayed contralateral prophylactic mastectomy.  She was interested in free HELENE flap, we discussed that weight loss may be optimal.  When we last saw her her weight was 266 pounds, today her weight is 256 pounds    Objective    Vitals:    06/20/24 0850   BP: 123/75   Pulse: 88       Gen: interactive and pleasant  Head: NCAT  Eyes: EOMI, PERRLA  Mouth: MMM  Throat: trachea midline  Cor: RRR  Pulm: nonlabored breathing  Abd: s/nt/nd  Neuro: AAOx3  Ext: extremities perfused    Body mass index is 37.59 kg/m².      Focused exam of the:   Breast, sternal notch: NAC = 34 cm; NAC = 9 cm, breast width 18 cm  Breast absent with excess skin, and lateral dogear breast width 18 cm    Assessment/Plan       Nia is a 34 y.o. female who presents to discuss delayed breast reconstruction, and also possibly body contouring.    She previously indicated interest in free deep inferior epigastric  flap surgery, I asked her over several questions what interests her in the surgery, she indicates she is interested in the surgery for the donor site modifications.  She is also  considering a prophylactic completion mastectomy contralateral side.    On our previous visit we discussed:  1.  Free deep inferior epigastric  flap surgery, unilateral, bilateral  2.  Prophylactic contralateral mastectomy with bilateral tissue expander placement, and eventual second stage surgery with implant exchange and possible panniculectomy/abdominoplasty    She is now further out from her gastric sleeve surgery.  And is slowly losing weight to obtain a more optimal BMI.  She has considered our previous discussion, and is interested in moving forward with a contralateral prophylactic mastectomy and bilateral free HELENE flaps.    To that end I will have her engage with Dr. Schroeder for discussion regarding prophylactic mastectomy.  And I will have her meet with our partner Dr. Nieto to discuss timing of bilateral Helene flap surgery.  We may be able to perform both procedures at the same time, however given for surgeon availability and may be more efficient for her to have her mastectomy, followed by elective bilateral Helene.    Plan:   Refer to Dr. Nieto and Dr. Schroeder to discuss the above-mentioned plan.    I spent 30 minutes with this patient. Greater than 50% of this time was spent in the counselling and/or coordination of care of this patient.  This note was created using voice recognition software and was not corrected for typographical or grammatical errors.    Signature: Luis Carlos Borrero MD   Date: 6/20/2024

## 2024-06-21 ENCOUNTER — OFFICE VISIT (OUTPATIENT)
Dept: SURGERY | Facility: CLINIC | Age: 34
End: 2024-06-21
Payer: COMMERCIAL

## 2024-06-21 ENCOUNTER — NUTRITION (OUTPATIENT)
Dept: SURGERY | Facility: CLINIC | Age: 34
End: 2024-06-21
Payer: COMMERCIAL

## 2024-06-21 VITALS — WEIGHT: 245 LBS | BODY MASS INDEX: 35.15 KG/M2

## 2024-06-21 DIAGNOSIS — E66.9 OBESITY WITH SERIOUS COMORBIDITY, UNSPECIFIED CLASSIFICATION, UNSPECIFIED OBESITY TYPE: ICD-10-CM

## 2024-06-21 DIAGNOSIS — E78.1 HYPERTRIGLYCERIDEMIA: Primary | ICD-10-CM

## 2024-06-21 DIAGNOSIS — I10 PRIMARY HYPERTENSION: ICD-10-CM

## 2024-06-21 DIAGNOSIS — Z90.3 S/P GASTRIC SLEEVE PROCEDURE: Primary | ICD-10-CM

## 2024-06-21 DIAGNOSIS — K76.0 FATTY LIVER: ICD-10-CM

## 2024-06-21 DIAGNOSIS — K91.2 POSTOPERATIVE MALABSORPTION (HHS-HCC): ICD-10-CM

## 2024-06-21 DIAGNOSIS — Z90.3 S/P GASTRIC SLEEVE PROCEDURE: ICD-10-CM

## 2024-06-21 DIAGNOSIS — E11.65 UNCONTROLLED TYPE 2 DIABETES MELLITUS WITH HYPERGLYCEMIA (MULTI): ICD-10-CM

## 2024-06-21 NOTE — ASSESSMENT & PLAN NOTE
S/p LSG 3 mo      Initial Wt: 294 lbs  Initial BMI: 40.3  Patient denies any N,V,F,D,CP or SOB.   Tolerating soft diet   ambulating and eliminating well  no sign of infection   taking Vitamins and PPI     imp: doing well     recs:   advance diet as advised   advance activity as advised   cont Vitamin supplementation, PPI   FU with dietitian   FU with this office in 3 months for 6 mo pov. Labs - see orders

## 2024-06-21 NOTE — PATIENT INSTRUCTIONS
"The following are the recommendations we discussed at your appointment today:  1. Nutrition  - Please make sure you are seeing the bariatric dietitian regularly.    Dietitian Information:   Tee Hood: [phone]  [facility] [place] [phone]    Your Protein Goals will gradually increase as you get further out from surgery:  0-3 months - 60 GRAMS PER DAY  3-6 months - 60-75 GRAMS PER DAY  6-12 months - 75-90 GRAMS PER DAY  12+ months - 80-90 GRAMS PER DAY    - Follow Pouch Rules;.   Stop drinking 30 minutes before your meals, Take 30 minutes to eat your meal   - NO DRINKING DURING MEALS, Wait for 30 minutes after your meal to drink  - Eat 5 servings of fruits and veggies daily.  A serving is 1 small (tennis ball size) piece of whole fruit, 1/2 cup fresh fruit, 1 cup non starchy vegetables  - Eat 3 small meals and 1-2 healthy, protein rich, snacks per day.    EAT PROTEIN FIRST AT MEALS, 2nd non starchy vegetable or fruit serving, consume starches last and aim for whole grains of small portion.  This pattern of eating ensures you are getting full on high quality protein and higher fiber foods with many vitamins and minerals to support adequate nutrition first.      2. Exercise Recommendations:    Regular physical activity is CRITICAL for long term successful weight management.    Strive for a minimum weekly exercise goal of at least 200 minutes per week of aerobic exercise (45 minutes at least 5 days per week or 30 minutes daily)    Strength based resistance training is critical for helping to maintain and build lean body mass/muscle mass which is very important for long term health.  Having higher muscle mass is associated with better health outcomes and can help to maintain higher calorie expenditure.      Designing a Strength Program:  Select 3-4 exercises that target different major muscle groups.  - Emphasize the following movements \"pull, push, squat, hip hinge\"  \"Pull\" examples - pull up, " "bent over row or dumbbell row, pull down with weight bar or resistance band  \"Push\" examples - push up, bench press, chest flys, dips, overhead press, dumbbell bench press  \"Squat\" examples - air squat, chair squat, lunge steps, goblet squat, front squat, etc  \"Hip hinge\" examples - kettle bell swing, good morning, glute bridge, deadlift, suitcase pick ups, hip thrust    Perform two to three sets of eight to 15 repetitions of each exercise.  Try to use a resistance that feels like an \"8 out of 10\" effort, with 10 being the highest effort you can give.    To get stronger, try increasing either the weight, number of repetitions, number of sets or number of exercises every 4-8 weeks as you feel more comfortable with your current program.      3. Fluids  - Drink at least 60 oz of water every day.   - Wait 30 minutes before or after meals to drink fluids.  - Avoid carbonated beverages.    4. Vitamins  - Remember to take your multivitamins 2 times daily, once in the morning and once in the evening  - Take your calcium 2-3 times daily, at least 2 hours apart from the multivitamin - total daily dose at least 1200-1500mg per day.    - Vitamin D3 3000 units per day  - B12 - depending on your blood work and how well you absorb B12 from your multivitamin you may need additional B12 of 350-500mcg oral per day.    5. Labs  - We will check labs today and results will be communicated via [facility] My Chart  - A copy of the results can be viewed on  My Chart.      Follow-up with Dietitian for bariatric diet optimization  Follow-up with PCP for general health questions and ongoing management of routine health concerns      "

## 2024-06-21 NOTE — PROGRESS NOTES
BARIATRIC SURGERY CLINIC  Comprehensive Weight Management        Patient: Nia Harper   MRN: [id number]      Index Surgery:   Surgeon: Dr. Ervin  Procedure: LSG  Date: 03/25/2024     Virtual or Telephone Consent:  A telephone visit (audio or visual only) between the patient (at the originating site) and the provider (at the distant site) was utilized to provide this telehealth service. Verbal consent was requested and obtained from Nia Harper on this date, 06/21/24  for a bariatric telehealth visit.      HPI   Nia Harper is a 33 y.o. female presenting for follow up visit 3 mo pov s/p LSG with Dr. Ervin. Initial Weight is 294 lbs, BMI 40.3. Class 3 Obesity.      Diet:  - Stage: regular food diet  - Achieving protein and fluid goals: yes - both, occasionally uses shakes      Exercise:  -  walking, increasing activity as tolerated.      Concerns related to:  Nausea/Vomiting: no, denies   Reflux/heartburn: denies  Abdominal Pain: denies   Diarrhea/Constipation- bowel function is regular     Daily Supplements:  Calcium: Calcium Citrate w/ vitamin D (1200 - 1500mg) (centrum)  Multivitamin & Minerals: 2 per day   Vitamin B12: 500 mcg/day   Iron/Other: iron supplement, probiotic   PPI: taking    Primary Medical Hx:  Patient Active Problem List   Diagnosis    Astigmatism of both eyes    Breast skin changes    Radial styloid tenosynovitis of right hand    Depression    Diabetes mellitus type 2 without retinopathy (Multi)    Diffuse goiter    Elevation of levels of liver transaminase levels    Epidermal inclusion cyst    Herpes simplex virus (HSV) infection    History of malignant neoplasm of breast    Hyperlipemia    Hypertriglyceridemia    Myopia of both eyes    Nexplanon in place    Suspected sleep apnea    Uncontrolled type 2 diabetes mellitus with hyperglycemia (Multi)    Chronic headaches    BMI 40.0-44.9, adult (Multi)    Severe obesity (Multi)    Breast cancer (Multi)    Primary hypertension    Angioedema    ARNULFO  (generalized anxiety disorder)    History of bariatric surgery    Fatty liver    Gallstones    History of cannabis abuse    History of noncompliance with medical treatment    Insufficient sleep syndrome    Sleep disorder breathing    Status post mastectomy    Systolic murmur    Artificial menopause    Acute vaginitis    Encounter for follow-up surveillance of breast cancer    Hidradenitis suppurativa    Suppression of ovarian secretion    Bariatric surgery status    Mass of left breast    Infected sebaceous cyst    Sebaceous cyst    Obesity, Class III, BMI 40-49.9 (morbid obesity) (Multi)    Post-op pain    Obesity    S/P gastric sleeve procedure    Hospital discharge follow-up    Anxiety attack    Postoperative malabsorption (HHS-HCC)      Past Surgical History:   Procedure Laterality Date     SECTION, CLASSIC  2019     Section    MASTECTOMY Right     MR HEAD ANGIO WO IV CONTRAST  2015    MR HEAD ANGIO WO IV CONTRAST 2015 [facility]    OTHER SURGICAL HISTORY  2019    Surgical Treatment Of Missed  In First Trimester      Social History     Socioeconomic History    Marital status: Single     Spouse name: Not on file    Number of children: Not on file    Years of education: Not on file    Highest education level: Not on file   Occupational History    Not on file   Tobacco Use    Smoking status: Never     Passive exposure: Never    Smokeless tobacco: Never   Vaping Use    Vaping status: Never Used   Substance and Sexual Activity    Alcohol use: Never    Drug use: Not Currently     Types: Marijuana     Comment: last used 4 weeks ago    Sexual activity: Defer   Other Topics Concern    Not on file   Social History Narrative    Not on file     Social Determinants of Health     Financial Resource Strain: Patient Declined (3/25/2024)    Overall Financial Resource Strain (CARDIA)     Difficulty of Paying Living Expenses: Patient declined   Food Insecurity: No Food  Insecurity (3/25/2024)    Hunger Vital Sign     Worried About Running Out of Food in the Last Year: Never true     Ran Out of Food in the Last Year: Never true   Transportation Needs: Patient Declined (3/25/2024)    PRAPARE - Transportation     Lack of Transportation (Medical): Patient declined     Lack of Transportation (Non-Medical): Patient declined   Physical Activity: Not on file   Stress: No Stress Concern Present (3/25/2024)    [employer] [place] [employer] - Occupational Stress Questionnaire     Feeling of Stress : Only a little   Social Connections: Not on file   Intimate Partner Violence: Not At Risk (3/25/2024)    Humiliation, Afraid, Rape, and Kick questionnaire     Fear of Current or Ex-Partner: No     Emotionally Abused: No     Physically Abused: No     Sexually Abused: No   Housing Stability: Patient Declined (3/25/2024)    Housing Stability Vital Sign     Unable to Pay for Housing in the Last Year: Patient declined     Number of Places Lived in the Last Year: 1     Unstable Housing in the Last Year: Patient declined     Family History   Problem Relation Name Age of Onset    Heart disease Father      Cancer Maternal Grandmother      Diabetes type II Maternal Grandmother      Cancer Maternal Grandfather      Cancer Paternal Grandmother      Diabetes type II Mother's Sister          uncontrolled    Breast cancer Father's Sister          two paternal 1st cousins (through paternal uncle, both sisters, both early onset, one ). two paternal great aunts (through PGF's side, both  in their 50s/60s)    Cancer Father's Sister      Cancer Father's Brother      Breast cancer Paternal Cousin          two paternal 1st cousins (through paternal uncle, both sisters, both early onset, one ). two paternal great aunts (through PGF's side, both  in their 50s/60s)    Breast cancer Cousin        Current Outpatient Medications on File Prior to Visit   Medication Sig Dispense Refill     anastrozole (Arimidex) 1 mg tablet Take 1 tablet (1 mg total) by mouth once daily.  Swallow whole with a drink of water. 90 tablet 3    atorvastatin (Lipitor) 40 mg tablet Take 1 tablet (40 mg) by mouth once daily at bedtime. 90 tablet 3    benzoyl peroxide 5 % external wash Apply topically once daily. Lather onto skin folds, leave on 2 minutes, rinse with water 236 g 11    blood sugar diagnostic (OneTouch Verio test strips) strip Inject 100 strips under the skin once daily. 100 strip 1    blood-glucose sensor (FreeStyle Cristina 3 Sensor) device 2 each every 14 (fourteen) days. 2 each 11    diphenhydrAMINE (Sominex) 25 mg tablet Take 1 tablet (25 mg) by mouth as needed at bedtime for sleep.      dulaglutide (Trulicity) 1.5 mg/0.5 mL pen injector injection Inject 1.5 mg under the skin 1 (one) time per week. 2 mL 11    DULoxetine (Cymbalta) 30 mg DR capsule Take 1 capsule (30 mg) by mouth once daily. Do not crush or chew. 30 capsule 2    DULoxetine (Cymbalta) 60 mg DR capsule Take 1 capsule (60 mg) by mouth once daily. Do not crush or chew. Take in the evening with food. Increase to 60mg daily dosing. 90 capsule 1    estradiol (Estrace) 0.01 % (0.1 mg/gram) vaginal cream Insert 0.5 Applicatorfuls (2 g) into the vagina once daily. Use once a day for 2 weeks then as needed there after 42.5 g 0    ezetimibe (Zetia) 10 mg tablet Take 1 tablet (10 mg) by mouth once daily.      FreeStyle Cristina 2 Sensor kit CHANGE SENSOR EVERY 14 DAYS AS DIRECTED.      insulin glargine (Lantus Solostar U-100 Insulin) 100 unit/mL (3 mL) pen Inject 40 Units under the skin 2 times a day. 30 mL 3    insulin lispro (HumaLOG Aurelio Kwikpen) 100 unit/mL injection Inject 40 Units under the skin 3 times a day as needed for high blood sugar. inject up to 20 units before meals per sliding scale 15 mL 5    LORazepam (Ativan) 1 mg tablet TAKE 1 TABLET BY MOUTH ONCE DAILY AS NEEDED FOR ANXIETY 30 tablet 0    metFORMIN  mg 24 hr tablet Take 2 tablets  "(1,000 mg) by mouth 2 times a day with meals. 180 tablet 1    omega-3 acid ethyl esters (Lovaza) 1 gram capsule Take 2 capsules (2 g) by mouth 2 times a day with meals. 120 capsule 1    omeprazole (PriLOSEC) 40 mg DR capsule Take 1 capsule (40 mg) by mouth once daily in the morning. Take before meals. Do not crush or chew. Open capsule, sprinkle beads on SF jello, pudding or applesauce. 30 capsule 5    ondansetron ODT (Zofran-ODT) 4 mg disintegrating tablet Take 1 tablet (4 mg) by mouth every 6 hours if needed for nausea or vomiting. 60 tablet 1    OneTouch Delica Plus Lancet 33 gauge misc TEST BLOOD GLUCOSE 3 TIMES/ DAY      OneTouch Verio Flex meter misc USE AS DIRECTED.      oxyCODONE (Roxicodone) 5 mg/5 mL solution Take 5 mL (5 mg) by mouth every 6 hours if needed for severe pain (7 - 10) or moderate pain (4 - 6). 140 mL 0    pen needle, diabetic (BD Ultra-Fine Mini Pen Needle) 31 gauge x 3/16\" needle Inject 200 each under the skin 4 times a day before meals. Use 4 a day as directed 200 each 3    ProbioMax Daily DF 30 billion cell capsule,delayed release(DR/EC) TAKE 1 CAPSULE BY MOUTH EVERY DAY 90 capsule 3    Probiotic 3 billion cell capsule Take 1 capsule by mouth once daily.      traZODone (Desyrel) 50 mg tablet Take 1 tablet (50 mg) by mouth once daily at bedtime. 30 tablet 2    valACYclovir (Valtrex) 500 mg tablet Take 1 tablet (500 mg) by mouth 2 times a day. As needed for outbreaks       No current facility-administered medications on file prior to visit.      Allergies   Allergen Reactions    Metformin Swelling     Immediate release only- currently taking ER without problems    Cephalexin Hives, Other, Rash and Swelling     \"welts\"    Sulfamethoxazole-Trimethoprim Hives, Rash and Swelling     REVIEW OF SYSTEMS:  Negative unless otherwise reviewed in HPI.    PHYSICAL EXAM   Physical Exam   Ht: 5'10\"           Wt: 245 lbs      BMI: 35.15  General- No acute distress, well appearing and well nourished. "   Eyes Conjunctiva and lids: No erythema, swelling or discharge  Neck appears supple  CV: reg rate/rhythm (self report)  Pulmonary: Respiratory effort: Normal respiration. unlabored  Abdomen: Central adiposity  Neurologic - Coordination: Not assessed  Extremities: No clubbing, cyanosis  Psychiatric - Orientation to person, place, and time: Normal. Mood and affect: Normal.     ASSESSMENT & PLAN    33 y.o. female presenting for 3 mo fuv s/p LSG 03/25/2024. No acute complications or issues. Achieving protein and fluid goals, having regular BM. She is taking appropriate bariatric vitamins/supplements. She reports energy is improved with steady weight loss. She is increasing activity as tolerated, and has had follow up with bariatric RD. Increasing activity as tolerated. Three month labs reviewed, supplements adjusted per result. B12 taking every other day. Labs submitted for 6 mo pov, assessment of micronutrients. Follow up in 3 mo for 6 mo pov, sooner if needed.     Weight Impression:  -Initial BMI: 40.3   -Initial Weight: 294 lbs   -Today's Weight: 245 lbs    -Current BMI: Body mass index is 35.15  -%Total Weight Loss: -16.67 %     Problem List Items Addressed This Visit       Hypertriglyceridemia - Primary     Limiting dietary saturated fat encouraged <5-10% total kcal.  Increase dietary fiber to a minimum goal 25-30g per day.  Emphasis on whole foods based dietary carbohydrates.  Controlled carbohydrate intake reviewed for triglyceride reduction.    May consider high quality fish oil 2g EPA/DHA per day for CV benefit.  Regular exercise encouraged for CV risk reduction at least 150 min/week of moderate intense aerobic exercise encouraged.   Regular follow up with PCP/cardiology for lipid monitoring, CV risk reduction and appropriate lipid lowering therapies.         Relevant Orders    Comprehensive Metabolic Panel    Uncontrolled type 2 diabetes mellitus with hyperglycemia (Multi)     Recommend controlled CHO Diet to  improve glycemic control.    Carbohydrate portions at meals <40g per meal depending on activity level reviewed.  Avoid sugar sweetened beverages  Engage in regular aerobic exercise at least 150 minutes per week for CV benefit.  Encourage self monitoring of blood glucose values  Optimal values of blood glucose:  Fasting < 90  PreMeal < 100  30 minutes post meal < 140  60 minutes post meal < 120  90 minutes post meal < 100    Discussed roles of combining dietary protein, increased dietary fiber and reduction of simple carbohydrates to benefit glycemic control.   Discussed importance of regular exercise for glycemic control.   Ongoing follow up with PCP, Endo and speciality providers for retinopathy & nephropathy screening encouraged annually.          Relevant Orders    Comprehensive Metabolic Panel    Primary hypertension     Goal BP <120/80  Continue follow up with PCP for mgmt of antiHTN regimen  Encourage adherence to medications if prescribed for BP control  DASH/Mediterranean Diet lifestyle encouraged.  Weight reduction of 5-10% of clinical significance to improve BP control  Continues to work on lifestyle change goals to support blood pressure control and weight reduction.  Continue to follow up with your primary care physician         Relevant Orders    CBC and Auto Differential    Comprehensive Metabolic Panel    Fatty liver    Relevant Orders    Comprehensive Metabolic Panel    Obesity     - Reviewed impact of diet, physical activity, stress & sleep to cardio metabolic health risks and ways to modify current lifestyle to reduce cardiometabolic health risks associated with obesity.  - Counseled on benefits of increased regular exercise, both aerobic & resistance training.  Reviewed benefits of resistance training to enhance/maintain lean body mass.  - Counseled on dietary modifications to support weight reduction, reduced calorie diet, encourage adequate protein, increased dietary fiber from fruit and  vegetable to improve appetite/satiety regulation and quality of diet.  Discussed impact of processed foods and liquid calories to weight gain & contribution to dysfunctional appetite signals.   -  Reviewed importance of intensification of lifestyle therapy in weight reduction and maintenance, set behavioral modification goals of nutrition, physical activity or behavioral practices to benefit weight reduction.    - Discussed self monitoring practices to ensure reduce calorie diet, emphasizing increased dietary protein & fiber.  Reviewed protein targets per meal as recommendation to help with satiety and lean mass maintenance.         Relevant Orders    CBC and Auto Differential    Comprehensive Metabolic Panel    S/P gastric sleeve procedure     S/p LSG 3 mo      Initial Wt: 294 lbs  Initial BMI: 40.3  Patient denies any N,V,F,D,CP or SOB.   Tolerating soft diet   ambulating and eliminating well  no sign of infection   taking Vitamins and PPI     imp: doing well     recs:   advance diet as advised   advance activity as advised   cont Vitamin supplementation, PPI   FU with dietitian   FU with this office in 3 months for 6 mo pov. Labs - see orders          Relevant Orders    CBC and Auto Differential    Comprehensive Metabolic Panel    Postoperative malabsorption (HHS-HCC)     Check micronutrient levels - see orders  Adjust micronutrient supplementation per results  Continue recommended post bariatric surgery supplements         Relevant Orders    Ferritin    Folate    Iron and TIBC    Vitamin B12    Vitamin D 25-Hydroxy,Total (for eval of Vitamin D levels)   Please see Patient Instructions for today's relevant referrals, orders and instructions.       Follow Up: Follow up in about 3 months (around 9/21/2024).     Oma Horowitz, APRN-CNP

## 2024-06-25 ENCOUNTER — TELEPHONE (OUTPATIENT)
Dept: OBSTETRICS AND GYNECOLOGY | Facility: CLINIC | Age: 34
End: 2024-06-25

## 2024-06-25 ENCOUNTER — APPOINTMENT (OUTPATIENT)
Dept: BEHAVIORAL HEALTH | Facility: CLINIC | Age: 34
End: 2024-06-25
Payer: COMMERCIAL

## 2024-06-25 DIAGNOSIS — N76.0 BV (BACTERIAL VAGINOSIS): Primary | ICD-10-CM

## 2024-06-25 DIAGNOSIS — N89.8 VAGINAL DISCHARGE: ICD-10-CM

## 2024-06-25 DIAGNOSIS — B96.89 BV (BACTERIAL VAGINOSIS): Primary | ICD-10-CM

## 2024-06-25 RX ORDER — METRONIDAZOLE 500 MG/1
500 TABLET ORAL 2 TIMES DAILY
Qty: 14 TABLET | Refills: 0 | Status: SHIPPED | OUTPATIENT
Start: 2024-06-25 | End: 2024-07-02

## 2024-06-25 RX ORDER — METRONIDAZOLE 7.5 MG/G
GEL VAGINAL
Qty: 70 G | Refills: 1 | Status: SHIPPED | OUTPATIENT
Start: 2024-06-30 | End: 2024-08-29

## 2024-06-27 RX ORDER — PROCHLORPERAZINE MALEATE 10 MG
1 TABLET ORAL EVERY 6 HOURS
COMMUNITY
Start: 2024-05-03

## 2024-06-27 NOTE — PROGRESS NOTES
Subjective :  Patient ID: Nia Harper is a 34 y.o. female surgical referral from Dr. Luis Carlos Borrero     History of Present Illness: Patient has a past medical history of gastric sleeve (3/25/24) and breast cancer, had a right partial mastectomy (6/30/23), DM (A1C was 9.5 on 3/13/24) anxiety, and depression. Plan for double mastectomy (Dr. Schroeder) and delayed HELENE reconstruction with Dr. Borrero and Dr. Cerrato.     BMI today is 35    Objective :  Physical Exam   Nursing note and vitals reviewed.  Constitutional  She appears well-developed and well-nourished. She does not appear to be diaphoretic. No distress.     Eyes  Pupils are equal, round, and reactive to light. System normal.     General -             Right: Right eye is negative for discharge. Right eye extraocular movements are normal.             Left: Left eye is negative for discharge. Left eye extraocular movements are normal.     Cardiovascular: Normal rate and regular rhythm.     Pulmonary/Chest  Effort normal and breath sounds normal.     Skin  Skin is warm and dry.     Psychiatric  She has a normal mood and affect. Her behavior is normal. Judgment and thought content normal.     Focused exam on the abdomen: Soft, non tender, no masses, no hernias. Adequate abdominal tissue for bilateral breast reconstruction of moderate size.     Assessment/Plan :      Patient presents to discuss delayed bilateral breast reconstruction with autologous abdominal tissue. She is interested in contralateral prophylactic completion mastectomy. She is 4 months out from her bariatric surgery with gastric sleeve, and steadily she is losing weight. Patient stated that her interest in autologous breast reconstruction with abdominal tissue stems from her desire to eliminate her excess abdominal skin and tissue which she expects it to worsen as she loses more weight. She states that she desires prophylactic mastectomy, and feels we perhaps can utilize that abdominal tissue for  the reconstruction for both breasts. receive prophylactic mastectomy and that she thinks she has enough abdominal tissue.    I reviewed with patients common donor sites for autologous breast reconstruction including the abdomen, thigh, gluteal region and flanks.  Pro and cons were reviewed.     We also discussed the types of flaps available from the abdomen HELENE vs. MS_TRAM vs. TRAM.  The need for CTA to assess whether or not she is a candidate for HELENE was discussed with patient, and she is agreeable to receive CTA study.     We then discussed HELENE/MS-TRAM/TRAM flap perioperative course including procedure detail, first post operative day, hospital stay (3-5 days), recovery, need for blood transfusion, need for blood thinners, and their potential complications were reviewed.   Possible complications of HELENE flap surgery including but not limited to reaction to medication, deep vein thrombosis, pulmonary embolism, infection, seroma, bleeding and hematoma, wound healing issues, partial or total flap necrosis, wound healing issues, numbness and tingling at the breast and or abdomen, hernia or bulge at the abdomen, mesh-related complications, persistent pan, need for additional surgeries/procedures were discussed today with patient.   Risk factors for complications were reviewed with patient, and in her case, her latest A1c of 9.5 was identified along with BMI at 35.30.       Next, we discussed timing, bilateral HELENE can be done simultaneously with the mastectomies. However, given the busy schedule of the three surgeons involved, most likely delayed reconstruction with TE placed in the interim is the way forward. Patient was also in favor with such approach.         At the end, patient verbalized good understanding to our discussion, and would like to proceed with the plan.    Plan:  CTA of abdomen and pelvis for HELENE planning.  Hb A1C.   Delayed bilateral breast reconstruction with autologous tissue from the abdomen.

## 2024-07-03 ENCOUNTER — APPOINTMENT (OUTPATIENT)
Dept: PLASTIC SURGERY | Facility: CLINIC | Age: 34
End: 2024-07-03
Payer: COMMERCIAL

## 2024-07-05 ENCOUNTER — APPOINTMENT (OUTPATIENT)
Dept: HEMATOLOGY/ONCOLOGY | Facility: HOSPITAL | Age: 34
End: 2024-07-05
Payer: COMMERCIAL

## 2024-07-08 ENCOUNTER — APPOINTMENT (OUTPATIENT)
Dept: PLASTIC SURGERY | Facility: CLINIC | Age: 34
End: 2024-07-08
Payer: COMMERCIAL

## 2024-07-08 ENCOUNTER — PREP FOR PROCEDURE (OUTPATIENT)
Dept: SURGICAL ONCOLOGY | Facility: HOSPITAL | Age: 34
End: 2024-07-08

## 2024-07-08 ENCOUNTER — LAB (OUTPATIENT)
Dept: LAB | Facility: LAB | Age: 34
End: 2024-07-08
Payer: COMMERCIAL

## 2024-07-08 ENCOUNTER — HOSPITAL ENCOUNTER (OUTPATIENT)
Facility: HOSPITAL | Age: 34
Setting detail: OUTPATIENT SURGERY
End: 2024-07-08
Attending: SURGERY | Admitting: SURGERY
Payer: COMMERCIAL

## 2024-07-08 VITALS
HEIGHT: 70 IN | SYSTOLIC BLOOD PRESSURE: 131 MMHG | HEART RATE: 85 BPM | DIASTOLIC BLOOD PRESSURE: 78 MMHG | OXYGEN SATURATION: 99 % | TEMPERATURE: 97.4 F | BODY MASS INDEX: 35.22 KG/M2 | WEIGHT: 246 LBS

## 2024-07-08 DIAGNOSIS — Z17.0 MALIGNANT NEOPLASM OF OVERLAPPING SITES OF RIGHT BREAST IN FEMALE, ESTROGEN RECEPTOR POSITIVE (MULTI): ICD-10-CM

## 2024-07-08 DIAGNOSIS — K76.0 FATTY LIVER: ICD-10-CM

## 2024-07-08 DIAGNOSIS — E11.9 DIABETES MELLITUS TYPE 2 WITHOUT RETINOPATHY (MULTI): ICD-10-CM

## 2024-07-08 DIAGNOSIS — Z71.89 ENCOUNTER TO DISCUSS BREAST RECONSTRUCTION: Primary | ICD-10-CM

## 2024-07-08 DIAGNOSIS — Z01.818 PRE-OP EXAM: ICD-10-CM

## 2024-07-08 DIAGNOSIS — E66.9 OBESITY WITH SERIOUS COMORBIDITY, UNSPECIFIED CLASSIFICATION, UNSPECIFIED OBESITY TYPE: ICD-10-CM

## 2024-07-08 DIAGNOSIS — K91.2 POSTOPERATIVE MALABSORPTION (HHS-HCC): ICD-10-CM

## 2024-07-08 DIAGNOSIS — E11.65 UNCONTROLLED TYPE 2 DIABETES MELLITUS WITH HYPERGLYCEMIA (MULTI): ICD-10-CM

## 2024-07-08 DIAGNOSIS — C50.811 MALIGNANT NEOPLASM OF OVERLAPPING SITES OF RIGHT BREAST IN FEMALE, ESTROGEN RECEPTOR POSITIVE (MULTI): ICD-10-CM

## 2024-07-08 DIAGNOSIS — I10 PRIMARY HYPERTENSION: ICD-10-CM

## 2024-07-08 DIAGNOSIS — Z90.3 S/P GASTRIC SLEEVE PROCEDURE: ICD-10-CM

## 2024-07-08 DIAGNOSIS — E78.1 HYPERTRIGLYCERIDEMIA: ICD-10-CM

## 2024-07-08 LAB
25(OH)D3 SERPL-MCNC: 31 NG/ML (ref 30–100)
ALBUMIN SERPL BCP-MCNC: 4.5 G/DL (ref 3.4–5)
ALP SERPL-CCNC: 81 U/L (ref 33–110)
ALT SERPL W P-5'-P-CCNC: 42 U/L (ref 7–45)
ANION GAP SERPL CALC-SCNC: 14 MMOL/L (ref 10–20)
AST SERPL W P-5'-P-CCNC: 28 U/L (ref 9–39)
BASOPHILS # BLD AUTO: 0.02 X10*3/UL (ref 0–0.1)
BASOPHILS NFR BLD AUTO: 0.4 %
BILIRUB SERPL-MCNC: 0.3 MG/DL (ref 0–1.2)
BUN SERPL-MCNC: 15 MG/DL (ref 6–23)
CALCIUM SERPL-MCNC: 10.2 MG/DL (ref 8.6–10.6)
CHLORIDE SERPL-SCNC: 99 MMOL/L (ref 98–107)
CO2 SERPL-SCNC: 30 MMOL/L (ref 21–32)
CREAT SERPL-MCNC: 0.65 MG/DL (ref 0.5–1.05)
EGFRCR SERPLBLD CKD-EPI 2021: >90 ML/MIN/1.73M*2
EOSINOPHIL # BLD AUTO: 0.07 X10*3/UL (ref 0–0.7)
EOSINOPHIL NFR BLD AUTO: 1.5 %
ERYTHROCYTE [DISTWIDTH] IN BLOOD BY AUTOMATED COUNT: 12.4 % (ref 11.5–14.5)
EST. AVERAGE GLUCOSE BLD GHB EST-MCNC: 206 MG/DL
FERRITIN SERPL-MCNC: 91 NG/ML (ref 8–150)
FOLATE SERPL-MCNC: 18.5 NG/ML
GLUCOSE SERPL-MCNC: 338 MG/DL (ref 74–99)
HBA1C MFR BLD: 8.8 %
HCT VFR BLD AUTO: 41.6 % (ref 36–46)
HGB BLD-MCNC: 14.2 G/DL (ref 12–16)
IMM GRANULOCYTES # BLD AUTO: 0.01 X10*3/UL (ref 0–0.7)
IMM GRANULOCYTES NFR BLD AUTO: 0.2 % (ref 0–0.9)
IRON SATN MFR SERPL: 27 % (ref 25–45)
IRON SERPL-MCNC: 108 UG/DL (ref 35–150)
LYMPHOCYTES # BLD AUTO: 1.87 X10*3/UL (ref 1.2–4.8)
LYMPHOCYTES NFR BLD AUTO: 40 %
MCH RBC QN AUTO: 30 PG (ref 26–34)
MCHC RBC AUTO-ENTMCNC: 34.1 G/DL (ref 32–36)
MCV RBC AUTO: 88 FL (ref 80–100)
MONOCYTES # BLD AUTO: 0.2 X10*3/UL (ref 0.1–1)
MONOCYTES NFR BLD AUTO: 4.3 %
NEUTROPHILS # BLD AUTO: 2.51 X10*3/UL (ref 1.2–7.7)
NEUTROPHILS NFR BLD AUTO: 53.6 %
NRBC BLD-RTO: 0 /100 WBCS (ref 0–0)
PLATELET # BLD AUTO: 225 X10*3/UL (ref 150–450)
POTASSIUM SERPL-SCNC: 4.5 MMOL/L (ref 3.5–5.3)
PROT SERPL-MCNC: 7.9 G/DL (ref 6.4–8.2)
RBC # BLD AUTO: 4.73 X10*6/UL (ref 4–5.2)
SODIUM SERPL-SCNC: 138 MMOL/L (ref 136–145)
TIBC SERPL-MCNC: 394 UG/DL (ref 240–445)
UIBC SERPL-MCNC: 286 UG/DL (ref 110–370)
VIT B12 SERPL-MCNC: 1472 PG/ML (ref 211–911)
WBC # BLD AUTO: 4.7 X10*3/UL (ref 4.4–11.3)

## 2024-07-08 PROCEDURE — 82728 ASSAY OF FERRITIN: CPT

## 2024-07-08 PROCEDURE — 3046F HEMOGLOBIN A1C LEVEL >9.0%: CPT

## 2024-07-08 PROCEDURE — 85025 COMPLETE CBC W/AUTO DIFF WBC: CPT

## 2024-07-08 PROCEDURE — 82306 VITAMIN D 25 HYDROXY: CPT

## 2024-07-08 PROCEDURE — 82746 ASSAY OF FOLIC ACID SERUM: CPT

## 2024-07-08 PROCEDURE — 3008F BODY MASS INDEX DOCD: CPT

## 2024-07-08 PROCEDURE — 1036F TOBACCO NON-USER: CPT

## 2024-07-08 PROCEDURE — 36415 COLL VENOUS BLD VENIPUNCTURE: CPT

## 2024-07-08 PROCEDURE — 80053 COMPREHEN METABOLIC PANEL: CPT

## 2024-07-08 PROCEDURE — 83540 ASSAY OF IRON: CPT

## 2024-07-08 PROCEDURE — 3078F DIAST BP <80 MM HG: CPT

## 2024-07-08 PROCEDURE — 83036 HEMOGLOBIN GLYCOSYLATED A1C: CPT

## 2024-07-08 PROCEDURE — 82607 VITAMIN B-12: CPT

## 2024-07-08 PROCEDURE — 83550 IRON BINDING TEST: CPT

## 2024-07-08 PROCEDURE — 99215 OFFICE O/P EST HI 40 MIN: CPT

## 2024-07-08 PROCEDURE — 3075F SYST BP GE 130 - 139MM HG: CPT

## 2024-07-08 RX ORDER — METFORMIN HYDROCHLORIDE 500 MG/1
1000 TABLET, EXTENDED RELEASE ORAL
Qty: 360 TABLET | Refills: 1 | Status: SHIPPED | OUTPATIENT
Start: 2024-07-08

## 2024-07-08 ASSESSMENT — ENCOUNTER SYMPTOMS
OCCASIONAL FEELINGS OF UNSTEADINESS: 0
LOSS OF SENSATION IN FEET: 0
DEPRESSION: 0

## 2024-07-08 ASSESSMENT — PAIN SCALES - GENERAL: PAINLEVEL: 0-NO PAIN

## 2024-07-08 ASSESSMENT — PATIENT HEALTH QUESTIONNAIRE - PHQ9
2. FEELING DOWN, DEPRESSED OR HOPELESS: NOT AT ALL
1. LITTLE INTEREST OR PLEASURE IN DOING THINGS: NOT AT ALL
SUM OF ALL RESPONSES TO PHQ9 QUESTIONS 1 & 2: 0

## 2024-07-18 ENCOUNTER — APPOINTMENT (OUTPATIENT)
Dept: LAB | Facility: HOSPITAL | Age: 34
End: 2024-07-18
Payer: COMMERCIAL

## 2024-07-18 ENCOUNTER — INFUSION (OUTPATIENT)
Dept: HEMATOLOGY/ONCOLOGY | Facility: HOSPITAL | Age: 34
End: 2024-07-18
Payer: COMMERCIAL

## 2024-07-18 VITALS
TEMPERATURE: 97 F | DIASTOLIC BLOOD PRESSURE: 66 MMHG | SYSTOLIC BLOOD PRESSURE: 115 MMHG | BODY MASS INDEX: 36.5 KG/M2 | WEIGHT: 254.41 LBS | RESPIRATION RATE: 18 BRPM | OXYGEN SATURATION: 100 % | HEART RATE: 87 BPM

## 2024-07-18 DIAGNOSIS — C50.911 MALIGNANT NEOPLASM OF RIGHT BREAST IN FEMALE, ESTROGEN RECEPTOR POSITIVE, UNSPECIFIED SITE OF BREAST (MULTI): ICD-10-CM

## 2024-07-18 DIAGNOSIS — C50.919 MALIGNANT NEOPLASM OF FEMALE BREAST, UNSPECIFIED ESTROGEN RECEPTOR STATUS, UNSPECIFIED LATERALITY, UNSPECIFIED SITE OF BREAST (MULTI): ICD-10-CM

## 2024-07-18 DIAGNOSIS — Z17.0 MALIGNANT NEOPLASM OF RIGHT BREAST IN FEMALE, ESTROGEN RECEPTOR POSITIVE, UNSPECIFIED SITE OF BREAST (MULTI): ICD-10-CM

## 2024-07-18 PROCEDURE — 2500000004 HC RX 250 GENERAL PHARMACY W/ HCPCS (ALT 636 FOR OP/ED): Mod: JZ | Performed by: INTERNAL MEDICINE

## 2024-07-18 PROCEDURE — 96402 CHEMO HORMON ANTINEOPL SQ/IM: CPT

## 2024-07-18 PROCEDURE — 2500000005 HC RX 250 GENERAL PHARMACY W/O HCPCS: Performed by: INTERNAL MEDICINE

## 2024-07-18 RX ORDER — LIDOCAINE HYDROCHLORIDE 10 MG/ML
2 INJECTION, SOLUTION EPIDURAL; INFILTRATION; INTRACAUDAL; PERINEURAL ONCE
OUTPATIENT
Start: 2024-08-01

## 2024-07-18 RX ORDER — EPINEPHRINE 0.3 MG/.3ML
0.3 INJECTION SUBCUTANEOUS EVERY 5 MIN PRN
OUTPATIENT
Start: 2024-08-01

## 2024-07-18 RX ORDER — DIPHENHYDRAMINE HYDROCHLORIDE 50 MG/ML
50 INJECTION INTRAMUSCULAR; INTRAVENOUS AS NEEDED
OUTPATIENT
Start: 2024-08-01

## 2024-07-18 RX ORDER — FAMOTIDINE 10 MG/ML
20 INJECTION INTRAVENOUS ONCE AS NEEDED
OUTPATIENT
Start: 2024-08-01

## 2024-07-18 RX ORDER — LIDOCAINE HYDROCHLORIDE 10 MG/ML
2 INJECTION, SOLUTION EPIDURAL; INFILTRATION; INTRACAUDAL; PERINEURAL ONCE
Status: COMPLETED | OUTPATIENT
Start: 2024-07-18 | End: 2024-07-18

## 2024-07-18 RX ORDER — ALBUTEROL SULFATE 0.83 MG/ML
3 SOLUTION RESPIRATORY (INHALATION) AS NEEDED
OUTPATIENT
Start: 2024-08-01

## 2024-07-18 NOTE — PROGRESS NOTES
Patient here for Zoladex injection per plan. Patient denies any complaints or concerns. Patient tolerated visit well and ambulated to home.  Patient is aware of future appointment via My Chart.

## 2024-07-22 ENCOUNTER — TELEPHONE (OUTPATIENT)
Dept: PRIMARY CARE | Facility: CLINIC | Age: 34
End: 2024-07-22
Payer: COMMERCIAL

## 2024-07-24 ENCOUNTER — TELEPHONE (OUTPATIENT)
Dept: PRIMARY CARE | Facility: CLINIC | Age: 34
End: 2024-07-24
Payer: COMMERCIAL

## 2024-07-25 ENCOUNTER — PREP FOR PROCEDURE (OUTPATIENT)
Dept: SURGICAL ONCOLOGY | Facility: HOSPITAL | Age: 34
End: 2024-07-25
Payer: COMMERCIAL

## 2024-07-29 ENCOUNTER — OFFICE VISIT (OUTPATIENT)
Dept: SURGICAL ONCOLOGY | Facility: HOSPITAL | Age: 34
End: 2024-07-29
Payer: COMMERCIAL

## 2024-07-29 ENCOUNTER — APPOINTMENT (OUTPATIENT)
Dept: PRIMARY CARE | Facility: CLINIC | Age: 34
End: 2024-07-29
Payer: COMMERCIAL

## 2024-07-29 VITALS
WEIGHT: 248 LBS | SYSTOLIC BLOOD PRESSURE: 125 MMHG | BODY MASS INDEX: 35.58 KG/M2 | DIASTOLIC BLOOD PRESSURE: 80 MMHG | HEART RATE: 75 BPM | TEMPERATURE: 96 F

## 2024-07-29 VITALS — WEIGHT: 247 LBS | BODY MASS INDEX: 35.36 KG/M2 | HEIGHT: 70 IN

## 2024-07-29 DIAGNOSIS — S91.114S: Primary | ICD-10-CM

## 2024-07-29 DIAGNOSIS — C50.919 MALIGNANT NEOPLASM OF FEMALE BREAST, UNSPECIFIED ESTROGEN RECEPTOR STATUS, UNSPECIFIED LATERALITY, UNSPECIFIED SITE OF BREAST (MULTI): ICD-10-CM

## 2024-07-29 DIAGNOSIS — Z90.11 S/P PARTIAL MASTECTOMY, RIGHT: ICD-10-CM

## 2024-07-29 PROBLEM — S91.114A LACERATION OF LESSER TOE OF RIGHT FOOT WITHOUT FOREIGN BODY PRESENT OR DAMAGE TO NAIL: Status: ACTIVE | Noted: 2024-07-29

## 2024-07-29 PROCEDURE — 3052F HG A1C>EQUAL 8.0%<EQUAL 9.0%: CPT | Performed by: SURGERY

## 2024-07-29 PROCEDURE — 1036F TOBACCO NON-USER: CPT | Performed by: FAMILY MEDICINE

## 2024-07-29 PROCEDURE — 99214 OFFICE O/P EST MOD 30 MIN: CPT | Performed by: SURGERY

## 2024-07-29 PROCEDURE — 3052F HG A1C>EQUAL 8.0%<EQUAL 9.0%: CPT | Performed by: FAMILY MEDICINE

## 2024-07-29 PROCEDURE — 1036F TOBACCO NON-USER: CPT | Performed by: SURGERY

## 2024-07-29 PROCEDURE — 3074F SYST BP LT 130 MM HG: CPT | Performed by: FAMILY MEDICINE

## 2024-07-29 PROCEDURE — 3008F BODY MASS INDEX DOCD: CPT | Performed by: SURGERY

## 2024-07-29 PROCEDURE — 99214 OFFICE O/P EST MOD 30 MIN: CPT | Performed by: FAMILY MEDICINE

## 2024-07-29 PROCEDURE — 3079F DIAST BP 80-89 MM HG: CPT | Performed by: FAMILY MEDICINE

## 2024-07-29 RX ORDER — DOXYCYCLINE 100 MG/1
100 CAPSULE ORAL 2 TIMES DAILY
Qty: 20 CAPSULE | Refills: 0 | Status: SHIPPED | OUTPATIENT
Start: 2024-07-29 | End: 2024-08-08

## 2024-07-29 RX ORDER — FLUCONAZOLE 150 MG/1
150 TABLET ORAL ONCE
Qty: 1 TABLET | Refills: 0 | Status: SHIPPED | OUTPATIENT
Start: 2024-07-29 | End: 2024-07-29

## 2024-07-29 ASSESSMENT — PAIN SCALES - GENERAL: PAINLEVEL: 0-NO PAIN

## 2024-07-29 NOTE — PROGRESS NOTES
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  -Oncotype 45, completed adjuvant chemo 8/24/23: Adjuvant TC (docetaxel, cyclophosphamide) initiated. First cycle complicated by admission for nausea/vomiting. Second cycle complicated by admission for angioedema, hives; thought to be secondary to docetaxel. Switched to CMF (cyclophosphamide, methotrexate, fluorouracil) on 10/16/23. Second (and last) cycle given 11/9/23. , ovarian suppression and anastrozole 1mg.      Offered left risk reducing skin sparing mastectomy.  Will coordinate dates with plastics.  Per plastics' notes, will offer staged approach with mastectomy and TE/implant temporary recon followed by stage 2 HELENE reconstruction.  Plan for surgery with Dr. Borrero for stage 1.  Discussed timing with pt.  Prefers date after 9/14 but before 10/30/2024.  Consent signed today for left skin sparing mastectomy.      Subjective   Nia Harper is a 34 y.o. female who presents today  for  consideration for left risk reducing mastectomy for symmetry I conjunction with autologous HELENE reconstruction.    History of right breast cancer in 2023.  At the time was advised she was not a reconstruction candidate and underwent total mastectomy with Dr. Henderson.  This was followed by adjuvant chemotherapy. She is currently on ovarian suppression and AI.    Genetic testing completed and negative.  She's since undergone weight reduction surgery (gastric sleeve) and has seen Dr. Borrero to be evaluated for HELENE flaps.  She presents to be for consideration for left risk reducing mastectomy for symmetry.      Review of Systems   Constitutional symptoms: Denies generalized fatigue.  Denies weight change, fevers/chills, difficulty sleeping   Eyes: Denies double vision, glaucoma, cataracts.  Ear/nose/throat/mouth: Denies hearing changes, sore throat, sinus problems.  Cardiovascular: No chest pain.  Denies  irregular heartbeat.  Denies ankle swelling.  Respiratory: No wheezing, cough, or shortness of breath.  Gastrointestinal: No abdominal pain,  No nausea/vomiting.  No indigestion/heartburn.  No change in bowel habits.  No constipation or diarrhea.   Genitourinary: No urinary incontinence.  No urinary frequency.  No painful urination.  Musculoskeletal: No bone pain, no muscle pain, no joint pain.   Integumentary: No rash. No masses.  No changes in moles.  No easy bruising.  Neurological: No headaches.  No tremors. No numbness/tingling.  Psychiatric: No anxiety. No depression.  Endocrine: No excessive thirst.  Not too hot or too cold.  Not tired or fatigued.    Hematological/lymphatic: No swollen glands or blood clotting problems.  No bruising.    Objective   Physical Exam  General: Alert and oriented x 3.  Mood and affect are appropriate.  HEENT: EOMI, PERRLA.   Neck: supple, no masses, no cervical adenopathy.  Cardiovascular: no lower extremity edema.  Pulmonary: breathing non labored on room air.  GI: Abdomen soft, no masses. No hepatomegaly or splenomegaly.  Lymph nodes: No supraclavicular or axillary adenopathy bilaterally.  Musculoskeletal: Full range of motion in the upper extremities bilaterally.  Neuro: denies dizziness, tremors    Breasts: The breasts were examined in both the seated and supine positions.    RIGHT: surgically absent. Incision well healed.  No suspicious skin changes.  Scarring along IMF and medially at the sternum related to bout of hidradenitis.   LEFT: The nipple is everted without nipple discharge.  There are no skin changes, skin thickening, or dimpling. There are no masses palpated in the LEFT breast. Hidradenitis and some soft tissue swelling along the lateral IMF.     Radiology review: All images and reports were personally reviewed.         Migdalia Schroeder DO

## 2024-07-29 NOTE — PROGRESS NOTES
Subjective   Patient ID: Nia Harper is a 34 y.o. female who presents for Follow-up (Prescription ).  HPI    The patient is a 34 year old Female with a PMHx of DMII on insulin, marijuana use, ER+/DC+ breast CA Dx 6/23 s/p right mastectomy, gastric sleeve 3/25/2024, HTN, HLD, here for the evaluation of a right 4th toe  shaving injury that occurred 3 weeks ago and is not getting better.  Patient states that she accidentally cut a callous on that toe as she was shaving the hair off of it.  Last Tdap was in 20219.     A review of system was completed.  All systems were reviewed and were normal, except for the ones that are listed in the HPI.    Objective   Physical Exam  Constitutional:       Appearance: Normal appearance.   HENT:      Head: Normocephalic and atraumatic.      Right Ear: Tympanic membrane, ear canal and external ear normal.      Left Ear: Tympanic membrane, ear canal and external ear normal.      Nose: Nose normal.      Mouth/Throat:      Mouth: Mucous membranes are moist.      Pharynx: Oropharynx is clear.   Eyes:      Extraocular Movements: Extraocular movements intact.      Conjunctiva/sclera: Conjunctivae normal.      Pupils: Pupils are equal, round, and reactive to light.   Cardiovascular:      Rate and Rhythm: Normal rate and regular rhythm.      Pulses: Normal pulses.   Pulmonary:      Effort: Pulmonary effort is normal.      Breath sounds: Normal breath sounds.   Abdominal:      General: Abdomen is flat. Bowel sounds are normal.      Palpations: Abdomen is soft.   Musculoskeletal:         General: Normal range of motion.      Cervical back: Normal range of motion and neck supple.      Comments: Right foot 4 th toe IP joint tender callous- mild hyperpigmentation.    Skin:     General: Skin is warm.   Neurological:      General: No focal deficit present.      Mental Status: She is alert and oriented to person, place, and time. Mental status is at baseline.   Psychiatric:         Mood and Affect:  Mood normal.         Behavior: Behavior normal.         Thought Content: Thought content normal.         Judgment: Judgment normal.     Assessment/Plan   Problem List Items Addressed This Visit       Laceration of lesser toe of right foot without foreign body present or damage to nail - Primary     -Tdap was in 2019.  -Doxycycline 100 mg BID for 10 days started.  Fluconazole 150 mg tab sent.  -Podiatrist referral.          Relevant Medications    doxycycline (Vibramycin) 100 mg capsule    fluconazole (Diflucan) 150 mg tablet    Other Relevant Orders    Referral to Podiatry    Patient to return to office in 1- 2 weeks if not better.

## 2024-07-29 NOTE — ASSESSMENT & PLAN NOTE
-Tdap was in 2019.  -Doxycycline 100 mg BID for 10 days started.  Fluconazole 150 mg tab sent.  -Podiatrist referral.

## 2024-07-30 ENCOUNTER — PREP FOR PROCEDURE (OUTPATIENT)
Dept: SURGICAL ONCOLOGY | Facility: CLINIC | Age: 34
End: 2024-07-30
Payer: COMMERCIAL

## 2024-07-31 ENCOUNTER — TELEPHONE (OUTPATIENT)
Dept: SURGICAL ONCOLOGY | Facility: HOSPITAL | Age: 34
End: 2024-07-31
Payer: COMMERCIAL

## 2024-07-31 PROBLEM — R06.09 DYSPNEA ON EXERTION: Status: ACTIVE | Noted: 2024-07-31

## 2024-07-31 PROBLEM — L02.91 ABSCESS: Status: ACTIVE | Noted: 2024-07-31

## 2024-07-31 PROBLEM — N93.9 ABNORMAL UTERINE BLEEDING: Status: ACTIVE | Noted: 2024-07-31

## 2024-07-31 PROBLEM — C50.919 MALIGNANT NEOPLASM OF BREAST (MULTI): Status: ACTIVE | Noted: 2024-07-31

## 2024-07-31 PROBLEM — B96.89 BACTERIAL VAGINOSIS: Status: ACTIVE | Noted: 2024-07-31

## 2024-07-31 PROBLEM — F54 PSYCHOLOGICAL FACTORS AFFECTING MEDICAL CONDITION: Status: ACTIVE | Noted: 2024-07-31

## 2024-07-31 PROBLEM — N89.8 VAGINAL IRRITATION: Status: ACTIVE | Noted: 2024-07-31

## 2024-07-31 PROBLEM — R23.9 IMPAIRED SKIN INTEGRITY: Status: ACTIVE | Noted: 2024-07-31

## 2024-07-31 PROBLEM — R00.2 PALPITATIONS: Status: ACTIVE | Noted: 2024-07-31

## 2024-07-31 PROBLEM — L72.0 EPIDERMOID CYST: Status: ACTIVE | Noted: 2024-07-31

## 2024-07-31 PROBLEM — L70.9 ACNE: Status: ACTIVE | Noted: 2024-07-31

## 2024-07-31 PROBLEM — G47.00 INSOMNIA: Status: ACTIVE | Noted: 2024-07-31

## 2024-07-31 PROBLEM — K80.20 CALCULUS OF GALLBLADDER: Status: ACTIVE | Noted: 2024-07-31

## 2024-07-31 PROBLEM — A41.9 SEPSIS (MULTI): Status: ACTIVE | Noted: 2024-07-31

## 2024-07-31 PROBLEM — N89.8 VAGINAL ODOR: Status: ACTIVE | Noted: 2024-07-31

## 2024-07-31 PROBLEM — N76.0 BACTERIAL VAGINOSIS: Status: ACTIVE | Noted: 2024-07-31

## 2024-07-31 RX ORDER — DULAGLUTIDE 3 MG/.5ML
INJECTION, SOLUTION SUBCUTANEOUS
COMMUNITY
Start: 2024-07-02

## 2024-07-31 RX ORDER — FLUCONAZOLE 150 MG/1
TABLET ORAL
COMMUNITY
Start: 2024-07-29

## 2024-07-31 RX ORDER — ERGOCALCIFEROL 1.25 MG/1
CAPSULE ORAL
COMMUNITY
Start: 2023-08-18

## 2024-07-31 RX ORDER — LOPERAMIDE HYDROCHLORIDE 2 MG/1
CAPSULE ORAL EVERY 4 HOURS PRN
COMMUNITY

## 2024-07-31 NOTE — TELEPHONE ENCOUNTER
Left voicemail for Ms. Harper.  Plastics deferring surgery until HgbA1c is lower.  Will reconvene to select a surgical date once cleared by Plastics for reconstruction.

## 2024-08-01 ENCOUNTER — APPOINTMENT (OUTPATIENT)
Dept: HEMATOLOGY/ONCOLOGY | Facility: HOSPITAL | Age: 34
End: 2024-08-01
Payer: COMMERCIAL

## 2024-08-01 ENCOUNTER — OFFICE VISIT (OUTPATIENT)
Dept: BEHAVIORAL HEALTH | Facility: HOSPITAL | Age: 34
End: 2024-08-01
Payer: COMMERCIAL

## 2024-08-01 ENCOUNTER — APPOINTMENT (OUTPATIENT)
Dept: ENDOCRINOLOGY | Facility: CLINIC | Age: 34
End: 2024-08-01
Payer: COMMERCIAL

## 2024-08-01 VITALS
TEMPERATURE: 96.8 F | OXYGEN SATURATION: 95 % | SYSTOLIC BLOOD PRESSURE: 116 MMHG | RESPIRATION RATE: 18 BRPM | BODY MASS INDEX: 35.87 KG/M2 | DIASTOLIC BLOOD PRESSURE: 62 MMHG | WEIGHT: 250 LBS | HEART RATE: 85 BPM

## 2024-08-01 DIAGNOSIS — F33.0 MILD EPISODE OF RECURRENT MAJOR DEPRESSIVE DISORDER (CMS-HCC): ICD-10-CM

## 2024-08-01 DIAGNOSIS — F34.1 PERSISTENT DEPRESSIVE DISORDER: ICD-10-CM

## 2024-08-01 DIAGNOSIS — F41.0 ANXIETY ATTACK: ICD-10-CM

## 2024-08-01 DIAGNOSIS — F41.1 GAD (GENERALIZED ANXIETY DISORDER): ICD-10-CM

## 2024-08-01 PROCEDURE — 3074F SYST BP LT 130 MM HG: CPT | Performed by: PSYCHIATRY & NEUROLOGY

## 2024-08-01 PROCEDURE — 90833 PSYTX W PT W E/M 30 MIN: CPT | Performed by: PSYCHIATRY & NEUROLOGY

## 2024-08-01 PROCEDURE — 99214 OFFICE O/P EST MOD 30 MIN: CPT | Mod: AM | Performed by: PSYCHIATRY & NEUROLOGY

## 2024-08-01 PROCEDURE — 1036F TOBACCO NON-USER: CPT | Performed by: PSYCHIATRY & NEUROLOGY

## 2024-08-01 PROCEDURE — 99214 OFFICE O/P EST MOD 30 MIN: CPT | Performed by: PSYCHIATRY & NEUROLOGY

## 2024-08-01 PROCEDURE — 90833 PSYTX W PT W E/M 30 MIN: CPT | Mod: AM | Performed by: PSYCHIATRY & NEUROLOGY

## 2024-08-01 PROCEDURE — 3078F DIAST BP <80 MM HG: CPT | Performed by: PSYCHIATRY & NEUROLOGY

## 2024-08-01 PROCEDURE — 3052F HG A1C>EQUAL 8.0%<EQUAL 9.0%: CPT | Performed by: PSYCHIATRY & NEUROLOGY

## 2024-08-01 RX ORDER — DOXEPIN 3 MG/1
3 TABLET, FILM COATED ORAL DAILY
Qty: 30 TABLET | Refills: 2 | Status: SHIPPED | OUTPATIENT
Start: 2024-08-01

## 2024-08-01 RX ORDER — LORAZEPAM 1 MG/1
1 TABLET ORAL DAILY PRN
Qty: 30 TABLET | Refills: 0 | Status: SHIPPED | OUTPATIENT
Start: 2024-08-01 | End: 2024-08-31

## 2024-08-01 ASSESSMENT — PAIN SCALES - GENERAL: PAINLEVEL: 6
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
## 2024-08-08 ENCOUNTER — APPOINTMENT (OUTPATIENT)
Dept: RADIOLOGY | Facility: HOSPITAL | Age: 34
End: 2024-08-08
Payer: COMMERCIAL

## 2024-08-08 ENCOUNTER — HOSPITAL ENCOUNTER (OUTPATIENT)
Dept: RADIOLOGY | Facility: HOSPITAL | Age: 34
Discharge: HOME | End: 2024-08-08
Payer: COMMERCIAL

## 2024-08-08 ENCOUNTER — HOSPITAL ENCOUNTER (EMERGENCY)
Facility: HOSPITAL | Age: 34
Discharge: HOME | End: 2024-08-08
Payer: COMMERCIAL

## 2024-08-08 VITALS
BODY MASS INDEX: 35.3 KG/M2 | DIASTOLIC BLOOD PRESSURE: 75 MMHG | WEIGHT: 246 LBS | OXYGEN SATURATION: 98 % | RESPIRATION RATE: 16 BRPM | HEART RATE: 77 BPM | SYSTOLIC BLOOD PRESSURE: 112 MMHG | TEMPERATURE: 97.4 F

## 2024-08-08 DIAGNOSIS — S16.1XXA CERVICAL STRAIN, ACUTE, INITIAL ENCOUNTER: Primary | ICD-10-CM

## 2024-08-08 DIAGNOSIS — S46.912A STRAIN OF LEFT SHOULDER, INITIAL ENCOUNTER: ICD-10-CM

## 2024-08-08 DIAGNOSIS — Z01.818 PRE-OP EXAM: ICD-10-CM

## 2024-08-08 DIAGNOSIS — S39.012A STRAIN OF LUMBAR REGION, INITIAL ENCOUNTER: ICD-10-CM

## 2024-08-08 PROCEDURE — 2550000001 HC RX 255 CONTRASTS

## 2024-08-08 PROCEDURE — 73030 X-RAY EXAM OF SHOULDER: CPT | Mod: LT

## 2024-08-08 PROCEDURE — 2500000001 HC RX 250 WO HCPCS SELF ADMINISTERED DRUGS (ALT 637 FOR MEDICARE OP): Mod: SE | Performed by: PHYSICIAN ASSISTANT

## 2024-08-08 PROCEDURE — 72100 X-RAY EXAM L-S SPINE 2/3 VWS: CPT | Mod: FOREIGN READ | Performed by: RADIOLOGY

## 2024-08-08 PROCEDURE — 99284 EMERGENCY DEPT VISIT MOD MDM: CPT

## 2024-08-08 PROCEDURE — 74174 CTA ABD&PLVS W/CONTRAST: CPT

## 2024-08-08 PROCEDURE — 72100 X-RAY EXAM L-S SPINE 2/3 VWS: CPT

## 2024-08-08 PROCEDURE — 73030 X-RAY EXAM OF SHOULDER: CPT | Mod: LEFT SIDE | Performed by: RADIOLOGY

## 2024-08-08 PROCEDURE — 2500000005 HC RX 250 GENERAL PHARMACY W/O HCPCS: Mod: SE | Performed by: PHYSICIAN ASSISTANT

## 2024-08-08 RX ORDER — CYCLOBENZAPRINE HCL 10 MG
10 TABLET ORAL ONCE
Status: COMPLETED | OUTPATIENT
Start: 2024-08-08 | End: 2024-08-08

## 2024-08-08 RX ORDER — LIDOCAINE 560 MG/1
1 PATCH PERCUTANEOUS; TOPICAL; TRANSDERMAL ONCE
Status: DISCONTINUED | OUTPATIENT
Start: 2024-08-08 | End: 2024-08-08 | Stop reason: HOSPADM

## 2024-08-08 RX ORDER — IBUPROFEN 600 MG/1
600 TABLET ORAL EVERY 6 HOURS PRN
Qty: 20 TABLET | Refills: 0 | Status: SHIPPED | OUTPATIENT
Start: 2024-08-08 | End: 2024-08-13

## 2024-08-08 RX ORDER — ACETAMINOPHEN 325 MG/1
650 TABLET ORAL EVERY 6 HOURS PRN
Qty: 20 TABLET | Refills: 0 | Status: SHIPPED | OUTPATIENT
Start: 2024-08-08 | End: 2024-08-13

## 2024-08-08 RX ORDER — IBUPROFEN 600 MG/1
600 TABLET ORAL ONCE
Status: COMPLETED | OUTPATIENT
Start: 2024-08-08 | End: 2024-08-08

## 2024-08-08 RX ORDER — LIDOCAINE 50 MG/G
1 PATCH TOPICAL DAILY
Qty: 10 PATCH | Refills: 0 | Status: SHIPPED | OUTPATIENT
Start: 2024-08-08 | End: 2024-08-18

## 2024-08-08 RX ORDER — CYCLOBENZAPRINE HCL 10 MG
10 TABLET ORAL 3 TIMES DAILY PRN
Qty: 9 TABLET | Refills: 0 | Status: SHIPPED | OUTPATIENT
Start: 2024-08-08 | End: 2024-08-11

## 2024-08-08 ASSESSMENT — PAIN SCALES - GENERAL: PAINLEVEL_OUTOF10: 8

## 2024-08-08 ASSESSMENT — COLUMBIA-SUICIDE SEVERITY RATING SCALE - C-SSRS
6. HAVE YOU EVER DONE ANYTHING, STARTED TO DO ANYTHING, OR PREPARED TO DO ANYTHING TO END YOUR LIFE?: NO
1. IN THE PAST MONTH, HAVE YOU WISHED YOU WERE DEAD OR WISHED YOU COULD GO TO SLEEP AND NOT WAKE UP?: NO
2. HAVE YOU ACTUALLY HAD ANY THOUGHTS OF KILLING YOURSELF?: NO

## 2024-08-08 ASSESSMENT — PAIN - FUNCTIONAL ASSESSMENT: PAIN_FUNCTIONAL_ASSESSMENT: 0-10

## 2024-08-08 NOTE — Clinical Note
Nia Harper was seen and treated in our emergency department on 8/8/2024.  She may return to work on 08/09/2024.       If you have any questions or concerns, please don't hesitate to call.      Becky Faith PA-C
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
## 2024-08-15 ENCOUNTER — TELEPHONE (OUTPATIENT)
Dept: HEMATOLOGY/ONCOLOGY | Facility: HOSPITAL | Age: 34
End: 2024-08-15
Payer: COMMERCIAL

## 2024-08-15 ENCOUNTER — INFUSION (OUTPATIENT)
Dept: HEMATOLOGY/ONCOLOGY | Facility: HOSPITAL | Age: 34
End: 2024-08-15
Payer: COMMERCIAL

## 2024-08-15 VITALS
HEART RATE: 90 BPM | SYSTOLIC BLOOD PRESSURE: 126 MMHG | WEIGHT: 251.54 LBS | DIASTOLIC BLOOD PRESSURE: 69 MMHG | BODY MASS INDEX: 36.09 KG/M2 | RESPIRATION RATE: 18 BRPM | TEMPERATURE: 97 F | OXYGEN SATURATION: 100 %

## 2024-08-15 DIAGNOSIS — C50.911 MALIGNANT NEOPLASM OF RIGHT BREAST IN FEMALE, ESTROGEN RECEPTOR POSITIVE, UNSPECIFIED SITE OF BREAST (MULTI): Primary | ICD-10-CM

## 2024-08-15 DIAGNOSIS — C50.919 MALIGNANT NEOPLASM OF FEMALE BREAST, UNSPECIFIED ESTROGEN RECEPTOR STATUS, UNSPECIFIED LATERALITY, UNSPECIFIED SITE OF BREAST (MULTI): ICD-10-CM

## 2024-08-15 DIAGNOSIS — Z17.0 MALIGNANT NEOPLASM OF RIGHT BREAST IN FEMALE, ESTROGEN RECEPTOR POSITIVE, UNSPECIFIED SITE OF BREAST (MULTI): ICD-10-CM

## 2024-08-15 DIAGNOSIS — Z17.0 MALIGNANT NEOPLASM OF RIGHT BREAST IN FEMALE, ESTROGEN RECEPTOR POSITIVE, UNSPECIFIED SITE OF BREAST (MULTI): Primary | ICD-10-CM

## 2024-08-15 DIAGNOSIS — C50.911 MALIGNANT NEOPLASM OF RIGHT BREAST IN FEMALE, ESTROGEN RECEPTOR POSITIVE, UNSPECIFIED SITE OF BREAST (MULTI): ICD-10-CM

## 2024-08-15 PROCEDURE — 2500000004 HC RX 250 GENERAL PHARMACY W/ HCPCS (ALT 636 FOR OP/ED): Mod: JZ

## 2024-08-15 PROCEDURE — 96402 CHEMO HORMON ANTINEOPL SQ/IM: CPT

## 2024-08-15 PROCEDURE — 2500000005 HC RX 250 GENERAL PHARMACY W/O HCPCS

## 2024-08-15 RX ORDER — EPINEPHRINE 0.3 MG/.3ML
0.3 INJECTION SUBCUTANEOUS EVERY 5 MIN PRN
OUTPATIENT
Start: 2024-09-12

## 2024-08-15 RX ORDER — DIPHENHYDRAMINE HYDROCHLORIDE 50 MG/ML
50 INJECTION INTRAMUSCULAR; INTRAVENOUS AS NEEDED
OUTPATIENT
Start: 2024-09-12

## 2024-08-15 RX ORDER — ALBUTEROL SULFATE 0.83 MG/ML
3 SOLUTION RESPIRATORY (INHALATION) AS NEEDED
Status: CANCELLED | OUTPATIENT
Start: 2024-08-15

## 2024-08-15 RX ORDER — LIDOCAINE HYDROCHLORIDE 10 MG/ML
2 INJECTION, SOLUTION EPIDURAL; INFILTRATION; INTRACAUDAL; PERINEURAL ONCE
Status: COMPLETED | OUTPATIENT
Start: 2024-08-15 | End: 2024-08-15

## 2024-08-15 RX ORDER — LIDOCAINE HYDROCHLORIDE 10 MG/ML
2 INJECTION, SOLUTION EPIDURAL; INFILTRATION; INTRACAUDAL; PERINEURAL ONCE
Status: CANCELLED | OUTPATIENT
Start: 2024-08-15

## 2024-08-15 RX ORDER — EPINEPHRINE 0.3 MG/.3ML
0.3 INJECTION SUBCUTANEOUS EVERY 5 MIN PRN
Status: CANCELLED | OUTPATIENT
Start: 2024-09-12

## 2024-08-15 RX ORDER — DIPHENHYDRAMINE HYDROCHLORIDE 50 MG/ML
50 INJECTION INTRAMUSCULAR; INTRAVENOUS AS NEEDED
Status: CANCELLED | OUTPATIENT
Start: 2024-08-15

## 2024-08-15 RX ORDER — EPINEPHRINE 0.3 MG/.3ML
0.3 INJECTION SUBCUTANEOUS EVERY 5 MIN PRN
Status: CANCELLED | OUTPATIENT
Start: 2024-08-15

## 2024-08-15 RX ORDER — FAMOTIDINE 10 MG/ML
20 INJECTION INTRAVENOUS ONCE AS NEEDED
Status: CANCELLED | OUTPATIENT
Start: 2024-08-15

## 2024-08-15 RX ORDER — ALBUTEROL SULFATE 0.83 MG/ML
3 SOLUTION RESPIRATORY (INHALATION) AS NEEDED
OUTPATIENT
Start: 2024-09-12

## 2024-08-15 RX ORDER — DIPHENHYDRAMINE HYDROCHLORIDE 50 MG/ML
50 INJECTION INTRAMUSCULAR; INTRAVENOUS AS NEEDED
Status: CANCELLED | OUTPATIENT
Start: 2024-09-12

## 2024-08-15 RX ORDER — ALBUTEROL SULFATE 0.83 MG/ML
3 SOLUTION RESPIRATORY (INHALATION) AS NEEDED
Status: CANCELLED | OUTPATIENT
Start: 2024-09-12

## 2024-08-15 RX ORDER — LIDOCAINE HYDROCHLORIDE 10 MG/ML
2 INJECTION, SOLUTION EPIDURAL; INFILTRATION; INTRACAUDAL; PERINEURAL ONCE
Status: CANCELLED | OUTPATIENT
Start: 2024-09-12

## 2024-08-15 RX ORDER — FAMOTIDINE 10 MG/ML
20 INJECTION INTRAVENOUS ONCE AS NEEDED
OUTPATIENT
Start: 2024-09-12

## 2024-08-15 RX ORDER — LIDOCAINE HYDROCHLORIDE 10 MG/ML
2 INJECTION, SOLUTION EPIDURAL; INFILTRATION; INTRACAUDAL; PERINEURAL ONCE
OUTPATIENT
Start: 2024-09-12

## 2024-08-15 RX ORDER — FAMOTIDINE 10 MG/ML
20 INJECTION INTRAVENOUS ONCE AS NEEDED
Status: CANCELLED | OUTPATIENT
Start: 2024-09-12
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
## 2024-08-15 NOTE — TELEPHONE ENCOUNTER
Patient states she will be in at 1600 for 1100 arnex appointment 8/15/24.  DARION Lewis made aware and will see patient at that time.  Oscar Banerjee RN

## 2024-08-16 ENCOUNTER — APPOINTMENT (OUTPATIENT)
Dept: HEMATOLOGY/ONCOLOGY | Facility: HOSPITAL | Age: 34
End: 2024-08-16
Payer: COMMERCIAL

## 2024-08-29 ENCOUNTER — APPOINTMENT (OUTPATIENT)
Dept: HEMATOLOGY/ONCOLOGY | Facility: HOSPITAL | Age: 34
End: 2024-08-29
Payer: COMMERCIAL

## 2024-09-05 DIAGNOSIS — E11.9 DIABETES MELLITUS TYPE 2 WITHOUT RETINOPATHY (MULTI): ICD-10-CM

## 2024-09-05 DIAGNOSIS — Z79.4 TYPE 2 DIABETES MELLITUS TREATED WITH INSULIN (MULTI): Primary | ICD-10-CM

## 2024-09-05 DIAGNOSIS — E11.9 TYPE 2 DIABETES MELLITUS TREATED WITH INSULIN (MULTI): Primary | ICD-10-CM

## 2024-09-05 DIAGNOSIS — Z79.4 TYPE 2 DIABETES MELLITUS WITH HYPERGLYCEMIA, WITH LONG-TERM CURRENT USE OF INSULIN (MULTI): Primary | ICD-10-CM

## 2024-09-05 DIAGNOSIS — E11.65 TYPE 2 DIABETES MELLITUS WITH HYPERGLYCEMIA, WITH LONG-TERM CURRENT USE OF INSULIN (MULTI): Primary | ICD-10-CM

## 2024-09-05 DIAGNOSIS — E78.5 HYPERLIPIDEMIA, UNSPECIFIED HYPERLIPIDEMIA TYPE: ICD-10-CM

## 2024-09-05 RX ORDER — OMEGA-3-ACID ETHYL ESTERS 1 G/1
2 CAPSULE, LIQUID FILLED ORAL
Qty: 120 CAPSULE | Refills: 1 | Status: SHIPPED | OUTPATIENT
Start: 2024-09-05 | End: 2024-11-04

## 2024-09-05 RX ORDER — EZETIMIBE 10 MG/1
10 TABLET ORAL DAILY
Qty: 90 TABLET | Refills: 3 | Status: SHIPPED | OUTPATIENT
Start: 2024-09-05

## 2024-09-05 RX ORDER — BLOOD-GLUCOSE SENSOR
EACH MISCELLANEOUS
Qty: 2 EACH | Refills: 11 | Status: SHIPPED | OUTPATIENT
Start: 2024-09-05

## 2024-09-12 ENCOUNTER — OFFICE VISIT (OUTPATIENT)
Dept: HEMATOLOGY/ONCOLOGY | Facility: HOSPITAL | Age: 34
End: 2024-09-12
Payer: COMMERCIAL

## 2024-09-12 ENCOUNTER — INFUSION (OUTPATIENT)
Dept: HEMATOLOGY/ONCOLOGY | Facility: HOSPITAL | Age: 34
End: 2024-09-12
Payer: COMMERCIAL

## 2024-09-12 VITALS
HEIGHT: 70 IN | OXYGEN SATURATION: 91 % | WEIGHT: 253.09 LBS | TEMPERATURE: 96.4 F | RESPIRATION RATE: 18 BRPM | DIASTOLIC BLOOD PRESSURE: 72 MMHG | BODY MASS INDEX: 36.23 KG/M2 | SYSTOLIC BLOOD PRESSURE: 117 MMHG | HEART RATE: 94 BPM

## 2024-09-12 DIAGNOSIS — C50.911 MALIGNANT NEOPLASM OF RIGHT BREAST IN FEMALE, ESTROGEN RECEPTOR POSITIVE, UNSPECIFIED SITE OF BREAST (MULTI): ICD-10-CM

## 2024-09-12 DIAGNOSIS — Z17.0 MALIGNANT NEOPLASM OF RIGHT BREAST IN FEMALE, ESTROGEN RECEPTOR POSITIVE, UNSPECIFIED SITE OF BREAST (MULTI): ICD-10-CM

## 2024-09-12 DIAGNOSIS — C50.919 MALIGNANT NEOPLASM OF FEMALE BREAST, UNSPECIFIED ESTROGEN RECEPTOR STATUS, UNSPECIFIED LATERALITY, UNSPECIFIED SITE OF BREAST (MULTI): ICD-10-CM

## 2024-09-12 DIAGNOSIS — N89.8 VAGINAL DRYNESS: ICD-10-CM

## 2024-09-12 DIAGNOSIS — E11.9 DIABETES MELLITUS TYPE 2 WITHOUT RETINOPATHY (MULTI): Primary | ICD-10-CM

## 2024-09-12 PROCEDURE — 3008F BODY MASS INDEX DOCD: CPT

## 2024-09-12 PROCEDURE — 3052F HG A1C>EQUAL 8.0%<EQUAL 9.0%: CPT

## 2024-09-12 PROCEDURE — 99215 OFFICE O/P EST HI 40 MIN: CPT

## 2024-09-12 PROCEDURE — 96402 CHEMO HORMON ANTINEOPL SQ/IM: CPT

## 2024-09-12 PROCEDURE — 3078F DIAST BP <80 MM HG: CPT

## 2024-09-12 PROCEDURE — 3074F SYST BP LT 130 MM HG: CPT

## 2024-09-12 PROCEDURE — 1036F TOBACCO NON-USER: CPT

## 2024-09-12 PROCEDURE — 2500000005 HC RX 250 GENERAL PHARMACY W/O HCPCS

## 2024-09-12 PROCEDURE — 2500000004 HC RX 250 GENERAL PHARMACY W/ HCPCS (ALT 636 FOR OP/ED): Mod: JZ

## 2024-09-12 RX ORDER — FLASH GLUCOSE SENSOR
KIT MISCELLANEOUS
Qty: 2 EACH | Refills: 11 | Status: SHIPPED | OUTPATIENT
Start: 2024-09-12

## 2024-09-12 RX ORDER — ESTRADIOL 0.1 MG/G
2 CREAM VAGINAL DAILY
Qty: 42.5 G | Refills: 0 | Status: SHIPPED | OUTPATIENT
Start: 2024-09-12

## 2024-09-12 RX ORDER — EXEMESTANE 25 MG/1
25 TABLET ORAL DAILY
Qty: 30 TABLET | Refills: 1 | Status: SHIPPED | OUTPATIENT
Start: 2024-09-12 | End: 2025-09-12

## 2024-09-12 RX ORDER — ALBUTEROL SULFATE 0.83 MG/ML
3 SOLUTION RESPIRATORY (INHALATION) AS NEEDED
OUTPATIENT
Start: 2024-10-10

## 2024-09-12 RX ORDER — LIDOCAINE HYDROCHLORIDE 10 MG/ML
2 INJECTION, SOLUTION EPIDURAL; INFILTRATION; INTRACAUDAL; PERINEURAL ONCE
OUTPATIENT
Start: 2024-10-10

## 2024-09-12 RX ORDER — FAMOTIDINE 10 MG/ML
20 INJECTION INTRAVENOUS ONCE AS NEEDED
OUTPATIENT
Start: 2024-10-10

## 2024-09-12 RX ORDER — EPINEPHRINE 0.3 MG/.3ML
0.3 INJECTION SUBCUTANEOUS EVERY 5 MIN PRN
OUTPATIENT
Start: 2024-10-10

## 2024-09-12 RX ORDER — LIDOCAINE HYDROCHLORIDE 10 MG/ML
2 INJECTION, SOLUTION EPIDURAL; INFILTRATION; INTRACAUDAL; PERINEURAL ONCE
Status: COMPLETED | OUTPATIENT
Start: 2024-09-12 | End: 2024-09-12

## 2024-09-12 RX ORDER — DIPHENHYDRAMINE HYDROCHLORIDE 50 MG/ML
50 INJECTION INTRAMUSCULAR; INTRAVENOUS AS NEEDED
OUTPATIENT
Start: 2024-10-10

## 2024-09-12 ASSESSMENT — PATIENT HEALTH QUESTIONNAIRE - PHQ9
10. IF YOU CHECKED OFF ANY PROBLEMS, HOW DIFFICULT HAVE THESE PROBLEMS MADE IT FOR YOU TO DO YOUR WORK, TAKE CARE OF THINGS AT HOME, OR GET ALONG WITH OTHER PEOPLE: NOT DIFFICULT AT ALL
1. LITTLE INTEREST OR PLEASURE IN DOING THINGS: NOT AT ALL
2. FEELING DOWN, DEPRESSED OR HOPELESS: SEVERAL DAYS
SUM OF ALL RESPONSES TO PHQ9 QUESTIONS 1 AND 2: 1
2. FEELING DOWN, DEPRESSED OR HOPELESS: SEVERAL DAYS
1. LITTLE INTEREST OR PLEASURE IN DOING THINGS: NOT AT ALL
SUM OF ALL RESPONSES TO PHQ9 QUESTIONS 1 & 2: 1

## 2024-09-12 ASSESSMENT — PAIN SCALES - GENERAL: PAINLEVEL: 10-WORST PAIN EVER

## 2024-09-12 NOTE — PROGRESS NOTES
Nia Harper is a 34 y.o. female who presents in stable condition for Zoladex injection every 28days    Since her last visit, she has been doing well.  Overall, she states her energy level is stable.  Her appetite & hydration status has been good.     Patient tolerated injection to left lower abdomen well and has been educated with the overall therapy plan. Questions & concerns addressed by her provider during visit. AVS & future appointment provided. Patient discharged in stable condition.    Next treatment date due & scheduled to be given 10/10/2024    Reviewed and approved by YARON WALLER on 9/12/24 at 12:40 PM.

## 2024-09-12 NOTE — PROGRESS NOTES
Patient ID: Nia Harper is a 34 y.o. female.  MRN: [id number]  : 1990  The patient presents to clinic today for her history of right-sided breast cancer.     Cancer Staging   Breast cancer (Multi)  Staging form: Breast, AJCC 8th Edition  - Pathologic stage from 2023: Stage IB (pT2(3), pN0(sn), cM0, G3, ER+, OK+, HER2-, Oncotype DX score: 45) - Signed by Jeramie Lancaster MD on 10/10/2023    Diagnostic/Therapeutic History:  -Enlarging right breast lumps.  -5/15/23: Dx MG/US demonstrated multiple masses: 1.1 cm (11:00, 8 cm FN); 1 cm (9:30, 5 cm FN); 1.2 cm (9:00, 6 cm FN); 0.7 cm (10:00, 6 cm FN); 0.4 cm (10:00, 5 cm FN). Four lymph nodes demonstrated suspicious cortical thickening.  -23: US-guided CNB of the 11:00 mass showed IDC grade 3; ER >95% OK 85% Her2-negative (1+ IHC). Biopsy of the 9:30 mass showed IDC grade 2-3; ER 80% OK 10% Her2-negative (1+ IHC). A right axillary LN was negative for carcinoma.  -23: Right mastectomy and SLNBx performed. Multipe foci of grade 3 carcinoma noted (3.1 cm, 2.2 cm, 1.8 cm), no LVI, margins negative, 0/3 LN’s involved IB, pT2 pN0 cM0 G3  Oncotype Dx RS 45 (33% 9-year recurrence risk; >15% chemo benefit).  RSClin: 59% recurrence risk with tamoxifen; 44% chemo benefit.  -23: Adjuvant TC (docetaxel, cyclophosphamide) initiated. First cycle complicated by admission for nausea/vomiting. Second cycle complicated by admission for angioedema, hives; thought to be secondary to docetaxel. Switched to CMF (cyclophosphamide, methotrexate, fluorouracil) on 10/16/23. Second (and last) cycle given 23.    History of Present Illness (HPI)/Interval History:  Nia Harper presents for routine FUV. On anastrozole.     She denies any new breast cancer concerns. She continues to have bothersome sebaceous cysts at the left inframammary fold.     She denies any chest pain or breathing issues.     She denies any vision changes or headache issues, dizziness, loss of  balance or falls.     She denies any new or unexplained bone aches or pains.    She denies any skin lesions or masses.    Of note, completed bariatric surgery in March 2024, healing well. She reports a stable appetite.     She sleep is okay. Hot flashes are bothersome again.     She would like to get reconstruction in the future. Needs her A1C down.     Review of Systems:  14-point ROS otherwise negative, as per HPI/Interval History.    Past Medical History:   Diagnosis Date    Abnormal levels of other serum enzymes 08/26/2021    Elevated liver enzymes    Anxiety     Anxiety disorder, unspecified 08/13/2019    Anxiety and depression    Body mass index (BMI)40.0-44.9, adult 06/11/2019    BMI 40.0-44.9, adult    Breast cancer (Multi)     Carpal tunnel syndrome, bilateral upper limbs 05/01/2019    Carpal tunnel syndrome on both sides    Depression, unspecified 12/29/2021    Depression    Elevated liver enzymes     Glycosuria 06/30/2014    Glucosuria    Hx antineoplastic chemo     Irregular menstruation, unspecified 12/21/2015    Missed periods    Mixed hyperlipidemia 07/30/2018    Hyperlipemia, mixed    Morbid (severe) obesity due to excess calories (Multi) 06/11/2019    Severe obesity (BMI >= 40)    Myopia, bilateral 09/15/2017    Myopia of both eyes with astigmatism    Type 2 diabetes mellitus without complications (Multi) 09/14/2022    Diabetes    Vision loss     glasses     Patient Active Problem List   Diagnosis    Astigmatism of both eyes    Breast skin changes    Radial styloid tenosynovitis of right hand    Depression    Diabetes mellitus type 2 without retinopathy (Multi)    Diffuse goiter    Elevation of levels of liver transaminase levels    Epidermal inclusion cyst    Herpes simplex virus (HSV) infection    History of malignant neoplasm of breast    Hyperlipemia    Hypertriglyceridemia    Myopia of both eyes    Nexplanon in place    Suspected sleep apnea    Uncontrolled type 2 diabetes mellitus with  hyperglycemia (Multi)    Chronic headaches    BMI 40.0-44.9, adult (Multi)    Severe obesity (Multi)    Breast cancer (Multi)    Primary hypertension    Angioedema    ARNULFO (generalized anxiety disorder)    History of bariatric surgery    Fatty liver    Gallstones    History of cannabis abuse    History of noncompliance with medical treatment    Insufficient sleep syndrome    Sleep disorder breathing    Status post mastectomy    Systolic murmur    Artificial menopause    Acute vaginitis    Encounter for follow-up surveillance of breast cancer    Hidradenitis suppurativa    Suppression of ovarian secretion    Bariatric surgery status    Mass of left breast    Infected sebaceous cyst    Sebaceous cyst    Obesity, Class III, BMI 40-49.9 (morbid obesity) (Multi)    Post-op pain    Obesity    S/P gastric sleeve procedure    Hospital discharge follow-up    Anxiety attack    Postoperative malabsorption (HHS-HCC)    Laceration of lesser toe of right foot without foreign body present or damage to nail    Abnormal uterine bleeding    Acne    Bacterial vaginosis    Dyspnea on exertion    Mild postpartum depression    Palpitations    Psychological factors affecting medical condition    Sepsis (Multi)    Vaginal irritation    Vaginal odor    Malignant neoplasm of breast (Multi)    Epidermoid cyst    Calculus of gallbladder    Impaired skin integrity    Insomnia    Abscess        Past Surgical History:   Procedure Laterality Date     SECTION, CLASSIC  2019     Section    MASTECTOMY Right     MR HEAD ANGIO WO IV CONTRAST  2015    MR HEAD ANGIO WO IV CONTRAST 2015 [facility]    OTHER SURGICAL HISTORY  2019    Surgical Treatment Of Missed  In First Trimester       Social History     Socioeconomic History    Marital status: Single   Tobacco Use    Smoking status: Never     Passive exposure: Never    Smokeless tobacco: Never   Vaping Use    Vaping status: Never Used   Substance and  Sexual Activity    Alcohol use: Never    Drug use: Not Currently     Types: Marijuana     Comment: last used 4 weeks ago    Sexual activity: Defer     Social Determinants of Health     Financial Resource Strain: Patient Declined (3/25/2024)    Overall Financial Resource Strain (CARDIA)     Difficulty of Paying Living Expenses: Patient declined   Food Insecurity: No Food Insecurity (3/25/2024)    Hunger Vital Sign     Worried About Running Out of Food in the Last Year: Never true     Ran Out of Food in the Last Year: Never true   Transportation Needs: Patient Declined (3/25/2024)    PRAPARE - Transportation     Lack of Transportation (Medical): Patient declined     Lack of Transportation (Non-Medical): Patient declined   Stress: No Stress Concern Present (3/25/2024)    [employer] [place] [employer] - Occupational Stress Questionnaire     Feeling of Stress : Only a little   Intimate Partner Violence: Not At Risk (3/25/2024)    Humiliation, Afraid, Rape, and Kick questionnaire     Fear of Current or Ex-Partner: No     Emotionally Abused: No     Physically Abused: No     Sexually Abused: No   Housing Stability: Patient Declined (3/25/2024)    Housing Stability Vital Sign     Unable to Pay for Housing in the Last Year: Patient declined     Number of Places Lived in the Last Year: 1     Unstable Housing in the Last Year: Patient declined       Family History   Problem Relation Name Age of Onset    Heart disease Father      Cancer Maternal Grandmother      Diabetes type II Maternal Grandmother      Cancer Maternal Grandfather      Cancer Paternal Grandmother      Diabetes type II Mother's Sister          uncontrolled    Breast cancer Father's Sister          two paternal 1st cousins (through paternal uncle, both sisters, both early onset, one ). two paternal great aunts (through PGF's side, both  in their 50s/60s)    Cancer Father's Sister      Cancer Father's Brother      Breast cancer Paternal  "Cousin          two paternal 1st cousins (through paternal uncle, both sisters, both early onset, one ). two paternal great aunts (through PGF's side, both  in their 50s/60s)    Breast cancer Cousin         Allergies:   Allergies   Allergen Reactions    Metformin Swelling     Immediate release only- currently taking ER without problems    Cephalexin Hives, Other, Rash and Swelling     \"welts\"    Sulfamethoxazole-Trimethoprim Hives, Rash and Swelling         Current Outpatient Medications:     anastrozole (Arimidex) 1 mg tablet, Take 1 tablet (1 mg total) by mouth once daily.  Swallow whole with a drink of water., Disp: 90 tablet, Rfl: 3    atorvastatin (Lipitor) 40 mg tablet, Take 1 tablet (40 mg) by mouth once daily at bedtime., Disp: 90 tablet, Rfl: 3    benzoyl peroxide 5 % external wash, Apply topically once daily. Lather onto skin folds, leave on 2 minutes, rinse with water, Disp: 236 g, Rfl: 11    blood sugar diagnostic (OneTouch Verio test strips) strip, Inject 100 strips under the skin once daily., Disp: 100 strip, Rfl: 1    cyclobenzaprine (Flexeril) 10 mg tablet, Take 1 tablet (10 mg) by mouth 3 times a day as needed for muscle spasms for up to 3 days., Disp: 9 tablet, Rfl: 0    diphenhydrAMINE (Sominex) 25 mg tablet, Take 1 tablet (25 mg) by mouth as needed at bedtime for sleep., Disp: , Rfl:     doxepin 3 mg tablet, Take 1 tablet (3 mg) by mouth once daily., Disp: 30 tablet, Rfl: 2    dulaglutide (Trulicity) 1.5 mg/0.5 mL pen injector injection, Inject 1.5 mg under the skin 1 (one) time per week., Disp: 2 mL, Rfl: 11    DULoxetine (Cymbalta) 30 mg DR capsule, Take 1 capsule (30 mg) by mouth once daily. Do not crush or chew., Disp: 30 capsule, Rfl: 2    DULoxetine (Cymbalta) 60 mg DR capsule, Take 1 capsule (60 mg) by mouth once daily. Do not crush or chew. Take in the evening with food. Increase to 60mg daily dosing., Disp: 90 capsule, Rfl: 1    ergocalciferol (Vitamin D-2) 1.25 MG (70640 UT) " "capsule, Take by mouth., Disp: , Rfl:     estradiol (Estrace) 0.01 % (0.1 mg/gram) vaginal cream, Insert 0.5 Applicatorfuls (2 g) into the vagina once daily. Use once a day for 2 weeks then as needed there after, Disp: 42.5 g, Rfl: 0    ezetimibe (Zetia) 10 mg tablet, Take 1 tablet (10 mg) by mouth once daily., Disp: 90 tablet, Rfl: 3    fluconazole (Diflucan) 150 mg tablet, , Disp: , Rfl:     FreeStyle Cristina 3 Sensor device, Change every 14 days, Disp: 2 each, Rfl: 11    insulin glargine (Lantus Solostar U-100 Insulin) 100 unit/mL (3 mL) pen, Inject 40 Units under the skin 2 times a day., Disp: 30 mL, Rfl: 3    insulin lispro (HumaLOG Aurelio Kwikpen) 100 unit/mL injection, Inject 40 Units under the skin 3 times a day as needed for high blood sugar. inject up to 20 units before meals per sliding scale, Disp: 15 mL, Rfl: 5    loperamide (Imodium) 2 mg capsule, Take by mouth every 4 hours if needed., Disp: , Rfl:     LORazepam (Ativan) 1 mg tablet, TAKE 1 TABLET BY MOUTH ONCE DAILY AS NEEDED FOR ANXIETY, Disp: 30 tablet, Rfl: 0    metFORMIN  mg 24 hr tablet, Take 2 tablets (1,000 mg) by mouth 2 times daily (morning and late afternoon)., Disp: 360 tablet, Rfl: 1    omega-3 acid ethyl esters (Lovaza) 1 gram capsule, Take 2 capsules (2 g) by mouth 2 times daily (morning and late afternoon)., Disp: 120 capsule, Rfl: 1    ondansetron ODT (Zofran-ODT) 4 mg disintegrating tablet, Take 1 tablet (4 mg) by mouth every 6 hours if needed for nausea or vomiting., Disp: 60 tablet, Rfl: 1    OneTouch Delica Plus Lancet 33 gauge misc, TEST BLOOD GLUCOSE 3 TIMES/ DAY, Disp: , Rfl:     OneTouch Verio Flex meter misc, USE AS DIRECTED., Disp: , Rfl:     pen needle, diabetic (BD Ultra-Fine Mini Pen Needle) 31 gauge x 3/16\" needle, Inject 200 each under the skin 4 times a day before meals. Use 4 a day as directed, Disp: 200 each, Rfl: 3    ProbioMax Daily DF 30 billion cell capsule,delayed release(DR/EC), TAKE 1 CAPSULE BY MOUTH " EVERY DAY, Disp: 90 capsule, Rfl: 3    Probiotic 3 billion cell capsule, Take 1 capsule by mouth once daily., Disp: , Rfl:     prochlorperazine (Compazine) 10 mg tablet, Take 1 tablet (10 mg) by mouth every 6 hours., Disp: , Rfl:     Trulicity 3 mg/0.5 mL pen injector, INJECT 3MG UNDER THE SKIN ONCE A WEEK, Disp: , Rfl:     valACYclovir (Valtrex) 500 mg tablet, Take 1 tablet (500 mg) by mouth 2 times a day. As needed for outbreaks, Disp: , Rfl:      Objective    BSA: There is no height or weight on file to calculate BSA.  There were no vitals taken for this visit.    Performance Status:  The ECOG performance scale today is ECO- Fully active, able to carry on all pre-disease performance w/o restriction.    Physical Exam  Vitals reviewed.   Constitutional:       General: She is awake. She is not in acute distress.     Appearance: Normal appearance. She is not ill-appearing.   HENT:      Head: Normocephalic and atraumatic.      Mouth/Throat:      Pharynx: Oropharynx is clear. No oropharyngeal exudate.   Eyes:      General: No scleral icterus.     Conjunctiva/sclera: Conjunctivae normal.   Neck:      Trachea: Trachea and phonation normal. No tracheal tenderness.   Cardiovascular:      Rate and Rhythm: Normal rate and regular rhythm.      Heart sounds: No murmur heard.     No friction rub. No gallop.   Pulmonary:      Effort: Pulmonary effort is normal. No respiratory distress.      Breath sounds: Normal breath sounds. No stridor. No wheezing, rhonchi or rales.   Chest:      Chest wall: No mass, lacerations, deformity or tenderness.   Breasts:     Right: Absent.      Left: Skin change (inframammary fold sebaceous cyst currently not imflammed) present. No swelling, mass, nipple discharge or tenderness.      Comments: S/p right mastectomy would surgical scarring present   Abdominal:      General: Abdomen is flat. There is no distension.      Palpations: Abdomen is soft. There is no mass.      Tenderness: There is no  abdominal tenderness.   Musculoskeletal:         General: No swelling, tenderness or deformity. Normal range of motion.      Cervical back: Normal range of motion and neck supple. No rigidity.   Lymphadenopathy:      Cervical: No cervical adenopathy.      Upper Body:      Right upper body: No supraclavicular, axillary or pectoral adenopathy.      Left upper body: No supraclavicular, axillary or pectoral adenopathy.   Skin:     General: Skin is warm and dry.      Coloration: Skin is not jaundiced.      Findings: No lesion or rash.      Comments: No hives noted today.   Neurological:      General: No focal deficit present.      Mental Status: She is alert and oriented to person, place, and time.      Motor: No weakness.      Gait: Gait normal.   Psychiatric:         Mood and Affect: Mood normal.         Behavior: Behavior normal.         Thought Content: Thought content normal.         Judgment: Judgment normal.         Laboratory Data:  Lab Results   Component Value Date    WBC 4.7 07/08/2024    HGB 14.2 07/08/2024    HCT 41.6 07/08/2024    MCV 88 07/08/2024     07/08/2024    ANC 15.37 (H) 09/26/2023       Chemistry    Lab Results   Component Value Date/Time     07/08/2024 1226    K 4.5 07/08/2024 1226    CL 99 07/08/2024 1226    CO2 30 07/08/2024 1226    BUN 15 07/08/2024 1226    CREATININE 0.65 07/08/2024 1226    Lab Results   Component Value Date/Time    CALCIUM 10.2 07/08/2024 1226    ALKPHOS 81 07/08/2024 1226    AST 28 07/08/2024 1226    ALT 42 07/08/2024 1226    BILITOT 0.3 07/08/2024 1226           Component      Latest Ref Rng 12/7/2023   FOLLICLE STIMULATING HORMONE      IU/L 3.5    Estradiol      pg/mL <19    Vitamin D, 25-Hydroxy, Total      30 - 100 ng/mL 30        Radiology: N/A    Pathology: N/A      Assessment/Plan:  Nia Hraper is a 34 y.o. female with a history of right breast cancer, who presents today for follow-up evaluation.    Breast cancer, right, stage IB (ER/AK+,  Her2-).  High-risk for recurrence, based on Oncotype Dx and clinical features.  Very poor tolerance of TC (2 cycles), including severe allergic reaction. Completed CMF x2 11/9/23.  Grade 2 hot flashes: continue Cymbalta - will try switching anastrozole to exemestane after a 2 week washout period   Grade 2 vaginal dryness: encouraged estrace use BID x 2 weeks then maintenance 2-3 times a week   Breast cysts: looking forward to getting a mastectomy however needs her A1C down   - Continue ovarian suppression started 8/2023.   - No role for adjuvant RT.  - Left mammogram 1/29/2024: cat 2, next scheduled for Jan 2025     There is no evidence of breast cancer recurrence based on her clinical exam today.     Allergic reaction.  - Likely delayed reaction to docetaxel, but pegfilgrastim also possible.  - Avoid further TC/Neulasta. Switched therapy, as above.  - Off therapy. No further hives.    Premenopausal status. Continue goserelin q4 weeks, initiated 8/18/23.    Insulin-dependent T2DM.  - Following with Endocrinology.  - Will need improved HbgA1C to move forward with mastectomy and reconstruction    S/p bariatric surgery   - Healing well, diet progressing, appropriate weight loss given this/appetite is still adequate   - Follows with surgery team     Disposition.  - FUV ~ 3 months with Dr. Tena at [facility]   - She has our contact information and was instructed to call with concerns/questions in the interim.    No orders of the defined types were placed in this encounter.    Petrona Bermeo PA-C

## 2024-09-12 NOTE — PATIENT INSTRUCTIONS
Please schedule appointment with Dr Cordell Tena at [facility]    Please call us at [phone] option 5 then option 2 with any questions or concerns

## 2024-09-16 RX ORDER — METRONIDAZOLE 500 MG/1
1 TABLET ORAL 2 TIMES DAILY
COMMUNITY

## 2024-09-16 RX ORDER — CHLORHEXIDINE GLUCONATE 40 MG/ML
SOLUTION TOPICAL 2 TIMES DAILY
COMMUNITY

## 2024-09-16 RX ORDER — LOSARTAN POTASSIUM 25 MG/1
1 TABLET ORAL DAILY
COMMUNITY

## 2024-09-16 RX ORDER — OMEPRAZOLE 20 MG/1
1 CAPSULE, DELAYED RELEASE ORAL DAILY
COMMUNITY

## 2024-09-16 RX ORDER — FLASH GLUCOSE SENSOR
KIT MISCELLANEOUS
COMMUNITY

## 2024-09-16 RX ORDER — TRAZODONE HYDROCHLORIDE 50 MG/1
50 TABLET ORAL NIGHTLY
COMMUNITY

## 2024-09-16 RX ORDER — DOXYCYCLINE HYCLATE 100 MG
TABLET ORAL
COMMUNITY

## 2024-09-16 RX ORDER — IBUPROFEN 600 MG/1
TABLET ORAL
COMMUNITY

## 2024-09-16 RX ORDER — OLANZAPINE 5 MG/1
1 TABLET ORAL NIGHTLY
COMMUNITY

## 2024-09-16 RX ORDER — PEN NEEDLE, DIABETIC 30 GX3/16"
NEEDLE, DISPOSABLE MISCELLANEOUS
COMMUNITY

## 2024-09-16 RX ORDER — FAMOTIDINE 40 MG/1
1 TABLET, FILM COATED ORAL 2 TIMES DAILY
COMMUNITY

## 2024-09-16 RX ORDER — OXYCODONE HCL 5 MG/5 ML
SOLUTION, ORAL ORAL
COMMUNITY

## 2024-09-16 RX ORDER — CLINDAMYCIN PHOSPHATE 20 MG/G
CREAM VAGINAL
COMMUNITY

## 2024-09-16 RX ORDER — LACTOBACILLUS COMBINATION NO.4 3B CELL
1 CAPSULE ORAL DAILY
COMMUNITY

## 2024-09-16 RX ORDER — BLOOD-GLUCOSE SENSOR
EACH MISCELLANEOUS
COMMUNITY

## 2024-09-16 RX ORDER — LIDOCAINE 50 MG/G
PATCH TOPICAL
COMMUNITY

## 2024-09-16 RX ORDER — CLINDAMYCIN HYDROCHLORIDE 300 MG/1
CAPSULE ORAL
COMMUNITY

## 2024-09-16 RX ORDER — DEXAMETHASONE 4 MG/1
TABLET ORAL
COMMUNITY

## 2024-09-16 RX ORDER — BLOOD-GLUCOSE METER
EACH MISCELLANEOUS
COMMUNITY

## 2024-09-16 RX ORDER — ACETAMINOPHEN 325 MG/1
TABLET ORAL
COMMUNITY

## 2024-09-16 RX ORDER — VALACYCLOVIR HYDROCHLORIDE 1 G/1
TABLET, FILM COATED ORAL
COMMUNITY
Start: 2024-09-05

## 2024-09-16 RX ORDER — DOXYCYCLINE 100 MG/1
CAPSULE ORAL
COMMUNITY

## 2024-09-16 RX ORDER — METRONIDAZOLE 7.5 MG/G
GEL VAGINAL
COMMUNITY

## 2024-09-16 RX ORDER — LORATADINE 10 MG/1
TABLET ORAL
COMMUNITY

## 2024-09-16 RX ORDER — OMEPRAZOLE 40 MG/1
CAPSULE, DELAYED RELEASE ORAL
COMMUNITY

## 2024-09-16 RX ORDER — CLINDAMYCIN PHOSPHATE 11.9 MG/ML
SOLUTION TOPICAL DAILY
COMMUNITY

## 2024-09-19 ENCOUNTER — APPOINTMENT (OUTPATIENT)
Dept: SURGICAL ONCOLOGY | Facility: CLINIC | Age: 34
End: 2024-09-19
Payer: COMMERCIAL

## 2024-09-19 NOTE — PROGRESS NOTES
[facility]  Nia Harper female   1990 34 y.o.   [id number]      Chief Complaint  Follow up, right breast sebaceous cyst, history of right breast cancer.     History Of Present Illness  Nia Harper is a very pleasant 34 y.o. female diagnosed on 2023 with right breast multicentric breast cancer, invasive ductal carcinoma (IDC), grade 2-3, ER/CA+, HER2-. On 2023 she underwent a right mastectomy and sentinel lymph node biopsy (0/3). Oncotype 45. She completed adjuvant chemotherapy on 2023. She was started on ovarian suppression and Anastrozole 1mg. She has family history of breast cancer, see below.     She presents today for follow up of infected sebaceous cyst under the left breast for two weeks. She was placed on Clindamycin for seven days and tolerating it will. She has a few days left. The cyst had purulent drainage. This happens frequently and she would like this removed. At this time, she states the cyst infection has improved. No longer red or tender and no visible drainage. She has a personal history of hidradenitis. She will be having bariatric surgery at the end of the month.    BREAST IMAGIN2024 LEFT diagnostic mammogram, BI-RADS Category 1. 2024 Left breast ultrasound, indicates BI-RADS Category 2.     REPRODUCTIVE HISTORY:  menarche age 10, , first birth age 20,  x 2 months, no OCP's, premenopausal on ovarian suppression therapy, no HRT, scattered fibroglandular tissue     FAMILY CANCER HISTORY:   Maternal Cousin (1): Breast cancer, 30s alive in her 40s  Maternal Cousin (2): Breast cancer, 40s  in her early 40s  Maternal Cousin (3): BRCA negative (no cancer diagnosed)  Maternal grandmother: unknown cancer ()  Maternal grandfather: unknown cancer ()  Paternal grandmother: unknown cancer ()      Review of Systems  Constitutional:  Negative for appetite change, fatigue, fever and unexpected weight change.   HENT:   Negative for ear pain, hearing loss, nosebleeds, sore throat and trouble swallowing.    Eyes:  Negative for discharge, itching and visual disturbance.   Breast: As stated in HPI.  Respiratory:  Negative for cough, chest tightness and shortness of breath.    Cardiovascular:  Negative for chest pain, palpitations and leg swelling.   Gastrointestinal:  Negative for abdominal pain, constipation, diarrhea and nausea.   Endocrine: Negative for cold intolerance and heat intolerance.   Genitourinary:  Negative for dysuria, frequency, hematuria, pelvic pain and vaginal bleeding.   Musculoskeletal:  Negative for arthralgias, back pain, gait problem, joint swelling and myalgias.   Skin:  Negative for color change and rash.   Allergic/Immunologic: Negative for environmental allergies and food allergies.   Neurological:  Negative for dizziness, tremors, speech difficulty, weakness, numbness and headaches.   Hematological:  Does not bruise/bleed easily.   Psychiatric/Behavioral:  Negative for agitation, dysphoric mood and sleep disturbance. The patient is not nervous/anxious.      Past Medical History  She has a past medical history of Abnormal levels of other serum enzymes (2021), Anxiety, Anxiety disorder, unspecified (2019), Body mass index (BMI)40.0-44.9, adult (2019), Breast cancer (Multi), Carpal tunnel syndrome, bilateral upper limbs (2019), Depression, unspecified (2021), Elevated liver enzymes, Glycosuria (2014), antineoplastic chemo, Irregular menstruation, unspecified (2015), Mixed hyperlipidemia (2018), Morbid (severe) obesity due to excess calories (Multi) (2019), Myopia, bilateral (09/15/2017), Type 2 diabetes mellitus without complications (Multi) (2022), and Vision loss.    Surgical History  She has a past surgical history that includes  section, classic (2019); Other surgical history (2019); MR angio head wo IV contrast (2015);  and Mastectomy (Right).    Family History  Cancer-related family history includes Breast cancer in her cousin, father's sister, and paternal cousin; Cancer in her father's brother, father's sister, maternal grandfather, maternal grandmother, and paternal grandmother.     Social History  She reports that she has never smoked. She has never been exposed to tobacco smoke. She has never used smokeless tobacco. She reports that she does not currently use drugs after having used the following drugs: Marijuana. She reports that she does not drink alcohol.    Allergies  Metformin, Cephalexin, and Sulfamethoxazole-trimethoprim    Medications  Current Outpatient Medications   Medication Instructions    acetaminophen (Tylenol) 325 mg tablet TAKE 2 TABLETS (650 MG) BY MOUTH EVERY 6 HOURS IF NEEDED (PAIN/FEVER) FOR UP TO 5 DAYS.    atorvastatin (LIPITOR) 40 mg, oral, Nightly    benzoyl peroxide 5 % external wash Topical, Daily, Lather onto skin folds, leave on 2 minutes, rinse with water    blood sugar diagnostic (OneTouch Verio test strips) strip 100 strips, subcutaneous, Daily    blood sugar diagnostic (OneTouch Verio test strips) strip USE AS DIRECTED 3 TIMES A DAY    chlorhexidine (Hibiclens) 4 % external liquid Topical, 2 times daily    clindamycin (Cleocin T) 1 % external solution Topical, Daily    clindamycin (Cleocin) 2 % vaginal cream INSERT 1 APPLICATION INTO THE VAGINA ONCE DAILY AT BEDTIME FOR 3 DAYS.    clindamycin (Cleocin) 300 mg capsule TAKE 1 CAPSULE (300 MG) BY MOUTH 3 TIMES A DAY FOR 7 DAYS.    cyclobenzaprine (FLEXERIL) 10 mg, oral, 3 times daily PRN    dexAMETHasone (Decadron) 4 mg tablet 2 TAB(S) ORALLY ONCE A DAY DAYS 2-4 AFTER EACH CHEMOTHERAPY TREATMENT    diphenhydrAMINE (SOMINEX) 25 mg, oral, Nightly PRN    doxepin 3 mg, oral, Daily    doxycycline (Vibra-Tabs) 100 mg tablet 1 TAB(S) ORALLY 2 TIMES A DAY (AVOID SUNLIGHT WHILE ON THIS MEDICATION)    doxycycline (Vibramycin) 100 mg capsule PLEASE SEE  ATTACHED FOR DETAILED DIRECTIONS    DULoxetine (CYMBALTA) 60 mg, oral, Daily, Do not crush or chew. Take in the evening with food. Increase to 60mg daily dosing.    DULoxetine (CYMBALTA) 30 mg, oral, Daily, Do not crush or chew.    ergocalciferol (Vitamin D-2) 1.25 MG (13329 UT) capsule oral    estradiol (ESTRACE) 2 g, vaginal, Daily, Use once a day for 2 weeks then as needed there after    exemestane (AROMASIN) 25 mg, oral, Daily, Take after a meal.  Try to take at the same time each day.    ezetimibe (ZETIA) 10 mg, oral, Daily    famotidine (Pepcid) 40 mg tablet 1 tablet, oral, 2 times daily    fluconazole (Diflucan) 150 mg tablet     FreeStyle Cristina 2 Sensor kit Change every 14 days    FreeStyle Cristina 2 Sensor kit CHANGE SENSOR EVERY 14 DAYS AS DIRECTED.    FreeStyle Cristina 3 Sensor device Change every 14 days    FreeStyle Cristina 3 Sensor device 2 EACH EVERY 14 (FOURTEEN) DAYS.    ibuprofen 600 mg tablet TAKE 1 TABLET (600 MG) BY MOUTH EVERY 6 HOURS IF NEEDED (PAIN) FOR UP TO 5 DAYS.    insulin lispro (HUMALOG BHUPINDER KWIKPEN) 40 Units, subcutaneous, 3 times daily PRN, inject up to 20 units before meals per sliding scale    lactobacillus combination no.4 (Probiotic) 3 billion cell capsule 1 capsule, oral, Daily    Lantus Solostar U-100 Insulin 40 Units, subcutaneous, 2 times daily    lidocaine (Lidoderm) 5 % patch PLEASE SEE ATTACHED FOR DETAILED DIRECTIONS    loperamide (Imodium) 2 mg capsule oral, Every 4 hours PRN    loratadine (Claritin) 10 mg tablet TAKE 2 TABLETS (20 MG) BY MOUTH 2 TIMES A DAY.    LORazepam (Ativan) 1 mg tablet TAKE 1 TABLET BY MOUTH ONCE DAILY AS NEEDED FOR ANXIETY    losartan (Cozaar) 25 mg tablet 1 tablet, oral, Daily    metFORMIN XR (GLUCOPHAGE-XR) 1,000 mg, oral, 2 times daily (morning and late afternoon)    metroNIDAZOLE (Flagyl) 500 mg tablet 1 tablet, oral, 2 times daily    metroNIDAZOLE (Metrogel) 0.75 % (37.5mg/5 gram) vaginal gel INSERT INTO THE VAGINA 1 (ONE) TIME PER WEEK.     "OLANZapine (ZyPREXA) 5 mg tablet 1 tablet, oral, Nightly    omega-3 acid ethyl esters (LOVAZA) 2 g, oral, 2 times daily (morning and late afternoon)    omeprazole (PriLOSEC) 20 mg DR capsule 1 capsule, oral, Daily    omeprazole (PriLOSEC) 40 mg DR capsule PLEASE SEE ATTACHED FOR DETAILED DIRECTIONS    ondansetron ODT (ZOFRAN-ODT) 4 mg, oral, Every 6 hours PRN    OneTouch Delica Plus Lancet 33 gauge misc TEST BLOOD GLUCOSE 3 TIMES/ DAY    OneTouch Verio Flex meter misc USE AS DIRECTED.    oxyCODONE (Roxicodone) 5 mg/5 mL solution PLEASE SEE ATTACHED FOR DETAILED DIRECTIONS    pen needle, diabetic (BD Ultra-Fine Mini Pen Needle) 31 gauge x 3/16\" needle 200 each, subcutaneous, 4 times daily before meals and nightly, Use 4 a day as directed    pen needle, diabetic (BD Ultra-Fine Mini Pen Needle) 31 gauge x 3/16\" needle USE 4 A DAY AS DIRECTED    ProbioMax Daily DF 30 billion cell capsule,delayed release(DR/EC) 1 capsule, oral, Daily    Probiotic 3 billion cell capsule 1 capsule, oral, Daily    prochlorperazine (Compazine) 10 mg tablet 1 tablet, oral, Every 6 hours    traZODone (DESYREL) 50 mg, oral, Nightly    Trulicity 3 mg/0.5 mL pen injector INJECT 3MG UNDER THE SKIN ONCE A WEEK    Trulicity 1.5 mg, subcutaneous, Once Weekly    valACYclovir (Valtrex) 1 gram tablet     valACYclovir (Valtrex) 500 mg tablet 1 tablet, oral, 2 times daily, As needed for outbreaks       Last Recorded Vitals  There were no vitals filed for this visit.         Physical Exam  Chest:         Patient is alert and oriented x3 and in a relaxed and appropriate mood. Her gait is steady and hand grasps are equal. Sclera is clear. The right breast has been removed with well-healed mastectomy incision. Overlying tissue soft without palpable abnormalities, discrete nodules or masses. The left breast 5:00, 8 cm from the nipple is a scabbed healing 1.0 cm sebaceous cyst, no erythema or drainage. The left nipple appears normal. There is no cervical, " supraclavicular or axillary lymphadenopathy. Heart rate and rhythm normal, S1 and S2 appreciated. The lungs are clear to auscultation bilaterally. Abdomen is soft and non-tender.     Relevant Results and Imaging  Study Result    Narrative & Impression   Interpreted By:  Jessica Felton and Tavana Shahrzad   STUDY:  BI US BREAST LIMITED LEFT;  1/29/2024 10:31 am      ACCESSION NUMBER(S):  [id number]      ORDERING CLINICIAN:  KEYANA RIVERO      INDICATION:  History of hidradenitis. Palpable abnormality in the left breast for  3-4 years which has become larger and painful since the week prior.  Patient states now improving. History of stage IB hormone positive  right-sided breast cancer      COMPARISON:  Mammogram 01/04/2024      FINDINGS:  Targeted ultrasound of the left breast was performed using  elastography.      At 5 o'clock 8 cm from the nipple, corresponding to patient's  palpable area of concern, a 3.3 x 1.7 x 1.1 cm heterogeneous  collection is noted with internal echoes, extending to the skin  surface. There is associated predominantly peripheral vascularity.  This collection demonstrates posterior enhancement and is  predominantly soft on elastography with areas of intermediate to hard  elasticity along the margins. There is thickening of the overlying  skin.      IMPRESSION:  Heterogeneous collection corresponding to patient's palpable area of  concern is most consistent with an abscess. Clinical follow-up until  resolution is recommended. If the area does not resolve then a  follow-up ultrasound is recommended.      BI-RADS CATEGORY:      BI-RADS Category:  2 Benign.  Recommendation:  Clinical follow-up.  Recommended Date:  N/A.  Laterality:  Left.      For any future breast imaging appointments, please call [phone]).      I personally reviewed the images/study and I agree with the findings  as stated by Dr. Shannan Riggs. The study was interpreted at  [facility]  [place], [place].      MACRO:  None      Signed by: Jessica Felton 1/29/2024 11:21 AM  Dictation workstation:   [id number]       Orders  No orders of the defined types were placed in this encounter.      Visit Diagnosis  No diagnosis found.      Assessment/Plan  Breast cancer surveillance, left breast sebaceous cyst, history right breast cancer, s/p right mastectomy, completed adjuvant chemotherapy, on Anastrozole and ovarian suppression therapy, family history of breast cancer, scattered fibroglandular tissue    Plan:  Complete clindamycin as prescribed. Referral to general surgery to have sebaceous cyst removed. Will follow up in 6 months. Call the office for worsening redness, pain, drainage, or fever. Follow up with all previous appointments as scheduled.    Patient Discussion/Summary  Complete antibiotics as prescribed,schedule an appointment with general surgery. Call the office for worsening redness, pain, drainage, or fever. Follow up with all previous appointments as scheduled.    You can see your health information, review clinical summaries from office visits & test results online when you follow your health with MY  Chart, a personal health record. To sign up go to [email or web address]. If you need assistance with signing up or trouble getting into your account call [facility] Patient Line 24/7 at [phone].    My office phone number is [phone] if you need to get in touch with me or have additional questions or concerns. Thank you for choosing [facility] and trusting me as your healthcare provider. I look forward to seeing you again at your next office visit. I am honored to be a provider on your health care team and I remain dedicated to helping you achieve your health goals.    Anya Grissom, RONEL-CNP

## 2024-09-20 ENCOUNTER — APPOINTMENT (OUTPATIENT)
Dept: SURGICAL ONCOLOGY | Facility: HOSPITAL | Age: 34
End: 2024-09-20
Payer: COMMERCIAL

## 2024-09-24 NOTE — PROGRESS NOTES
[facility]  Nia Harper female   1990 34 y.o.   [id number]      Chief Complaint  Follow up, history of right breast cancer     History Of Present Illness  Nia Harper is a very pleasant 34 y.o. female diagnosed on 2023 with right breast multicentric breast cancer, invasive ductal carcinoma (IDC), grade 2-3, ER/CT+, HER2-. On 2023 she underwent a right mastectomy and sentinel lymph node biopsy (0/3). Oncotype 45. She completed adjuvant chemotherapy on 2023. She was started on ovarian suppression and Anastrozole 1mg. She developed toxicities related to anastrozole and completed a drug holiday and will start exemestane today. Plans for left breast mastectomy and reconstruction with Dr. Schroeder and Dr. Borrero. Denies concerns at this time.     She has family history of breast cancer, see below.       BREAST IMAGIN2024 LEFT diagnostic mammogram, BI-RADS Category 1. 2024 Left breast ultrasound, indicates BI-RADS Category 2.     REPRODUCTIVE HISTORY:  menarche age 10, , first birth age 20,  x 2 months, no OCP's, premenopausal on ovarian suppression therapy, no HRT, scattered fibroglandular tissue     FAMILY CANCER HISTORY:   Maternal Cousin (1): Breast cancer, 30s alive in her 40s  Maternal Cousin (2): Breast cancer, 40s  in her early 40s  Maternal Cousin (3): BRCA negative (no cancer diagnosed)  Maternal grandmother: unknown cancer ()  Maternal grandfather: unknown cancer ()  Paternal grandmother: unknown cancer ()      Review of Systems  Constitutional:  Negative for appetite change, fatigue, fever and unexpected weight change.   HENT:  Negative for ear pain, hearing loss, nosebleeds, sore throat and trouble swallowing.    Eyes:  Negative for discharge, itching and visual disturbance.   Breast: As stated in HPI.  Respiratory:  Negative for cough, chest tightness and shortness of breath.    Cardiovascular:  Negative for chest  pain, palpitations and leg swelling.   Gastrointestinal:  Negative for abdominal pain, constipation, diarrhea and nausea.   Endocrine: Negative for cold intolerance and heat intolerance.   Genitourinary:  Negative for dysuria, frequency, hematuria, pelvic pain and vaginal bleeding.   Musculoskeletal:  Negative for arthralgias, back pain, gait problem, joint swelling and myalgias.   Skin:  Negative for color change and rash.   Allergic/Immunologic: Negative for environmental allergies and food allergies.   Neurological:  Negative for dizziness, tremors, speech difficulty, weakness, numbness and headaches.   Hematological:  Does not bruise/bleed easily.   Psychiatric/Behavioral:  Negative for agitation, dysphoric mood and sleep disturbance. The patient is not nervous/anxious.      Past Medical History  She has a past medical history of Abnormal levels of other serum enzymes (2021), Anxiety, Anxiety disorder, unspecified (2019), Body mass index (BMI)40.0-44.9, adult (2019), Breast cancer (Multi), Carpal tunnel syndrome, bilateral upper limbs (2019), Depression, unspecified (2021), Elevated liver enzymes, Glycosuria (2014), antineoplastic chemo, Irregular menstruation, unspecified (2015), Mixed hyperlipidemia (2018), Morbid (severe) obesity due to excess calories (Multi) (2019), Myopia, bilateral (09/15/2017), Type 2 diabetes mellitus without complications (Multi) (2022), and Vision loss.    Surgical History  She has a past surgical history that includes  section, classic (2019); Other surgical history (2019); MR angio head wo IV contrast (2015); and Mastectomy (Right).    Family History  Cancer-related family history includes Breast cancer in her cousin, father's sister, and paternal cousin; Cancer in her father's brother, father's sister, maternal grandfather, maternal grandmother, and paternal grandmother.     Social History  She  reports that she has never smoked. She has never been exposed to tobacco smoke. She has never used smokeless tobacco. She reports that she does not currently use drugs after having used the following drugs: Marijuana. She reports that she does not drink alcohol.    Allergies  Metformin, Cephalexin, and Sulfamethoxazole-trimethoprim    Medications  Current Outpatient Medications   Medication Instructions    acetaminophen (Tylenol) 325 mg tablet TAKE 2 TABLETS (650 MG) BY MOUTH EVERY 6 HOURS IF NEEDED (PAIN/FEVER) FOR UP TO 5 DAYS.    atorvastatin (LIPITOR) 40 mg, oral, Nightly    benzoyl peroxide 5 % external wash Topical, Daily, Lather onto skin folds, leave on 2 minutes, rinse with water    blood sugar diagnostic (OneTouch Verio test strips) strip 100 strips, subcutaneous, Daily    blood sugar diagnostic (OneTouch Verio test strips) strip USE AS DIRECTED 3 TIMES A DAY    chlorhexidine (Hibiclens) 4 % external liquid Topical, 2 times daily    clindamycin (Cleocin T) 1 % external solution Topical, Daily    clindamycin (Cleocin) 2 % vaginal cream INSERT 1 APPLICATION INTO THE VAGINA ONCE DAILY AT BEDTIME FOR 3 DAYS.    clindamycin (Cleocin) 300 mg capsule TAKE 1 CAPSULE (300 MG) BY MOUTH 3 TIMES A DAY FOR 7 DAYS.    cloNIDine (CATAPRES) 0.1 mg, oral, Nightly    cyclobenzaprine (FLEXERIL) 10 mg, oral, 3 times daily PRN    dexAMETHasone (Decadron) 4 mg tablet 2 TAB(S) ORALLY ONCE A DAY DAYS 2-4 AFTER EACH CHEMOTHERAPY TREATMENT    diphenhydrAMINE (SOMINEX) 25 mg, oral, Nightly PRN    doxepin 3 mg, oral, Daily    doxycycline (Vibra-Tabs) 100 mg tablet 1 TAB(S) ORALLY 2 TIMES A DAY (AVOID SUNLIGHT WHILE ON THIS MEDICATION)    doxycycline (Vibramycin) 100 mg capsule PLEASE SEE ATTACHED FOR DETAILED DIRECTIONS    DULoxetine (CYMBALTA) 120 mg, oral, Daily, Do not crush or chew. Take in the evening with food.    ergocalciferol (Vitamin D-2) 1.25 MG (23594 UT) capsule oral    estradiol (ESTRACE) 2 g, vaginal, Daily, Use once a  day for 2 weeks then as needed there after    exemestane (AROMASIN) 25 mg, oral, Daily, Take after a meal.  Try to take at the same time each day.    ezetimibe (ZETIA) 10 mg, oral, Daily    famotidine (Pepcid) 40 mg tablet 1 tablet, oral, 2 times daily    fluconazole (Diflucan) 150 mg tablet     FreeStyle Cristina 2 Sensor kit Change every 14 days    FreeStyle Cristina 2 Sensor kit CHANGE SENSOR EVERY 14 DAYS AS DIRECTED.    FreeStyle Cristina 3 Sensor device Change every 14 days    FreeStyle Cristina 3 Sensor device 2 EACH EVERY 14 (FOURTEEN) DAYS.    ibuprofen 600 mg tablet TAKE 1 TABLET (600 MG) BY MOUTH EVERY 6 HOURS IF NEEDED (PAIN) FOR UP TO 5 DAYS.    insulin lispro (HUMALOG BHUPINDER KWIKPEN) 40 Units, subcutaneous, 3 times daily PRN, inject up to 20 units before meals per sliding scale    lactobacillus combination no.4 (Probiotic) 3 billion cell capsule 1 capsule, oral, Daily    Lantus Solostar U-100 Insulin 40 Units, subcutaneous, 2 times daily    lidocaine (Lidoderm) 5 % patch PLEASE SEE ATTACHED FOR DETAILED DIRECTIONS    loperamide (Imodium) 2 mg capsule oral, Every 4 hours PRN    loratadine (Claritin) 10 mg tablet TAKE 2 TABLETS (20 MG) BY MOUTH 2 TIMES A DAY.    LORazepam (Ativan) 1 mg tablet TAKE 1 TABLET BY MOUTH ONCE DAILY AS NEEDED FOR ANXIETY    losartan (Cozaar) 25 mg tablet 1 tablet, oral, Daily    metFORMIN XR (GLUCOPHAGE-XR) 1,000 mg, oral, 2 times daily (morning and late afternoon)    metroNIDAZOLE (Flagyl) 500 mg tablet 1 tablet, oral, 2 times daily    metroNIDAZOLE (Metrogel) 0.75 % (37.5mg/5 gram) vaginal gel INSERT INTO THE VAGINA 1 (ONE) TIME PER WEEK.    OLANZapine (ZyPREXA) 5 mg tablet 1 tablet, oral, Nightly    omega-3 acid ethyl esters (LOVAZA) 2 g, oral, 2 times daily (morning and late afternoon)    omeprazole (PriLOSEC) 20 mg DR capsule 1 capsule, oral, Daily    omeprazole (PriLOSEC) 40 mg DR capsule PLEASE SEE ATTACHED FOR DETAILED DIRECTIONS    ondansetron ODT (ZOFRAN-ODT) 4 mg, oral, Every 6  "hours PRN    OneTouch Delica Plus Lancet 33 gauge misc TEST BLOOD GLUCOSE 3 TIMES/ DAY    OneTouch Verio Flex meter misc USE AS DIRECTED.    oxyCODONE (Roxicodone) 5 mg/5 mL solution PLEASE SEE ATTACHED FOR DETAILED DIRECTIONS    pen needle, diabetic (BD Ultra-Fine Mini Pen Needle) 31 gauge x 3/16\" needle 200 each, subcutaneous, 4 times daily before meals and nightly, Use 4 a day as directed    pen needle, diabetic (BD Ultra-Fine Mini Pen Needle) 31 gauge x 3/16\" needle USE 4 A DAY AS DIRECTED    ProbioMax Daily DF 30 billion cell capsule,delayed release(DR/EC) 1 capsule, oral, Daily    Probiotic 3 billion cell capsule 1 capsule, oral, Daily    prochlorperazine (Compazine) 10 mg tablet 1 tablet, oral, Every 6 hours    traZODone (DESYREL) 50 mg, oral, Nightly    Trulicity 3 mg/0.5 mL pen injector INJECT 3MG UNDER THE SKIN ONCE A WEEK    Trulicity 1.5 mg, subcutaneous, Once Weekly    valACYclovir (Valtrex) 1 gram tablet     valACYclovir (Valtrex) 500 mg tablet 1 tablet, oral, 2 times daily, As needed for outbreaks       Last Recorded Vitals  Vitals:    09/26/24 1036   BP: 135/77   Pulse: 93   Resp: 18   Temp: 35.9 °C (96.6 °F)   SpO2: 91%            Physical Exam  Chest:         Patient is alert and oriented x3 and in a relaxed and appropriate mood. Her gait is steady and hand grasps are equal. Sclera is clear. The right breast has been removed with well-healed mastectomy incision. The tissue is soft without palpable abnormalities, discrete nodules or masses. The left nipple appears normal. There is no cervical, supraclavicular or axillary lymphadenopathy. Heart rate and rhythm normal, S1 and S2 appreciated. The lungs are clear to auscultation bilaterally. Abdomen is soft and non-tender.     Relevant Results and Imaging      Orders          Visit Diagnosis  1. Sebaceous cyst  Clinic Appointment Request Follow Up            Assessment/Plan  Breast cancer surveillance, history right breast cancer, s/p right mastectomy, " completed adjuvant chemotherapy, will start exemestane and ovarian suppression therapy, family history of breast cancer, scattered fibroglandular tissue    Plan:  Follow up with all previous appointments as scheduled.    Patient Discussion/Summary  Follow up with all previous appointments as scheduled.    You can see your health information, review clinical summaries from office visits & test results online when you follow your health with MY  Chart, a personal health record. To sign up go to [email or web address]. If you need assistance with signing up or trouble getting into your account call [employer] Patient Line 24/7 at [phone].    My office phone number is [phone] if you need to get in touch with me or have additional questions or concerns. Thank you for choosing [facility] and trusting me as your healthcare provider. I look forward to seeing you again at your next office visit. I am honored to be a provider on your health care team and I remain dedicated to helping you achieve your health goals.    Anya Grissom, APRN-CNP

## 2024-09-26 ENCOUNTER — OFFICE VISIT (OUTPATIENT)
Dept: SURGICAL ONCOLOGY | Facility: HOSPITAL | Age: 34
End: 2024-09-26
Payer: COMMERCIAL

## 2024-09-26 ENCOUNTER — OFFICE VISIT (OUTPATIENT)
Dept: BEHAVIORAL HEALTH | Facility: HOSPITAL | Age: 34
End: 2024-09-26
Payer: COMMERCIAL

## 2024-09-26 ENCOUNTER — APPOINTMENT (OUTPATIENT)
Dept: HEMATOLOGY/ONCOLOGY | Facility: HOSPITAL | Age: 34
End: 2024-09-26
Payer: COMMERCIAL

## 2024-09-26 VITALS
HEART RATE: 93 BPM | TEMPERATURE: 96.6 F | SYSTOLIC BLOOD PRESSURE: 135 MMHG | DIASTOLIC BLOOD PRESSURE: 77 MMHG | BODY MASS INDEX: 36.86 KG/M2 | OXYGEN SATURATION: 91 % | RESPIRATION RATE: 18 BRPM | WEIGHT: 257.5 LBS | HEIGHT: 70 IN

## 2024-09-26 DIAGNOSIS — F34.1 PERSISTENT DEPRESSIVE DISORDER: ICD-10-CM

## 2024-09-26 DIAGNOSIS — F41.0 ANXIETY ATTACK: ICD-10-CM

## 2024-09-26 DIAGNOSIS — F41.1 GAD (GENERALIZED ANXIETY DISORDER): ICD-10-CM

## 2024-09-26 DIAGNOSIS — C50.919 MALIGNANT NEOPLASM OF FEMALE BREAST, UNSPECIFIED ESTROGEN RECEPTOR STATUS, UNSPECIFIED LATERALITY, UNSPECIFIED SITE OF BREAST: ICD-10-CM

## 2024-09-26 DIAGNOSIS — L72.3 SEBACEOUS CYST: ICD-10-CM

## 2024-09-26 DIAGNOSIS — F41.9 ANXIETY: ICD-10-CM

## 2024-09-26 DIAGNOSIS — F33.0 MILD EPISODE OF RECURRENT MAJOR DEPRESSIVE DISORDER (CMS-HCC): ICD-10-CM

## 2024-09-26 PROCEDURE — 99213 OFFICE O/P EST LOW 20 MIN: CPT

## 2024-09-26 PROCEDURE — 1036F TOBACCO NON-USER: CPT | Performed by: PSYCHIATRY & NEUROLOGY

## 2024-09-26 PROCEDURE — 3052F HG A1C>EQUAL 8.0%<EQUAL 9.0%: CPT

## 2024-09-26 PROCEDURE — 3008F BODY MASS INDEX DOCD: CPT

## 2024-09-26 PROCEDURE — 3078F DIAST BP <80 MM HG: CPT

## 2024-09-26 PROCEDURE — 1036F TOBACCO NON-USER: CPT

## 2024-09-26 PROCEDURE — 3075F SYST BP GE 130 - 139MM HG: CPT

## 2024-09-26 PROCEDURE — 99214 OFFICE O/P EST MOD 30 MIN: CPT | Performed by: PSYCHIATRY & NEUROLOGY

## 2024-09-26 PROCEDURE — 90833 PSYTX W PT W E/M 30 MIN: CPT | Mod: AM | Performed by: PSYCHIATRY & NEUROLOGY

## 2024-09-26 PROCEDURE — 4010F ACE/ARB THERAPY RXD/TAKEN: CPT

## 2024-09-26 PROCEDURE — 90833 PSYTX W PT W E/M 30 MIN: CPT | Performed by: PSYCHIATRY & NEUROLOGY

## 2024-09-26 PROCEDURE — 4010F ACE/ARB THERAPY RXD/TAKEN: CPT | Performed by: PSYCHIATRY & NEUROLOGY

## 2024-09-26 PROCEDURE — 99214 OFFICE O/P EST MOD 30 MIN: CPT | Mod: AM | Performed by: PSYCHIATRY & NEUROLOGY

## 2024-09-26 PROCEDURE — 3052F HG A1C>EQUAL 8.0%<EQUAL 9.0%: CPT | Performed by: PSYCHIATRY & NEUROLOGY

## 2024-09-26 RX ORDER — DULOXETIN HYDROCHLORIDE 60 MG/1
120 CAPSULE, DELAYED RELEASE ORAL DAILY
Qty: 180 CAPSULE | Refills: 1 | Status: SHIPPED | OUTPATIENT
Start: 2024-09-26

## 2024-09-26 RX ORDER — CLONIDINE HYDROCHLORIDE 0.1 MG/1
0.1 TABLET ORAL NIGHTLY
Qty: 30 TABLET | Refills: 2 | Status: SHIPPED | OUTPATIENT
Start: 2024-09-26 | End: 2024-12-25

## 2024-09-26 ASSESSMENT — PAIN SCALES - GENERAL: PAINLEVEL: 0-NO PAIN
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
## 2024-09-26 NOTE — PATIENT INSTRUCTIONS
You can see your health information, review clinical summaries from office visits & test results online when you follow your health with MY  Chart, a personal health record. To sign up go to [email or web address]. If you need assistance with signing up or trouble getting into your account call [facility] Patient Line 24/7 at [phone].    My office phone number is [phone] if you need to get in touch with me or have additional questions or concerns. Thank you for choosing [facility] and trusting me as your healthcare provider. I look forward to seeing you again at your next office visit. I am honored to be a provider on your health care team and I remain dedicated to helping you achieve your health goals.

## 2024-09-27 ENCOUNTER — APPOINTMENT (OUTPATIENT)
Dept: HEMATOLOGY/ONCOLOGY | Facility: HOSPITAL | Age: 34
End: 2024-09-27
Payer: COMMERCIAL
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
## 2024-09-28 NOTE — ASSESSMENT & PLAN NOTE
S/p LSG 6 mo      Initial Wt: 294 lbs  Initial BMI: 40.3  Patient denies any N,V,F,D,CP or SOB.   Tolerating soft diet   ambulating and eliminating well  no sign of infection   taking Vitamins      imp: doing well     recs:   advance diet as advised   advance activity as advised   cont Vitamin supplementation, PPI   FU with dietitian   FU with this office in 6 mo

## 2024-09-28 NOTE — PATIENT INSTRUCTIONS
"The following are the recommendations we discussed at your appointment today:  1. Nutrition  - Please make sure you are seeing the bariatric dietitian regularly.    Dietitian Information:   Tee Hood: [phone]  [facility] [place] [phone]    Your Protein Goals will gradually increase as you get further out from surgery:  0-3 months - 60 GRAMS PER DAY  3-6 months - 60-75 GRAMS PER DAY  6-12 months - 75-90 GRAMS PER DAY  12+ months - 80-90 GRAMS PER DAY    - Follow Pouch Rules;.   Stop drinking 30 minutes before your meals, Take 30 minutes to eat your meal   - NO DRINKING DURING MEALS, Wait for 30 minutes after your meal to drink  - Eat 5 servings of fruits and veggies daily.  A serving is 1 small (tennis ball size) piece of whole fruit, 1/2 cup fresh fruit, 1 cup non starchy vegetables  - Eat 3 small meals and 1-2 healthy, protein rich, snacks per day.    EAT PROTEIN FIRST AT MEALS, 2nd non starchy vegetable or fruit serving, consume starches last and aim for whole grains of small portion.  This pattern of eating ensures you are getting full on high quality protein and higher fiber foods with many vitamins and minerals to support adequate nutrition first.      2. Exercise Recommendations:    Regular physical activity is CRITICAL for long term successful weight management.    Strive for a minimum weekly exercise goal of at least 200 minutes per week of aerobic exercise (45 minutes at least 5 days per week or 30 minutes daily)    Strength based resistance training is critical for helping to maintain and build lean body mass/muscle mass which is very important for long term health.  Having higher muscle mass is associated with better health outcomes and can help to maintain higher calorie expenditure.      Designing a Strength Program:  Select 3-4 exercises that target different major muscle groups.  - Emphasize the following movements \"pull, push, squat, hip hinge\"  \"Pull\" examples - pull up, " "bent over row or dumbbell row, pull down with weight bar or resistance band  \"Push\" examples - push up, bench press, chest flys, dips, overhead press, dumbbell bench press  \"Squat\" examples - air squat, chair squat, lunge steps, goblet squat, front squat, etc  \"Hip hinge\" examples - kettle bell swing, good morning, glute bridge, deadlift, suitcase pick ups, hip thrust    Perform two to three sets of eight to 15 repetitions of each exercise.  Try to use a resistance that feels like an \"8 out of 10\" effort, with 10 being the highest effort you can give.    To get stronger, try increasing either the weight, number of repetitions, number of sets or number of exercises every 4-8 weeks as you feel more comfortable with your current program.      3. Fluids  - Drink at least 60 oz of water every day.   - Wait 30 minutes before or after meals to drink fluids.  - Avoid carbonated beverages.    4. Vitamins  - Remember to take your multivitamins 2 times daily, once in the morning and once in the evening  - Take your calcium 2-3 times daily, at least 2 hours apart from the multivitamin - total daily dose at least 1200-1500mg per day.    - Vitamin D3 3000 units per day  - B12 - depending on your blood work and how well you absorb B12 from your multivitamin you may need additional B12 of 350-500mcg oral per day.    5. Labs  - We will check labs today and results will be communicated via  [facility] My Chart  - A copy of the results can be viewed on  My Chart.      Follow-up with Dietitian for bariatric diet optimization  Follow-up with PCP for general health questions and ongoing management of routine health concerns    Schedule Referrals placed today by calling [phone]  If lab work ordered at your visit today please complete prior to next appointment at any  lab facility.     Lab results with be communicated via  [facility] MYChart.  If you need assistance setting up your  [facility]: [phone]    Diet Pattern to support " weight loss:   Implement a structured meal plan.  Aim for 3 meals per day, focus on the health quality of foods.   Minimize snacking between meals unless experiencing hunger - reflect on why you are snacking prior to eating, this is called mindful eating & can help identify eating for hunger or for other reason (bored, emotional, etc).    Use of self monitoring practices by keeping a food log helps increase your awareness of what you are eating, calorie density of foods, accurate portion sizes, why you are eating, when you are eating, etc.  Use as an analysis tool to better understand behaviors that impact your diet.  Can be used to set goals around dietary changes.     Tracking your total daily calorie intake over the course of 2-4 weeks and comparing that to your weekly weight average can provide real time data on if the average daily calorie intake is supporting weight loss, maintenance or gain over this time frame.     Women will benefit from a calorie target of 1191-0681 per day for weight loss depending on activity level  Men will benefit from a calorie target of 5319-5002 per day for weight loss depending on activity level.     Optimizing protein & dietary fiber can help sustainability of diet changes due to improved satiety (fullness).    Protein goal (Aim for at least 0.7g protein per pound of ideal body weight to help maintain lean body mass in setting of weight loss).    For most women this will be at least approximately 80-90g+ of protein per day depending on activity levels.  For most men this will be approximately 100-120g+ of protein per day depending on activity levels    Dietary Fiber goal of 25-30g per day.   Ideally obtained through plentiful additions of vegetables, fruits, 100% whole grains, beans, lentils, etc.  A psyllium husk fiber supplement can be used to increase fiber intake and may benefit increased fullness if taken as 1 scoop of fiber powder in 8-12 oz of water 30 minutes prior to a  "meal 1-2 times per day.    FOR MEALS:   Use an 8 inch plate to help with appropriate portion size.  1/4 plate protein, ensure at least 4oz portion of protein for 25-30g protein goal per meal --> \"palm of hand size portion\".    1/2 the plate vegetables or fruit --> Daily goal is 5 or MORE servings of fruit and vegetables per DAY  1 serving = 1 cup non starchy vegetables (all veggies except corn, peas, potatoes), 1/2 cup starchy veggies (corn, peas, potatoes), 1/2 cup fruit or 1 small whole piece of fruit (tennis ball size)  Use recommend serving size for whole grain starches - typically 1/3 - 1/2 cup COOKED whole grains such as rice, beans, lentils, quinoa, etc.      FOR SNACKS:   Include protein can help better control hunger. Aim for 10-15g protein with snacks.    Be sure you are using calorie controlled snacks.  Include a fruit or vegetable serving with snacks to increase dietary fiber which helps you stay full longer between meals.      DO NOT DRINK YOUR CALORIES, choose water or zero calorie beverages - aim for 64oz or more of non carbonated, non caffeinated calorie free beverages per day.    EXERCISE GOALS:   Please track your physical activity - Goal is to increase aerobic exercise to at least 150-300 minutes per week (2.5-5 hours).  Strength training is also beneficial for weight loss, maintaining muscle mass and health improvement.    2 sessions per week of resistance training is encouraged to maintain or build muscle mass.  Increase your exercise duration and intensity over time.  Build up slowly to ensure appropriate recovery and avoid injury.      Intensification Beyond Lifestyle Management  Chronic obesity medications are FDA approved to be used as treatment intensification WITH LIFESTYLE modification.    Weight management medications help to improve appetite control and can help make it easier to maintain a reduced calorie diet to support weight loss.    Please check with your insurance prior to our " next appointment to determine if any of the following medications are covered by your insurance plan for the diagnosis of Obesity.  Other medications in similar classes may be covered for Diabetes but you need a diagnosis of Type 2 Diabetes for these medications to be covered.    Zepbound - injectable medication (weekly), helps your brain better recognize fullness signals to help you reduce calorie intake, also can help with craving reduction.  Can help to improve blood sugar and reduce cardiovascular risk.    Wegovy - injectable medication (weekly), helps your brain better recognize fullness signals to help you reduce calorie intake, also can help with craving reduction.  Can help to improve blood sugar and reduce cardiovascular risk. - Currently struggling with supply of this medication nationally.      Saxenda - injectable medication (daily), helps your brain better recognize fullness signals to help you reduce calorie intake, also can help with craving reduction.  Can help to improve blood sugar and reduce cardiovascular risk. - Currently struggling with supply of this medication nationally.    Contrave - oral (pill) medication taken twice daily, helps better regulate eating behaviors, lessens emotional & cravings triggers for eating  --> Has preferred pricing mail order program approx $99/mo through [facility] if not covered by insurance    Qsymia (Phentermine/Topiramate) - once daily oral capsule (pill) taken in the morning.  May help to reduce cravings and appetite.  This medication is a controlled substance and requires special prescribing and follow up due to this.  Does have preferred pricing mail order program approx $99/month if not covered by insurance through [facility]  program.        If appointment was not scheduled before leaving today please call [phone].  FOR VIRTUAL VISITS PLEASE BE AWARE OF THE FOLLOWING:    - You need a reliable scale to weigh yourself at home for  follow up visits  - You need a blood pressure cuff to monitor blood pressure & heart rate at home (can be purchased over the counter, on Amazon, drug store, etc).    - For virtual visits you  must be in the State of [place]  - YOU CANNOT BE DRIVING, please remember this is an appointment and should be treated the same as if you were in the office for follow up appointment.     no

## 2024-09-30 ENCOUNTER — APPOINTMENT (OUTPATIENT)
Dept: SURGERY | Facility: CLINIC | Age: 34
End: 2024-09-30
Payer: COMMERCIAL

## 2024-09-30 ENCOUNTER — APPOINTMENT (OUTPATIENT)
Dept: ENDOCRINOLOGY | Facility: CLINIC | Age: 34
End: 2024-09-30
Payer: COMMERCIAL

## 2024-09-30 ENCOUNTER — TELEPHONE (OUTPATIENT)
Dept: ENDOCRINOLOGY | Facility: CLINIC | Age: 34
End: 2024-09-30

## 2024-09-30 VITALS — HEIGHT: 70 IN | BODY MASS INDEX: 36.79 KG/M2 | WEIGHT: 257 LBS

## 2024-09-30 DIAGNOSIS — E78.2 MIXED HYPERLIPIDEMIA: ICD-10-CM

## 2024-09-30 DIAGNOSIS — Z71.3 DIETARY COUNSELING: ICD-10-CM

## 2024-09-30 DIAGNOSIS — I10 PRIMARY HYPERTENSION: ICD-10-CM

## 2024-09-30 DIAGNOSIS — Z90.3 S/P GASTRIC SLEEVE PROCEDURE: ICD-10-CM

## 2024-09-30 DIAGNOSIS — K91.2 POSTOPERATIVE MALABSORPTION (HHS-HCC): Primary | ICD-10-CM

## 2024-09-30 DIAGNOSIS — E11.9 DIABETES MELLITUS TYPE 2 WITHOUT RETINOPATHY (MULTI): ICD-10-CM

## 2024-09-30 PROCEDURE — 99401 PREV MED CNSL INDIV APPRX 15: CPT

## 2024-09-30 PROCEDURE — 4010F ACE/ARB THERAPY RXD/TAKEN: CPT

## 2024-09-30 PROCEDURE — 99401 PREV MED CNSL INDIV APPRX 15: CPT | Mod: U1

## 2024-09-30 PROCEDURE — 99213 OFFICE O/P EST LOW 20 MIN: CPT

## 2024-09-30 PROCEDURE — 99213 OFFICE O/P EST LOW 20 MIN: CPT | Mod: GT,U1,95

## 2024-09-30 PROCEDURE — 3052F HG A1C>EQUAL 8.0%<EQUAL 9.0%: CPT

## 2024-09-30 PROCEDURE — 3008F BODY MASS INDEX DOCD: CPT

## 2024-09-30 NOTE — ASSESSMENT & PLAN NOTE
- Counseled on benefits of increased regular exercise, both aerobic & resistance training.  Reviewed benefits of resistance training to enhance/maintain lean body mass.  - Counseled on dietary modifications to support weight reduction, reduced calorie diet, encourage adequate protein, increased dietary fiber from fruit and vegetable to improve appetite/satiety regulation and quality of diet.  Discussed impact of processed foods and liquid calories to weight gain & contribution to dysfunctional appetite signals.   -  Reviewed importance of intensification of lifestyle therapy in weight reduction and maintenance, set behavioral modification goals of nutrition, physical activity or behavioral practices to benefit weight reduction.    - Discussed self monitoring practices to ensure reduce calorie diet, emphasizing increased dietary protein & fiber.  Reviewed protein targets per meal as recommendation to help with satiety and lean mass maintenance.

## 2024-09-30 NOTE — TELEPHONE ENCOUNTER
Patient wants to know if she can be seen sooner then after the first of the year. She states the last she seen you was Kristal

## 2024-10-02 ENCOUNTER — LAB (OUTPATIENT)
Dept: LAB | Facility: LAB | Age: 34
End: 2024-10-02
Payer: COMMERCIAL

## 2024-10-02 ENCOUNTER — OFFICE VISIT (OUTPATIENT)
Dept: PRIMARY CARE | Facility: CLINIC | Age: 34
End: 2024-10-02
Payer: COMMERCIAL

## 2024-10-02 VITALS — DIASTOLIC BLOOD PRESSURE: 76 MMHG | TEMPERATURE: 98 F | SYSTOLIC BLOOD PRESSURE: 122 MMHG | HEART RATE: 84 BPM

## 2024-10-02 DIAGNOSIS — E11.65 UNCONTROLLED TYPE 2 DIABETES MELLITUS WITH HYPERGLYCEMIA: ICD-10-CM

## 2024-10-02 DIAGNOSIS — E11.9 DIABETES MELLITUS TYPE 2 WITHOUT RETINOPATHY (MULTI): Primary | ICD-10-CM

## 2024-10-02 DIAGNOSIS — Z01.818 PRE-OPERATIVE CLEARANCE: ICD-10-CM

## 2024-10-02 DIAGNOSIS — E11.9 DIABETES MELLITUS TYPE 2 WITHOUT RETINOPATHY (MULTI): ICD-10-CM

## 2024-10-02 DIAGNOSIS — Z01.818 PRE-OP EVALUATION: ICD-10-CM

## 2024-10-02 LAB
ALBUMIN SERPL BCP-MCNC: 5.1 G/DL (ref 3.4–5)
ALP SERPL-CCNC: 80 U/L (ref 33–110)
ALT SERPL W P-5'-P-CCNC: 36 U/L (ref 7–45)
ANION GAP SERPL CALC-SCNC: 14 MMOL/L (ref 10–20)
AST SERPL W P-5'-P-CCNC: 26 U/L (ref 9–39)
BILIRUB SERPL-MCNC: 0.5 MG/DL (ref 0–1.2)
BUN SERPL-MCNC: 19 MG/DL (ref 6–23)
CALCIUM SERPL-MCNC: 10.3 MG/DL (ref 8.6–10.6)
CHLORIDE SERPL-SCNC: 100 MMOL/L (ref 98–107)
CO2 SERPL-SCNC: 32 MMOL/L (ref 21–32)
CREAT SERPL-MCNC: 0.57 MG/DL (ref 0.5–1.05)
EGFRCR SERPLBLD CKD-EPI 2021: >90 ML/MIN/1.73M*2
ERYTHROCYTE [DISTWIDTH] IN BLOOD BY AUTOMATED COUNT: 13.4 % (ref 11.5–14.5)
GLUCOSE SERPL-MCNC: 88 MG/DL (ref 74–99)
HCT VFR BLD AUTO: 42.2 % (ref 36–46)
HGB BLD-MCNC: 13.6 G/DL (ref 12–16)
INR PPP: 1 (ref 0.9–1.1)
LDLC SERPL DIRECT ASSAY-MCNC: 104 MG/DL (ref 0–129)
MCH RBC QN AUTO: 28.9 PG (ref 26–34)
MCHC RBC AUTO-ENTMCNC: 32.2 G/DL (ref 32–36)
MCV RBC AUTO: 90 FL (ref 80–100)
NRBC BLD-RTO: 0 /100 WBCS (ref 0–0)
PLATELET # BLD AUTO: 258 X10*3/UL (ref 150–450)
POTASSIUM SERPL-SCNC: 3.9 MMOL/L (ref 3.5–5.3)
PROT SERPL-MCNC: 8.3 G/DL (ref 6.4–8.2)
PROTHROMBIN TIME: 11.4 SECONDS (ref 9.8–12.8)
RBC # BLD AUTO: 4.7 X10*6/UL (ref 4–5.2)
SODIUM SERPL-SCNC: 142 MMOL/L (ref 136–145)
TSH SERPL-ACNC: 2.01 MIU/L (ref 0.44–3.98)
WBC # BLD AUTO: 5.9 X10*3/UL (ref 4.4–11.3)

## 2024-10-02 PROCEDURE — 3074F SYST BP LT 130 MM HG: CPT | Performed by: FAMILY MEDICINE

## 2024-10-02 PROCEDURE — 1036F TOBACCO NON-USER: CPT | Performed by: FAMILY MEDICINE

## 2024-10-02 PROCEDURE — 99215 OFFICE O/P EST HI 40 MIN: CPT | Performed by: FAMILY MEDICINE

## 2024-10-02 PROCEDURE — 85610 PROTHROMBIN TIME: CPT

## 2024-10-02 PROCEDURE — 84443 ASSAY THYROID STIM HORMONE: CPT

## 2024-10-02 PROCEDURE — 4010F ACE/ARB THERAPY RXD/TAKEN: CPT | Performed by: FAMILY MEDICINE

## 2024-10-02 PROCEDURE — 85027 COMPLETE CBC AUTOMATED: CPT

## 2024-10-02 PROCEDURE — 36415 COLL VENOUS BLD VENIPUNCTURE: CPT

## 2024-10-02 PROCEDURE — 83721 ASSAY OF BLOOD LIPOPROTEIN: CPT

## 2024-10-02 PROCEDURE — 3078F DIAST BP <80 MM HG: CPT | Performed by: FAMILY MEDICINE

## 2024-10-02 PROCEDURE — 3052F HG A1C>EQUAL 8.0%<EQUAL 9.0%: CPT | Performed by: FAMILY MEDICINE

## 2024-10-02 PROCEDURE — 80053 COMPREHEN METABOLIC PANEL: CPT

## 2024-10-02 PROCEDURE — 83036 HEMOGLOBIN GLYCOSYLATED A1C: CPT

## 2024-10-02 NOTE — ASSESSMENT & PLAN NOTE
Preoperative clearance for her left breast preventive mastectomy and bilateral reconstruction  with Dr. Schroeder and Dr. Borrero.  -DM II- needs Clearance form endocrinologist. Last A1C: 7.8% (7/2024)  -HTN- controlled.   -Last stress test done 6/2023- Hypertensive response with / 92.  Cardiac clearance recommended.

## 2024-10-03 ENCOUNTER — OFFICE VISIT (OUTPATIENT)
Dept: CARDIOLOGY | Facility: HOSPITAL | Age: 34
End: 2024-10-03
Payer: COMMERCIAL

## 2024-10-03 VITALS
HEIGHT: 62 IN | DIASTOLIC BLOOD PRESSURE: 72 MMHG | HEART RATE: 92 BPM | OXYGEN SATURATION: 92 % | WEIGHT: 255.2 LBS | BODY MASS INDEX: 46.96 KG/M2 | SYSTOLIC BLOOD PRESSURE: 128 MMHG

## 2024-10-03 DIAGNOSIS — I10 PRIMARY HYPERTENSION: Primary | ICD-10-CM

## 2024-10-03 DIAGNOSIS — E11.65 UNCONTROLLED TYPE 2 DIABETES MELLITUS WITH HYPERGLYCEMIA: Primary | ICD-10-CM

## 2024-10-03 LAB
EST. AVERAGE GLUCOSE BLD GHB EST-MCNC: 166 MG/DL
HBA1C MFR BLD: 7.4 %

## 2024-10-03 PROCEDURE — 3078F DIAST BP <80 MM HG: CPT | Performed by: INTERNAL MEDICINE

## 2024-10-03 PROCEDURE — 3049F LDL-C 100-129 MG/DL: CPT | Performed by: INTERNAL MEDICINE

## 2024-10-03 PROCEDURE — 93010 ELECTROCARDIOGRAM REPORT: CPT | Performed by: INTERNAL MEDICINE

## 2024-10-03 PROCEDURE — 3051F HG A1C>EQUAL 7.0%<8.0%: CPT | Performed by: INTERNAL MEDICINE

## 2024-10-03 PROCEDURE — 4010F ACE/ARB THERAPY RXD/TAKEN: CPT | Performed by: INTERNAL MEDICINE

## 2024-10-03 PROCEDURE — 99214 OFFICE O/P EST MOD 30 MIN: CPT | Mod: 25 | Performed by: INTERNAL MEDICINE

## 2024-10-03 PROCEDURE — 3008F BODY MASS INDEX DOCD: CPT | Performed by: INTERNAL MEDICINE

## 2024-10-03 PROCEDURE — 93005 ELECTROCARDIOGRAM TRACING: CPT | Performed by: INTERNAL MEDICINE

## 2024-10-03 PROCEDURE — 1036F TOBACCO NON-USER: CPT | Performed by: INTERNAL MEDICINE

## 2024-10-03 PROCEDURE — 3074F SYST BP LT 130 MM HG: CPT | Performed by: INTERNAL MEDICINE

## 2024-10-03 NOTE — PROGRESS NOTES
"Chief Complaint:   No chief complaint on file.     History Of Present Illness:    Nia Harper is a 34 y.o. female here regarding a preoperative cardiovascular risk assessment. She has a history of diabetes mellitus type II, hypertension, hyperlipidemia, depression / anxiety, gastric sleeve, and breast cancer s/p right mastectomy. She is pending a left breast preventive mastectomy and bilateral reconstruction.    She reports doing well. She denies cardiovascular symptoms including chest discomfort, dyspnea/JORGENSEN, pnd/orthopnea, leg swelling, palpitations, pre-syncope/syncope, or claudication. She exercises regularly including a run earlier today and notes no cardiac symptoms with strenuous activities.          Last Recorded Vitals:  Vitals:    10/03/24 1331   BP: 128/72   BP Location: Left arm   Pulse: 92   SpO2: 92%   Weight: 116 kg (255 lb 3.2 oz)   Height: 1.575 m (5' 2\")             Allergies:  Metformin, Cephalexin, and Sulfamethoxazole-trimethoprim    Outpatient Medications:  Current Outpatient Medications   Medication Instructions    acetaminophen (Tylenol) 325 mg tablet TAKE 2 TABLETS (650 MG) BY MOUTH EVERY 6 HOURS IF NEEDED (PAIN/FEVER) FOR UP TO 5 DAYS.    atorvastatin (LIPITOR) 40 mg, oral, Nightly    benzoyl peroxide 5 % external wash Topical, Daily, Lather onto skin folds, leave on 2 minutes, rinse with water    blood sugar diagnostic (OneTouch Verio test strips) strip 100 strips, subcutaneous, Daily    chlorhexidine (Hibiclens) 4 % external liquid Topical, 2 times daily    clindamycin (Cleocin T) 1 % external solution Topical, Daily    clindamycin (Cleocin) 2 % vaginal cream INSERT 1 APPLICATION INTO THE VAGINA ONCE DAILY AT BEDTIME FOR 3 DAYS.    cloNIDine (CATAPRES) 0.1 mg, oral, Nightly    cyclobenzaprine (FLEXERIL) 10 mg, oral, 3 times daily PRN    doxepin 3 mg, oral, Daily    doxycycline (Vibra-Tabs) 100 mg tablet 1 TAB(S) ORALLY 2 TIMES A DAY (AVOID SUNLIGHT WHILE ON THIS MEDICATION)    " "DULoxetine (CYMBALTA) 120 mg, oral, Daily, Do not crush or chew. Take in the evening with food.    estradiol (ESTRACE) 2 g, vaginal, Daily, Use once a day for 2 weeks then as needed there after    exemestane (AROMASIN) 25 mg, oral, Daily, Take after a meal.  Try to take at the same time each day.    ezetimibe (ZETIA) 10 mg, oral, Daily    FreeStyle Cristina 2 Sensor kit Change every 14 days    FreeStyle Cristina 3 Sensor device Change every 14 days    ibuprofen 600 mg tablet TAKE 1 TABLET (600 MG) BY MOUTH EVERY 6 HOURS IF NEEDED (PAIN) FOR UP TO 5 DAYS.    insulin lispro (HUMALOG BHUPINDER KWIKPEN) 40 Units, subcutaneous, 3 times daily PRN, inject up to 20 units before meals per sliding scale    Lantus Solostar U-100 Insulin 40 Units, subcutaneous, 2 times daily    lidocaine (Lidoderm) 5 % patch PLEASE SEE ATTACHED FOR DETAILED DIRECTIONS    LORazepam (Ativan) 1 mg tablet TAKE 1 TABLET BY MOUTH ONCE DAILY AS NEEDED FOR ANXIETY    losartan (Cozaar) 25 mg tablet 1 tablet, oral, Daily    metFORMIN XR (GLUCOPHAGE-XR) 1,000 mg, oral, 2 times daily (morning and late afternoon)    OLANZapine (ZyPREXA) 5 mg tablet 1 tablet, oral, Nightly    omega-3 acid ethyl esters (LOVAZA) 2 g, oral, 2 times daily (morning and late afternoon)    ondansetron ODT (ZOFRAN-ODT) 4 mg, oral, Every 6 hours PRN    OneTouch Delica Plus Lancet 33 gauge misc TEST BLOOD GLUCOSE 3 TIMES/ DAY    OneTouch Verio Flex meter misc USE AS DIRECTED.    pen needle, diabetic (BD Ultra-Fine Mini Pen Needle) 31 gauge x 3/16\" needle 200 each, subcutaneous, 4 times daily before meals and nightly, Use 4 a day as directed    pen needle, diabetic (BD Ultra-Fine Mini Pen Needle) 31 gauge x 3/16\" needle USE 4 A DAY AS DIRECTED    ProbioMax Daily DF 30 billion cell capsule,delayed release(DR/EC) 1 capsule, oral, Daily    traZODone (DESYREL) 50 mg, oral, Nightly    valACYclovir (Valtrex) 1 gram tablet     valACYclovir (Valtrex) 500 mg tablet 1 tablet, oral, 2 times daily, As " needed for outbreaks         Physical Exam:  Gen Well nourished adult female sitting up in NAD. Body mass index is 46.68 kg/m².  Eyes sclera nonicteric. Pupils normal size and symmetric.  Neck supple with no masses or thyromegaly.  CV rrr. No m/r/g appreciated. No JVD or leg edema. No carotid bruits appreciated.  Pulm Lungs clear with normal respiratory effort.  Skin No bruises rashes or jaundice. No induration or nodules.  Psych Appropriate affect and mood. Normal judgement and insight.  Neuro Alert and conversant. Grossly nonfocal.       I reviewed the patient's ECG - NSR.      I reviewed most recent imaging / labs / and office notes as well as details of any recent hospitalizations or ED visits.    Assessment/Plan   1.  Preoperative cardiovascular risk assessment  Recent cardiac testing done last year which was overall unremarkable. Her hypertensive response is not a barrier for surgery and only warrants more aggressive evaluation and treatment of hypertension if present. She is off BP medications post weight loss with normal BP's as of late. Regardless she is considered a low risk for perioperative cardiovascular complications. No further cardiac testing. No cardiac barriers to surgery.     2. Hyperlipidemia   Last lipid profile acceptable. Her present statin dose is to continue.    3. Hypertension   BP's well controlled as above. Continued exercise encouraged.        Follow-up prn        Humberto Estes MD

## 2024-10-04 LAB
ATRIAL RATE: 92 BPM
P AXIS: 28 DEGREES
P OFFSET: 200 MS
P ONSET: 153 MS
PR INTERVAL: 138 MS
Q ONSET: 222 MS
QRS COUNT: 15 BEATS
QRS DURATION: 64 MS
QT INTERVAL: 352 MS
QTC CALCULATION(BAZETT): 435 MS
QTC FREDERICIA: 405 MS
R AXIS: 32 DEGREES
T AXIS: 82 DEGREES
T OFFSET: 398 MS
VENTRICULAR RATE: 92 BPM

## 2024-10-06 ENCOUNTER — PATIENT MESSAGE (OUTPATIENT)
Dept: BEHAVIORAL HEALTH | Facility: HOSPITAL | Age: 34
End: 2024-10-06
Payer: COMMERCIAL

## 2024-10-06 DIAGNOSIS — F41.0 ANXIETY ATTACK: ICD-10-CM

## 2024-10-06 NOTE — RESULT ENCOUNTER NOTE
Patient's blood sugar shows that her diabetes control improved.  We recommend to continue a stricter low sugar/low carbohydrate diet, weight loss and increased exercise.  We also encouraged her to stay compliant with her medications.

## 2024-10-07 ENCOUNTER — APPOINTMENT (OUTPATIENT)
Dept: ENDOCRINOLOGY | Facility: CLINIC | Age: 34
End: 2024-10-07
Payer: COMMERCIAL

## 2024-10-07 ENCOUNTER — TELEPHONE (OUTPATIENT)
Dept: PRIMARY CARE | Facility: CLINIC | Age: 34
End: 2024-10-07

## 2024-10-07 ENCOUNTER — PATIENT MESSAGE (OUTPATIENT)
Dept: SURGICAL ONCOLOGY | Facility: HOSPITAL | Age: 34
End: 2024-10-07

## 2024-10-07 VITALS
DIASTOLIC BLOOD PRESSURE: 71 MMHG | HEART RATE: 87 BPM | SYSTOLIC BLOOD PRESSURE: 106 MMHG | WEIGHT: 253 LBS | BODY MASS INDEX: 46.27 KG/M2

## 2024-10-07 DIAGNOSIS — E11.9 TYPE 2 DIABETES MELLITUS TREATED WITH INSULIN (MULTI): ICD-10-CM

## 2024-10-07 DIAGNOSIS — Z79.4 TYPE 2 DIABETES MELLITUS TREATED WITH INSULIN (MULTI): ICD-10-CM

## 2024-10-07 DIAGNOSIS — E11.9 DIABETES MELLITUS TYPE 2 WITHOUT RETINOPATHY (MULTI): ICD-10-CM

## 2024-10-07 PROCEDURE — 4010F ACE/ARB THERAPY RXD/TAKEN: CPT | Performed by: STUDENT IN AN ORGANIZED HEALTH CARE EDUCATION/TRAINING PROGRAM

## 2024-10-07 PROCEDURE — 3051F HG A1C>EQUAL 7.0%<8.0%: CPT | Performed by: STUDENT IN AN ORGANIZED HEALTH CARE EDUCATION/TRAINING PROGRAM

## 2024-10-07 PROCEDURE — 3074F SYST BP LT 130 MM HG: CPT | Performed by: STUDENT IN AN ORGANIZED HEALTH CARE EDUCATION/TRAINING PROGRAM

## 2024-10-07 PROCEDURE — 3078F DIAST BP <80 MM HG: CPT | Performed by: STUDENT IN AN ORGANIZED HEALTH CARE EDUCATION/TRAINING PROGRAM

## 2024-10-07 PROCEDURE — 3049F LDL-C 100-129 MG/DL: CPT | Performed by: STUDENT IN AN ORGANIZED HEALTH CARE EDUCATION/TRAINING PROGRAM

## 2024-10-07 PROCEDURE — 99214 OFFICE O/P EST MOD 30 MIN: CPT | Performed by: STUDENT IN AN ORGANIZED HEALTH CARE EDUCATION/TRAINING PROGRAM

## 2024-10-07 PROCEDURE — 1036F TOBACCO NON-USER: CPT | Performed by: STUDENT IN AN ORGANIZED HEALTH CARE EDUCATION/TRAINING PROGRAM

## 2024-10-07 RX ORDER — BLOOD-GLUCOSE METER
EACH MISCELLANEOUS
Qty: 100 STRIP | Refills: 1 | Status: SHIPPED | OUTPATIENT
Start: 2024-10-07

## 2024-10-07 RX ORDER — LANCETS 33 GAUGE
EACH MISCELLANEOUS
Qty: 100 EACH | Refills: 3 | Status: SHIPPED | OUTPATIENT
Start: 2024-10-07

## 2024-10-07 RX ORDER — METFORMIN HYDROCHLORIDE 500 MG/1
1000 TABLET, EXTENDED RELEASE ORAL
Qty: 360 TABLET | Refills: 1 | Status: SHIPPED | OUTPATIENT
Start: 2024-10-07

## 2024-10-07 RX ORDER — INSULIN GLARGINE 100 [IU]/ML
INJECTION, SOLUTION SUBCUTANEOUS
Qty: 30 ML | Refills: 3 | Status: SHIPPED | OUTPATIENT
Start: 2024-10-07

## 2024-10-07 RX ORDER — BLOOD-GLUCOSE SENSOR
EACH MISCELLANEOUS
Qty: 2 EACH | Refills: 11 | Status: SHIPPED | OUTPATIENT
Start: 2024-10-07

## 2024-10-07 RX ORDER — LORAZEPAM 1 MG/1
1 TABLET ORAL DAILY PRN
Qty: 30 TABLET | Refills: 0 | Status: SHIPPED | OUTPATIENT
Start: 2024-10-07 | End: 2024-11-06

## 2024-10-07 RX ORDER — INSULIN LISPRO 100 [IU]/ML
INJECTION, SOLUTION INTRAVENOUS; SUBCUTANEOUS
Qty: 15 ML | Refills: 3 | Status: SHIPPED | OUTPATIENT
Start: 2024-10-07 | End: 2024-10-09 | Stop reason: ALTCHOICE

## 2024-10-07 RX ORDER — INSULIN LISPRO 100 [IU]/ML
40 INJECTION, SOLUTION SUBCUTANEOUS 3 TIMES DAILY PRN
Qty: 15 ML | Refills: 5 | OUTPATIENT
Start: 2024-10-07

## 2024-10-07 NOTE — PATIENT INSTRUCTIONS
Stop metformin 3 days before admission, restart 3 days after discharge  Half dose of lantus night prior to sugery, and hold the morning dose on the day of surgery         Usual dose:  Metformin 1000mg twice a day with meals  Lantus 40 units in the am and 20 units in the pm  Mealtime insulin 5 units with meals plus sliding scale     Follow up in about 4 months    Yasmin Clements MD  Divison of Endocrinology   [facility]   Phone: [phone]    option 4, then option 1  Fax: [phone]

## 2024-10-07 NOTE — PROGRESS NOTES
"33 F PMH: DM2, HLD, breast CA, sleeve gastrectomy 3/2024    DM2 follow up, last seen in November of 2023     Interval: since sleeve gastrectomy lost about 50 lbs.  She is planning to have breast reconstructive surgery    Diabetes History     DM diagnosed > 5 years ago, during teenage years  Complications Micro and Macro-none known   A1c:   Lab Results   Component Value Date    HGBA1C 7.4 (H) 10/02/2024   IO A1c done today 8.6%    Regimen   Metformin 1000mg BID   Lantus 40 BID  Mealtime insulin   Sliding scale as needed, does not take prandial doses consistently only with big meals     Previously   Trulicity     SMBG   Doing fingerstick   Fasting 80-100s   Later in the day 140-160s  Freestyle jason    Diet: doing low carb     Hypoglycemia none     Comorbidities and Screening  Eye Exam: done this year, reportedly no DR  Foot exam: needs    Lipid  Lab Results   Component Value Date    CHOL 159 08/11/2023    CHOL 243 (H) 06/03/2023    CHOL 209 (H) 09/20/2022     Lab Results   Component Value Date    HDL 42.0 08/11/2023    HDL 41.2 06/03/2023    HDL 43.6 09/20/2022     No results found for: \"LDLCALC\"  Lab Results   Component Value Date    TRIG 112 08/11/2023    TRIG 122 06/03/2023    TRIG 121 09/20/2022       Statin- omega 3, atorvastatin 40   Cr and albuminuria- no CKD   Lab Results   Component Value Date    CREATININE 0.57 10/02/2024    EGFR >90 10/02/2024      ACE/ARB- none     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     Past Medical History:   Diagnosis Date    Abnormal levels of other serum enzymes 08/26/2021    Elevated liver enzymes    Anxiety     Anxiety disorder, unspecified 08/13/2019    Anxiety and depression    Body mass index (BMI)40.0-44.9, adult 06/11/2019    BMI 40.0-44.9, adult    Breast cancer (Multi)     Carpal tunnel syndrome, bilateral upper limbs 05/01/2019    Carpal tunnel syndrome on both sides    Depression, unspecified 12/29/2021    Depression    Elevated liver enzymes     Glycosuria 06/30/2014    " Glucosuria    Hx antineoplastic chemo     Irregular menstruation, unspecified 2015    Missed periods    Mixed hyperlipidemia 2018    Hyperlipemia, mixed    Morbid (severe) obesity due to excess calories (Multi) 2019    Severe obesity (BMI >= 40)    Myopia, bilateral 09/15/2017    Myopia of both eyes with astigmatism    Type 2 diabetes mellitus without complications (Multi) 2022    Diabetes    Vision loss     glasses     Family History   Problem Relation Name Age of Onset    Heart disease Father      Cancer Maternal Grandmother      Diabetes type II Maternal Grandmother      Cancer Maternal Grandfather      Cancer Paternal Grandmother      Diabetes type II Mother's Sister          uncontrolled    Breast cancer Father's Sister          two paternal 1st cousins (through paternal uncle, both sisters, both early onset, one ). two paternal great aunts (through PGF's side, both  in their 50s/60s)    Cancer Father's Sister      Cancer Father's Brother      Breast cancer Paternal Cousin          two paternal 1st cousins (through paternal uncle, both sisters, both early onset, one ). two paternal great aunts (through PGF's side, both  in their 50s/60s)    Breast cancer Cousin        Social History     Socioeconomic History    Marital status: Single     Spouse name: Not on file    Number of children: Not on file    Years of education: Not on file    Highest education level: Not on file   Occupational History    Not on file   Tobacco Use    Smoking status: Never     Passive exposure: Never    Smokeless tobacco: Never   Vaping Use    Vaping status: Never Used   Substance and Sexual Activity    Alcohol use: Never    Drug use: Not Currently     Types: Marijuana     Comment: last used 4 weeks ago    Sexual activity: Defer   Other Topics Concern    Not on file   Social History Narrative    Not on file     Social Determinants of Health     Financial Resource Strain: Patient Declined  (3/25/2024)    Overall Financial Resource Strain (CARDIA)     Difficulty of Paying Living Expenses: Patient declined   Food Insecurity: No Food Insecurity (3/25/2024)    Hunger Vital Sign     Worried About Running Out of Food in the Last Year: Never true     Ran Out of Food in the Last Year: Never true   Transportation Needs: Patient Declined (3/25/2024)    PRAPARE - Transportation     Lack of Transportation (Medical): Patient declined     Lack of Transportation (Non-Medical): Patient declined   Physical Activity: Not on file   Stress: No Stress Concern Present (3/25/2024)    [employer] [place] - Occupational Stress Questionnaire     Feeling of Stress : Only a little   Social Connections: Not on file   Intimate Partner Violence: Not At Risk (3/25/2024)    Humiliation, Afraid, Rape, and Kick questionnaire     Fear of Current or Ex-Partner: No     Emotionally Abused: No     Physically Abused: No     Sexually Abused: No   Housing Stability: Patient Declined (3/25/2024)    Housing Stability Vital Sign     Unable to Pay for Housing in the Last Year: Patient declined     Number of Places Lived in the Last Year: 1     Unstable Housing in the Last Year: Patient declined        ROS:  No polyuria or polydipsia  No abdominal pain   No nausea   Negative except those noted in current and interim history    Physical Exam  Constitutional:       Appearance: Normal appearance.   HENT:      Head: Normocephalic.   Cardiovascular:      Rate and Rhythm: Normal rate.   Abdominal:      Palpations: Abdomen is soft.   Skin:     General: Skin is warm.      Comments: No lipodystrophy   Neurological:      General: No focal deficit present.      Mental Status: She is alert and oriented to person, place, and time.   Psychiatric:         Mood and Affect: Mood normal.         Behavior: Behavior normal.          labs and imaging reviewed, pertinent findings listed on HPI and Impression      Problem List Items Addressed This  Visit       Diabetes mellitus type 2 without retinopathy (Multi)    Relevant Medications    metFORMIN  mg 24 hr tablet    insulin glargine (Lantus Solostar U-100 Insulin) 100 unit/mL (3 mL) pen    blood sugar diagnostic (OneTouch Verio test strips) strip     Other Visit Diagnoses       Type 2 diabetes mellitus treated with insulin (Multi)        Relevant Medications    FreeStyle Cristina 3 Sensor device    insulin lispro (HumaLOG KwikPen Insulin) 100 unit/mL injection    OneTouch Delica Plus Lancet 33 gauge misc          DM2 with insulin use  With improving control    Javon after sleeve gastrectomy       Will have breast reconstructive surgery, no contraindications form a diabetes standpoint, last A1c with improvement to 7.4%       Decrease insulin  Lantus 40-20  Prandial 5 units if with carb containing meals, with sliding sale #1  She wants to hold off on restarting trulicity  Continue metformin 1000mg BID     Had a pending thyroid ultrasound due to goiter on exam can discuss this at follow up    Needs foot exam next visit       Follow up in about 4 months

## 2024-10-08 NOTE — PROGRESS NOTES
Subjective :  Patient ID: Nia Harper is a 34 y.o. female surgical referral from Dr. Luis Carlos Borrero     History of Present Illness: Patient has a past medical history of gastric sleeve (3/25/24) and breast cancer, had a right partial mastectomy (6/30/23), DM (A1C was 7.4 on 10/2/24) anxiety, and depression. Plan for double mastectomy (Dr. Schroeder) and delayed HELENE reconstruction with Dr. Borrero and Dr. Cerrato.  Endocrinology saw patient on 10/7/24 and expressed no contraindications from a diabetes standpoint for breast reconstructive surgery.     BMI today is 46    Objective :  Physical Exam   Nursing note and vitals reviewed.  Constitutional  She appears well-developed and well-nourished. She does not appear to be diaphoretic. No distress.     Eyes  Pupils are equal, round, and reactive to light. System normal.     General -             Right: Right eye is negative for discharge. Right eye extraocular movements are normal.             Left: Left eye is negative for discharge. Left eye extraocular movements are normal.     Cardiovascular: Normal rate and regular rhythm.     Pulmonary/Chest  Effort normal and breath sounds normal.     Skin  Skin is warm and dry.     Psychiatric  She has a normal mood and affect. Her behavior is normal. Judgment and thought content normal.     Focused exam on the abdomen: Soft, non tender, no masses, no hernias. Adequate abdominal tissue for bilateral breast reconstruction of moderate size.     Assessment/Plan :      Patient presents as a follow up after improving HbA1c to discuss delayed right breast reconstruction and simultaneous left skin sparing mastectomy and breast reconstruction with autologous abdominal tissue. Current BMI is 46.82    We discussed reduction of BMI to 35 which will help decrease risks for wound healing issues, infection, reoperation, prolong hospital stay, and medical complications.      We discussed possible left breast mastopexy prior to her mastectomy to  preserve the nipple if oncologically sound, and to be further discussed with Dr. cShroeder.     We discussed HELENE flap procedure in detail, and perioperative course, and reviewed possible complications of HELENE flap surgery including but not limited to reaction to medication, deep vein thrombosis, pulmonary embolism, infection, seroma, bleeding and hematoma, wound healing issues, partial or total flap necrosis, wound healing issues, numbness and tingling at the chest and or abdomen, hernia or bulge at the abdomen, mesh-related complications, persistent pain, need for additional surgeries/procedures were discussed today with patient.     We discussed coordinating the surgery which will involve three surgeons, and reviewed the possibility of mastectomy first with tissue expander followed by delayed bilateral reconstruction.     Plan:  - Schedule follow up with bariatric surgery for weight management and help with reduction of BMI of 35.  - coordinate with Dr. Borrero and Dr. Schroeder for bilateral HELENE  -Reach out to Dr. Schroeder for possible mastopexy prior to mastectomy

## 2024-10-09 DIAGNOSIS — E11.65 UNCONTROLLED TYPE 2 DIABETES MELLITUS WITH HYPERGLYCEMIA: Primary | ICD-10-CM

## 2024-10-09 RX ORDER — INSULIN LISPRO 100 [IU]/ML
INJECTION, SOLUTION INTRAVENOUS; SUBCUTANEOUS
Refills: 3 | OUTPATIENT
Start: 2024-10-09

## 2024-10-09 RX ORDER — INSULIN ASPART 100 [IU]/ML
INJECTION, SOLUTION INTRAVENOUS; SUBCUTANEOUS
Qty: 15 ML | Refills: 5 | Status: SHIPPED | OUTPATIENT
Start: 2024-10-09

## 2024-10-10 ENCOUNTER — APPOINTMENT (OUTPATIENT)
Dept: HEMATOLOGY/ONCOLOGY | Facility: HOSPITAL | Age: 34
End: 2024-10-10
Payer: COMMERCIAL

## 2024-10-10 ENCOUNTER — APPOINTMENT (OUTPATIENT)
Dept: HEMATOLOGY/ONCOLOGY | Facility: CLINIC | Age: 34
End: 2024-10-10
Payer: COMMERCIAL

## 2024-10-11 ENCOUNTER — TELEPHONE (OUTPATIENT)
Dept: ADMISSION | Facility: HOSPITAL | Age: 34
End: 2024-10-11
Payer: COMMERCIAL

## 2024-10-11 ENCOUNTER — TELEMEDICINE (OUTPATIENT)
Dept: HEMATOLOGY/ONCOLOGY | Facility: HOSPITAL | Age: 34
End: 2024-10-11
Payer: COMMERCIAL

## 2024-10-11 DIAGNOSIS — F32.A DEPRESSION, UNSPECIFIED DEPRESSION TYPE: ICD-10-CM

## 2024-10-11 DIAGNOSIS — F41.1 GENERALIZED ANXIETY DISORDER: ICD-10-CM

## 2024-10-11 DIAGNOSIS — Z85.3 HISTORY OF MALIGNANT NEOPLASM OF BREAST: Primary | ICD-10-CM

## 2024-10-11 DIAGNOSIS — Z51.5 ENCOUNTER FOR PALLIATIVE CARE: ICD-10-CM

## 2024-10-11 NOTE — PROGRESS NOTES
Patient ID: Nia Harper is a 34 y.o. female.    Cancer History:          Breast         AJCC Edition: 8th (AJCC), Diagnosis Date: Jun 2023, IB, pT2 pN0 cM0 G3     Treatment Synopsis:    Treatment Synopsis:   -Enlarging right breast lumps.  -5/15/23: Dx MG/US demonstrated multiple masses: 1.1 cm (11:00, 8 cm FN); 1 cm (9:30, 5 cm FN); 1.2 cm (9:00, 6 cm FN); 0.7 cm (10:00, 6 cm FN); 0.4 cm (10:00, 5 cm FN). Four lymph nodes demonstrated suspicious cortical thickening.  -5/19/23: US-guided CNB of the 11:00 mass showed IDC grade 3; ER >95% WA 85% Her2-negative (1+ IHC). Biopsy of the 9:30 mass showed IDC grade 2-3; ER 80% WA 10% Her2-negative (1+ IHC). A right axillary LN was negative for carcinoma.  -6/30/23: Right mastectomy and SLNBx performed. Multipe foci of grade 3 carcinoma noted (3.1 cm, 2.2 cm, 1.8 cm), no LVI, margins negative, 0/3 LN’s involved IB, pT2 pN0 cM0 G3  Oncotype Dx RS 45 (33% 9-year recurrence risk; >15% chemo benefit).  RSClin: 59% recurrence risk with tamoxifen; 44% chemo benefit.  -8/24/23: Adjuvant TC (docetaxel, cyclophosphamide) initiated. First cycle complicated by admission for nausea/vomiting. Second cycle complicated by admission for angioedema, hives; thought to be secondary to docetaxel.   -10/16/23 Therapy changed to Cyclophosphamide/Methotrexate/5-Flouracil d/t reaction x 2 cycles   -11/09/23 C2/2 CMF -- last dose of systemic chemotherapy     Subjective    Patient returns today in follow up regarding issues unique to being a young person with cancer.    Overall relays doing well. Physically she feels improved off anastrazole, recently changed to exemestane and feels she is tolerating this better. Notes improved vaginal dryness with change. Sexual dysfunction improved, continues to use daily vaginal replens, has topical estrogen but not using.     Continues to exercise daily, notes she feels physically better with exercise, improved energy and sleep.     Still experiencing hot  "flashes \"1000x\" per day, with duloxetine improved but still significant and bothersome. Impacts her ability to stay asleep.     Mood is fluctuant, notes more good days than bad days. Trying to take time to herself. Following with Dr. Kent for medication management - continues on duloxetine, lorazepam PRN, and to initiate new medication clonidine for hot flashes/sleep. Would like to engage with 1:1 therapy but wants to get through mastectomy/surgical reconstruction before adding other medical appointments.     Received clearance from endocrinology for surgery - awaiting date from surg-onc.      Social History: Lives in [place] with partner/father of her children Dinesh, 11 y.o. daughter Lambert, 5 y.o. Florecita starting , Gal 3 y.o. son special needs  Previously worked as , currently stays at home with children/runs household  Denies tobacco, does smoke marijuana nightly, rare ETOH estimates 6x/year, no vaping or other recreational drug use.    Objective    BSA: There is no height or weight on file to calculate BSA.  There were no vitals taken for this visit.     Current Outpatient Medications   Medication Instructions    acetaminophen (Tylenol) 325 mg tablet TAKE 2 TABLETS (650 MG) BY MOUTH EVERY 6 HOURS IF NEEDED (PAIN/FEVER) FOR UP TO 5 DAYS.    atorvastatin (LIPITOR) 40 mg, oral, Nightly    blood sugar diagnostic (OneTouch Verio test strips) strip For BG check up to 4x/day for CGM failure    chlorhexidine (Hibiclens) 4 % external liquid Topical, 2 times daily    clindamycin (Cleocin T) 1 % external solution Topical, Daily    clindamycin (Cleocin) 2 % vaginal cream INSERT 1 APPLICATION INTO THE VAGINA ONCE DAILY AT BEDTIME FOR 3 DAYS.    cloNIDine (CATAPRES) 0.1 mg, oral, Nightly    cyclobenzaprine (FLEXERIL) 10 mg, oral, 3 times daily PRN    doxepin 3 mg, oral, Daily    doxycycline (Vibra-Tabs) 100 mg tablet 1 TAB(S) ORALLY 2 TIMES A DAY (AVOID SUNLIGHT WHILE ON THIS " "MEDICATION)    DULoxetine (CYMBALTA) 120 mg, oral, Daily, Do not crush or chew. Take in the evening with food.    estradiol (ESTRACE) 2 g, vaginal, Daily, Use once a day for 2 weeks then as needed there after    exemestane (AROMASIN) 25 mg, oral, Daily, Take after a meal.  Try to take at the same time each day.    ezetimibe (ZETIA) 10 mg, oral, Daily    FreeStyle Cristina 2 Sensor kit Change every 14 days    FreeStyle Cristina 3 Sensor device Change every 14 days    ibuprofen 600 mg tablet TAKE 1 TABLET (600 MG) BY MOUTH EVERY 6 HOURS IF NEEDED (PAIN) FOR UP TO 5 DAYS.    insulin aspart (NovoLOG) 100 unit/mL (3 mL) pen 5 units with meals plus sliding scale up to 25 units daily    insulin glargine (Lantus Solostar U-100 Insulin) 100 unit/mL (3 mL) pen 40 units in the am, 20 units in the PM    lidocaine (Lidoderm) 5 % patch PLEASE SEE ATTACHED FOR DETAILED DIRECTIONS    LORazepam (Ativan) 1 mg tablet TAKE 1 TABLET BY MOUTH ONCE DAILY AS NEEDED FOR ANXIETY    losartan (Cozaar) 25 mg tablet 1 tablet, oral, Daily    metFORMIN XR (GLUCOPHAGE-XR) 1,000 mg, oral, 2 times daily (morning and late afternoon)    OLANZapine (ZyPREXA) 5 mg tablet 1 tablet, oral, Nightly    omega-3 acid ethyl esters (LOVAZA) 2 g, oral, 2 times daily (morning and late afternoon)    ondansetron ODT (ZOFRAN-ODT) 4 mg, oral, Every 6 hours PRN    OneTouch Delica Plus Lancet 33 gauge misc For BG check up to 4x/day for CGM failure    OneTouch Verio Flex meter misc USE AS DIRECTED.    pen needle, diabetic (BD Ultra-Fine Mini Pen Needle) 31 gauge x 3/16\" needle 200 each, subcutaneous, 4 times daily before meals and nightly, Use 4 a day as directed    pen needle, diabetic (BD Ultra-Fine Mini Pen Needle) 31 gauge x 3/16\" needle USE 4 A DAY AS DIRECTED    ProbioMax Daily DF 30 billion cell capsule,delayed release(DR/EC) 1 capsule, oral, Daily    traZODone (DESYREL) 50 mg, oral, Nightly    valACYclovir (Valtrex) 1 gram tablet     valACYclovir (Valtrex) 500 mg tablet " 1 tablet, oral, 2 times daily, As needed for outbreaks     No visits with results within 1 Week(s) from this visit.   Latest known visit with results is:   Office Visit on 10/03/2024   Component Date Value Ref Range Status    Ventricular Rate 10/03/2024 92  BPM Final    Atrial Rate 10/03/2024 92  BPM Final    NM Interval 10/03/2024 138  ms Final    QRS Duration 10/03/2024 64  ms Final    QT Interval 10/03/2024 352  ms Final    QTC Calculation(Bazett) 10/03/2024 435  ms Final    P Axis 10/03/2024 28  degrees Final    R Axis 10/03/2024 32  degrees Final    T Axis 10/03/2024 82  degrees Final    QRS Count 10/03/2024 15  beats Final    Q Onset 10/03/2024 222  ms Final    P Onset 10/03/2024 153  ms Final    P Offset 10/03/2024 200  ms Final    T Offset 10/03/2024 398  ms Final    QTC Fredericia 10/03/2024 405  ms Final      Physical Exam  Constitutional:       General: She is not in acute distress.     Appearance: Normal appearance. She is normal weight. She is not ill-appearing.   Neurological:      General: No focal deficit present.      Mental Status: She is alert and oriented to person, place, and time.   Psychiatric:         Mood and Affect: Mood normal.         Behavior: Behavior normal.         Thought Content: Thought content normal.         Judgment: Judgment normal.     Performance Status:  Symptomatic; fully ambulatory    Assessment/Plan   Nia Harper is a 34 y.o. AA F with a recent diagnosis of G3 IB adenocarcinoma of the breast, ER+. S/p surgery followed by adjuvant chemotherapy with TC x 2 cycles but with angioedema so switched to CMF x 2 additional cycles. Currently on anastrazole and goserelin for endocrine therapy.     #Psychosocial:  - Now following with psychiatry for medication management - currently on duloxetine, lorazepam, and clonidine  - Have previously referred to psychology - discussed re-evaluating 1:1 therapy after reconstruction/mastectomy  - Connected with child-life to help with  adjustment/parenting  - Receiving monthly young adult social programming invitations    #Hot Flashes: secondary to ovarian suppression and low circulating estrogen  - Improved with initiation of duloxetine    #Sexual Dysfunction/Contraception: secondary to ovarian suppression and low circulating estrogen  - Provided patient with sexual concerns handout, encouraged open communication outside of intimacy to discuss patient decreased interest/sexual dysfunction with partner  - Use replens daily topical vaginal moisturizer  - Use silicone based lubricant with sex  - Has topical estrogen suppositories/applicator but has yet to trial  - We discussed the seriousness of getting pregnant while receiving exemestane due to the harmful effects this could have on the  fetus, and on her cancer treatment given the decreased availability of medical  services.   - Options for contraception include intrauterine devices, estrogen containing contraceptives are contraindicated d/t ER+ status -- provided patient with comparison chart of contraception options  - Following with gynecology    #Practical Needs:  - Following with [facility] for financial assistance grants and support  - Has referral in place to [employer] for food insecurity    #Diet/Exercise/Healthy Lifestyle:  - Discussed research demonstrating consumption of a healthy, plant based diet not only decreases cancer risk, but also has been found to improve survival in cancer patients who partake in a healthy diet.   - We reviewed the American Cancer Society guidelines for a healthy diet including mostly plant based foods  with incorporation of plant/lean animal proteins and healthy fats such as the mediterranean diet.   - Patient following bariatric surgery diet  - Discussed research that demonstrates decreased cancer risk and improved survival in cancer patients who exercise and stay active throughout diagnosis and treatment.  - Encouraged patient to continue  exercise >150min/week  - Encouraged patient to continue to abstain from alcohol, tobacco, and recreational drugs    #Risk for infertility:  - Previously discussed the damaging effect cancer treatment can have on the ovaries.   - Previously discussed natural age related decline in fertility and menopause that occurs as a result of ovarian aging, and that cancer treatments can accelerate that progression and diminish ovarian reserve.  - Individuals may experience varying degrees of short or long term ovarian dysfunction and amenorrhea, and that this is dependent upon type of cancer treatment, cumulative dose, and patients age.   - Loss of ovarian function may be permanent or temporary.  - Amenorrhea may be temporary or permanent -- temporary amenorrhea results from destruction of maturing follicles whereas permanent amenorrhea results from depletion of viable primordial follicles.  - Risk to fertility is INTERMEDIATE based on cancer treatment of Docetaxel/Cyclophosphamide  with cyclophosphamide being the primary  of infertility -- this is dose dependent  - Patient elected not to undergo fertility preservation up front and opted for ovarian suppression with goserelin  - Baseline AMH level drawn 08/11/2023 2.464  - Repeat AMH at one year out from therapy 11/2024  - Discussed plan for at least 18 months on oral endocrine therapy followed by a 3 month wash out before she should attempt to conceive     Follow up visit in 3 months      Cancer Staging   Breast cancer (Multi)  Staging form: Breast, AJCC 8th Edition  - Pathologic stage from 6/30/2023: Stage IB (pT2(3), pN0(sn), cM0, G3, ER+, VA+, HER2-, Oncotype DX score: 45) - Signed by Jeramie Lancaster MD on 10/10/2023      Oncology History   Breast cancer (Multi)   6/30/2023 Cancer Staged    Staging form: Breast, AJCC 8th Edition, Pathologic stage from 6/30/2023: Stage IB (pT2(3), pN0(sn), cM0, G3, ER+, VA+, HER2-, Oncotype DX score: 45) - Signed by Jeramie Lancaster MD  on 10/10/2023     8/24/2023 - 9/15/2023 Chemotherapy    TC (DOCEtaxel / Cyclophosphamide), 21 Day Cycles     9/8/2023 Initial Diagnosis    Malignant neoplasm of female breast (CMS/HCC)     10/16/2023 - 11/9/2023 Chemotherapy    CMF (Cyclophosphamide / Methotrexate / Fluorouracil) 21 Day Cycles                        Sanjuana Anand, APRN-CNP

## 2024-10-11 NOTE — TELEPHONE ENCOUNTER
Nia called nurse line and asked for her in clinic appointment with Sanjuana Anand to be changed to virtual. I secured chat Sanjuana and she did approve this being changed to virtual. I will change in system.

## 2024-10-11 NOTE — TELEPHONE ENCOUNTER
I called Nia and left V/M on cell that her appointment today was approved to be a virtual appointment and a link should be sent. If no link is sent and she isn't connected Sanjuana Anand will send her a link at time of visit.

## 2024-10-14 ENCOUNTER — APPOINTMENT (OUTPATIENT)
Dept: PLASTIC SURGERY | Facility: CLINIC | Age: 34
End: 2024-10-14
Payer: COMMERCIAL

## 2024-10-14 VITALS
DIASTOLIC BLOOD PRESSURE: 84 MMHG | HEIGHT: 62 IN | HEART RATE: 80 BPM | SYSTOLIC BLOOD PRESSURE: 131 MMHG | BODY MASS INDEX: 47.11 KG/M2 | WEIGHT: 256 LBS

## 2024-10-14 DIAGNOSIS — Z90.3 S/P GASTRIC SLEEVE PROCEDURE: ICD-10-CM

## 2024-10-14 DIAGNOSIS — Z71.89 ENCOUNTER TO DISCUSS BREAST RECONSTRUCTION: ICD-10-CM

## 2024-10-14 DIAGNOSIS — Z90.11 ACQUIRED ABSENCE OF BREAST AND ABSENT NIPPLE, RIGHT: Primary | ICD-10-CM

## 2024-10-14 DIAGNOSIS — E66.01 SEVERE OBESITY (MULTI): ICD-10-CM

## 2024-10-16 RX ORDER — HEPARIN SODIUM,PORCINE/PF 10 UNIT/ML
50 SYRINGE (ML) INTRAVENOUS AS NEEDED
OUTPATIENT
Start: 2024-10-16

## 2024-10-16 RX ORDER — HEPARIN 100 UNIT/ML
500 SYRINGE INTRAVENOUS AS NEEDED
OUTPATIENT
Start: 2024-10-16

## 2024-10-17 ENCOUNTER — INFUSION (OUTPATIENT)
Dept: HEMATOLOGY/ONCOLOGY | Facility: HOSPITAL | Age: 34
End: 2024-10-17
Payer: COMMERCIAL

## 2024-10-17 ENCOUNTER — APPOINTMENT (OUTPATIENT)
Dept: HEMATOLOGY/ONCOLOGY | Facility: CLINIC | Age: 34
End: 2024-10-17
Payer: COMMERCIAL

## 2024-10-17 VITALS
DIASTOLIC BLOOD PRESSURE: 68 MMHG | OXYGEN SATURATION: 99 % | RESPIRATION RATE: 18 BRPM | TEMPERATURE: 96.8 F | SYSTOLIC BLOOD PRESSURE: 106 MMHG

## 2024-10-17 DIAGNOSIS — C50.919 MALIGNANT NEOPLASM OF FEMALE BREAST, UNSPECIFIED ESTROGEN RECEPTOR STATUS, UNSPECIFIED LATERALITY, UNSPECIFIED SITE OF BREAST: ICD-10-CM

## 2024-10-17 DIAGNOSIS — Z90.11 ACQUIRED ABSENCE OF BREAST AND ABSENT NIPPLE, RIGHT: Primary | ICD-10-CM

## 2024-10-17 DIAGNOSIS — Z17.0 MALIGNANT NEOPLASM OF RIGHT BREAST IN FEMALE, ESTROGEN RECEPTOR POSITIVE, UNSPECIFIED SITE OF BREAST: ICD-10-CM

## 2024-10-17 DIAGNOSIS — C50.911 MALIGNANT NEOPLASM OF RIGHT BREAST IN FEMALE, ESTROGEN RECEPTOR POSITIVE, UNSPECIFIED SITE OF BREAST: ICD-10-CM

## 2024-10-17 PROCEDURE — 96372 THER/PROPH/DIAG INJ SC/IM: CPT

## 2024-10-17 PROCEDURE — 96402 CHEMO HORMON ANTINEOPL SQ/IM: CPT

## 2024-10-17 PROCEDURE — 2500000004 HC RX 250 GENERAL PHARMACY W/ HCPCS (ALT 636 FOR OP/ED): Mod: JZ

## 2024-10-17 RX ORDER — LIDOCAINE HYDROCHLORIDE 10 MG/ML
2 INJECTION, SOLUTION EPIDURAL; INFILTRATION; INTRACAUDAL; PERINEURAL ONCE
OUTPATIENT
Start: 2024-11-07

## 2024-10-17 RX ORDER — ALBUTEROL SULFATE 0.83 MG/ML
3 SOLUTION RESPIRATORY (INHALATION) AS NEEDED
Status: DISCONTINUED | OUTPATIENT
Start: 2024-10-17 | End: 2024-10-17 | Stop reason: HOSPADM

## 2024-10-17 RX ORDER — FAMOTIDINE 10 MG/ML
20 INJECTION INTRAVENOUS ONCE AS NEEDED
Status: DISCONTINUED | OUTPATIENT
Start: 2024-10-17 | End: 2024-10-17 | Stop reason: HOSPADM

## 2024-10-17 RX ORDER — EPINEPHRINE 0.3 MG/.3ML
0.3 INJECTION SUBCUTANEOUS EVERY 5 MIN PRN
OUTPATIENT
Start: 2024-11-07

## 2024-10-17 RX ORDER — DIPHENHYDRAMINE HYDROCHLORIDE 50 MG/ML
50 INJECTION INTRAMUSCULAR; INTRAVENOUS AS NEEDED
OUTPATIENT
Start: 2024-11-07

## 2024-10-17 RX ORDER — DIPHENHYDRAMINE HYDROCHLORIDE 50 MG/ML
50 INJECTION INTRAMUSCULAR; INTRAVENOUS AS NEEDED
Status: DISCONTINUED | OUTPATIENT
Start: 2024-10-17 | End: 2024-10-17 | Stop reason: HOSPADM

## 2024-10-17 RX ORDER — FAMOTIDINE 10 MG/ML
20 INJECTION INTRAVENOUS ONCE AS NEEDED
OUTPATIENT
Start: 2024-11-07

## 2024-10-17 RX ORDER — LIDOCAINE HYDROCHLORIDE 10 MG/ML
2 INJECTION, SOLUTION EPIDURAL; INFILTRATION; INTRACAUDAL; PERINEURAL ONCE
Status: COMPLETED | OUTPATIENT
Start: 2024-10-17 | End: 2024-10-17

## 2024-10-17 RX ORDER — ALBUTEROL SULFATE 0.83 MG/ML
3 SOLUTION RESPIRATORY (INHALATION) AS NEEDED
OUTPATIENT
Start: 2024-11-07

## 2024-10-17 RX ORDER — EPINEPHRINE 0.3 MG/.3ML
0.3 INJECTION SUBCUTANEOUS EVERY 5 MIN PRN
Status: DISCONTINUED | OUTPATIENT
Start: 2024-10-17 | End: 2024-10-17 | Stop reason: HOSPADM

## 2024-10-18 ENCOUNTER — TELEPHONE (OUTPATIENT)
Dept: PLASTIC SURGERY | Facility: CLINIC | Age: 34
End: 2024-10-18
Payer: COMMERCIAL

## 2024-10-18 NOTE — TELEPHONE ENCOUNTER
Left message for patient to call our office back to discuss the plan of care and to also reach out to Dr. Borrero's office for a follow up appointment.

## 2024-10-21 ENCOUNTER — PATIENT MESSAGE (OUTPATIENT)
Dept: SURGERY | Facility: CLINIC | Age: 34
End: 2024-10-21
Payer: COMMERCIAL

## 2024-10-27 PROBLEM — Z71.3 DIETARY COUNSELING: Status: ACTIVE | Noted: 2024-10-27

## 2024-10-28 ENCOUNTER — TELEMEDICINE (OUTPATIENT)
Dept: SURGERY | Facility: CLINIC | Age: 34
End: 2024-10-28
Payer: COMMERCIAL

## 2024-10-28 VITALS — HEIGHT: 62 IN | WEIGHT: 252 LBS | BODY MASS INDEX: 46.38 KG/M2

## 2024-10-28 DIAGNOSIS — Z71.3 DIETARY COUNSELING: ICD-10-CM

## 2024-10-28 DIAGNOSIS — K91.2 POSTOPERATIVE MALABSORPTION (HHS-HCC): ICD-10-CM

## 2024-10-28 DIAGNOSIS — E78.1 HYPERTRIGLYCERIDEMIA: ICD-10-CM

## 2024-10-28 DIAGNOSIS — E11.65 UNCONTROLLED TYPE 2 DIABETES MELLITUS WITH HYPERGLYCEMIA: Primary | ICD-10-CM

## 2024-10-28 DIAGNOSIS — I10 PRIMARY HYPERTENSION: ICD-10-CM

## 2024-10-28 DIAGNOSIS — E66.01 OBESITY, CLASS III, BMI 40-49.9 (MORBID OBESITY) (MULTI): ICD-10-CM

## 2024-10-28 DIAGNOSIS — Z90.3 S/P GASTRIC SLEEVE PROCEDURE: ICD-10-CM

## 2024-10-28 PROCEDURE — 3008F BODY MASS INDEX DOCD: CPT

## 2024-10-28 PROCEDURE — 99214 OFFICE O/P EST MOD 30 MIN: CPT | Mod: GT,U1,95

## 2024-10-28 PROCEDURE — 4010F ACE/ARB THERAPY RXD/TAKEN: CPT

## 2024-10-28 PROCEDURE — 3051F HG A1C>EQUAL 7.0%<8.0%: CPT

## 2024-10-28 PROCEDURE — 99214 OFFICE O/P EST MOD 30 MIN: CPT

## 2024-10-28 PROCEDURE — 3049F LDL-C 100-129 MG/DL: CPT

## 2024-10-28 RX ORDER — ONDANSETRON 4 MG/1
4 TABLET, ORALLY DISINTEGRATING ORAL EVERY 8 HOURS PRN
Qty: 20 TABLET | Refills: 0 | Status: SHIPPED | OUTPATIENT
Start: 2024-10-28 | End: 2024-11-04

## 2024-10-28 RX ORDER — TIRZEPATIDE 2.5 MG/.5ML
2.5 INJECTION, SOLUTION SUBCUTANEOUS
Qty: 2 ML | Refills: 0 | Status: SHIPPED | OUTPATIENT
Start: 2024-10-28

## 2024-11-04 ENCOUNTER — APPOINTMENT (OUTPATIENT)
Dept: PLASTIC SURGERY | Facility: CLINIC | Age: 34
End: 2024-11-04
Payer: COMMERCIAL

## 2024-11-04 VITALS
SYSTOLIC BLOOD PRESSURE: 127 MMHG | DIASTOLIC BLOOD PRESSURE: 78 MMHG | BODY MASS INDEX: 36.51 KG/M2 | WEIGHT: 255 LBS | HEIGHT: 70 IN | HEART RATE: 91 BPM

## 2024-11-04 DIAGNOSIS — Z90.11 ACQUIRED ABSENCE OF BREAST AND ABSENT NIPPLE, RIGHT: Primary | ICD-10-CM

## 2024-11-04 NOTE — PROGRESS NOTES
Department of Plastic and Reconstructive Surgery            FUV    Date: 11/4/2024        Yoko Harper is a 34 y.o. female who was originally seen in June 2023 biopsy-proven invasive ductal carcinoma right breast at multiple locations.    At that time, her BMI was 43, the patient was instructed she was not a candidate for immediate reconstruction.  On 6/30/2023 she underwent right partial mastectomy with sentinel lymph node biopsy.  This was performed by Dr. Tamir Henderson.  She underwent chemo docetaxel/cyclophosphamide with ongoing goserelin (started 8/18/23)   She underwent laparoscopic sleeve gastrectomy, with Dr. Ervin on 3/25/2024.    Her maximum weight was approximately 300 pounds.  Current weight is 262 pounds, her goal is to be close to 220    We previously met to discuss delayed reconstructive options including possible delayed contralateral prophylactic mastectomy.  She was interested in free HELENE flap, we discussed that weight loss may be optimal.  When we last saw her her weight was 266 pounds, today her weight is 256 pounds    Objective    There were no vitals filed for this visit.      Gen: interactive and pleasant  Head: NCAT  Eyes: EOMI, PERRLA  Mouth: MMM  Throat: trachea midline  Cor: RRR  Pulm: nonlabored breathing  Abd: s/nt/nd  Neuro: AAOx3  Ext: extremities perfused    Body mass index is 46.09 kg/m².      Focused exam of the:   Breast, sternal notch: NAC = 34 cm; NAC = 9 cm, breast width 18 cm  Breast absent with excess skin, and lateral dogear breast width 18 cm    Assessment/Plan       Nia is a 34 y.o. female who presents to discuss delayed breast reconstruction, and also possibly body contouring.     She is now further out from her gastric sleeve surgery.  And is slowly losing weight to obtain a more optimal BMI.  She has considered our previous discussion, and is interested in moving forward with a contralateral prophylactic mastectomy and bilateral free  HELENE flaps.    We have been following her for awhile to get her to delayed HELENE (with CPM).  She has been in the ether with a BMI of 46, but today I found out that she has been recorded at 5'2” for the last year when her actual height is 5'10”.  Obviously , this drastically changes her BMI to 36. She weighs 255 still.  He goal weight is 220. to be BMI 35, she needs to get to 243.    So, she is a lot closer than we thought. I recommend she do the surgery at goal weight to 220    To that end I will have her engage with Dr. Schroeder for discussion regarding prophylactic mastectomy.  And I will have her meet with our partner Dr. Nieto to discuss timing of bilateral Helene flap surgery.  We may be able to perform both procedures at the same time, however given for surgeon availability and may be more efficient for her to have her mastectomy, followed by elective bilateral Helene.    Body mass index is 36.59 kg/m².     Plan:   Continue weight loss  May move forward with Alexandre Wilburn, and myself    I spent 30 minutes with this patient. Greater than 50% of this time was spent in the counselling and/or coordination of care of this patient.  This note was created using voice recognition software and was not corrected for typographical or grammatical errors.    Signature: Luis Carlos Borrero MD   Date: 11/4/2024

## 2024-11-07 ENCOUNTER — TELEPHONE (OUTPATIENT)
Dept: SURGERY | Facility: CLINIC | Age: 34
End: 2024-11-07

## 2024-11-07 ENCOUNTER — INFUSION (OUTPATIENT)
Dept: HEMATOLOGY/ONCOLOGY | Facility: HOSPITAL | Age: 34
End: 2024-11-07
Payer: COMMERCIAL

## 2024-11-07 ENCOUNTER — APPOINTMENT (OUTPATIENT)
Dept: HEMATOLOGY/ONCOLOGY | Facility: CLINIC | Age: 34
End: 2024-11-07
Payer: COMMERCIAL

## 2024-11-07 VITALS — RESPIRATION RATE: 18 BRPM | DIASTOLIC BLOOD PRESSURE: 61 MMHG | SYSTOLIC BLOOD PRESSURE: 120 MMHG

## 2024-11-07 DIAGNOSIS — Z17.0 MALIGNANT NEOPLASM OF RIGHT BREAST IN FEMALE, ESTROGEN RECEPTOR POSITIVE, UNSPECIFIED SITE OF BREAST: ICD-10-CM

## 2024-11-07 DIAGNOSIS — C50.919 MALIGNANT NEOPLASM OF FEMALE BREAST, UNSPECIFIED ESTROGEN RECEPTOR STATUS, UNSPECIFIED LATERALITY, UNSPECIFIED SITE OF BREAST: ICD-10-CM

## 2024-11-07 DIAGNOSIS — C50.911 MALIGNANT NEOPLASM OF RIGHT BREAST IN FEMALE, ESTROGEN RECEPTOR POSITIVE, UNSPECIFIED SITE OF BREAST: ICD-10-CM

## 2024-11-07 DIAGNOSIS — E66.01 OBESITY, CLASS III, BMI 40-49.9 (MORBID OBESITY) (MULTI): Primary | ICD-10-CM

## 2024-11-07 PROCEDURE — 2500000004 HC RX 250 GENERAL PHARMACY W/ HCPCS (ALT 636 FOR OP/ED): Mod: JZ

## 2024-11-07 RX ORDER — FAMOTIDINE 10 MG/ML
20 INJECTION INTRAVENOUS ONCE AS NEEDED
OUTPATIENT
Start: 2024-11-14

## 2024-11-07 RX ORDER — EPINEPHRINE 0.3 MG/.3ML
0.3 INJECTION SUBCUTANEOUS EVERY 5 MIN PRN
Status: DISCONTINUED | OUTPATIENT
Start: 2024-11-07 | End: 2024-11-07 | Stop reason: HOSPADM

## 2024-11-07 RX ORDER — LIDOCAINE HYDROCHLORIDE 10 MG/ML
2 INJECTION, SOLUTION EPIDURAL; INFILTRATION; INTRACAUDAL; PERINEURAL ONCE
OUTPATIENT
Start: 2024-11-14

## 2024-11-07 RX ORDER — ALBUTEROL SULFATE 0.83 MG/ML
3 SOLUTION RESPIRATORY (INHALATION) AS NEEDED
Status: DISCONTINUED | OUTPATIENT
Start: 2024-11-07 | End: 2024-11-07 | Stop reason: HOSPADM

## 2024-11-07 RX ORDER — PHENTERMINE HYDROCHLORIDE 37.5 MG/1
37.5 TABLET ORAL
Qty: 30 TABLET | Refills: 0 | Status: SHIPPED | OUTPATIENT
Start: 2024-11-07 | End: 2024-12-07

## 2024-11-07 RX ORDER — FAMOTIDINE 10 MG/ML
20 INJECTION INTRAVENOUS ONCE AS NEEDED
Status: DISCONTINUED | OUTPATIENT
Start: 2024-11-07 | End: 2024-11-07 | Stop reason: HOSPADM

## 2024-11-07 RX ORDER — DIPHENHYDRAMINE HYDROCHLORIDE 50 MG/ML
50 INJECTION INTRAMUSCULAR; INTRAVENOUS AS NEEDED
OUTPATIENT
Start: 2024-11-14

## 2024-11-07 RX ORDER — EPINEPHRINE 0.3 MG/.3ML
0.3 INJECTION SUBCUTANEOUS EVERY 5 MIN PRN
OUTPATIENT
Start: 2024-11-14

## 2024-11-07 RX ORDER — LIDOCAINE HYDROCHLORIDE 10 MG/ML
2 INJECTION, SOLUTION EPIDURAL; INFILTRATION; INTRACAUDAL; PERINEURAL ONCE
Status: COMPLETED | OUTPATIENT
Start: 2024-11-07 | End: 2024-11-07

## 2024-11-07 RX ORDER — DIPHENHYDRAMINE HYDROCHLORIDE 50 MG/ML
50 INJECTION INTRAMUSCULAR; INTRAVENOUS AS NEEDED
Status: DISCONTINUED | OUTPATIENT
Start: 2024-11-07 | End: 2024-11-07 | Stop reason: HOSPADM

## 2024-11-07 RX ORDER — TOPIRAMATE 50 MG/1
TABLET, FILM COATED ORAL
Qty: 60 TABLET | Refills: 2 | Status: SHIPPED | OUTPATIENT
Start: 2024-11-07

## 2024-11-07 RX ORDER — ALBUTEROL SULFATE 0.83 MG/ML
3 SOLUTION RESPIRATORY (INHALATION) AS NEEDED
OUTPATIENT
Start: 2024-11-14

## 2024-11-07 NOTE — PROGRESS NOTES
Nia Harper is a 34 y.o. female who presents in stable condition for Zoladex injection    Since her last visit, she has been doing well.  Overall, she states her energy level is stable.  Her appetite & hydration status has been good.      Provider gave an okay to treat a week early for today.     Patient tolerated subcutaneous injection to her right lower abdomen well and has been educated with the overall therapy plan. She has no other questions or concerns at this time. AVS & future appointment provided. Patient discharged in stable condition.    Next zoladex injection due & scheduled to be given 12/5/2024    Reviewed and approved by YARON WALLER on 11/7/24 at 10:01 AM.

## 2024-11-07 NOTE — TELEPHONE ENCOUNTER
Spoke with patient, name and  verified:     Patient states that Mounjaro was denied by insurance and is requesting scripts for Phentermine and Topiramate.     Preferred [facility]acy: [facility] [address]

## 2024-11-20 ENCOUNTER — PATIENT MESSAGE (OUTPATIENT)
Dept: BEHAVIORAL HEALTH | Facility: HOSPITAL | Age: 34
End: 2024-11-20
Payer: COMMERCIAL

## 2024-11-21 ENCOUNTER — OFFICE VISIT (OUTPATIENT)
Dept: OBSTETRICS AND GYNECOLOGY | Facility: CLINIC | Age: 34
End: 2024-11-21
Payer: COMMERCIAL

## 2024-11-21 VITALS
HEART RATE: 81 BPM | DIASTOLIC BLOOD PRESSURE: 71 MMHG | BODY MASS INDEX: 36.62 KG/M2 | WEIGHT: 255.2 LBS | SYSTOLIC BLOOD PRESSURE: 107 MMHG

## 2024-11-21 DIAGNOSIS — Z00.00 HEALTHCARE MAINTENANCE: ICD-10-CM

## 2024-11-21 DIAGNOSIS — B96.89 BACTERIAL VAGINOSIS: Primary | ICD-10-CM

## 2024-11-21 DIAGNOSIS — N89.8 VAGINAL DISCHARGE: ICD-10-CM

## 2024-11-21 DIAGNOSIS — N76.0 BACTERIAL VAGINOSIS: Primary | ICD-10-CM

## 2024-11-21 PROCEDURE — 99214 OFFICE O/P EST MOD 30 MIN: CPT | Performed by: OBSTETRICS & GYNECOLOGY

## 2024-11-21 PROCEDURE — 3078F DIAST BP <80 MM HG: CPT | Performed by: OBSTETRICS & GYNECOLOGY

## 2024-11-21 PROCEDURE — 3051F HG A1C>EQUAL 7.0%<8.0%: CPT | Performed by: OBSTETRICS & GYNECOLOGY

## 2024-11-21 PROCEDURE — 3074F SYST BP LT 130 MM HG: CPT | Performed by: OBSTETRICS & GYNECOLOGY

## 2024-11-21 PROCEDURE — 4010F ACE/ARB THERAPY RXD/TAKEN: CPT | Performed by: OBSTETRICS & GYNECOLOGY

## 2024-11-21 PROCEDURE — 3049F LDL-C 100-129 MG/DL: CPT | Performed by: OBSTETRICS & GYNECOLOGY

## 2024-11-21 RX ORDER — METRONIDAZOLE 500 MG/1
500 TABLET ORAL 2 TIMES DAILY
Qty: 14 TABLET | Refills: 0 | Status: SHIPPED | OUTPATIENT
Start: 2024-11-21 | End: 2024-11-28

## 2024-11-21 ASSESSMENT — ENCOUNTER SYMPTOMS: NERVOUS/ANXIOUS: 1

## 2024-11-21 ASSESSMENT — PAIN SCALES - GENERAL: PAINLEVEL_OUTOF10: 0-NO PAIN

## 2024-11-21 NOTE — PROGRESS NOTES
Subjective   Patient ID: Nia Harper is a 34 y.o. female who presents for Vaginitis/Bacterial Vaginosis (Patient reports odor x2 weeks, states minimal discharge, described as bright).  HPI  Patient is a 34-year-old female  5 para 3 here for increased vaginal discharge and odor.  Denies any significant discharge but more bothered by the odor.    She is a cancer survivor and still on multiple medications.  She denies any urinary or bowel symptoms.  Review of Systems   Psychiatric/Behavioral:  The patient is nervous/anxious.    All other systems reviewed and are negative.  Vaginal odor    Objective   Physical Exam  Pelvic exam: External genitalia Bartholin's urethra and Stanton's normal.  Vaginal vault with thick or white discharge in the posterior fornices.  Cervix not friable.  Cultures performed    Wet prep: Positive clue cells negative trichomonads negative yeast forms  Assessment/Plan   Thick white discharge but wet prep consistent with BV.    Will treat with Flagyl twice a day for a week.  Also suggested probiotic.    Follow-up as needed         Tate Ray MD 24 9:25 AM

## 2024-11-21 NOTE — PATIENT COMMUNICATION
Called pt to see if tried clonidine. She did try last night and notes was able to get up today. Agreed to see if taking consistently will help. Has appt in 2 weeks, will call with concerns sooner. Alerted pt MD off next week but coverage available if needed.

## 2024-11-22 DIAGNOSIS — E11.9 DIABETES MELLITUS TYPE 2 WITHOUT RETINOPATHY (MULTI): ICD-10-CM

## 2024-11-22 LAB
C TRACH RRNA SPEC QL NAA+PROBE: NEGATIVE
N GONORRHOEA DNA SPEC QL PROBE+SIG AMP: NEGATIVE
T VAGINALIS RRNA SPEC QL NAA+PROBE: NEGATIVE

## 2024-11-22 RX ORDER — BLOOD-GLUCOSE METER
EACH MISCELLANEOUS
Qty: 400 STRIP | Refills: 1 | Status: SHIPPED | OUTPATIENT
Start: 2024-11-22

## 2024-11-23 LAB
CLUE CELLS VAG LPF-#/AREA: PRESENT /[LPF]
NUGENT SCORE: 8
YEAST VAG WET PREP-#/AREA: ABNORMAL

## 2024-12-03 ENCOUNTER — PATIENT MESSAGE (OUTPATIENT)
Dept: HEMATOLOGY/ONCOLOGY | Facility: HOSPITAL | Age: 34
End: 2024-12-03
Payer: COMMERCIAL

## 2024-12-03 DIAGNOSIS — C50.919 MALIGNANT NEOPLASM OF FEMALE BREAST, UNSPECIFIED ESTROGEN RECEPTOR STATUS, UNSPECIFIED LATERALITY, UNSPECIFIED SITE OF BREAST: ICD-10-CM

## 2024-12-04 RX ORDER — EXEMESTANE 25 MG/1
25 TABLET ORAL DAILY
Qty: 90 TABLET | Refills: 3 | Status: SHIPPED | OUTPATIENT
Start: 2024-12-04 | End: 2025-12-04

## 2024-12-05 ENCOUNTER — INFUSION (OUTPATIENT)
Dept: HEMATOLOGY/ONCOLOGY | Facility: HOSPITAL | Age: 34
End: 2024-12-05
Payer: COMMERCIAL

## 2024-12-05 ENCOUNTER — OFFICE VISIT (OUTPATIENT)
Dept: BEHAVIORAL HEALTH | Facility: HOSPITAL | Age: 34
End: 2024-12-05
Payer: COMMERCIAL

## 2024-12-05 ENCOUNTER — APPOINTMENT (OUTPATIENT)
Dept: HEMATOLOGY/ONCOLOGY | Facility: CLINIC | Age: 34
End: 2024-12-05
Payer: COMMERCIAL

## 2024-12-05 VITALS
TEMPERATURE: 96.6 F | WEIGHT: 249.2 LBS | SYSTOLIC BLOOD PRESSURE: 110 MMHG | RESPIRATION RATE: 18 BRPM | DIASTOLIC BLOOD PRESSURE: 69 MMHG | OXYGEN SATURATION: 100 % | HEART RATE: 105 BPM | BODY MASS INDEX: 35.76 KG/M2

## 2024-12-05 DIAGNOSIS — C50.919 MALIGNANT NEOPLASM OF FEMALE BREAST, UNSPECIFIED ESTROGEN RECEPTOR STATUS, UNSPECIFIED LATERALITY, UNSPECIFIED SITE OF BREAST: ICD-10-CM

## 2024-12-05 DIAGNOSIS — Z17.0 MALIGNANT NEOPLASM OF RIGHT BREAST IN FEMALE, ESTROGEN RECEPTOR POSITIVE, UNSPECIFIED SITE OF BREAST: ICD-10-CM

## 2024-12-05 DIAGNOSIS — F41.1 GAD (GENERALIZED ANXIETY DISORDER): ICD-10-CM

## 2024-12-05 DIAGNOSIS — F41.9 ANXIETY: ICD-10-CM

## 2024-12-05 DIAGNOSIS — F41.0 ANXIETY ATTACK: ICD-10-CM

## 2024-12-05 DIAGNOSIS — F33.0 MILD EPISODE OF RECURRENT MAJOR DEPRESSIVE DISORDER (CMS-HCC): ICD-10-CM

## 2024-12-05 DIAGNOSIS — C50.911 MALIGNANT NEOPLASM OF RIGHT BREAST IN FEMALE, ESTROGEN RECEPTOR POSITIVE, UNSPECIFIED SITE OF BREAST: ICD-10-CM

## 2024-12-05 DIAGNOSIS — F34.1 PERSISTENT DEPRESSIVE DISORDER: ICD-10-CM

## 2024-12-05 PROCEDURE — 3049F LDL-C 100-129 MG/DL: CPT | Performed by: PSYCHIATRY & NEUROLOGY

## 2024-12-05 PROCEDURE — 96402 CHEMO HORMON ANTINEOPL SQ/IM: CPT

## 2024-12-05 PROCEDURE — 2500000004 HC RX 250 GENERAL PHARMACY W/ HCPCS (ALT 636 FOR OP/ED): Mod: SE

## 2024-12-05 PROCEDURE — 90833 PSYTX W PT W E/M 30 MIN: CPT | Mod: AM | Performed by: PSYCHIATRY & NEUROLOGY

## 2024-12-05 PROCEDURE — 99214 OFFICE O/P EST MOD 30 MIN: CPT | Mod: AM | Performed by: PSYCHIATRY & NEUROLOGY

## 2024-12-05 PROCEDURE — 99214 OFFICE O/P EST MOD 30 MIN: CPT | Performed by: PSYCHIATRY & NEUROLOGY

## 2024-12-05 PROCEDURE — 4010F ACE/ARB THERAPY RXD/TAKEN: CPT | Performed by: PSYCHIATRY & NEUROLOGY

## 2024-12-05 PROCEDURE — 3051F HG A1C>EQUAL 7.0%<8.0%: CPT | Performed by: PSYCHIATRY & NEUROLOGY

## 2024-12-05 PROCEDURE — 1036F TOBACCO NON-USER: CPT | Performed by: PSYCHIATRY & NEUROLOGY

## 2024-12-05 PROCEDURE — 90833 PSYTX W PT W E/M 30 MIN: CPT | Performed by: PSYCHIATRY & NEUROLOGY

## 2024-12-05 RX ORDER — DULOXETIN HYDROCHLORIDE 30 MG/1
30 CAPSULE, DELAYED RELEASE ORAL DAILY
Qty: 90 CAPSULE | Refills: 1 | Status: SHIPPED | OUTPATIENT
Start: 2024-12-05 | End: 2025-06-03

## 2024-12-05 RX ORDER — EPINEPHRINE 0.3 MG/.3ML
0.3 INJECTION SUBCUTANEOUS EVERY 5 MIN PRN
OUTPATIENT
Start: 2025-01-02

## 2024-12-05 RX ORDER — ALBUTEROL SULFATE 0.83 MG/ML
3 SOLUTION RESPIRATORY (INHALATION) AS NEEDED
OUTPATIENT
Start: 2025-01-02

## 2024-12-05 RX ORDER — DIPHENHYDRAMINE HYDROCHLORIDE 50 MG/ML
50 INJECTION INTRAMUSCULAR; INTRAVENOUS AS NEEDED
OUTPATIENT
Start: 2025-01-02

## 2024-12-05 RX ORDER — LIDOCAINE HYDROCHLORIDE 10 MG/ML
2 INJECTION, SOLUTION EPIDURAL; INFILTRATION; INTRACAUDAL; PERINEURAL ONCE
OUTPATIENT
Start: 2025-01-02

## 2024-12-05 RX ORDER — DULOXETIN HYDROCHLORIDE 60 MG/1
120 CAPSULE, DELAYED RELEASE ORAL DAILY
Qty: 90 CAPSULE | Refills: 1 | Status: SHIPPED | OUTPATIENT
Start: 2024-12-05

## 2024-12-05 RX ORDER — LORAZEPAM 1 MG/1
1 TABLET ORAL DAILY PRN
Qty: 30 TABLET | Refills: 0 | Status: SHIPPED | OUTPATIENT
Start: 2024-12-05 | End: 2025-01-04

## 2024-12-05 RX ORDER — LIDOCAINE HYDROCHLORIDE 10 MG/ML
2 INJECTION, SOLUTION EPIDURAL; INFILTRATION; INTRACAUDAL; PERINEURAL ONCE
Status: COMPLETED | OUTPATIENT
Start: 2024-12-05 | End: 2024-12-05

## 2024-12-05 RX ORDER — FAMOTIDINE 10 MG/ML
20 INJECTION INTRAVENOUS ONCE AS NEEDED
OUTPATIENT
Start: 2025-01-02

## 2024-12-05 NOTE — PROGRESS NOTES
Nia Harper is a 34 y.o. female who presents in stable condition for zoladex injection to her left lower abdomen    Since her last visit, she has been doing well.  Overall, she states her energy level is stable.  Her appetite & hydration status has been good.  She reports night sweats.  She has no other concerns.    Patient tolerated injection well and has been educated with the overall therapy plan. She has no other questions or concerns at this time. AVS, lab results & future appointment provided. Patient discharged in stable condition.    Reviewed and approved by YARON WALLER on 12/5/24 at 10:25 AM.
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
## 2024-12-11 DIAGNOSIS — E66.01 OBESITY, CLASS III, BMI 40-49.9 (MORBID OBESITY) (MULTI): ICD-10-CM

## 2024-12-11 RX ORDER — PHENTERMINE HYDROCHLORIDE 37.5 MG/1
37.5 TABLET ORAL
Qty: 30 TABLET | Refills: 0 | Status: SHIPPED | OUTPATIENT
Start: 2024-12-11 | End: 2025-01-10

## 2024-12-18 ENCOUNTER — TELEPHONE (OUTPATIENT)
Dept: ADMISSION | Facility: HOSPITAL | Age: 34
End: 2024-12-18
Payer: COMMERCIAL

## 2024-12-18 DIAGNOSIS — C50.919 MALIGNANT NEOPLASM OF FEMALE BREAST, UNSPECIFIED ESTROGEN RECEPTOR STATUS, UNSPECIFIED LATERALITY, UNSPECIFIED SITE OF BREAST: ICD-10-CM

## 2024-12-18 DIAGNOSIS — N89.8 VAGINAL DRYNESS: ICD-10-CM

## 2024-12-18 RX ORDER — ESTRADIOL 0.1 MG/G
2 CREAM VAGINAL DAILY
Qty: 42.5 G | Refills: 0 | Status: SHIPPED | OUTPATIENT
Start: 2024-12-18

## 2024-12-20 ENCOUNTER — APPOINTMENT (OUTPATIENT)
Dept: BEHAVIORAL HEALTH | Facility: CLINIC | Age: 34
End: 2024-12-20
Payer: COMMERCIAL

## 2024-12-20 DIAGNOSIS — F41.1 GAD (GENERALIZED ANXIETY DISORDER): ICD-10-CM

## 2024-12-20 DIAGNOSIS — F34.1 PERSISTENT DEPRESSIVE DISORDER: ICD-10-CM

## 2024-12-20 PROCEDURE — 90834 PSYTX W PT 45 MINUTES: CPT | Performed by: PSYCHOLOGIST

## 2024-12-20 PROCEDURE — 3051F HG A1C>EQUAL 7.0%<8.0%: CPT | Performed by: PSYCHOLOGIST

## 2024-12-20 PROCEDURE — 3049F LDL-C 100-129 MG/DL: CPT | Performed by: PSYCHOLOGIST

## 2024-12-20 PROCEDURE — 4010F ACE/ARB THERAPY RXD/TAKEN: CPT | Performed by: PSYCHOLOGIST

## 2024-12-20 NOTE — PROGRESS NOTES
PSYCHOTHERAPY FOLLOW-UP NOTE    Start Time: 1000  Stop Time: 1050      ASSESSMENT:  Pt with a hx of anxiety and persistent depressive disorder with multiple medical conditions and psychosocial stressors.     IMPRESSION:  Persistent depressive disorder  ARNULFO      RECOMMENDATIONS/PLAN:   1. F/U 1/17/25  2. Medications:         Current Outpatient Medications   Medication Sig Dispense Refill    acetaminophen (Tylenol) 325 mg tablet TAKE 2 TABLETS (650 MG) BY MOUTH EVERY 6 HOURS IF NEEDED (PAIN/FEVER) FOR UP TO 5 DAYS.      atorvastatin (Lipitor) 40 mg tablet Take 1 tablet (40 mg) by mouth once daily at bedtime. 90 tablet 3    blood sugar diagnostic (OneTouch Verio test strips) strip CHECK BLOOD SUGARS UP TO 4X/DAY FOR CGM FAILURE 400 strip 1    clindamycin (Cleocin T) 1 % external solution Apply topically once daily.      clindamycin (Cleocin) 2 % vaginal cream INSERT 1 APPLICATION INTO THE VAGINA ONCE DAILY AT BEDTIME FOR 3 DAYS.      cloNIDine (Catapres) 0.1 mg tablet Take 1 tablet (0.1 mg) by mouth once daily at bedtime. 30 tablet 2    cyclobenzaprine (Flexeril) 10 mg tablet Take 1 tablet (10 mg) by mouth 3 times a day as needed for muscle spasms for up to 3 days. 9 tablet 0    doxycycline (Vibra-Tabs) 100 mg tablet 1 TAB(S) ORALLY 2 TIMES A DAY (AVOID SUNLIGHT WHILE ON THIS MEDICATION)      DULoxetine (Cymbalta) 30 mg DR capsule Take 1 capsule (30 mg) by mouth once daily. Do not crush or chew. 90 capsule 1    DULoxetine (Cymbalta) 60 mg DR capsule Take 2 capsules (120 mg) by mouth once daily. Do not crush or chew. Take in the evening with food. 90 capsule 1    estradiol (Estrace) 0.01 % (0.1 mg/gram) vaginal cream Insert 0.5 Applicatorfuls (2 g) into the vagina once daily. Use once a day for 2 weeks then as needed there after 42.5 g 0    exemestane (Aromasin) 25 mg tablet Take 1 tablet (25 mg total) by mouth once daily.  Take after a meal.  Try to take at the same time each day. 90 tablet 3    ezetimibe (Zetia) 10 mg  "tablet Take 1 tablet (10 mg) by mouth once daily. 90 tablet 3    FreeStyle Cristina 2 Sensor kit Change every 14 days 2 each 11    FreeStyle Cristina 3 Sensor device Change every 14 days 2 each 11    ibuprofen 600 mg tablet TAKE 1 TABLET (600 MG) BY MOUTH EVERY 6 HOURS IF NEEDED (PAIN) FOR UP TO 5 DAYS.      insulin aspart (NovoLOG) 100 unit/mL (3 mL) pen 5 units with meals plus sliding scale up to 25 units daily 15 mL 5    insulin glargine (Lantus Solostar U-100 Insulin) 100 unit/mL (3 mL) pen 40 units in the am, 20 units in the PM 30 mL 3    lidocaine (Lidoderm) 5 % patch PLEASE SEE ATTACHED FOR DETAILED DIRECTIONS      LORazepam (Ativan) 1 mg tablet TAKE 1 TABLET BY MOUTH ONCE DAILY AS NEEDED FOR ANXIETY 30 tablet 0    losartan (Cozaar) 25 mg tablet Take 1 tablet (25 mg) by mouth once daily.      metFORMIN  mg 24 hr tablet Take 2 tablets (1,000 mg) by mouth 2 times daily (morning and late afternoon). 360 tablet 1    omega-3 acid ethyl esters (Lovaza) 1 gram capsule Take 2 capsules (2 g) by mouth 2 times daily (morning and late afternoon). 120 capsule 1    ondansetron ODT (Zofran-ODT) 4 mg disintegrating tablet Take 1 tablet (4 mg) by mouth every 6 hours if needed for nausea or vomiting. 60 tablet 1    OneTouch Delica Plus Lancet 33 gauge misc For BG check up to 4x/day for CGM failure 100 each 3    OneTouch Verio Flex meter misc USE AS DIRECTED.      pen needle, diabetic (BD Ultra-Fine Mini Pen Needle) 31 gauge x 3/16\" needle Inject 200 each under the skin 4 times a day before meals. Use 4 a day as directed 200 each 3    pen needle, diabetic (BD Ultra-Fine Mini Pen Needle) 31 gauge x 3/16\" needle USE 4 A DAY AS DIRECTED      phentermine (Adipex-P) 37.5 mg tablet Take 1 tablet (37.5 mg) by mouth once daily in the morning. Take before meals. Start with 1/2 tablet for 5-7 days then increase to full tablet. 30 tablet 0    ProbioMax Daily DF 30 billion cell capsule,delayed release(DR/EC) TAKE 1 CAPSULE BY MOUTH EVERY " "DAY 90 capsule 3    topiramate (Topamax) 50 mg tablet Take 1 tablet PO qPM x 1-2 weeks then increase to 1 tablet PO BID 60 tablet 2    traZODone (Desyrel) 50 mg tablet Take 1 tablet (50 mg) by mouth once daily at bedtime.      valACYclovir (Valtrex) 1 gram tablet       valACYclovir (Valtrex) 500 mg tablet Take 1 tablet (500 mg) by mouth 2 times a day. As needed for outbreaks       No current facility-administered medications for this visit.     3. Behavioral recommendation(s):     A. Pt to create goals for therapy     SUBJECTIVE:  PT here for new patient visit regarding anxiety and depression. Pt reported the following symptoms/concerns: thinks she's having an adverse reaction to recent medication adjustment. Describes a \"tingly\" sensation all over her body and states the last time she felt this way, the pharmacy was out of her duloxetine for about 2 weeks. Discussed that she may be experiencing some discontinuation sx and encouraged pt to contact her psychiatrist, Dr. Kent. Pt also having a few panic attacks.     Pt described a complex medical hx (breast CA in 2023, gastric sleeve March, 2024, planned reconstruction surgery in March, 2025). Pt also with T2DM, HTN, HLD. Pt reports she's lost about 65 lbs since weight loss surgery. States she works out daily. This is the only time she has set aside for herself.     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 Pt reports 2 of her 3 kids have special needs. Pt reports her partner helps out but also works a lot and has other children he has to go see. Pt's partner's hx of infidelity has her \"checked out\" of the relationship, but she needs help with the kids. Pt's parents are able to help but not all the time.     Coping skills: going to the gym, smoking marijuana (pt has a medical card), Ativan before bed maybe 1-2x/week,     Duration of problem: year(s)  Severity: Moderate      OBJECTIVE:  Orientation & Cognition: " Oriented x3  Mood: anxious   Affect: Anxious  Appearance: Well groomed, appropriate eye contact.  Harm to self: None Reported  Substance abuse: reports she has a medical marijuana card and goes through 1 blunt per day   Psychiatric medication use: as per Dr. Kent     --------------------------------------------  Other(s) present in the room: none  --------------------------------------------  Caryn Herrera PhD  -------------------------------------------  An interactive audio and video telecommunication system which permits real time communications between the patient (at the originating site) and provider (at the distant site) was utilized to provide this telehealth service.      Verbal consent was requested and obtained from Nia Harper on this date, 2024, for a telehealth visit.     I have confirmed the patient's identity via the following (minimum of three) acceptable identifiers as per  Policy PH-9: address, , SSN.

## 2024-12-28 NOTE — ASSESSMENT & PLAN NOTE
S/p LSG 9 mo      Initial Wt: 294 lbs  Initial BMI: 40.3  Patient denies any N,V,F,D,CP or SOB.   Tolerating soft diet   ambulating and eliminating well  no sign of infection   taking Vitamins      imp: doing well     recs:   advance diet as advised   advance activity as advised   cont Vitamin supplementation, PPI   FU with dietitian   FU with this office in 3 mo
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
## 2024-12-28 NOTE — PATIENT INSTRUCTIONS
You have been prescribed Qsymia (Phentermine/Topiramate) for weight loss.     **IF START MAIL ORDER QSYMIA DISCONTINUE USE OF GENERIC PHENTERMINE AND / OR TOPIRAMATE**    This medication will decrease your appetite and promote improved appetite control, may help to reduce cravings and improve satiety (feeling of fullness).  This medication has been approved for chronic weight management if you achieve at least 5% total body weight reduction in the first 12 weeks of use.    This medication is a controlled substance and will result in a positive urine drug screen of amphetamine if taking regularly.    IMPORTANT Lifestyle Management with the use of anti-obesity medication include:  - Monitor/track your protein intake, ensure at least 80-90g of protein per day for women, 110-120g for men -> this helps to ensure you are consuming adequate protein to maintain/preserve lean body mass in weight loss setting.  - Consume at least 64 oz of water per day -> this helps to reduce the risk of constipation/dehydration associated with this medication  - Engage in resistance at least 2x/week - targeting upper & lower body group with each resistance training session -> this helps to ensure you are consuming adequate protein to maintain/preserve lean body mass in weight loss setting.    Side Effects include: tingling of the arms and legs, nausea, a change in the way foods taste, diarrhea, nervousness, cough, fogginess, constipation, dry mouth, insomnia.   --> Topiramate containing medications can increase the risk of kidney stone formation.    --> Topiramate containing medications can increase the risk of mood change, depression or suicidal thoughts, if you experience these symptoms after starting medication you must notify this provider immediately to discuss medication discontinuation.      If you develop insomnia, take the medication as early a possible in the day, you can intermittently use an over the counter melatonin supplement  IF needed.    If you develop constipation, be sure you are drinking at least 60 oz of water daily, you can also add an over the counter fiber supplement and stool softener to lessen symptoms.      For WOMEN of childbearing age: you must continue to take a reliable method of contraception while on this medication - IT IS CONTRAINDICATED IN PREGNANCY due to the risk of cleft palate formation in developing fetus.  A monthly home pregnancy test should be used to confirm negative pregnancy if of child bearing age in addition to contraception.    If using Qsymia [facility] Mail Order Program, you will need to set up a mail order account at the following website:  [employer] - [email or web address]   Phone: [phone] / [phone]  Fax: [phone]  8:00 AM - 8:00 PM ET, Monday- Friday        The following are the recommendations we discussed at your appointment today:  1. Nutrition  - Please make sure you are seeing the bariatric dietitian regularly.    Dietitian Information:   Tee Hood: [phone]  [facility] Han Amaro [phone]    Your Protein Goals will gradually increase as you get further out from surgery:  0-3 months - 60 GRAMS PER DAY  3-6 months - 60-75 GRAMS PER DAY  6-12 months - 75-90 GRAMS PER DAY  12+ months - 80-90 GRAMS PER DAY    - Follow Pouch Rules;.   Stop drinking 30 minutes before your meals, Take 30 minutes to eat your meal   - NO DRINKING DURING MEALS, Wait for 30 minutes after your meal to drink  - Eat 5 servings of fruits and veggies daily.  A serving is 1 small (tennis ball size) piece of whole fruit, 1/2 cup fresh fruit, 1 cup non starchy vegetables  - Eat 3 small meals and 1-2 healthy, protein rich, snacks per day.    EAT PROTEIN FIRST AT MEALS, 2nd non starchy vegetable or fruit serving, consume starches last and aim for whole grains of small portion.  This pattern of eating ensures you are getting full on high quality protein and higher  "fiber foods with many vitamins and minerals to support adequate nutrition first.      2. Exercise Recommendations:    Regular physical activity is CRITICAL for long term successful weight management.    Strive for a minimum weekly exercise goal of at least 200 minutes per week of aerobic exercise (45 minutes at least 5 days per week or 30 minutes daily)    Strength based resistance training is critical for helping to maintain and build lean body mass/muscle mass which is very important for long term health.  Having higher muscle mass is associated with better health outcomes and can help to maintain higher calorie expenditure.      Designing a Strength Program:  Select 3-4 exercises that target different major muscle groups.  - Emphasize the following movements \"pull, push, squat, hip hinge\"  \"Pull\" examples - pull up, bent over row or dumbbell row, pull down with weight bar or resistance band  \"Push\" examples - push up, bench press, chest flys, dips, overhead press, dumbbell bench press  \"Squat\" examples - air squat, chair squat, lunge steps, goblet squat, front squat, etc  \"Hip hinge\" examples - kettle bell swing, good morning, glute bridge, deadlift, suitcase pick ups, hip thrust    Perform two to three sets of eight to 15 repetitions of each exercise.  Try to use a resistance that feels like an \"8 out of 10\" effort, with 10 being the highest effort you can give.    To get stronger, try increasing either the weight, number of repetitions, number of sets or number of exercises every 4-8 weeks as you feel more comfortable with your current program.      3. Fluids  - Drink at least 60 oz of water every day.   - Wait 30 minutes before or after meals to drink fluids.  - Avoid carbonated beverages.    4. Vitamins  - Remember to take your multivitamins 2 times daily, once in the morning and once in the evening  - Take your calcium 2-3 times daily, at least 2 hours apart from the multivitamin - total daily dose at least " 1200-1500mg per day.    - Vitamin D3 3000 units per day  - B12 - depending on your blood work and how well you absorb B12 from your multivitamin you may need additional B12 of 350-500mcg oral per day.    5. Labs  - We will check labs today and results will be communicated via [facility] My Chart  - A copy of the results can be viewed on  My Chart.      Follow-up with Dietitian for bariatric diet optimization  Follow-up with PCP for general health questions and ongoing management of routine health concerns

## 2024-12-30 ENCOUNTER — TELEMEDICINE (OUTPATIENT)
Dept: SURGERY | Facility: CLINIC | Age: 34
End: 2024-12-30
Payer: COMMERCIAL

## 2024-12-30 VITALS — WEIGHT: 235 LBS | BODY MASS INDEX: 33.64 KG/M2 | HEIGHT: 70 IN

## 2024-12-30 DIAGNOSIS — E11.9 DIABETES MELLITUS TYPE 2 WITHOUT RETINOPATHY (MULTI): ICD-10-CM

## 2024-12-30 DIAGNOSIS — Z71.3 DIETARY COUNSELING: ICD-10-CM

## 2024-12-30 DIAGNOSIS — E66.9 OBESITY (BMI 35.0-39.9 WITHOUT COMORBIDITY): ICD-10-CM

## 2024-12-30 DIAGNOSIS — I10 PRIMARY HYPERTENSION: ICD-10-CM

## 2024-12-30 DIAGNOSIS — K91.2 POSTOPERATIVE MALABSORPTION (HHS-HCC): Primary | ICD-10-CM

## 2024-12-30 DIAGNOSIS — Z90.3 S/P GASTRIC SLEEVE PROCEDURE: ICD-10-CM

## 2024-12-30 DIAGNOSIS — E78.2 MIXED HYPERLIPIDEMIA: ICD-10-CM

## 2024-12-30 PROCEDURE — 99214 OFFICE O/P EST MOD 30 MIN: CPT | Mod: 25,GT,U1,95

## 2024-12-30 PROCEDURE — 3008F BODY MASS INDEX DOCD: CPT

## 2024-12-30 PROCEDURE — 99214 OFFICE O/P EST MOD 30 MIN: CPT

## 2024-12-30 PROCEDURE — 3049F LDL-C 100-129 MG/DL: CPT

## 2024-12-30 PROCEDURE — 99401 PREV MED CNSL INDIV APPRX 15: CPT

## 2024-12-30 PROCEDURE — 3051F HG A1C>EQUAL 7.0%<8.0%: CPT

## 2024-12-30 PROCEDURE — 99401 PREV MED CNSL INDIV APPRX 15: CPT | Mod: U1

## 2024-12-30 PROCEDURE — 4010F ACE/ARB THERAPY RXD/TAKEN: CPT

## 2024-12-30 RX ORDER — PHENTERMINE AND TOPIRAMATE 15; 92 MG/1; MG/1
1 CAPSULE, EXTENDED RELEASE ORAL DAILY
Qty: 30 CAPSULE | Refills: 1 | Status: SHIPPED | OUTPATIENT
Start: 2025-01-30 | End: 2025-03-01

## 2024-12-30 RX ORDER — PHENTERMINE HYDROCHLORIDE 37.5 MG/1
37.5 TABLET ORAL
Qty: 30 TABLET | Refills: 0 | Status: SHIPPED | OUTPATIENT
Start: 2024-12-30 | End: 2025-01-29

## 2024-12-30 RX ORDER — TOPIRAMATE 50 MG/1
TABLET, FILM COATED ORAL
Qty: 60 TABLET | Refills: 2 | Status: SHIPPED | OUTPATIENT
Start: 2024-12-30

## 2024-12-30 NOTE — ASSESSMENT & PLAN NOTE
Obesity Class 3- initial  BMI: Initial BMI - medical weight loss s/p bariatric surgery 46.09  [place] Stage 2     -Obesity is a chronic, relapsing, progressive, treatable, multifactorial, neurobehavioral disease, where in an increase in body fat promotes adipose (fat) tissue dysfunction, as well as biomechanical stress on the body which can result in adverse metabolic, biomechanical and psychosocial health consequences, in addition to reducing quality of life and lifespan.     -Without treatment, obesity is likely to progress and worsen, further increasing the patient's risk for numerous health conditions caused by excess adiposity and increasing the risk for premature death.     -The patient was counseled on the science of weight and appetite regulation. The pathophysiology of obesity was reviewed, as well as the biological adaptations to weight loss.      -We reviewed the role of nutrition, physical activity, stress and sleep. We discussed the potential for bariatric surgery and or anti-obesity medications in the treatment of obesity. We reviewed the health impacts of obesity, and the health benefits of a 5-10% weight loss. We also reviewed program expectations and coordination of care. All additional questions were answered     - Reviewed role of bariatric surgical interventions for obesity treatment, indications for bariatric surgery, benefits of bariatric & metabolic surgery to long term health and obesity treatment.  - Meets ASMBS Guidelines for bariatric surgery with BMI > 40 or > 35 with weight related comorbidity: Yes - now at 6 months post-op, seeking MWL to further progress for breast ca surgery     -Reviewed use of FDA approved anti obesity pharmacologic therapies and how they assist lifestyle changes.  Discussed medications risk/benefit, expected weight loss outcomes with use, contraindications, side effects and monitoring parameters.    - Contraindications to AOMs: n/a Previously took trulicity and  ozempic and had ADR n/v. Wishes to proceed with current plan- qsymia (phentermine/topiramate). Goal weight is 200 - 220 lbs lbs for plastic surgery in March.   -Reviewed pertinent history, patient has no current contraindications to use of any prescribed medication for adjunct treatment in weight management.   -Counseling provided on clinical success criteria of 5% weight loss within 12 weeks of therapeutic dose of medication.

## 2024-12-31 ENCOUNTER — PATIENT MESSAGE (OUTPATIENT)
Dept: SURGERY | Facility: CLINIC | Age: 34
End: 2024-12-31
Payer: COMMERCIAL

## 2025-01-02 ENCOUNTER — APPOINTMENT (OUTPATIENT)
Dept: HEMATOLOGY/ONCOLOGY | Facility: CLINIC | Age: 35
End: 2025-01-02
Payer: COMMERCIAL

## 2025-01-02 ENCOUNTER — SOCIAL WORK (OUTPATIENT)
Dept: CASE MANAGEMENT | Facility: HOSPITAL | Age: 35
End: 2025-01-02
Payer: COMMERCIAL

## 2025-01-02 ENCOUNTER — OFFICE VISIT (OUTPATIENT)
Dept: HEMATOLOGY/ONCOLOGY | Facility: CLINIC | Age: 35
End: 2025-01-02
Payer: COMMERCIAL

## 2025-01-02 VITALS
WEIGHT: 238.65 LBS | SYSTOLIC BLOOD PRESSURE: 98 MMHG | BODY MASS INDEX: 34.24 KG/M2 | HEART RATE: 79 BPM | TEMPERATURE: 97 F | DIASTOLIC BLOOD PRESSURE: 62 MMHG

## 2025-01-02 DIAGNOSIS — C50.919 MALIGNANT NEOPLASM OF FEMALE BREAST, UNSPECIFIED ESTROGEN RECEPTOR STATUS, UNSPECIFIED LATERALITY, UNSPECIFIED SITE OF BREAST: ICD-10-CM

## 2025-01-02 PROCEDURE — 99215 OFFICE O/P EST HI 40 MIN: CPT | Performed by: STUDENT IN AN ORGANIZED HEALTH CARE EDUCATION/TRAINING PROGRAM

## 2025-01-02 PROCEDURE — 3074F SYST BP LT 130 MM HG: CPT | Performed by: STUDENT IN AN ORGANIZED HEALTH CARE EDUCATION/TRAINING PROGRAM

## 2025-01-02 PROCEDURE — 4010F ACE/ARB THERAPY RXD/TAKEN: CPT | Performed by: STUDENT IN AN ORGANIZED HEALTH CARE EDUCATION/TRAINING PROGRAM

## 2025-01-02 PROCEDURE — 3078F DIAST BP <80 MM HG: CPT | Performed by: STUDENT IN AN ORGANIZED HEALTH CARE EDUCATION/TRAINING PROGRAM

## 2025-01-02 ASSESSMENT — NCCN CANCER DISTRESS MANAGEMENT
NCCN PHYSICAL CONCERNS: 7
NCCN SOCIAL CONCERNS: 1
NCCN PHYSICAL CONCERNS: 6
NCCN EMOTIONAL CONCERNS: 2
NCCN EMOTIONAL CONCERNS: 7

## 2025-01-02 ASSESSMENT — PAIN SCALES - GENERAL: PAINLEVEL_OUTOF10: 0-NO PAIN

## 2025-01-02 NOTE — PROGRESS NOTES
Breast Medical Oncology Clinic  Location: [facility]    Visit Type: New Patient Visit/Transfer Care    Oncologic History:    -Enlarging right breast lumps.  -5/15/23: Dx MG/US demonstrated multiple masses: 1.1 cm (11:00, 8 cm FN); 1 cm (9:30, 5 cm FN); 1.2 cm (9:00, 6 cm FN); 0.7 cm (10:00, 6 cm FN); 0.4 cm (10:00, 5 cm FN). Four lymph nodes demonstrated suspicious cortical thickening.  -5/19/23: US-guided CNB of the 11:00 mass showed IDC grade 3; ER >95% GA 85% Her2-negative (1+ IHC). Biopsy of the 9:30 mass showed IDC grade 2-3; ER 80% GA 10% Her2-negative (1+ IHC). A right axillary LN was negative for carcinoma.  -6/30/23: Right mastectomy and SLNBx performed. Multipe foci of grade 3 carcinoma noted (3.1 cm, 2.2 cm, 1.8 cm), no LVI, margins negative, 0/3 LN’s involved IB, pT2 pN0 cM0 G3  Oncotype Dx RS 45 (33% 9-year recurrence risk; >15% chemo benefit).  RSClin: 59% recurrence risk with tamoxifen; 44% chemo benefit.  -8/24/23: Adjuvant TC (docetaxel, cyclophosphamide) initiated. First cycle complicated by admission for nausea/vomiting. Second cycle complicated by admission for angioedema, hives; thought to be secondary to docetaxel. Switched to CMF (cyclophosphamide, methotrexate, fluorouracil) on 10/16/23. Second (and last) cycle given 11/9/23.    Subjective: History of Present Illness    Patient presents today for follow-up new to doctor visit.    Oncologic history reviewed above with patient.     Denies interval events since last follow-up- no recent hospitalizations, new medical diagnoses, or new medications.     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     She continues on monthly goserelin and exemestane. Causing hot flashes, mood swings and low libido. Duloxetine recently adjusted. Prescribed estradiol but not tried yet.     Risk reducing L mastectomy planned in April.     Social History  Nia Harper  reports that she has never smoked. She has  never been exposed to tobacco smoke. She has never used smokeless tobacco.  She  reports no history of alcohol use.  She  reports that she does not currently use drugs after having used the following drugs: Marijuana.    ROS:     Review of Systems   All other systems reviewed and are negative.       Physical Examination:    BP 98/62 (BP Location: Left arm, Patient Position: Sitting, BP Cuff Size: Large adult)   Pulse 79   Temp 36.1 °C (97 °F) (Temporal)   Wt 108 kg (238 lb 10.4 oz)   BMI 34.24 kg/m²     Physical Exam  Vitals and nursing note reviewed.   Constitutional:       General: She is not in acute distress.     Appearance: Normal appearance. She is not toxic-appearing.   HENT:      Head: Normocephalic and atraumatic.   Eyes:      Conjunctiva/sclera: Conjunctivae normal.   Cardiovascular:      Rate and Rhythm: Normal rate.   Pulmonary:      Effort: Pulmonary effort is normal. No respiratory distress.   Abdominal:      General: Abdomen is flat.      Palpations: Abdomen is soft.   Musculoskeletal:         General: No swelling. Normal range of motion.      Cervical back: Normal range of motion.   Skin:     General: Skin is warm and dry.   Neurological:      Mental Status: She is alert.   Psychiatric:         Mood and Affect: Mood normal.       Breast Examination:    Left breast: No masses, skin or nipple changes  Right breast:  Surgically absent  Axillary: No significant examination findings    ECOG Performance Status:     [x] 0 Fully active, able to carry on all pre-disease performance without restriction  [] 1 Restricted in physically strenuous activity but ambulatory and able to carry out work of a light or sedentary nature, e.g., light house work, office work  [] 2 Ambulatory and capable of all selfcare but unable to carry out any work activities; up and about more than 50% of waking hours  [] 3 Capable of only limited selfcare; confined to bed or chair more than 50% of waking hours  [] 4 Completely  disabled; cannot carry on any selfcare; totally confined to bed or chair  [] 5 Dead     Results:    Labs:  Reviewed    Lab Results   Component Value Date    WBC 5.9 10/02/2024    HGB 13.6 10/02/2024    HCT 42.2 10/02/2024    MCV 90 10/02/2024     10/02/2024    ANC 15.37 (H) 09/26/2023       Chemistry    Lab Results   Component Value Date/Time     10/02/2024 1435    K 3.9 10/02/2024 1435     10/02/2024 1435    CO2 32 10/02/2024 1435    BUN 19 10/02/2024 1435    CREATININE 0.57 10/02/2024 1435    Lab Results   Component Value Date/Time    CALCIUM 10.3 10/02/2024 1435    ALKPHOS 80 10/02/2024 1435    AST 26 10/02/2024 1435    ALT 36 10/02/2024 1435    BILITOT 0.5 10/02/2024 1435             Imaging:  Reviewed above in Onc History    Pathology:  Reviewed above in Onc History    Assessment:     Pathologic prognostic stage IB (pT2 pN0 cM0) infiltrating ductal carcinoma of the right breast; Dx in 6/2023; Grade 3; ER positive (80-95%), [place] positive (10-80%), HER2 negative; Oncotype DX 45 ; S/p R mastectomy and SLNBx; S/p Tcx2, followed by CMFx2; On monthly goserelin and exemestane.     Very pleasant 33 yo female. Presents today to establish care. No evidence of breast cancer recurrence per patient history, physical examination and imaging findings.       Breast Cancer Treatment Plan:    Surgical Plan: S/p R mastectomy with SLNBx  Additional biopsy: No further biopsy indicated  Radiation Plan: Not indicated  Additional staging scans/DEXA/echo: Staging scans not indicated based on current stage, patient history and physical examination.; Baseline DEXA ordered  Additional Path info (i.e Ki67, PDL1): Not indicated  Gene assays: Oncotype DX 45    Systemic treatment plan: Goserelin and exemestane     Intent: Curative   Clinical trial: Not eligible for our current trials   Endocrine therapy: Exemestane   HER2 treatment: not indicated   Targeted agents: not indicated   Chemotherapy: previously received TC x2 then  CMFx2   BMA: Will discuss at future visit.    Access: Not indicated  Supportive meds: No new supportive medications prescribed  Genetic testing: Completed; negative  Fertility preservation: Not indicated    Other active problems/orders:     Vaginal dryness: Has been prescribed estradiol. She will give this a consistent trial  AI toxicities: Discussed avoiding further medications. Exemestane is better tolerated than anastrozole. Very interested to other therapies, will refer to integrative oncology.     Surveillance plan: Continue yearly mammogram of L breast until she undergoes mastectomy.     Follow-up: 3 months Petrona Bermeo    Nia Harper expressed understanding of the plan outlined above. Nia Harper had ample time to ask questions. Nia Harper understands she can contact us at any time should she have additional questions or issues arise in the interim.    Cordell Tena MD  Breast Medical Oncology  [facility]    Portions of this note were dictated utilizing voice recognition software.

## 2025-01-02 NOTE — PROGRESS NOTES
Distress Thermometer Referral    Distress Score: 10  Distress Concerns: Physical Concerns: Memory or concentration and Sexual health, Emotional Concerns: Sadness or depression and Changes in appearance, and Social Concerns: Relationship with spouse or partner  Meeting Location: Phone  Person(s) Present: Patient   Impression and Plan: patient reports she is active with a therapist through  and only saw her once. Patient has another appt on 1/17/25 and she feels it is helpful. Patient is also on medication. Patient feels overwhelmed and dealing a lot with her partner, her kids, her cancer journey and finances. Patient is having a hard time finding what brings her adam at this time and processing what she has gone through.S W suggested trying to increase her visits with therapist and also provided her with information for the [facility].  Interventions Provided: Referral to Community Resource and Supportive Counseling    SW will continue to follow as needed.

## 2025-01-02 NOTE — PATIENT INSTRUCTIONS
Continue taking Exemestane.  Continue with monthly Goserelin Injections.  Schedule DEXA Scan (bone density scan).  Referral placed to Integrative Oncology-Dr. Arellano.  Follow up with RAVINDRA Baum in 3 months.  Please call [phone], option 5 then option 2, with symptoms, questions or concerns.

## 2025-01-03 ENCOUNTER — INFUSION (OUTPATIENT)
Dept: HEMATOLOGY/ONCOLOGY | Facility: HOSPITAL | Age: 35
End: 2025-01-03
Payer: COMMERCIAL

## 2025-01-03 VITALS
TEMPERATURE: 97.7 F | SYSTOLIC BLOOD PRESSURE: 124 MMHG | HEART RATE: 69 BPM | DIASTOLIC BLOOD PRESSURE: 71 MMHG | RESPIRATION RATE: 18 BRPM | OXYGEN SATURATION: 100 % | BODY MASS INDEX: 34.23 KG/M2 | WEIGHT: 238.54 LBS

## 2025-01-03 DIAGNOSIS — C50.919 MALIGNANT NEOPLASM OF FEMALE BREAST, UNSPECIFIED ESTROGEN RECEPTOR STATUS, UNSPECIFIED LATERALITY, UNSPECIFIED SITE OF BREAST: ICD-10-CM

## 2025-01-03 DIAGNOSIS — C50.911 MALIGNANT NEOPLASM OF RIGHT BREAST IN FEMALE, ESTROGEN RECEPTOR POSITIVE, UNSPECIFIED SITE OF BREAST: ICD-10-CM

## 2025-01-03 DIAGNOSIS — Z17.0 MALIGNANT NEOPLASM OF RIGHT BREAST IN FEMALE, ESTROGEN RECEPTOR POSITIVE, UNSPECIFIED SITE OF BREAST: ICD-10-CM

## 2025-01-03 PROCEDURE — 2500000004 HC RX 250 GENERAL PHARMACY W/ HCPCS (ALT 636 FOR OP/ED): Mod: JZ,SE,TB

## 2025-01-03 PROCEDURE — 96402 CHEMO HORMON ANTINEOPL SQ/IM: CPT

## 2025-01-03 RX ORDER — LIDOCAINE HYDROCHLORIDE 10 MG/ML
2 INJECTION, SOLUTION EPIDURAL; INFILTRATION; INTRACAUDAL; PERINEURAL ONCE
Status: COMPLETED | OUTPATIENT
Start: 2025-01-03 | End: 2025-01-03

## 2025-01-03 RX ORDER — LIDOCAINE HYDROCHLORIDE 10 MG/ML
2 INJECTION, SOLUTION EPIDURAL; INFILTRATION; INTRACAUDAL; PERINEURAL ONCE
OUTPATIENT
Start: 2025-01-30

## 2025-01-03 RX ORDER — FAMOTIDINE 10 MG/ML
20 INJECTION INTRAVENOUS ONCE AS NEEDED
OUTPATIENT
Start: 2025-01-30

## 2025-01-03 RX ORDER — EPINEPHRINE 0.3 MG/.3ML
0.3 INJECTION SUBCUTANEOUS EVERY 5 MIN PRN
OUTPATIENT
Start: 2025-01-30

## 2025-01-03 RX ORDER — DIPHENHYDRAMINE HYDROCHLORIDE 50 MG/ML
50 INJECTION INTRAMUSCULAR; INTRAVENOUS AS NEEDED
OUTPATIENT
Start: 2025-01-30

## 2025-01-03 RX ORDER — ALBUTEROL SULFATE 0.83 MG/ML
3 SOLUTION RESPIRATORY (INHALATION) AS NEEDED
OUTPATIENT
Start: 2025-01-30

## 2025-01-03 RX ADMIN — GOSERELIN ACETATE 3.6 MG: 3.6 IMPLANT SUBCUTANEOUS at 11:25

## 2025-01-03 RX ADMIN — LIDOCAINE HYDROCHLORIDE 20 MG: 10 INJECTION, SOLUTION EPIDURAL; INFILTRATION; INTRACAUDAL; PERINEURAL at 11:24

## 2025-01-03 ASSESSMENT — PAIN SCALES - GENERAL: PAINLEVEL_OUTOF10: 0-NO PAIN

## 2025-01-03 NOTE — PROGRESS NOTES
Left a message regarding missed 9:30 AM appointment today.    Reviewed and approved by YARON WALLER on 1/3/25 at 10:31 AM.

## 2025-01-03 NOTE — PROGRESS NOTES
Nia Harper is a 34 y.o. female who presents in stable condition for zoladex injection    Since her last visit, she has been doing well.  Overall, she states her energy level is stable.  Her appetite has been good.  She reports no complaints.  She has no other concerns.    Patient tolerated subcutaneous injection to her right lower abdomen well and has been educated with the overall therapy plan. She has no other questions or concerns at this time. AVS, lab results & future appointment provided. Patient discharged in stable condition.    Reviewed and approved by YARON WALLER on 1/3/25 at 11:59 AM.

## 2025-01-06 ENCOUNTER — APPOINTMENT (OUTPATIENT)
Dept: RADIOLOGY | Facility: HOSPITAL | Age: 35
End: 2025-01-06
Payer: COMMERCIAL

## 2025-01-06 DIAGNOSIS — E78.5 HYPERLIPIDEMIA, UNSPECIFIED HYPERLIPIDEMIA TYPE: ICD-10-CM

## 2025-01-06 DIAGNOSIS — E11.9 DIABETES MELLITUS TYPE 2 WITHOUT RETINOPATHY (MULTI): ICD-10-CM

## 2025-01-06 RX ORDER — OMEGA-3-ACID ETHYL ESTERS 1 G/1
2 CAPSULE, LIQUID FILLED ORAL
Qty: 360 CAPSULE | Refills: 1 | Status: SHIPPED | OUTPATIENT
Start: 2025-01-06 | End: 2025-07-05

## 2025-01-17 ENCOUNTER — PATIENT MESSAGE (OUTPATIENT)
Dept: HEMATOLOGY/ONCOLOGY | Facility: HOSPITAL | Age: 35
End: 2025-01-17

## 2025-01-17 ENCOUNTER — APPOINTMENT (OUTPATIENT)
Dept: BEHAVIORAL HEALTH | Facility: CLINIC | Age: 35
End: 2025-01-17
Payer: COMMERCIAL

## 2025-01-22 DIAGNOSIS — E78.5 HYPERLIPIDEMIA, UNSPECIFIED HYPERLIPIDEMIA TYPE: ICD-10-CM

## 2025-01-22 RX ORDER — EZETIMIBE 10 MG/1
10 TABLET ORAL DAILY
Qty: 90 TABLET | Refills: 3 | Status: SHIPPED | OUTPATIENT
Start: 2025-01-22

## 2025-01-31 ENCOUNTER — OFFICE VISIT (OUTPATIENT)
Dept: HEMATOLOGY/ONCOLOGY | Facility: HOSPITAL | Age: 35
End: 2025-01-31
Payer: COMMERCIAL

## 2025-01-31 ENCOUNTER — INFUSION (OUTPATIENT)
Dept: HEMATOLOGY/ONCOLOGY | Facility: HOSPITAL | Age: 35
End: 2025-01-31
Payer: COMMERCIAL

## 2025-01-31 VITALS
SYSTOLIC BLOOD PRESSURE: 128 MMHG | HEART RATE: 97 BPM | TEMPERATURE: 95.7 F | DIASTOLIC BLOOD PRESSURE: 69 MMHG | OXYGEN SATURATION: 100 %

## 2025-01-31 DIAGNOSIS — Z17.0 MALIGNANT NEOPLASM OF RIGHT BREAST IN FEMALE, ESTROGEN RECEPTOR POSITIVE, UNSPECIFIED SITE OF BREAST: ICD-10-CM

## 2025-01-31 DIAGNOSIS — C50.911 MALIGNANT NEOPLASM OF RIGHT BREAST IN FEMALE, ESTROGEN RECEPTOR POSITIVE, UNSPECIFIED SITE OF BREAST: ICD-10-CM

## 2025-01-31 PROCEDURE — 2500000004 HC RX 250 GENERAL PHARMACY W/ HCPCS (ALT 636 FOR OP/ED): Mod: JZ,SE

## 2025-01-31 PROCEDURE — 96402 CHEMO HORMON ANTINEOPL SQ/IM: CPT

## 2025-01-31 RX ORDER — DIPHENHYDRAMINE HYDROCHLORIDE 50 MG/ML
50 INJECTION INTRAMUSCULAR; INTRAVENOUS AS NEEDED
OUTPATIENT
Start: 2025-02-28

## 2025-01-31 RX ORDER — EPINEPHRINE 0.3 MG/.3ML
0.3 INJECTION SUBCUTANEOUS EVERY 5 MIN PRN
OUTPATIENT
Start: 2025-02-28

## 2025-01-31 RX ORDER — LIDOCAINE HYDROCHLORIDE 10 MG/ML
2 INJECTION, SOLUTION EPIDURAL; INFILTRATION; INTRACAUDAL; PERINEURAL ONCE
Status: COMPLETED | OUTPATIENT
Start: 2025-01-31 | End: 2025-01-31

## 2025-01-31 RX ORDER — LIDOCAINE HYDROCHLORIDE 10 MG/ML
2 INJECTION, SOLUTION EPIDURAL; INFILTRATION; INTRACAUDAL; PERINEURAL ONCE
OUTPATIENT
Start: 2025-02-28

## 2025-01-31 RX ORDER — FAMOTIDINE 10 MG/ML
20 INJECTION INTRAVENOUS ONCE AS NEEDED
OUTPATIENT
Start: 2025-02-28

## 2025-01-31 RX ORDER — ALBUTEROL SULFATE 0.83 MG/ML
3 SOLUTION RESPIRATORY (INHALATION) AS NEEDED
OUTPATIENT
Start: 2025-02-28

## 2025-01-31 RX ADMIN — GOSERELIN ACETATE 3.6 MG: 3.6 IMPLANT SUBCUTANEOUS at 11:04

## 2025-01-31 RX ADMIN — LIDOCAINE HYDROCHLORIDE 20 MG: 10 INJECTION, SOLUTION EPIDURAL; INFILTRATION; INTRACAUDAL; PERINEURAL at 11:04

## 2025-01-31 ASSESSMENT — PAIN SCALES - GENERAL: PAINLEVEL_OUTOF10: 0-NO PAIN

## 2025-01-31 NOTE — PROGRESS NOTES
Nia Harper is a 34 y.o. female who presents in stable condition for zoladex injection     Since her last visit, she has been doing well.  Overall, she states her energy level is stable.  Her appetite & hydration status has been good.  She reports no complaints.  She has no other concerns.    Patient tolerated subcutaneous injection to her left lower abdomen well and has been educated with the overall therapy plan. She has no other questions or concerns at this time. AVS  & future appointment provided. Patient discharged in stable condition.    Reviewed and approved by YARON WALLER on 1/31/25 at 3:55 PM.

## 2025-02-03 ENCOUNTER — APPOINTMENT (OUTPATIENT)
Dept: BEHAVIORAL HEALTH | Facility: CLINIC | Age: 35
End: 2025-02-03
Payer: COMMERCIAL

## 2025-02-03 DIAGNOSIS — F41.1 GAD (GENERALIZED ANXIETY DISORDER): ICD-10-CM

## 2025-02-03 PROCEDURE — 4010F ACE/ARB THERAPY RXD/TAKEN: CPT | Performed by: PSYCHOLOGIST

## 2025-02-03 NOTE — PROGRESS NOTES
PSYCHOTHERAPY FOLLOW-UP NOTE    Start Time: 1400  Stop Time: 1410      ASSESSMENT:  Pt with a hx of anxiety and persistent depressive disorder with multiple medical conditions and psychosocial stressors.     IMPRESSION:  Persistent depressive disorder  ARNULFO      RECOMMENDATIONS/PLAN:   1. F/U TBD  2. Medications:         Current Outpatient Medications   Medication Sig Dispense Refill    acetaminophen (Tylenol) 325 mg tablet TAKE 2 TABLETS (650 MG) BY MOUTH EVERY 6 HOURS IF NEEDED (PAIN/FEVER) FOR UP TO 5 DAYS.      atorvastatin (Lipitor) 40 mg tablet Take 1 tablet (40 mg) by mouth once daily at bedtime. 90 tablet 3    blood sugar diagnostic (OneTouch Verio test strips) strip CHECK BLOOD SUGARS UP TO 4X/DAY FOR CGM FAILURE 400 strip 1    clindamycin (Cleocin T) 1 % external solution Apply topically once daily.      clindamycin (Cleocin) 2 % vaginal cream INSERT 1 APPLICATION INTO THE VAGINA ONCE DAILY AT BEDTIME FOR 3 DAYS.      cloNIDine (Catapres) 0.1 mg tablet Take 1 tablet (0.1 mg) by mouth once daily at bedtime. 30 tablet 2    cyclobenzaprine (Flexeril) 10 mg tablet Take 1 tablet (10 mg) by mouth 3 times a day as needed for muscle spasms for up to 3 days. 9 tablet 0    doxycycline (Vibra-Tabs) 100 mg tablet 1 TAB(S) ORALLY 2 TIMES A DAY (AVOID SUNLIGHT WHILE ON THIS MEDICATION)      DULoxetine (Cymbalta) 30 mg DR capsule Take 1 capsule (30 mg) by mouth once daily. Do not crush or chew. 90 capsule 1    DULoxetine (Cymbalta) 60 mg DR capsule Take 2 capsules (120 mg) by mouth once daily. Do not crush or chew. Take in the evening with food. 90 capsule 1    estradiol (Estrace) 0.01 % (0.1 mg/gram) vaginal cream Insert 0.5 Applicatorfuls (2 g) into the vagina once daily. Use once a day for 2 weeks then as needed there after 42.5 g 0    exemestane (Aromasin) 25 mg tablet Take 1 tablet (25 mg total) by mouth once daily.  Take after a meal.  Try to take at the same time each day. 90 tablet 3    ezetimibe (Zetia) 10 mg  "tablet Take 1 tablet (10 mg) by mouth once daily. 90 tablet 3    FreeStyle Cristina 2 Sensor kit Change every 14 days 2 each 11    FreeStyle Cristina 3 Sensor device Change every 14 days 2 each 11    ibuprofen 600 mg tablet TAKE 1 TABLET (600 MG) BY MOUTH EVERY 6 HOURS IF NEEDED (PAIN) FOR UP TO 5 DAYS.      insulin aspart (NovoLOG) 100 unit/mL (3 mL) pen 5 units with meals plus sliding scale up to 25 units daily 15 mL 5    insulin glargine (Lantus Solostar U-100 Insulin) 100 unit/mL (3 mL) pen 40 units in the am, 20 units in the PM 30 mL 3    lidocaine (Lidoderm) 5 % patch PLEASE SEE ATTACHED FOR DETAILED DIRECTIONS      LORazepam (Ativan) 1 mg tablet TAKE 1 TABLET BY MOUTH ONCE DAILY AS NEEDED FOR ANXIETY 30 tablet 0    losartan (Cozaar) 25 mg tablet Take 1 tablet (25 mg) by mouth once daily.      metFORMIN  mg 24 hr tablet Take 2 tablets (1,000 mg) by mouth 2 times daily (morning and late afternoon). 360 tablet 1    omega-3 acid ethyl esters (Lovaza) 1 gram capsule Take 2 capsules (2 g) by mouth 2 times daily (morning and late afternoon). 360 capsule 1    ondansetron ODT (Zofran-ODT) 4 mg disintegrating tablet Take 1 tablet (4 mg) by mouth every 6 hours if needed for nausea or vomiting. 60 tablet 1    OneTouch Delica Plus Lancet 33 gauge misc For BG check up to 4x/day for CGM failure 100 each 3    OneTouch Verio Flex meter misc USE AS DIRECTED.      pen needle, diabetic (BD Ultra-Fine Mini Pen Needle) 31 gauge x 3/16\" needle Inject 200 each under the skin 4 times a day before meals. Use 4 a day as directed 200 each 3    pen needle, diabetic (BD Ultra-Fine Mini Pen Needle) 31 gauge x 3/16\" needle USE 4 A DAY AS DIRECTED      phentermine (Adipex-P) 37.5 mg tablet Take 1 tablet (37.5 mg) by mouth once daily in the morning. Take before meals. Start with 1/2 tablet for 5-7 days then increase to full tablet. 30 tablet 0    phentermine-topiramate (Qsymia) 15-92 mg capsule, ER multiphase 24 hr Take 1 capsule by mouth " "once daily. Take 1 capsule daily Do not fill before January 30, 2025. 30 capsule 1    ProbioMax Daily DF 30 billion cell capsule,delayed release(DR/EC) TAKE 1 CAPSULE BY MOUTH EVERY DAY 90 capsule 3    topiramate (Topamax) 50 mg tablet Take 1 tablet PO qPM x 1-2 weeks then increase to 1 tablet PO BID 60 tablet 2    traZODone (Desyrel) 50 mg tablet Take 1 tablet (50 mg) by mouth once daily at bedtime.      valACYclovir (Valtrex) 1 gram tablet       valACYclovir (Valtrex) 500 mg tablet Take 1 tablet (500 mg) by mouth 2 times a day. As needed for outbreaks       No current facility-administered medications for this visit.     3. Behavioral recommendation(s):     A. Pt to create goals for therapy     SUBJECTIVE:  PT here for new patient visit regarding anxiety and depression. Pt reported the following symptoms/concerns: reports ongoing difficulties with anxiety but length of session was very brief today as pt's partner was present in the house and pt was also trying to watch a 9 month old nephew. Pt decided to call to reschedule.     Pt described a complex medical hx (breast CA in 2023, gastric sleeve March, 2024, planned reconstruction surgery in March, 2025). Pt also with T2DM, HTN, HLD. Pt reports she's lost about 65 lbs since weight loss surgery. States she works out daily. This is the only time she has set aside for herself.     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 Pt reports 2 of her 3 kids have special needs. Pt reports her partner helps out but also works a lot and has other children he has to go see. Pt's partner's hx of infidelity has her \"checked out\" of the relationship, but she needs help with the kids. Pt's parents are able to help but not all the time.     Coping skills: going to the gym, smoking marijuana (pt has a medical card), Ativan before bed maybe 1-2x/week,     Duration of problem: year(s)  Severity: Moderate      OBJECTIVE:  Orientation & " Cognition: Oriented x3  Mood: anxious   Affect: Anxious  Appearance: Well groomed, appropriate eye contact.  Harm to self: None Reported  Substance abuse: reports she has a medical marijuana card and goes through 1 blunt per day   Psychiatric medication use: as per Dr. Kent     --------------------------------------------  Other(s) present in the room: none  --------------------------------------------  Caryn Herrera PhD  -------------------------------------------  An interactive audio and video telecommunication system which permits real time communications between the patient (at the originating site) and provider (at the distant site) was utilized to provide this telehealth service.      Verbal consent was requested and obtained from Nia Harper on this date, 2/3/2025, for a telehealth visit.     I have confirmed the patient's identity via the following (minimum of three) acceptable identifiers as per  Policy PH-9: address, , SSN.

## 2025-02-04 DIAGNOSIS — F41.1 GAD (GENERALIZED ANXIETY DISORDER): ICD-10-CM

## 2025-02-04 DIAGNOSIS — F41.0 ANXIETY ATTACK: ICD-10-CM

## 2025-02-05 ENCOUNTER — TELEPHONE (OUTPATIENT)
Dept: HEMATOLOGY/ONCOLOGY | Facility: CLINIC | Age: 35
End: 2025-02-05
Payer: COMMERCIAL

## 2025-02-05 ENCOUNTER — TELEPHONE (OUTPATIENT)
Dept: PLASTIC SURGERY | Facility: CLINIC | Age: 35
End: 2025-02-05
Payer: COMMERCIAL

## 2025-02-05 RX ORDER — CLONIDINE HYDROCHLORIDE 0.1 MG/1
0.1 TABLET ORAL NIGHTLY
Qty: 30 TABLET | Refills: 2 | Status: SHIPPED | OUTPATIENT
Start: 2025-02-05 | End: 2025-05-06

## 2025-02-05 NOTE — TELEPHONE ENCOUNTER
I spoke with the patient regarding surgery date conflict with Dr. Schroeder.  She is understanding at this point and just wants to proceed as soon as possible.  I made her aware that I was already working with Dr. Cerrato's office to look at dates in the future and get back with her when available.  I discussed that she had inquired about finding another breast surgeon but she ultimately stated that she trusted and was comfortable with Dr. Schroeder and just wants to proceed as soon as possible.

## 2025-02-05 NOTE — TELEPHONE ENCOUNTER
Pt called very upset. She was scheduled for surgery on 03/26/2025. She received a call from Dr Jones office, that her procedure was cancelled. She explained they told her that the Migdalia Schroeder MD, can not perform the surgery and she needs a new surgeon, but they didn't give her a new surgeon to see. She doesn't know who to call. She wants the surgery rescheduled and needs a new surgeon.  Pt is very upset and does not understand how or why they would cancel her surgery. Patients phone keeps disconnecting and does not have good service, but wants a call back as soon as possible. I explained that I will send a message to her medical oncology team and social work, but she may need to reach out to the surgical oncology team for a new provider. Patient verbalized understanding and agreement regarding discussed information via verbal feedback.

## 2025-02-05 NOTE — TELEPHONE ENCOUNTER
Pt called back, extremely upset, pt has been notified that both surgery and plastic surgery scheduled for 3/26 have been cancelled.   She states she has made many arrangements and sacrifices to be ready for this surgery and she needs further explanation and assistance.   Pt states that she was told by surgery, she needs to find another surgeon- she is not sure where to start or how to proceed.

## 2025-02-05 NOTE — TELEPHONE ENCOUNTER
Spoke with patient regarding cancellation of surgical date for 3/26/25 and coordination of new date with Dr. Schroeder and plastic surgery.

## 2025-02-06 ENCOUNTER — PREP FOR PROCEDURE (OUTPATIENT)
Dept: SURGICAL ONCOLOGY | Facility: HOSPITAL | Age: 35
End: 2025-02-06
Payer: COMMERCIAL

## 2025-02-07 ENCOUNTER — TELEMEDICINE (OUTPATIENT)
Dept: HEMATOLOGY/ONCOLOGY | Facility: HOSPITAL | Age: 35
End: 2025-02-07
Payer: COMMERCIAL

## 2025-02-07 DIAGNOSIS — N89.8 VAGINAL DRYNESS: ICD-10-CM

## 2025-02-07 DIAGNOSIS — C50.919 MALIGNANT NEOPLASM OF FEMALE BREAST, UNSPECIFIED ESTROGEN RECEPTOR STATUS, UNSPECIFIED LATERALITY, UNSPECIFIED SITE OF BREAST: ICD-10-CM

## 2025-02-07 PROCEDURE — 99215 OFFICE O/P EST HI 40 MIN: CPT | Performed by: NURSE PRACTITIONER

## 2025-02-07 PROCEDURE — 4010F ACE/ARB THERAPY RXD/TAKEN: CPT | Performed by: NURSE PRACTITIONER

## 2025-02-07 RX ORDER — ESTRADIOL 0.1 MG/G
2 CREAM VAGINAL DAILY
Qty: 42.5 G | Refills: 3 | Status: SHIPPED | OUTPATIENT
Start: 2025-02-07

## 2025-02-07 NOTE — PROGRESS NOTES
Patient ID: Nia Harper is a 34 y.o. female.    Cancer History:          Breast         AJCC Edition: 8th (AJCC), Diagnosis Date: Jun 2023, IB, pT2 pN0 cM0 G3     Treatment Synopsis:    Treatment Synopsis:   -Enlarging right breast lumps.  -5/15/23: Dx MG/US demonstrated multiple masses: 1.1 cm (11:00, 8 cm FN); 1 cm (9:30, 5 cm FN); 1.2 cm (9:00, 6 cm FN); 0.7 cm (10:00, 6 cm FN); 0.4 cm (10:00, 5 cm FN). Four lymph nodes demonstrated suspicious cortical thickening.  -5/19/23: US-guided CNB of the 11:00 mass showed IDC grade 3; ER >95% [place] 85% Her2-negative (1+ IHC). Biopsy of the 9:30 mass showed IDC grade 2-3; ER 80% [place] 10% Her2-negative (1+ IHC). A right axillary LN was negative for carcinoma.  -6/30/23: Right mastectomy and SLNBx performed. Multipe foci of grade 3 carcinoma noted (3.1 cm, 2.2 cm, 1.8 cm), no LVI, margins negative, 0/3 LN’s involved IB, pT2 pN0 cM0 G3  Oncotype Dx RS 45 (33% 9-year recurrence risk; >15% chemo benefit).  RSClin: 59% recurrence risk with tamoxifen; 44% chemo benefit.  -8/24/23: Adjuvant TC (docetaxel, cyclophosphamide) initiated. First cycle complicated by admission for nausea/vomiting. Second cycle complicated by admission for angioedema, hives; thought to be secondary to docetaxel.   -10/16/23 Therapy changed to Cyclophosphamide/Methotrexate/5-Flouracil d/t reaction x 2 cycles   -11/09/23 C2/2 CMF -- last dose of systemic chemotherapy     Subjective    Patient returns today in follow up regarding issues unique to being a young person with cancer.    Overall relays doing fair. Had been planning for breast reconstruction and L prophylactic mastectomy next month but now d/t surgeon availability/prioritizing active cancer patients b/c of a colleague being away her surgery has been moved to August. Mentally she had been planning to have her surgery/this aspect of her treatment completed and this has required a shift in her planning/thinking.     Physically - she remains very  dedicated to her diet and exercise - is working hard to keep her HgbA1C and weight under control to even be able to proceed with surgery.     Mood is poor - current challenges are finding help for her 13 y.o. daughter who is struggling with being bullied at school, getting an evaluation for her mental health/ADD and finding a therapist for her. She is also struggling with her relationship to her partner, feeling disconnected, dealing with prior infidelity and trust issues, finding herself shutting down rather than dealing with emotions, discerning if this is a relationship that is worth continuing.     Still meeting with Dr. Kent for medication management, connected to psychology but unable to complete last visit d/t distractions/children around. Does want to follow up.     Tolerating exemestane better, still experiencing vaginal dryness, had not really been using estradial but now is.    Social History: Lives in [place] with partner/father of her children Dinesh, 13 y.o. daughter Lambert, 7 y.o. Gal Bernabe 5 y.o. son special needs  Previously worked as , currently stays at home with children/runs household  Denies tobacco, does smoke marijuana nightly, rare ETOH estimates 6x/year, no vaping or other recreational drug use.    Objective    BSA: There is no height or weight on file to calculate BSA.  There were no vitals taken for this visit.     Current Outpatient Medications   Medication Instructions    acetaminophen (Tylenol) 325 mg tablet TAKE 2 TABLETS (650 MG) BY MOUTH EVERY 6 HOURS IF NEEDED (PAIN/FEVER) FOR UP TO 5 DAYS.    atorvastatin (LIPITOR) 40 mg, oral, Nightly    blood sugar diagnostic (OneTouch Verio test strips) strip CHECK BLOOD SUGARS UP TO 4X/DAY FOR CGM FAILURE    clindamycin (Cleocin T) 1 % external solution Daily    clindamycin (Cleocin) 2 % vaginal cream INSERT 1 APPLICATION INTO THE VAGINA ONCE DAILY AT BEDTIME FOR 3 DAYS.    cloNIDine (CATAPRES) 0.1 mg, oral,  "Nightly    cyclobenzaprine (FLEXERIL) 10 mg, oral, 3 times daily PRN    doxycycline (Vibra-Tabs) 100 mg tablet 1 TAB(S) ORALLY 2 TIMES A DAY (AVOID SUNLIGHT WHILE ON THIS MEDICATION)    DULoxetine (CYMBALTA) 120 mg, oral, Daily, Do not crush or chew. Take in the evening with food.    DULoxetine (CYMBALTA) 30 mg, oral, Daily, Do not crush or chew.    estradiol (ESTRACE) 2 g, vaginal, Daily, Use once a day for 2 weeks then as needed there after    exemestane (AROMASIN) 25 mg, oral, Daily, Take after a meal.  Try to take at the same time each day.    ezetimibe (ZETIA) 10 mg, oral, Daily    FreeStyle Cristina 2 Sensor kit Change every 14 days    FreeStyle Cristina 3 Sensor device Change every 14 days    ibuprofen 600 mg tablet TAKE 1 TABLET (600 MG) BY MOUTH EVERY 6 HOURS IF NEEDED (PAIN) FOR UP TO 5 DAYS.    insulin aspart (NovoLOG) 100 unit/mL (3 mL) pen 5 units with meals plus sliding scale up to 25 units daily    insulin glargine (Lantus Solostar U-100 Insulin) 100 unit/mL (3 mL) pen 40 units in the am, 20 units in the PM    lidocaine (Lidoderm) 5 % patch PLEASE SEE ATTACHED FOR DETAILED DIRECTIONS    LORazepam (Ativan) 1 mg tablet TAKE 1 TABLET BY MOUTH ONCE DAILY AS NEEDED FOR ANXIETY    losartan (Cozaar) 25 mg tablet 1 tablet, Daily    metFORMIN XR (GLUCOPHAGE-XR) 1,000 mg, oral, 2 times daily (morning and late afternoon)    omega-3 acid ethyl esters (LOVAZA) 2 g, oral, 2 times daily (morning and late afternoon)    ondansetron ODT (ZOFRAN-ODT) 4 mg, oral, Every 6 hours PRN    OneTouch Delica Plus Lancet 33 gauge misc For BG check up to 4x/day for CGM failure    OneTouch Verio Flex meter misc USE AS DIRECTED.    pen needle, diabetic (BD Ultra-Fine Mini Pen Needle) 31 gauge x 3/16\" needle 200 each, subcutaneous, 4 times daily before meals and nightly, Use 4 a day as directed    pen needle, diabetic (BD Ultra-Fine Mini Pen Needle) 31 gauge x 3/16\" needle USE 4 A DAY AS DIRECTED    phentermine (ADIPEX-P) 37.5 mg, oral, " Daily before breakfast, Start with 1/2 tablet for 5-7 days then increase to full tablet.    phentermine-topiramate (Qsymia) 15-92 mg capsule, ER multiphase 24 hr 1 capsule, oral, Daily, Take 1 capsule daily    ProbioMax Daily DF 30 billion cell capsule,delayed release(DR/EC) 1 capsule, oral, Daily    topiramate (Topamax) 50 mg tablet Take 1 tablet PO qPM x 1-2 weeks then increase to 1 tablet PO BID    traZODone (DESYREL) 50 mg, Nightly    valACYclovir (Valtrex) 1 gram tablet     valACYclovir (Valtrex) 500 mg tablet 1 tablet, 2 times daily     No visits with results within 1 Week(s) from this visit.   Latest known visit with results is:   Office Visit on 11/21/2024   Component Date Value Ref Range Status    Neisseria gonorrhea,Amplified 11/21/2024 Negative  Negative Final    Chlamydia trachomatis, Amplified 11/21/2024 Negative  Negative Final    Trichomonas Vaginalis 11/21/2024 Negative  Negative, Invalid, TRICH neg Final    Claude Score 11/21/2024 8 (H)  0 - 3 Final    Interpretation of the Claude Score  0-3.....Normal vaginal microbiota  4-6.....Intermediate results  7-10....Bacterial vaginosis    Yeast 11/21/2024 ABSENT  ABSENT Final    Clue Cells 11/21/2024 PRESENT (A)  ABSENT Final      Physical Exam  Constitutional:       General: She is not in acute distress.     Appearance: Normal appearance. She is normal weight. She is not ill-appearing.   Neurological:      General: No focal deficit present.      Mental Status: She is alert and oriented to person, place, and time.   Psychiatric:         Mood and Affect: Mood normal.         Behavior: Behavior normal.         Thought Content: Thought content normal.         Judgment: Judgment normal.     Performance Status:  Symptomatic; fully ambulatory    Assessment/Plan   Nia Harper is a 34 y.o. AA F with a recent diagnosis of G3 IB adenocarcinoma of the breast, ER+. S/p surgery followed by adjuvant chemotherapy with TC x 2 cycles but with angioedema so switched to  CMF x 2 additional cycles. Currently on anastrazole and goserelin for endocrine therapy.     #Psychosocial:  - Now following with psychiatry for medication management - currently on duloxetine, lorazepam, and clonidine  - Connected to psychology Tamera Herrera for 1:1 therapy - encouraged her to call for follow up visit  - Connected with child-life to help with adjustment/parenting  - Receiving monthly young adult social programming invitations    #Hot Flashes: secondary to ovarian suppression and low circulating estrogen  - Improved with initiation of duloxetine    #Sexual Dysfunction/Contraception: secondary to ovarian suppression and low circulating estrogen  - Provided patient with sexual concerns handout, encouraged open communication outside of intimacy to discuss patient decreased interest/sexual dysfunction with partner  - Use replens daily topical vaginal moisturizer  - Use silicone based lubricant with sex  - Refilled topical estrogen - alternate preparation tablet not covered by insurance  - Previously discussed the seriousness of getting pregnant while receiving exemestane due to the harmful effects this could have on the  fetus, and on her cancer treatment given the decreased availability of medical  services.   - Options for contraception include intrauterine devices, estrogen containing contraceptives are contraindicated d/t ER+ status -- provided patient with comparison chart of contraception options  - Following with gynecology    #Practical Needs:  - Following with [employer] for financial assistance grants and support  - Has referral in place to [employer] for food insecurity    #Diet/Exercise/Healthy Lifestyle:  - Discussed research demonstrating consumption of a healthy, plant based diet not only decreases cancer risk, but also has been found to improve survival in cancer patients who partake in a healthy diet.   - We reviewed the American Cancer Society guidelines for a healthy diet  including mostly plant based foods  with incorporation of plant/lean animal proteins and healthy fats such as the mediterranean diet.   - Patient following bariatric surgery diet  - Discussed research that demonstrates decreased cancer risk and improved survival in cancer patients who exercise and stay active throughout diagnosis and treatment.  - Encouraged patient to continue exercise >150min/week  - Encouraged patient to continue to abstain from alcohol, tobacco, and recreational drugs    #Risk for infertility:  - Previously discussed the damaging effect cancer treatment can have on the ovaries.   - Previously discussed natural age related decline in fertility and menopause that occurs as a result of ovarian aging, and that cancer treatments can accelerate that progression and diminish ovarian reserve.  - Individuals may experience varying degrees of short or long term ovarian dysfunction and amenorrhea, and that this is dependent upon type of cancer treatment, cumulative dose, and patients age.   - Loss of ovarian function may be permanent or temporary.  - Amenorrhea may be temporary or permanent -- temporary amenorrhea results from destruction of maturing follicles whereas permanent amenorrhea results from depletion of viable primordial follicles.  - Risk to fertility is INTERMEDIATE based on cancer treatment of Docetaxel/Cyclophosphamide  with cyclophosphamide being the primary  of infertility -- this is dose dependent  - Patient elected not to undergo fertility preservation up front and opted for ovarian suppression with goserelin  - Baseline AMH level drawn 08/11/2023 2.464  - Repeat AMH at one year out from therapy 11/2024  - Discussed plan for at least 18 months on oral endocrine therapy followed by a 3 month wash out before she should attempt to conceive     Follow up visit in 2 months     The amount of time that I spent with the patient:  Prep: 5  Time spend directly with patient: 35  Coordinating  care: 5  Documentation: 5  Other time:    Total time spent on encounter:       Cancer Staging   Breast cancer (Multi)  Staging form: Breast, AJCC 8th Edition  - Pathologic stage from 6/30/2023: Stage IB (pT2(3), pN0(sn), cM0, G3, ER+, MN+, HER2-, Oncotype DX score: 45) - Signed by Jeramie Lancaster MD on 10/10/2023      Oncology History   Breast cancer (Multi)   6/30/2023 Cancer Staged    Staging form: Breast, AJCC 8th Edition, Pathologic stage from 6/30/2023: Stage IB (pT2(3), pN0(sn), cM0, G3, ER+, MN+, HER2-, Oncotype DX score: 45) - Signed by Jeramie Lancaster MD on 10/10/2023     8/24/2023 - 9/15/2023 Chemotherapy    TC (DOCEtaxel / Cyclophosphamide), 21 Day Cycles     9/8/2023 Initial Diagnosis    Malignant neoplasm of female breast (CMS/HCC)     10/16/2023 - 11/9/2023 Chemotherapy    CMF (Cyclophosphamide / Methotrexate / Fluorouracil) 21 Day Cycles                        Sanjuana Anand, RONEL-CNP

## 2025-02-11 DIAGNOSIS — F41.0 ANXIETY ATTACK: ICD-10-CM

## 2025-02-11 RX ORDER — LORAZEPAM 1 MG/1
1 TABLET ORAL DAILY PRN
Qty: 30 TABLET | Refills: 0 | Status: SHIPPED | OUTPATIENT
Start: 2025-02-11 | End: 2025-03-13

## 2025-02-13 ENCOUNTER — SOCIAL WORK (OUTPATIENT)
Dept: CASE MANAGEMENT | Facility: HOSPITAL | Age: 35
End: 2025-02-13
Payer: COMMERCIAL

## 2025-02-13 NOTE — PROGRESS NOTES
Patient called and left message asking for financial assistance. Call returned.  Patient not available. Left SW contact information on voicemail.    MIGUEL Blood

## 2025-02-14 ENCOUNTER — SOCIAL WORK (OUTPATIENT)
Dept: CASE MANAGEMENT | Facility: HOSPITAL | Age: 35
End: 2025-02-14
Payer: COMMERCIAL

## 2025-02-14 NOTE — PROGRESS NOTES
Patient called asking for financial resources. SW tried reaching out and unable to speak with patient and left a voicemail. In the meantime, Sw sent patient a list of financial applications and a resource guide for financial assistance. They include: the [employer] Breast Resource guide and application, Charlette Cole website, [employer] fund, [email or web address], etc. This informaiton was emailed to her.    MIGUEL Blood

## 2025-02-21 ENCOUNTER — APPOINTMENT (OUTPATIENT)
Dept: ENDOCRINOLOGY | Facility: CLINIC | Age: 35
End: 2025-02-21
Payer: COMMERCIAL

## 2025-03-03 ENCOUNTER — INFUSION (OUTPATIENT)
Dept: HEMATOLOGY/ONCOLOGY | Facility: HOSPITAL | Age: 35
End: 2025-03-03
Payer: COMMERCIAL

## 2025-03-03 ENCOUNTER — APPOINTMENT (OUTPATIENT)
Dept: PLASTIC SURGERY | Facility: CLINIC | Age: 35
End: 2025-03-03
Payer: COMMERCIAL

## 2025-03-03 VITALS
RESPIRATION RATE: 18 BRPM | HEART RATE: 108 BPM | TEMPERATURE: 98.8 F | OXYGEN SATURATION: 100 % | SYSTOLIC BLOOD PRESSURE: 131 MMHG | DIASTOLIC BLOOD PRESSURE: 71 MMHG

## 2025-03-03 DIAGNOSIS — C50.911 MALIGNANT NEOPLASM OF RIGHT BREAST IN FEMALE, ESTROGEN RECEPTOR POSITIVE, UNSPECIFIED SITE OF BREAST: ICD-10-CM

## 2025-03-03 DIAGNOSIS — Z17.0 MALIGNANT NEOPLASM OF RIGHT BREAST IN FEMALE, ESTROGEN RECEPTOR POSITIVE, UNSPECIFIED SITE OF BREAST: ICD-10-CM

## 2025-03-03 PROCEDURE — 2500000004 HC RX 250 GENERAL PHARMACY W/ HCPCS (ALT 636 FOR OP/ED): Mod: SE

## 2025-03-03 PROCEDURE — 96372 THER/PROPH/DIAG INJ SC/IM: CPT

## 2025-03-03 PROCEDURE — 96402 CHEMO HORMON ANTINEOPL SQ/IM: CPT

## 2025-03-03 RX ORDER — LIDOCAINE HYDROCHLORIDE 10 MG/ML
2 INJECTION, SOLUTION EPIDURAL; INFILTRATION; INTRACAUDAL; PERINEURAL ONCE
Status: COMPLETED | OUTPATIENT
Start: 2025-03-03 | End: 2025-03-03

## 2025-03-03 RX ORDER — ALBUTEROL SULFATE 0.83 MG/ML
3 SOLUTION RESPIRATORY (INHALATION) AS NEEDED
OUTPATIENT
Start: 2025-03-28

## 2025-03-03 RX ORDER — LIDOCAINE HYDROCHLORIDE 10 MG/ML
2 INJECTION, SOLUTION EPIDURAL; INFILTRATION; INTRACAUDAL; PERINEURAL ONCE
OUTPATIENT
Start: 2025-03-28

## 2025-03-03 RX ORDER — DIPHENHYDRAMINE HYDROCHLORIDE 50 MG/ML
50 INJECTION INTRAMUSCULAR; INTRAVENOUS AS NEEDED
OUTPATIENT
Start: 2025-03-28

## 2025-03-03 RX ORDER — FAMOTIDINE 10 MG/ML
20 INJECTION, SOLUTION INTRAVENOUS ONCE AS NEEDED
OUTPATIENT
Start: 2025-03-28

## 2025-03-03 RX ORDER — EPINEPHRINE 0.3 MG/.3ML
0.3 INJECTION SUBCUTANEOUS EVERY 5 MIN PRN
OUTPATIENT
Start: 2025-03-28

## 2025-03-03 RX ADMIN — LIDOCAINE HYDROCHLORIDE 20 MG: 10 INJECTION, SOLUTION EPIDURAL; INFILTRATION; INTRACAUDAL; PERINEURAL at 12:18

## 2025-03-03 RX ADMIN — GOSERELIN ACETATE 3.6 MG: 3.6 IMPLANT SUBCUTANEOUS at 12:18

## 2025-03-03 ASSESSMENT — PAIN SCALES - GENERAL
PAINLEVEL_OUTOF10: 0-NO PAIN
PAINLEVEL_OUTOF10: 0 - NO PAIN

## 2025-03-31 ENCOUNTER — OFFICE VISIT (OUTPATIENT)
Dept: SURGERY | Facility: CLINIC | Age: 35
End: 2025-03-31
Payer: COMMERCIAL

## 2025-03-31 ENCOUNTER — APPOINTMENT (OUTPATIENT)
Dept: BEHAVIORAL HEALTH | Facility: CLINIC | Age: 35
End: 2025-03-31
Payer: COMMERCIAL

## 2025-03-31 ENCOUNTER — INFUSION (OUTPATIENT)
Dept: HEMATOLOGY/ONCOLOGY | Facility: HOSPITAL | Age: 35
End: 2025-03-31
Payer: COMMERCIAL

## 2025-03-31 VITALS
HEART RATE: 85 BPM | SYSTOLIC BLOOD PRESSURE: 131 MMHG | DIASTOLIC BLOOD PRESSURE: 66 MMHG | TEMPERATURE: 96.8 F | RESPIRATION RATE: 18 BRPM

## 2025-03-31 VITALS — HEIGHT: 70 IN | WEIGHT: 239 LBS | BODY MASS INDEX: 34.22 KG/M2

## 2025-03-31 DIAGNOSIS — E78.2 MIXED HYPERLIPIDEMIA: Primary | ICD-10-CM

## 2025-03-31 DIAGNOSIS — C50.911 MALIGNANT NEOPLASM OF RIGHT BREAST IN FEMALE, ESTROGEN RECEPTOR POSITIVE, UNSPECIFIED SITE OF BREAST: ICD-10-CM

## 2025-03-31 DIAGNOSIS — F41.1 GAD (GENERALIZED ANXIETY DISORDER): ICD-10-CM

## 2025-03-31 DIAGNOSIS — K91.2 POSTOPERATIVE MALABSORPTION (HHS-HCC): ICD-10-CM

## 2025-03-31 DIAGNOSIS — R29.818 SUSPECTED SLEEP APNEA: ICD-10-CM

## 2025-03-31 DIAGNOSIS — E66.9 OBESITY (BMI 35.0-39.9 WITHOUT COMORBIDITY): ICD-10-CM

## 2025-03-31 DIAGNOSIS — Z90.3 S/P GASTRIC SLEEVE PROCEDURE: ICD-10-CM

## 2025-03-31 DIAGNOSIS — E11.9 DIABETES MELLITUS TYPE 2 WITHOUT RETINOPATHY (MULTI): ICD-10-CM

## 2025-03-31 DIAGNOSIS — I10 PRIMARY HYPERTENSION: ICD-10-CM

## 2025-03-31 DIAGNOSIS — Z71.3 DIETARY COUNSELING: ICD-10-CM

## 2025-03-31 DIAGNOSIS — Z17.0 MALIGNANT NEOPLASM OF RIGHT BREAST IN FEMALE, ESTROGEN RECEPTOR POSITIVE, UNSPECIFIED SITE OF BREAST: ICD-10-CM

## 2025-03-31 PROCEDURE — 99213 OFFICE O/P EST LOW 20 MIN: CPT

## 2025-03-31 PROCEDURE — 99401 PREV MED CNSL INDIV APPRX 15: CPT

## 2025-03-31 PROCEDURE — 4010F ACE/ARB THERAPY RXD/TAKEN: CPT

## 2025-03-31 PROCEDURE — 99213 OFFICE O/P EST LOW 20 MIN: CPT | Mod: 25,GT,95

## 2025-03-31 PROCEDURE — 96372 THER/PROPH/DIAG INJ SC/IM: CPT

## 2025-03-31 PROCEDURE — 2500000004 HC RX 250 GENERAL PHARMACY W/ HCPCS (ALT 636 FOR OP/ED): Mod: JZ,SE

## 2025-03-31 PROCEDURE — 96402 CHEMO HORMON ANTINEOPL SQ/IM: CPT

## 2025-03-31 PROCEDURE — 4010F ACE/ARB THERAPY RXD/TAKEN: CPT | Performed by: PSYCHOLOGIST

## 2025-03-31 PROCEDURE — 3008F BODY MASS INDEX DOCD: CPT

## 2025-03-31 PROCEDURE — 90834 PSYTX W PT 45 MINUTES: CPT | Performed by: PSYCHOLOGIST

## 2025-03-31 RX ORDER — ALBUTEROL SULFATE 0.83 MG/ML
3 SOLUTION RESPIRATORY (INHALATION) AS NEEDED
OUTPATIENT
Start: 2025-04-28

## 2025-03-31 RX ORDER — FAMOTIDINE 10 MG/ML
20 INJECTION, SOLUTION INTRAVENOUS ONCE AS NEEDED
Status: DISCONTINUED | OUTPATIENT
Start: 2025-03-31 | End: 2025-03-31 | Stop reason: HOSPADM

## 2025-03-31 RX ORDER — EPINEPHRINE 0.3 MG/.3ML
0.3 INJECTION SUBCUTANEOUS EVERY 5 MIN PRN
Status: DISCONTINUED | OUTPATIENT
Start: 2025-03-31 | End: 2025-03-31 | Stop reason: HOSPADM

## 2025-03-31 RX ORDER — DIPHENHYDRAMINE HYDROCHLORIDE 50 MG/ML
50 INJECTION, SOLUTION INTRAMUSCULAR; INTRAVENOUS AS NEEDED
Status: DISCONTINUED | OUTPATIENT
Start: 2025-03-31 | End: 2025-03-31 | Stop reason: HOSPADM

## 2025-03-31 RX ORDER — PHENTERMINE AND TOPIRAMATE 15; 92 MG/1; MG/1
1 CAPSULE, EXTENDED RELEASE ORAL DAILY
Qty: 30 CAPSULE | Refills: 2 | Status: SHIPPED | OUTPATIENT
Start: 2025-03-31 | End: 2025-04-30

## 2025-03-31 RX ORDER — LIDOCAINE HYDROCHLORIDE 10 MG/ML
2 INJECTION, SOLUTION EPIDURAL; INFILTRATION; INTRACAUDAL; PERINEURAL ONCE
OUTPATIENT
Start: 2025-04-28

## 2025-03-31 RX ORDER — FAMOTIDINE 10 MG/ML
20 INJECTION, SOLUTION INTRAVENOUS ONCE AS NEEDED
OUTPATIENT
Start: 2025-04-28

## 2025-03-31 RX ORDER — LIDOCAINE HYDROCHLORIDE 10 MG/ML
2 INJECTION, SOLUTION EPIDURAL; INFILTRATION; INTRACAUDAL; PERINEURAL ONCE
Status: COMPLETED | OUTPATIENT
Start: 2025-03-31 | End: 2025-03-31

## 2025-03-31 RX ORDER — EPINEPHRINE 0.3 MG/.3ML
0.3 INJECTION SUBCUTANEOUS EVERY 5 MIN PRN
OUTPATIENT
Start: 2025-04-28

## 2025-03-31 RX ORDER — ALBUTEROL SULFATE 0.83 MG/ML
3 SOLUTION RESPIRATORY (INHALATION) AS NEEDED
Status: DISCONTINUED | OUTPATIENT
Start: 2025-03-31 | End: 2025-03-31 | Stop reason: HOSPADM

## 2025-03-31 RX ORDER — DIPHENHYDRAMINE HYDROCHLORIDE 50 MG/ML
50 INJECTION, SOLUTION INTRAMUSCULAR; INTRAVENOUS AS NEEDED
OUTPATIENT
Start: 2025-04-28

## 2025-03-31 RX ADMIN — LIDOCAINE HYDROCHLORIDE 20 MG: 10 INJECTION, SOLUTION EPIDURAL; INFILTRATION; INTRACAUDAL; PERINEURAL at 14:33

## 2025-03-31 RX ADMIN — GOSERELIN ACETATE 3.6 MG: 3.6 IMPLANT SUBCUTANEOUS at 14:33

## 2025-03-31 NOTE — PROGRESS NOTES
Nia Harper presents to the clinic today for Zoladex injection, in left lower abdomen. Patient tolerated treatment without issues. Patient discharged in stable condition.
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
## 2025-03-31 NOTE — ASSESSMENT & PLAN NOTE
- continue to follow with sleep medicine as needed  - continue to wear CPAP or BiPAP if ordered at prescribed settings  - reviewed practices to promote adequate sleep hygiene.

## 2025-03-31 NOTE — PROGRESS NOTES
PSYCHOTHERAPY FOLLOW-UP NOTE    Start Time: 1400  Stop Time: 1410      ASSESSMENT:  Pt with a hx of anxiety and persistent depressive disorder with multiple medical conditions and psychosocial stressors.     IMPRESSION:  Persistent depressive disorder  ARNULFO      RECOMMENDATIONS/PLAN:   1. F/U TBD  2. Medications:         Current Outpatient Medications   Medication Sig Dispense Refill    acetaminophen (Tylenol) 325 mg tablet TAKE 2 TABLETS (650 MG) BY MOUTH EVERY 6 HOURS IF NEEDED (PAIN/FEVER) FOR UP TO 5 DAYS.      atorvastatin (Lipitor) 40 mg tablet Take 1 tablet (40 mg) by mouth once daily at bedtime. 90 tablet 3    blood sugar diagnostic (OneTouch Verio test strips) strip CHECK BLOOD SUGARS UP TO 4X/DAY FOR CGM FAILURE 400 strip 1    clindamycin (Cleocin T) 1 % external solution Apply topically once daily.      clindamycin (Cleocin) 2 % vaginal cream INSERT 1 APPLICATION INTO THE VAGINA ONCE DAILY AT BEDTIME FOR 3 DAYS.      cloNIDine (Catapres) 0.1 mg tablet TAKE 1 TABLET (0.1 MG) BY MOUTH ONCE DAILY AT BEDTIME. 30 tablet 2    cyclobenzaprine (Flexeril) 10 mg tablet Take 1 tablet (10 mg) by mouth 3 times a day as needed for muscle spasms for up to 3 days. 9 tablet 0    doxycycline (Vibra-Tabs) 100 mg tablet 1 TAB(S) ORALLY 2 TIMES A DAY (AVOID SUNLIGHT WHILE ON THIS MEDICATION)      DULoxetine (Cymbalta) 30 mg DR capsule Take 1 capsule (30 mg) by mouth once daily. Do not crush or chew. 90 capsule 1    DULoxetine (Cymbalta) 60 mg DR capsule Take 2 capsules (120 mg) by mouth once daily. Do not crush or chew. Take in the evening with food. 90 capsule 1    estradiol (Estrace) 0.01 % (0.1 mg/gram) vaginal cream Insert 0.5 Applicatorfuls (2 g) into the vagina once daily. Use once a day for 2 weeks then as needed there after 42.5 g 3    exemestane (Aromasin) 25 mg tablet Take 1 tablet (25 mg total) by mouth once daily.  Take after a meal.  Try to take at the same time each day. 90 tablet 3    ezetimibe (Zetia) 10 mg  "tablet Take 1 tablet (10 mg) by mouth once daily. 90 tablet 3    FreeStyle Cristina 2 Sensor kit Change every 14 days 2 each 11    FreeStyle Cristina 3 Sensor device Change every 14 days 2 each 11    ibuprofen 600 mg tablet TAKE 1 TABLET (600 MG) BY MOUTH EVERY 6 HOURS IF NEEDED (PAIN) FOR UP TO 5 DAYS.      insulin aspart (NovoLOG) 100 unit/mL (3 mL) pen 5 units with meals plus sliding scale up to 25 units daily 15 mL 5    insulin glargine (Lantus Solostar U-100 Insulin) 100 unit/mL (3 mL) pen 40 units in the am, 20 units in the PM 30 mL 3    lidocaine (Lidoderm) 5 % patch PLEASE SEE ATTACHED FOR DETAILED DIRECTIONS      LORazepam (Ativan) 1 mg tablet TAKE 1 TABLET BY MOUTH ONCE DAILY AS NEEDED FOR ANXIETY 30 tablet 0    losartan (Cozaar) 25 mg tablet Take 1 tablet (25 mg) by mouth once daily.      metFORMIN  mg 24 hr tablet Take 2 tablets (1,000 mg) by mouth 2 times daily (morning and late afternoon). 360 tablet 1    omega-3 acid ethyl esters (Lovaza) 1 gram capsule Take 2 capsules (2 g) by mouth 2 times daily (morning and late afternoon). 360 capsule 1    ondansetron ODT (Zofran-ODT) 4 mg disintegrating tablet Take 1 tablet (4 mg) by mouth every 6 hours if needed for nausea or vomiting. 60 tablet 1    OneTouch Delica Plus Lancet 33 gauge misc For BG check up to 4x/day for CGM failure 100 each 3    OneTouch Verio Flex meter misc USE AS DIRECTED.      pen needle, diabetic (BD Ultra-Fine Mini Pen Needle) 31 gauge x 3/16\" needle Inject 200 each under the skin 4 times a day before meals. Use 4 a day as directed 200 each 3    pen needle, diabetic (BD Ultra-Fine Mini Pen Needle) 31 gauge x 3/16\" needle USE 4 A DAY AS DIRECTED      phentermine-topiramate (Qsymia) 15-92 mg capsule, ER multiphase 24 hr Take 1 capsule by mouth once daily. Take 1 capsule daily 30 capsule 2    ProbioMax Daily DF 30 billion cell capsule,delayed release(DR/EC) TAKE 1 CAPSULE BY MOUTH EVERY DAY 90 capsule 3    traZODone (Desyrel) 50 mg tablet " Take 1 tablet (50 mg) by mouth once daily at bedtime.      valACYclovir (Valtrex) 1 gram tablet       valACYclovir (Valtrex) 500 mg tablet Take 1 tablet (500 mg) by mouth 2 times a day. As needed for outbreaks       No current facility-administered medications for this visit.     Facility-Administered Medications Ordered in Other Visits   Medication Dose Route Frequency Provider Last Rate Last Admin    albuterol 2.5 mg /3 mL (0.083 %) nebulizer solution 3 mL  3 mL nebulization PRN Petrona Bermeo, PAHanC        dextrose 5 % in water (D5W) bolus 500 mL  500 mL intravenous PRN Petrona Bermeo, PA-C        diphenhydrAMINE (BENADryl) injection 50 mg  50 mg intravenous PRN Petrona Bermeo, PA-C        EPINEPHrine (Epipen) injection syringe 0.3 mg  0.3 mg intramuscular q5 min PRN Petrona Bermeo, PA-C        famotidine PF (Pepcid) injection 20 mg  20 mg intravenous Once PRN Petrona Bermeo, PA-C        methylPREDNISolone sod succinate (SOLU-Medrol) 40 mg/mL injection 40 mg  40 mg intravenous PRN Petrona Bermeo, PA-C        sodium chloride 0.9 % bolus 500 mL  500 mL intravenous PRN Petrona Bermeo, PA-C         3. Behavioral recommendation(s):     A. Pt to create goals for therapy    B. Weigh in no more than once per week   C. Check out guided meditation videos on Youtube   D. Check into Facebook supports groups for mastectomy     SUBJECTIVE:  PT here for new patient visit regarding anxiety and depression. Pt reported the following symptoms/concerns: pt's breast reconstruction surgery got moved from March to April because of staffing issues. This is the 3rd or 4th time this surgery has been pushed forward. Pt with significant anxiety about meeting the stringent criteria for surgery (needs to be a certain weight, BMI, and A1C).     Pt described a complex medical hx (breast CA in 2023, gastric sleeve March, 2024, planned reconstruction surgery in March, 2025). Pt also with T2DM, HTN, HLD. Pt reports she's lost  "about 65 lbs since weight loss surgery. States she works out daily. This is the only time she has set aside for herself.     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 Pt reports 2 of her 3 kids have special needs. Pt reports her partner helps out but also works a lot and has other children he has to go see. Pt's partner's hx of infidelity has her \"checked out\" of the relationship, but she needs help with the kids. Pt's parents are able to help but not all the time.     Coping skills: going to the gym, smoking marijuana (pt has a medical card), Ativan before bed maybe 1-2x/week,     Duration of problem: year(s)  Severity: Moderate      OBJECTIVE:  Orientation & Cognition: Oriented x3  Mood: anxious   Affect: Anxious  Appearance: Well groomed, appropriate eye contact.  Harm to self: None Reported  Substance abuse: reports she has a medical marijuana card and goes through 1 blunt per day   Psychiatric medication use: as per Dr. Kent     --------------------------------------------  Other(s) present in the room: none  --------------------------------------------  Caryn Herrera PhD  -------------------------------------------  An interactive audio and video telecommunication system which permits real time communications between the patient (at the originating site) and provider (at the distant site) was utilized to provide this telehealth service.      Verbal consent was requested and obtained from Nia Leroy on this date, 3/31/2025, for a telehealth visit.     I have confirmed the patient's identity via the following (minimum of three) acceptable identifiers as per  Policy PH-9: address, , SSN.     "

## 2025-03-31 NOTE — PATIENT INSTRUCTIONS
You have been prescribed Qsymia (Phentermine/Topiramate) for weight loss.   This medication will decrease your appetite and promote improved appetite control, may help to reduce cravings and improve satiety (feeling of fullness).  This medication has been approved for chronic weight management if you achieve at least 5% total body weight reduction in the first 12 weeks of use.    This medication is a controlled substance and will result in a positive urine drug screen of amphetamine if taking regularly.    IMPORTANT Lifestyle Management with the use of anti-obesity medication include:  - Monitor/track your protein intake, ensure at least 80-90g of protein per day for women, 110-120g for men -> this helps to ensure you are consuming adequate protein to maintain/preserve lean body mass in weight loss setting.  - Consume at least 64 oz of water per day -> this helps to reduce the risk of constipation/dehydration associated with this medication  - Engage in resistance at least 2x/week - targeting upper & lower body group with each resistance training session -> this helps to ensure you are consuming adequate protein to maintain/preserve lean body mass in weight loss setting.    Side Effects include: tingling of the arms and legs, nausea, a change in the way foods taste, diarrhea, nervousness, cough, fogginess, constipation, dry mouth, insomnia.   --> Topiramate containing medications can increase the risk of kidney stone formation.    --> Topiramate containing medications can increase the risk of mood change, depression or suicidal thoughts, if you experience these symptoms after starting medication you must notify this provider immediately to discuss medication discontinuation.      If you develop insomnia, take the medication as early a possible in the day, you can intermittently use an over the counter melatonin supplement IF needed.    If you develop constipation, be sure you are drinking at least 60 oz of water  daily, you can also add an over the counter fiber supplement and stool softener to lessen symptoms.      For WOMEN of childbearing age: you must continue to take a reliable method of contraception while on this medication - IT IS CONTRAINDICATED IN PREGNANCY due to the risk of cleft palate formation in developing fetus.  A monthly home pregnancy test should be used to confirm negative pregnancy if of child bearing age in addition to contraception.    If using [facility] Mail Order [facility], you will need to set up a mail order account at the following website:  [employer] - [email or web address] OR CALL   Phone: [phone] / [phone]  Fax: [phone]  8:00 AM - 8:00 PM ET, Monday- Friday

## 2025-03-31 NOTE — ASSESSMENT & PLAN NOTE
Obesity Class 3- initial  BMI: Initial BMI - medical weight loss s/p bariatric surgery 46.09  [place] Stage 2     -Obesity is a chronic, relapsing, progressive, treatable, multifactorial, neurobehavioral disease, where in an increase in body fat promotes adipose (fat) tissue dysfunction, as well as biomechanical stress on the body which can result in adverse metabolic, biomechanical and psychosocial health consequences, in addition to reducing quality of life and lifespan.     -Without treatment, obesity is likely to progress and worsen, further increasing the patient's risk for numerous health conditions caused by excess adiposity and increasing the risk for premature death.     -The patient was counseled on the science of weight and appetite regulation. The pathophysiology of obesity was reviewed, as well as the biological adaptations to weight loss.      -We reviewed the role of nutrition, physical activity, stress and sleep. We discussed the potential for bariatric surgery and or anti-obesity medications in the treatment of obesity. We reviewed the health impacts of obesity, and the health benefits of a 5-10% weight loss. We also reviewed program expectations and coordination of care. All additional questions were answered     - Reviewed role of bariatric surgical interventions for obesity treatment, indications for bariatric surgery, benefits of bariatric & metabolic surgery to long term health and obesity treatment.  - Meets ASMBS Guidelines for bariatric surgery with BMI > 40 or > 35 with weight related comorbidity: Yes - now at 6 months post-op, seeking [facility] to further progress for breast ca surgery     -Reviewed use of FDA approved anti obesity pharmacologic therapies and how they assist lifestyle changes.  Discussed medications risk/benefit, expected weight loss outcomes with use, contraindications, side effects and monitoring parameters.    - Contraindications to AOMs: n/a Previously took trulicity and  ozempic and had ADR n/v. Wishes to proceed with current plan- qsymia (phentermine/topiramate). Goal weight is 200 - 220 lbs lbs for plastic surgery in March.   -Reviewed pertinent history, patient has no current contraindications to use of any prescribed medication for adjunct treatment in weight management.   -Counseling provided on clinical success criteria of 5% weight loss within 12 weeks of therapeutic dose of medication.

## 2025-04-02 ENCOUNTER — APPOINTMENT (OUTPATIENT)
Dept: BEHAVIORAL HEALTH | Facility: CLINIC | Age: 35
End: 2025-04-02
Payer: COMMERCIAL

## 2025-04-02 DIAGNOSIS — F34.1 PERSISTENT DEPRESSIVE DISORDER: ICD-10-CM

## 2025-04-02 DIAGNOSIS — F33.0 MILD EPISODE OF RECURRENT MAJOR DEPRESSIVE DISORDER (CMS-HCC): ICD-10-CM

## 2025-04-02 DIAGNOSIS — F41.1 GAD (GENERALIZED ANXIETY DISORDER): ICD-10-CM

## 2025-04-02 DIAGNOSIS — F41.0 ANXIETY ATTACK: ICD-10-CM

## 2025-04-02 PROCEDURE — 4010F ACE/ARB THERAPY RXD/TAKEN: CPT | Performed by: PSYCHIATRY & NEUROLOGY

## 2025-04-02 PROCEDURE — 1036F TOBACCO NON-USER: CPT | Performed by: PSYCHIATRY & NEUROLOGY

## 2025-04-02 PROCEDURE — 99214 OFFICE O/P EST MOD 30 MIN: CPT | Performed by: PSYCHIATRY & NEUROLOGY

## 2025-04-02 RX ORDER — ARIPIPRAZOLE 2 MG/1
2 TABLET ORAL DAILY
Qty: 30 TABLET | Refills: 2 | Status: SHIPPED | OUTPATIENT
Start: 2025-04-02 | End: 2025-07-01

## 2025-04-02 RX ORDER — LORAZEPAM 1 MG/1
1 TABLET ORAL DAILY PRN
Qty: 30 TABLET | Refills: 0 | Status: SHIPPED | OUTPATIENT
Start: 2025-04-02 | End: 2025-05-02
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
## 2025-04-08 ENCOUNTER — APPOINTMENT (OUTPATIENT)
Dept: HEMATOLOGY/ONCOLOGY | Facility: HOSPITAL | Age: 35
End: 2025-04-08
Payer: COMMERCIAL

## 2025-04-15 ENCOUNTER — TELEPHONE (OUTPATIENT)
Dept: HEMATOLOGY/ONCOLOGY | Facility: CLINIC | Age: 35
End: 2025-04-15
Payer: COMMERCIAL

## 2025-04-15 ENCOUNTER — PATIENT MESSAGE (OUTPATIENT)
Dept: HEMATOLOGY/ONCOLOGY | Facility: HOSPITAL | Age: 35
End: 2025-04-15
Payer: COMMERCIAL

## 2025-04-15 NOTE — TELEPHONE ENCOUNTER
Called pt to discuss symtpoms. Having these waves of body aches and pain since Faizan start in shoulders and works its way down. She does not want to keep trying different Ais but is very uncomfortable. Recommended trialing turmeric for 1 week, if better will continue treatment if no change or worse consider holding exemestane. In agreement with plan

## 2025-04-16 NOTE — TELEPHONE ENCOUNTER
"Petrona Bermeo note in separate encounter reads \"Called pt to discuss symtpoms. Having these waves of body aches and pain since Faizan start in shoulders and works its way down. She does not want to keep trying different Ais but is very uncomfortable. Recommended trialing turmeric for 1 week, if better will continue treatment if no change or worse consider holding exemestane. In agreement with plan \"  "

## 2025-04-21 ENCOUNTER — PATIENT MESSAGE (OUTPATIENT)
Dept: ENDOCRINOLOGY | Facility: CLINIC | Age: 35
End: 2025-04-21
Payer: COMMERCIAL

## 2025-04-21 DIAGNOSIS — E78.5 HYPERLIPIDEMIA, UNSPECIFIED HYPERLIPIDEMIA TYPE: ICD-10-CM

## 2025-04-21 RX ORDER — ATORVASTATIN CALCIUM 40 MG/1
40 TABLET, FILM COATED ORAL NIGHTLY
Qty: 90 TABLET | Refills: 1 | Status: SHIPPED | OUTPATIENT
Start: 2025-04-21

## 2025-04-22 RX ORDER — ATORVASTATIN CALCIUM 40 MG/1
40 TABLET, FILM COATED ORAL NIGHTLY
Qty: 90 TABLET | Refills: 3 | Status: SHIPPED | OUTPATIENT
Start: 2025-04-22 | End: 2026-04-22

## 2025-04-25 ENCOUNTER — APPOINTMENT (OUTPATIENT)
Dept: PRIMARY CARE | Facility: CLINIC | Age: 35
End: 2025-04-25
Payer: COMMERCIAL

## 2025-04-25 DIAGNOSIS — F33.0 MILD EPISODE OF RECURRENT MAJOR DEPRESSIVE DISORDER (CMS-HCC): ICD-10-CM

## 2025-04-25 RX ORDER — ARIPIPRAZOLE 2 MG/1
2 TABLET ORAL DAILY
Qty: 90 TABLET | Refills: 1 | Status: SHIPPED | OUTPATIENT
Start: 2025-04-25

## 2025-04-25 NOTE — TELEPHONE ENCOUNTER
Reason for Conversation  Med Change Request    Background   ***    Disposition   No disposition on file.

## 2025-04-28 ENCOUNTER — INFUSION (OUTPATIENT)
Dept: HEMATOLOGY/ONCOLOGY | Facility: HOSPITAL | Age: 35
End: 2025-04-28
Payer: COMMERCIAL

## 2025-04-28 VITALS
TEMPERATURE: 98.2 F | DIASTOLIC BLOOD PRESSURE: 74 MMHG | RESPIRATION RATE: 18 BRPM | HEART RATE: 80 BPM | BODY MASS INDEX: 33.86 KG/M2 | OXYGEN SATURATION: 100 % | SYSTOLIC BLOOD PRESSURE: 118 MMHG | WEIGHT: 236 LBS

## 2025-04-28 DIAGNOSIS — C50.911 MALIGNANT NEOPLASM OF RIGHT BREAST IN FEMALE, ESTROGEN RECEPTOR POSITIVE, UNSPECIFIED SITE OF BREAST: ICD-10-CM

## 2025-04-28 DIAGNOSIS — Z17.0 MALIGNANT NEOPLASM OF RIGHT BREAST IN FEMALE, ESTROGEN RECEPTOR POSITIVE, UNSPECIFIED SITE OF BREAST: ICD-10-CM

## 2025-04-28 PROCEDURE — 2500000004 HC RX 250 GENERAL PHARMACY W/ HCPCS (ALT 636 FOR OP/ED): Mod: JZ,SE

## 2025-04-28 PROCEDURE — 96402 CHEMO HORMON ANTINEOPL SQ/IM: CPT

## 2025-04-28 PROCEDURE — 96372 THER/PROPH/DIAG INJ SC/IM: CPT

## 2025-04-28 RX ORDER — LIDOCAINE HYDROCHLORIDE 10 MG/ML
2 INJECTION, SOLUTION EPIDURAL; INFILTRATION; INTRACAUDAL; PERINEURAL ONCE
Status: COMPLETED | OUTPATIENT
Start: 2025-04-28 | End: 2025-04-28

## 2025-04-28 RX ORDER — ALBUTEROL SULFATE 0.83 MG/ML
3 SOLUTION RESPIRATORY (INHALATION) AS NEEDED
Status: DISCONTINUED | OUTPATIENT
Start: 2025-04-28 | End: 2025-04-28 | Stop reason: HOSPADM

## 2025-04-28 RX ORDER — EPINEPHRINE 0.3 MG/.3ML
0.3 INJECTION SUBCUTANEOUS EVERY 5 MIN PRN
Status: DISCONTINUED | OUTPATIENT
Start: 2025-04-28 | End: 2025-04-28 | Stop reason: HOSPADM

## 2025-04-28 RX ORDER — EPINEPHRINE 0.3 MG/.3ML
0.3 INJECTION SUBCUTANEOUS EVERY 5 MIN PRN
OUTPATIENT
Start: 2025-05-26

## 2025-04-28 RX ORDER — LIDOCAINE HYDROCHLORIDE 10 MG/ML
2 INJECTION, SOLUTION EPIDURAL; INFILTRATION; INTRACAUDAL; PERINEURAL ONCE
OUTPATIENT
Start: 2025-05-26

## 2025-04-28 RX ORDER — FAMOTIDINE 10 MG/ML
20 INJECTION, SOLUTION INTRAVENOUS ONCE AS NEEDED
OUTPATIENT
Start: 2025-05-26

## 2025-04-28 RX ORDER — ALBUTEROL SULFATE 0.83 MG/ML
3 SOLUTION RESPIRATORY (INHALATION) AS NEEDED
OUTPATIENT
Start: 2025-05-26

## 2025-04-28 RX ORDER — DIPHENHYDRAMINE HYDROCHLORIDE 50 MG/ML
50 INJECTION, SOLUTION INTRAMUSCULAR; INTRAVENOUS AS NEEDED
OUTPATIENT
Start: 2025-05-26

## 2025-04-28 RX ORDER — FAMOTIDINE 10 MG/ML
20 INJECTION, SOLUTION INTRAVENOUS ONCE AS NEEDED
Status: DISCONTINUED | OUTPATIENT
Start: 2025-04-28 | End: 2025-04-28 | Stop reason: HOSPADM

## 2025-04-28 RX ORDER — DIPHENHYDRAMINE HYDROCHLORIDE 50 MG/ML
50 INJECTION, SOLUTION INTRAMUSCULAR; INTRAVENOUS AS NEEDED
Status: DISCONTINUED | OUTPATIENT
Start: 2025-04-28 | End: 2025-04-28 | Stop reason: HOSPADM

## 2025-04-28 RX ADMIN — LIDOCAINE HYDROCHLORIDE 20 MG: 10 INJECTION, SOLUTION EPIDURAL; INFILTRATION; INTRACAUDAL; PERINEURAL at 11:11

## 2025-04-28 RX ADMIN — GOSERELIN ACETATE 3.6 MG: 3.6 IMPLANT SUBCUTANEOUS at 11:11

## 2025-04-28 NOTE — PROGRESS NOTES
Pt present today for goserelin injection.  Lidocaine and Zoladex injections tolerated with no issues to abdomen.  Pt discharged to home in stable condition.

## 2025-05-01 DIAGNOSIS — E11.9 DIABETES MELLITUS TYPE 2 WITHOUT RETINOPATHY (MULTI): ICD-10-CM

## 2025-05-01 RX ORDER — INSULIN GLARGINE 100 [IU]/ML
INJECTION, SOLUTION SUBCUTANEOUS
Qty: 60 ML | Refills: 1 | Status: SHIPPED | OUTPATIENT
Start: 2025-05-01

## 2025-05-02 ENCOUNTER — OFFICE VISIT (OUTPATIENT)
Dept: HEMATOLOGY/ONCOLOGY | Facility: HOSPITAL | Age: 35
End: 2025-05-02
Payer: COMMERCIAL

## 2025-05-02 ENCOUNTER — HOSPITAL ENCOUNTER (OUTPATIENT)
Dept: RADIOLOGY | Facility: HOSPITAL | Age: 35
Discharge: HOME | End: 2025-05-02
Payer: COMMERCIAL

## 2025-05-02 VITALS
HEART RATE: 91 BPM | DIASTOLIC BLOOD PRESSURE: 64 MMHG | TEMPERATURE: 97.2 F | OXYGEN SATURATION: 97 % | SYSTOLIC BLOOD PRESSURE: 100 MMHG
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
## 2025-05-02 DIAGNOSIS — M25.532 LEFT WRIST PAIN: Primary | ICD-10-CM

## 2025-05-02 DIAGNOSIS — Z12.31 SCREENING MAMMOGRAM FOR BREAST CANCER: ICD-10-CM

## 2025-05-02 DIAGNOSIS — R41.89 BRAIN FOG: ICD-10-CM

## 2025-05-02 DIAGNOSIS — C50.919 MALIGNANT NEOPLASM OF FEMALE BREAST, UNSPECIFIED ESTROGEN RECEPTOR STATUS, UNSPECIFIED LATERALITY, UNSPECIFIED SITE OF BREAST: ICD-10-CM

## 2025-05-02 DIAGNOSIS — M25.532 LEFT WRIST PAIN: ICD-10-CM

## 2025-05-02 DIAGNOSIS — M89.8X9 BONE PAIN: ICD-10-CM

## 2025-05-02 DIAGNOSIS — Z17.0 MALIGNANT NEOPLASM OF RIGHT BREAST IN FEMALE, ESTROGEN RECEPTOR POSITIVE, UNSPECIFIED SITE OF BREAST: ICD-10-CM

## 2025-05-02 DIAGNOSIS — C50.911 MALIGNANT NEOPLASM OF RIGHT BREAST IN FEMALE, ESTROGEN RECEPTOR POSITIVE, UNSPECIFIED SITE OF BREAST: ICD-10-CM

## 2025-05-02 PROCEDURE — 4010F ACE/ARB THERAPY RXD/TAKEN: CPT

## 2025-05-02 PROCEDURE — 99215 OFFICE O/P EST HI 40 MIN: CPT

## 2025-05-02 PROCEDURE — 73110 X-RAY EXAM OF WRIST: CPT | Mod: LT

## 2025-05-02 PROCEDURE — 3074F SYST BP LT 130 MM HG: CPT

## 2025-05-02 PROCEDURE — 3078F DIAST BP <80 MM HG: CPT

## 2025-05-02 ASSESSMENT — PAIN SCALES - GENERAL: PAINLEVEL_OUTOF10: 7

## 2025-05-02 NOTE — RESULT ENCOUNTER NOTE
RN called patient informed her that wrist e=xray is ok and Petrona will place referral to ortho for patient

## 2025-05-02 NOTE — PROGRESS NOTES
"  Breast Medical Oncology Clinic  Location: [facility]    Visit Type: Follow up     Oncologic History:    -Enlarging right breast lumps.  -5/15/23: Dx MG/US demonstrated multiple masses: 1.1 cm (11:00, 8 cm FN); 1 cm (9:30, 5 cm FN); 1.2 cm (9:00, 6 cm FN); 0.7 cm (10:00, 6 cm FN); 0.4 cm (10:00, 5 cm FN). Four lymph nodes demonstrated suspicious cortical thickening.  -5/19/23: US-guided CNB of the 11:00 mass showed IDC grade 3; ER >95% CT 85% Her2-negative (1+ IHC). Biopsy of the 9:30 mass showed IDC grade 2-3; ER 80% CT 10% Her2-negative (1+ IHC). A right axillary LN was negative for carcinoma.  -6/30/23: Right mastectomy and SLNBx performed. Multipe foci of grade 3 carcinoma noted (3.1 cm, 2.2 cm, 1.8 cm), no LVI, margins negative, 0/3 LN’s involved IB, pT2 pN0 cM0 G3  Oncotype Dx RS 45 (33% 9-year recurrence risk; >15% chemo benefit).  RSClin: 59% recurrence risk with tamoxifen; 44% chemo benefit.  -8/24/23: Adjuvant TC (docetaxel, cyclophosphamide) initiated. First cycle complicated by admission for nausea/vomiting. Second cycle complicated by admission for angioedema, hives; thought to be secondary to docetaxel. Switched to CMF (cyclophosphamide, methotrexate, fluorouracil) on 10/16/23. Second (and last) cycle given 11/9/23.    Subjective:  Ms. Harper presents today for follow-up, treatment and surveillance. She is complaint on exemestane.     Since last OV with Dr. Tnea, she called in with waves of body aches/pain from shoulders down to hips. At that time, added turmeric but not consistent enough to know if it works for her. Still having a lot of pain. She fell in Jan, her left wrist has been \"popping in and out.\" She did wear a brace for a while, did not seem to make a difference.     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     She continues on monthly goserelin and exemestane. Causing hot flashes, mood swings and low libido. .     Risk reducing L " "mastectomy planned in Aug (was delayed). Her left breast cyst is \"a monster.\" Has three places that its draining from. On the right has a     She does work out every morning.    Social History  Nia Harper  reports that she has never smoked. She has never been exposed to tobacco smoke. She has never used smokeless tobacco.  She  reports no history of alcohol use.  She  reports that she does not currently use drugs after having used the following drugs: Marijuana.    ROS:   ROS 14 points performed, See HPI for exceptions     Performance Status:  The ECOG performance scale today is ECO- Restricted in physically strenuous activity.  Carries out light duty.     Physical Examination:    /64 (BP Location: Left arm, Patient Position: Sitting, BP Cuff Size: Small adult)   Pulse 91   Temp 36.2 °C (97.2 °F) (Temporal)   SpO2 97%     Physical Exam  Vitals and nursing note reviewed.   Constitutional:       General: She is not in acute distress.     Appearance: Normal appearance. She is not toxic-appearing.   HENT:      Head: Normocephalic and atraumatic.   Eyes:      Conjunctiva/sclera: Conjunctivae normal.   Cardiovascular:      Rate and Rhythm: Normal rate.   Pulmonary:      Effort: Pulmonary effort is normal. No respiratory distress.   Chest:      Comments: Declined today   Abdominal:      General: Abdomen is flat.      Palpations: Abdomen is soft.   Musculoskeletal:         General: No swelling. Normal range of motion.      Cervical back: Normal range of motion.   Skin:     General: Skin is warm and dry.   Neurological:      Mental Status: She is alert.   Psychiatric:         Mood and Affect: Mood normal.       Labs:  Reviewed    Lab Results   Component Value Date    WBC 5.9 10/02/2024    HGB 13.6 10/02/2024    HCT 42.2 10/02/2024    MCV 90 10/02/2024     10/02/2024    ANC 15.37 (H) 2023       Chemistry    Lab Results   Component Value Date/Time     10/02/2024 1435    K 3.9 10/02/2024 1435 "     10/02/2024 1435    CO2 32 10/02/2024 1435    BUN 19 10/02/2024 1435    CREATININE 0.57 10/02/2024 1435    Lab Results   Component Value Date/Time    CALCIUM 10.3 10/02/2024 1435    ALKPHOS 80 10/02/2024 1435    AST 26 10/02/2024 1435    ALT 36 10/02/2024 1435    BILITOT 0.5 10/02/2024 1435             Imaging:  Reviewed above in Onc History    Pathology:  Reviewed above in Onc History    Assessment:     Pathologic prognostic stage IB (pT2 pN0 cM0) infiltrating ductal carcinoma of the right breast; Dx in 6/2023; Grade 3; ER positive (80-95%), ID positive (10-80%), HER2 negative; Oncotype DX 45; S/p R mastectomy and SLNBx; S/p Tcx2, followed by CMFx2; On monthly goserelin and exemestane.     Very pleasant 35 yo female. Presents today to establish care. No evidence of breast cancer recurrence per patient history, physical examination and imaging findings.       Breast Cancer Treatment Plan:    Surgical Plan: S/p R mastectomy with SLNBx  Additional biopsy: No further biopsy indicated  Radiation Plan: Not indicated  Additional staging scans/DEXA/echo: Staging scans not indicated based on current stage, patient history and physical examination.; Baseline DEXA ordered  Additional Path info (i.e Ki67, PDL1): Not indicated  Gene assays: Oncotype DX 45    Systemic treatment plan: Goserelin and exemestane     Intent: Curative   Clinical trial: Not eligible for our current trials   Endocrine therapy: Exemestane   HER2 treatment: not indicated   Targeted agents: not indicated   Chemotherapy: previously received TC x2 then CMFx2   BMA: Will discuss at future visit.    Access: Not indicated  Supportive meds: No new supportive medications prescribed  Genetic testing: Completed; negative  Fertility preservation: Not indicated    - Bone scan ordered for whole body pain   - Referral to SLP for chemo brain   - Encouraged her to see integrative oncology   - Left wrist xray for ongoing wrist pain after fall    Surveillance  plan: Continue yearly mammogram of L breast until she undergoes mastectomy. Ordered today     Follow-up:  Sept 2025 follow up visit with Petrona Bermeo    Nia Harper expressed understanding of the plan outlined above. Nia Harper had ample time to ask questions. Nia Harper understands she can contact us at any time should she have additional questions or issues arise in the interim.    Petrona Bermeo PA-C   Breast Medical Oncology  [facility]

## 2025-05-03 DIAGNOSIS — F41.1 GAD (GENERALIZED ANXIETY DISORDER): ICD-10-CM

## 2025-05-03 DIAGNOSIS — F41.0 ANXIETY ATTACK: ICD-10-CM

## 2025-05-05 ENCOUNTER — APPOINTMENT (OUTPATIENT)
Dept: PRIMARY CARE | Facility: CLINIC | Age: 35
End: 2025-05-05
Payer: COMMERCIAL

## 2025-05-05 RX ORDER — CLONIDINE HYDROCHLORIDE 0.1 MG/1
0.1 TABLET ORAL NIGHTLY
Qty: 90 TABLET | Refills: 1 | Status: SHIPPED | OUTPATIENT
Start: 2025-05-05 | End: 2025-11-01

## 2025-05-07 ENCOUNTER — APPOINTMENT (OUTPATIENT)
Dept: RADIOLOGY | Facility: CLINIC | Age: 35
End: 2025-05-07
Payer: COMMERCIAL

## 2025-05-08 ENCOUNTER — APPOINTMENT (OUTPATIENT)
Dept: ORTHOPEDIC SURGERY | Facility: CLINIC | Age: 35
End: 2025-05-08
Payer: COMMERCIAL

## 2025-05-09 ENCOUNTER — APPOINTMENT (OUTPATIENT)
Dept: RADIOLOGY | Facility: HOSPITAL | Age: 35
End: 2025-05-09
Payer: COMMERCIAL

## 2025-05-09 ENCOUNTER — OFFICE VISIT (OUTPATIENT)
Dept: HEMATOLOGY/ONCOLOGY | Facility: HOSPITAL | Age: 35
End: 2025-05-09
Payer: COMMERCIAL

## 2025-05-09 VITALS
DIASTOLIC BLOOD PRESSURE: 64 MMHG | TEMPERATURE: 97.3 F | OXYGEN SATURATION: 99 % | RESPIRATION RATE: 18 BRPM | HEART RATE: 70 BPM | SYSTOLIC BLOOD PRESSURE: 109 MMHG

## 2025-05-09 DIAGNOSIS — Z91.89 AT RISK FOR INFERTILITY: ICD-10-CM

## 2025-05-09 DIAGNOSIS — C50.919 MALIGNANT NEOPLASM OF FEMALE BREAST, UNSPECIFIED ESTROGEN RECEPTOR STATUS, UNSPECIFIED LATERALITY, UNSPECIFIED SITE OF BREAST: Primary | ICD-10-CM

## 2025-05-09 DIAGNOSIS — N89.8 VAGINAL DRYNESS: ICD-10-CM

## 2025-05-09 PROCEDURE — 3078F DIAST BP <80 MM HG: CPT | Performed by: NURSE PRACTITIONER

## 2025-05-09 PROCEDURE — 99215 OFFICE O/P EST HI 40 MIN: CPT | Performed by: NURSE PRACTITIONER

## 2025-05-09 PROCEDURE — 3074F SYST BP LT 130 MM HG: CPT | Performed by: NURSE PRACTITIONER

## 2025-05-09 RX ORDER — ESTRADIOL 0.1 MG/G
2 CREAM VAGINAL DAILY
Qty: 42.5 G | Refills: 3 | Status: SHIPPED | OUTPATIENT
Start: 2025-05-09

## 2025-05-09 ASSESSMENT — PAIN SCALES - GENERAL: PAINLEVEL_OUTOF10: 6

## 2025-05-09 NOTE — PROGRESS NOTES
Patient ID: Nia Harper is a 34 y.o. female.    Cancer History:          Breast         AJCC Edition: 8th (AJCC), Diagnosis Date: Jun 2023, IB, pT2 pN0 cM0 G3     Treatment Synopsis:    Treatment Synopsis:   -Enlarging right breast lumps.  -5/15/23: Dx MG/US demonstrated multiple masses: 1.1 cm (11:00, 8 cm FN); 1 cm (9:30, 5 cm FN); 1.2 cm (9:00, 6 cm FN); 0.7 cm (10:00, 6 cm FN); 0.4 cm (10:00, 5 cm FN). Four lymph nodes demonstrated suspicious cortical thickening.  -5/19/23: US-guided CNB of the 11:00 mass showed IDC grade 3; ER >95% ME 85% Her2-negative (1+ IHC). Biopsy of the 9:30 mass showed IDC grade 2-3; ER 80% ME 10% Her2-negative (1+ IHC). A right axillary LN was negative for carcinoma.  -6/30/23: Right mastectomy and SLNBx performed. Multipe foci of grade 3 carcinoma noted (3.1 cm, 2.2 cm, 1.8 cm), no LVI, margins negative, 0/3 LN’s involved IB, pT2 pN0 cM0 G3  Oncotype Dx RS 45 (33% 9-year recurrence risk; >15% chemo benefit).  RSClin: 59% recurrence risk with tamoxifen; 44% chemo benefit.  -8/24/23: Adjuvant TC (docetaxel, cyclophosphamide) initiated. First cycle complicated by admission for nausea/vomiting. Second cycle complicated by admission for angioedema, hives; thought to be secondary to docetaxel.   -10/16/23 Therapy changed to Cyclophosphamide/Methotrexate/5-Flouracil d/t reaction x 2 cycles   -11/09/23 C2/2 CMF -- last dose of systemic chemotherapy     Subjective    Patient returns today in follow up regarding issues unique to being a young person with cancer.    Currently on ovarian suppression and exemestane. At our last visit was very stressed with change in surgery d/t lack of providers. She is currently scheduled for L prophylactic mastectomy and breast reconstruction in August. Mentally feeling more prepared for surgery at this date, though still wonders if she should seek second opinion at [facility] and have her surgery done there.     Physically, she remains very committed to her health  and lifestyle changes, she works out at the gym doing cardiovascular training every day. She is interested in strength training but is worried that she would gain muscle mass/weight and this would preclude her from proceeding with surgery. She has lost a tremendous amount of weight, now weighing ~230lbs - down 30lbs from one year ago. She has made diet modifications for herself and her family including reducing amount of sugars and processed foods in their diet. She is eating a mostly plant based diet.     Emotionally, she feels her mood is still fluctuant. Does note improved sleep. If she misses a dose of duloxetine she feels miserable. Is currently taking BID duloxetine and clonidine - she has not yet started abilify. She does feel that her mood medicines fog her thinking, sometimes she will forget what she is saying mid-sentence, or go to the store for something and walk out bc she forgot what she came for. Is concerned with adding a third medicine that her cognition will be further impacted. Still struggles to take time to herself, does have an hour to herself each morning without children - usually uses this time to complete mental load/tasks. Has tried meditation in the past but found it difficult to concentrate and focus.     Still experiencing vaginal dryness, decreased interest and arousal. Hot flashes are improved with duloxetine.    Social History: Lives in [place] with partner/father of her children Dinesh, 13 y.o. daughter Lambert, 7 y.o. Florecita, Gal 5 y.o. son special needs (autistic and non-verbal)  Previously worked as , currently stays at home with children/runs household  Denies tobacco, does smoke marijuana nightly, rare ETOH estimates 6x/year, no vaping or other recreational drug use.    Objective    BSA: There is no height or weight on file to calculate BSA.  /64 (BP Location: Left arm, Patient Position: Sitting, BP Cuff Size: Adult)   Pulse 70   Temp 36.3 °C (97.3  °F) (Temporal)   Resp 18   SpO2 99%      Current Outpatient Medications   Medication Instructions    acetaminophen (Tylenol) 325 mg tablet TAKE 2 TABLETS (650 MG) BY MOUTH EVERY 6 HOURS IF NEEDED (PAIN/FEVER) FOR UP TO 5 DAYS.    ARIPiprazole (ABILIFY) 2 mg, oral, Daily    atorvastatin (LIPITOR) 40 mg, oral, Nightly    atorvastatin (LIPITOR) 40 mg, oral, Nightly    blood sugar diagnostic (OneTouch Verio test strips) strip CHECK BLOOD SUGARS UP TO 4X/DAY FOR CGM FAILURE    clindamycin (Cleocin T) 1 % external solution Daily    clindamycin (Cleocin) 2 % vaginal cream INSERT 1 APPLICATION INTO THE VAGINA ONCE DAILY AT BEDTIME FOR 3 DAYS.    cloNIDine (CATAPRES) 0.1 mg, oral, Nightly    cyclobenzaprine (FLEXERIL) 10 mg, oral, 3 times daily PRN    DULoxetine (CYMBALTA) 120 mg, oral, Daily, Do not crush or chew. Take in the evening with food.    DULoxetine (CYMBALTA) 30 mg, oral, Daily, Do not crush or chew.    estradiol (ESTRACE) 2 g, vaginal, Daily, Use once a day for 2 weeks then as needed there after    exemestane (AROMASIN) 25 mg, oral, Daily, Take after a meal.  Try to take at the same time each day.    ezetimibe (ZETIA) 10 mg, oral, Daily    FreeStyle Cristina 3 Sensor device Change every 14 days    ibuprofen 600 mg tablet TAKE 1 TABLET (600 MG) BY MOUTH EVERY 6 HOURS IF NEEDED (PAIN) FOR UP TO 5 DAYS.    insulin aspart (NovoLOG) 100 unit/mL (3 mL) pen 5 units with meals plus sliding scale up to 25 units daily    insulin glargine (Lantus Solostar U-100 Insulin) 100 unit/mL (3 mL) pen INJECT 40 UNITS UNDER THE SKIN IN THE AM AND 20 UNITS IN THE PM    lidocaine (Lidoderm) 5 % patch PLEASE SEE ATTACHED FOR DETAILED DIRECTIONS    LORazepam (Ativan) 1 mg tablet TAKE 1 TABLET BY MOUTH ONCE DAILY AS NEEDED FOR ANXIETY    losartan (Cozaar) 25 mg tablet 1 tablet, Daily    metFORMIN XR (GLUCOPHAGE-XR) 1,000 mg, oral, 2 times daily (morning and late afternoon)    omega-3 acid ethyl esters (LOVAZA) 2 g, oral, 2 times daily  "(morning and late afternoon)    ondansetron ODT (ZOFRAN-ODT) 4 mg, oral, Every 6 hours PRN    OneTouch Delica Plus Lancet 33 gauge misc For BG check up to 4x/day for CGM failure    OneTouch Verio Flex meter misc USE AS DIRECTED.    pen needle, diabetic (BD Ultra-Fine Mini Pen Needle) 31 gauge x 3/16\" needle 200 each, subcutaneous, 4 times daily before meals and nightly, Use 4 a day as directed    pen needle, diabetic (BD Ultra-Fine Mini Pen Needle) 31 gauge x 3/16\" needle USE 4 A DAY AS DIRECTED    phentermine-topiramate (Qsymia) 15-92 mg capsule, ER multiphase 24 hr 1 capsule, oral, Daily, Take 1 capsule daily    traZODone (DESYREL) 50 mg, Nightly    valACYclovir (Valtrex) 1 gram tablet     valACYclovir (Valtrex) 500 mg tablet 1 tablet, 2 times daily     No visits with results within 1 Week(s) from this visit.   Latest known visit with results is:   Office Visit on 11/21/2024   Component Date Value Ref Range Status    Neisseria gonorrhea,Amplified 11/21/2024 Negative  Negative Final    Chlamydia trachomatis, Amplified 11/21/2024 Negative  Negative Final    Trichomonas Vaginalis 11/21/2024 Negative  Negative, Invalid, TRICH neg Final    Claude Score 11/21/2024 8 (H)  0 - 3 Final    Interpretation of the Claude Score  0-3.....Normal vaginal microbiota  4-6.....Intermediate results  7-10....Bacterial vaginosis    Yeast 11/21/2024 ABSENT  ABSENT Final    Clue Cells 11/21/2024 PRESENT (A)  ABSENT Final      Physical Exam  Constitutional:       General: She is not in acute distress.     Appearance: Normal appearance. She is normal weight. She is not ill-appearing.   Neurological:      General: No focal deficit present.      Mental Status: She is alert and oriented to person, place, and time.   Psychiatric:         Mood and Affect: Mood normal.         Behavior: Behavior normal.         Thought Content: Thought content normal.         Judgment: Judgment normal.     Performance Status:  Symptomatic; fully " ambulatory    Assessment/Plan   Nia Harper is a 34 y.o. AA F with a recent diagnosis of G3 IB adenocarcinoma of the breast, ER+. S/p surgery followed by adjuvant chemotherapy with TC x 2 cycles but with angioedema so switched to CMF x 2 additional cycles. Currently on anastrazole and goserelin for endocrine therapy.     #Diet/Exercise/Healthy Lifestyle:  - Reiterated and encouraged continued healthy diet - she has made many changes, discussed impact on general health and research re improved outcomes in cancer patients   - Reiterated and encouraged continued exercise - currently engaging in cardio exercise daily, OK to do strength training with light/moderate weights   - Discussed Anti-Cancer Lifestyle book - to review literature further about diet/exercise and minimizing exposure to environmental agents known to increase cancer risk  - Encouraged patient to continue to abstain from alcohol, tobacco, and recreational drugs    #Psychosocial:  - Now following with psychiatry for medication management - currently on duloxetine, lorazepam, and clonidine  - Connected to psychology Tamera Herrera for 1:1 therapy - encouraged her to call for follow up visit  - Validated experience and emotions and a young adult with cancer and parent to three young children  - Encouraged patient to take time to herself, to engage with mindfulness/meditation for 5-10 minutes/day  - Connected with child-life to help with adjustment/parenting  - Receiving monthly young adult social programming invitations    #Hot Flashes: secondary to ovarian suppression and low circulating estrogen  - Improved with initiation of duloxetine    #Sexual Dysfunction/Contraception: secondary to ovarian suppression and low circulating estrogen  - Provided patient with sexual concerns handout, encouraged open communication outside of intimacy to discuss patient decreased interest/sexual dysfunction with partner  - Use replens daily topical vaginal  moisturizer  - Use silicone based lubricant with sex  - Refilled topical estrogen - alternate preparation tablet not covered by insurance  - Previously discussed the seriousness of getting pregnant while receiving exemestane due to the harmful effects this could have on the  fetus, and on her cancer treatment given the decreased availability of medical  services.   - Options for contraception include intrauterine devices, estrogen containing contraceptives are contraindicated d/t ER+ status  - Following with gynecology    #Practical Needs:  - Following with [employer] for financial assistance grants and support  - Has referral in place to [employer] for food insecurity    #Risk for infertility:  - Previously discussed the damaging effect cancer treatment can have on the ovaries.   - Previously discussed natural age related decline in fertility and menopause that occurs as a result of ovarian aging, and that cancer treatments can accelerate that progression and diminish ovarian reserve.  - Individuals may experience varying degrees of short or long term ovarian dysfunction and amenorrhea, and that this is dependent upon type of cancer treatment, cumulative dose, and patients age.   - Loss of ovarian function may be permanent or temporary.  - Amenorrhea may be temporary or permanent -- temporary amenorrhea results from destruction of maturing follicles whereas permanent amenorrhea results from depletion of viable primordial follicles.  - Risk to fertility is INTERMEDIATE based on cancer treatment of Docetaxel/Cyclophosphamide  with cyclophosphamide being the primary  of infertility -- this is dose dependent  - Patient elected not to undergo fertility preservation up front and opted for ovarian suppression with goserelin  - Baseline AMH level drawn 2023 2.464  - Currently on goserelin - will repeat hormone function off ovarian suppression  - Discussed plan for at least 18 months on oral endocrine  therapy followed by a 3 month wash out before she should attempt to conceive     Follow up visit in 3 months virtually    The amount of time that I spent with the patient:  Prep: 5  Time spend directly with patient: 35  Coordinating care: 5  Documentation: 5  Other time:    Total time spent on encounter:       Cancer Staging   Breast cancer  Staging form: Breast, AJCC 8th Edition  - Pathologic stage from 6/30/2023: Stage IB (pT2(3), pN0(sn), cM0, G3, ER+, FL+, HER2-, Oncotype DX score: 45) - Signed by Jeramie Lancaster MD on 10/10/2023      Oncology History   Breast cancer   6/30/2023 Cancer Staged    Staging form: Breast, AJCC 8th Edition, Pathologic stage from 6/30/2023: Stage IB (pT2(3), pN0(sn), cM0, G3, ER+, FL+, HER2-, Oncotype DX score: 45) - Signed by Jeramie Lancaster MD on 10/10/2023     8/24/2023 - 9/15/2023 Chemotherapy    TC (DOCEtaxel / Cyclophosphamide), 21 Day Cycles     9/8/2023 Initial Diagnosis    Malignant neoplasm of female breast (CMS/HCC)     10/16/2023 - 11/9/2023 Chemotherapy    CMF (Cyclophosphamide / Methotrexate / Fluorouracil) 21 Day Cycles                        RONEL Pemberton-CNP

## 2025-05-12 ENCOUNTER — APPOINTMENT (OUTPATIENT)
Dept: PRIMARY CARE | Facility: CLINIC | Age: 35
End: 2025-05-12
Payer: COMMERCIAL

## 2025-05-12 ENCOUNTER — PATIENT MESSAGE (OUTPATIENT)
Dept: SURGERY | Facility: CLINIC | Age: 35
End: 2025-05-12

## 2025-05-12 DIAGNOSIS — E66.9 OBESITY (BMI 35.0-39.9 WITHOUT COMORBIDITY): ICD-10-CM

## 2025-05-13 ENCOUNTER — HOSPITAL ENCOUNTER (OUTPATIENT)
Dept: RADIOLOGY | Facility: CLINIC | Age: 35
Discharge: HOME | End: 2025-05-13
Payer: COMMERCIAL

## 2025-05-13 DIAGNOSIS — C50.911 MALIGNANT NEOPLASM OF RIGHT BREAST IN FEMALE, ESTROGEN RECEPTOR POSITIVE, UNSPECIFIED SITE OF BREAST: ICD-10-CM

## 2025-05-13 DIAGNOSIS — Z17.0 MALIGNANT NEOPLASM OF RIGHT BREAST IN FEMALE, ESTROGEN RECEPTOR POSITIVE, UNSPECIFIED SITE OF BREAST: ICD-10-CM

## 2025-05-13 DIAGNOSIS — M89.8X9 BONE PAIN: ICD-10-CM

## 2025-05-13 DIAGNOSIS — C50.919 MALIGNANT NEOPLASM OF FEMALE BREAST, UNSPECIFIED ESTROGEN RECEPTOR STATUS, UNSPECIFIED LATERALITY, UNSPECIFIED SITE OF BREAST: ICD-10-CM

## 2025-05-13 PROCEDURE — A9503 TC99M MEDRONATE: HCPCS

## 2025-05-13 PROCEDURE — 78306 BONE IMAGING WHOLE BODY: CPT | Performed by: STUDENT IN AN ORGANIZED HEALTH CARE EDUCATION/TRAINING PROGRAM

## 2025-05-13 PROCEDURE — 78306 BONE IMAGING WHOLE BODY: CPT

## 2025-05-13 PROCEDURE — 3430000001 HC RX 343 DIAGNOSTIC RADIOPHARMACEUTICALS

## 2025-05-13 RX ORDER — PHENTERMINE AND TOPIRAMATE 15; 92 MG/1; MG/1
1 CAPSULE, EXTENDED RELEASE ORAL DAILY
Qty: 30 CAPSULE | Refills: 1 | Status: SHIPPED | OUTPATIENT
Start: 2025-05-13

## 2025-05-13 RX ADMIN — TECHNETIUM TC 99M MEDRONATE 27.5 MILLICURIE: 25 INJECTION, POWDER, FOR SOLUTION INTRAVENOUS at 08:31

## 2025-05-14 ENCOUNTER — TELEPHONE (OUTPATIENT)
Dept: SPEECH THERAPY | Facility: CLINIC | Age: 35
End: 2025-05-14
Payer: COMMERCIAL

## 2025-05-14 ENCOUNTER — TELEPHONE (OUTPATIENT)
Dept: HEMATOLOGY/ONCOLOGY | Facility: CLINIC | Age: 35
End: 2025-05-14
Payer: COMMERCIAL

## 2025-05-14 DIAGNOSIS — R94.8 ABNORMAL RADIONUCLIDE BONE SCAN: ICD-10-CM

## 2025-05-14 DIAGNOSIS — C50.911 MALIGNANT NEOPLASM OF RIGHT BREAST IN FEMALE, ESTROGEN RECEPTOR POSITIVE, UNSPECIFIED SITE OF BREAST: ICD-10-CM

## 2025-05-14 DIAGNOSIS — Z17.0 MALIGNANT NEOPLASM OF RIGHT BREAST IN FEMALE, ESTROGEN RECEPTOR POSITIVE, UNSPECIFIED SITE OF BREAST: ICD-10-CM

## 2025-05-14 DIAGNOSIS — R93.6 ABNORMAL FINDINGS ON DIAGNOSTIC IMAGING OF LIMBS: Primary | ICD-10-CM

## 2025-05-14 DIAGNOSIS — M89.8X9 BONE PAIN: ICD-10-CM

## 2025-05-14 DIAGNOSIS — R93.7 MUSCULOSKELETAL SYSTEM IMAGING ABNORMALITY: ICD-10-CM

## 2025-05-14 NOTE — TELEPHONE ENCOUNTER
Called pt to discuss the following results:     NM bone whole body  Narrative: Interpreted By:  Virgil Espinoza and Gupta Jayesh   STUDY:  NM BONE WHOLE BODY;  5/13/2025 12:02 pm      INDICATION:  Signs/Symptoms:whole body bone aches,.      COMPARISON:  None      ACCESSION NUMBER(S):  [id number]      ORDERING CLINICIAN:  SONNY KIRBY      TECHNIQUE:  DIVISION OF NUCLEAR MEDICINE  BONE SCAN, WHOLE BODY plus REGIONAL VIEWS      The patient received an intravenous dose of  27.5 mCi of Tc-99m MDP.  Anterior and posterior images of the skeleton from skull vertex to  feet were then acquired.  Additional regional skeletal images were  also obtained.      FINDINGS:  There is increased radiotracer uptake seen within the posterior right  proximal femur, and sternum.      Expected, excreted activity is noted in the kidneys and bladder.      Increased radiotracer uptake is seen in the bilateral shoulders,  bilateral sterno clavicular joints, bilateral hips, midthoracic  spine, most consistent with an arthritic pattern.      Impression: 1. Increased radiotracer uptake within the posterior right proximal  femur and sternum. Correlate with future anatomic imaging is  recommended.      I personally reviewed the images/study and I agree with the findings  as stated by Bob Silva MD. This study was interpreted at  [facility], [place], [place].      MACRO:  None      Signed by: Virgil Espinoza 5/13/2025 5:30 PM  Dictation workstation:   [id number]       No results found for this or any previous visit from the past 365 days.     Stat MRI right femur and sternum ordered. Will follow up thereafter. No further questions at this time

## 2025-05-15 ENCOUNTER — APPOINTMENT (OUTPATIENT)
Dept: ORTHOPEDIC SURGERY | Facility: CLINIC | Age: 35
End: 2025-05-15
Payer: COMMERCIAL

## 2025-05-15 ENCOUNTER — TELEMEDICINE (OUTPATIENT)
Dept: BEHAVIORAL HEALTH | Facility: HOSPITAL | Age: 35
End: 2025-05-15
Payer: COMMERCIAL

## 2025-05-15 DIAGNOSIS — C50.919 MALIGNANT NEOPLASM OF FEMALE BREAST, UNSPECIFIED ESTROGEN RECEPTOR STATUS, UNSPECIFIED LATERALITY, UNSPECIFIED SITE OF BREAST: ICD-10-CM

## 2025-05-15 DIAGNOSIS — F34.1 PERSISTENT DEPRESSIVE DISORDER: ICD-10-CM

## 2025-05-15 DIAGNOSIS — F33.0 MILD EPISODE OF RECURRENT MAJOR DEPRESSIVE DISORDER: ICD-10-CM

## 2025-05-15 DIAGNOSIS — F41.1 GAD (GENERALIZED ANXIETY DISORDER): ICD-10-CM

## 2025-05-15 DIAGNOSIS — F41.9 ANXIETY: ICD-10-CM

## 2025-05-15 DIAGNOSIS — F41.0 ANXIETY ATTACK: ICD-10-CM

## 2025-05-15 PROCEDURE — 99214 OFFICE O/P EST MOD 30 MIN: CPT | Performed by: PSYCHIATRY & NEUROLOGY

## 2025-05-15 RX ORDER — DULOXETIN HYDROCHLORIDE 30 MG/1
30 CAPSULE, DELAYED RELEASE ORAL DAILY
Qty: 90 CAPSULE | Refills: 1 | Status: SHIPPED | OUTPATIENT
Start: 2025-05-15 | End: 2025-11-11

## 2025-05-15 RX ORDER — DULOXETIN HYDROCHLORIDE 60 MG/1
120 CAPSULE, DELAYED RELEASE ORAL DAILY
Qty: 90 CAPSULE | Refills: 1 | Status: SHIPPED | OUTPATIENT
Start: 2025-05-15
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
## 2025-05-20 ENCOUNTER — HOSPITAL ENCOUNTER (OUTPATIENT)
Dept: RADIOLOGY | Facility: HOSPITAL | Age: 35
Discharge: HOME | End: 2025-05-20
Payer: COMMERCIAL

## 2025-05-20 ENCOUNTER — APPOINTMENT (OUTPATIENT)
Dept: RADIOLOGY | Facility: HOSPITAL | Age: 35
End: 2025-05-20
Payer: COMMERCIAL

## 2025-05-20 DIAGNOSIS — C50.911 MALIGNANT NEOPLASM OF RIGHT BREAST IN FEMALE, ESTROGEN RECEPTOR POSITIVE, UNSPECIFIED SITE OF BREAST: ICD-10-CM

## 2025-05-20 DIAGNOSIS — R93.7 MUSCULOSKELETAL SYSTEM IMAGING ABNORMALITY: ICD-10-CM

## 2025-05-20 DIAGNOSIS — Z17.0 MALIGNANT NEOPLASM OF RIGHT BREAST IN FEMALE, ESTROGEN RECEPTOR POSITIVE, UNSPECIFIED SITE OF BREAST: ICD-10-CM

## 2025-05-20 DIAGNOSIS — R93.6 ABNORMAL FINDINGS ON DIAGNOSTIC IMAGING OF LIMBS: ICD-10-CM

## 2025-05-20 DIAGNOSIS — R94.8 ABNORMAL RADIONUCLIDE BONE SCAN: ICD-10-CM

## 2025-05-20 DIAGNOSIS — M89.8X9 BONE PAIN: ICD-10-CM

## 2025-05-20 PROCEDURE — A9575 INJ GADOTERATE MEGLUMI 0.1ML: HCPCS | Mod: JZ

## 2025-05-20 PROCEDURE — 73720 MRI LWR EXTREMITY W/O&W/DYE: CPT | Mod: RT

## 2025-05-20 PROCEDURE — 2550000001 HC RX 255 CONTRASTS: Mod: JZ

## 2025-05-20 RX ORDER — GADOTERATE MEGLUMINE 376.9 MG/ML
20 INJECTION INTRAVENOUS
Status: COMPLETED | OUTPATIENT
Start: 2025-05-20 | End: 2025-05-20

## 2025-05-20 RX ADMIN — GADOTERATE MEGLUMINE 20 ML: 376.9 INJECTION INTRAVENOUS at 21:07

## 2025-05-21 ENCOUNTER — APPOINTMENT (OUTPATIENT)
Dept: RADIOLOGY | Facility: HOSPITAL | Age: 35
End: 2025-05-21
Payer: COMMERCIAL

## 2025-05-22 ENCOUNTER — HOSPITAL ENCOUNTER (OUTPATIENT)
Dept: RADIOLOGY | Facility: HOSPITAL | Age: 35
Discharge: HOME | End: 2025-05-22
Payer: COMMERCIAL

## 2025-05-22 VITALS — WEIGHT: 235.89 LBS | BODY MASS INDEX: 33.85 KG/M2

## 2025-05-22 PROCEDURE — 71552 MRI CHEST W/O & W/DYE: CPT

## 2025-05-22 PROCEDURE — 2550000001 HC RX 255 CONTRASTS: Mod: JZ

## 2025-05-22 PROCEDURE — A9575 INJ GADOTERATE MEGLUMI 0.1ML: HCPCS | Mod: JZ

## 2025-05-22 RX ORDER — GADOTERATE MEGLUMINE 376.9 MG/ML
20 INJECTION INTRAVENOUS
Status: COMPLETED | OUTPATIENT
Start: 2025-05-22 | End: 2025-05-22

## 2025-05-22 RX ADMIN — GADOTERATE MEGLUMINE 20 ML: 376.9 INJECTION INTRAVENOUS at 11:04

## 2025-05-23 ENCOUNTER — APPOINTMENT (OUTPATIENT)
Dept: RADIOLOGY | Facility: HOSPITAL | Age: 35
End: 2025-05-23
Payer: COMMERCIAL

## 2025-05-23 ENCOUNTER — TELEPHONE (OUTPATIENT)
Dept: HEMATOLOGY/ONCOLOGY | Facility: HOSPITAL | Age: 35
End: 2025-05-23
Payer: COMMERCIAL

## 2025-05-23 DIAGNOSIS — M19.90 OSTEOARTHRITIS, UNSPECIFIED OSTEOARTHRITIS TYPE, UNSPECIFIED SITE: ICD-10-CM

## 2025-05-23 DIAGNOSIS — M76.891 RIGHT HIP TENDONITIS: Primary | ICD-10-CM

## 2025-05-23 NOTE — TELEPHONE ENCOUNTER
Called pt to discuss the following results:     MR msk chest w and wo IV contrast  Status: Final result     PACS Images     Show images for MR msk chest w and wo IV contrast  In Basket Actions    Done  Result Mgmt     View in In Basket  Signed by    Signed Time Phone Pager   Darryl Esparza MD 5/22/2025 11:53 [phone]      Exam Information    Status Exam Begun Exam Ended   Final 5/22/2025 09:42 5/22/2025 11:14     Study Result    Narrative & Impression   Interpreted By:  Darryl Esparza,   STUDY:  MR DUQUEK CHEST W AND WO IV CONTRAST; ;  5/22/2025 11:14 am      INDICATION:  Signs/Symptoms:radiotracter uptake in the sternum on bone scan.      ,R93.6 Abnormal findings on diagnostic imaging of limbs,R93.7  Abnormal findings on diagnostic imaging of other parts of  musculoskeletal system,R94.8 Abnormal results of function studies of  other organs and systems,M89.8X9 Other specified disorders of bone,  unspecified site,C50.911 Malignant neoplasm of unspecified site of  right female breast,Z17.0 Estrogen receptor positive status (ER+)      COMPARISON:  X-ray 09/25/2023 and bone scan 05/13/2025      ACCESSION NUMBER(S):  [id number]      ORDERING CLINICIAN:  SONNY KIRBY      TECHNIQUE:  Multiplanar multisequence pre and post gadolinium imaging obtained of  the chest. Post gadolinium imaging obtained following intravenous  administration of the 20 mL of Dotarem.      FINDINGS:  Sternum demonstrates no focal hypointense signal on T1 weighted  imaging within the manubrium. No corresponding hyperintensity on  fluid sensitive sequences within the manubrium. Sternomanubrial  junction is unremarkable. Remainder of the visualized sternum  demonstrates normal marrow signal intensity. No discrete lesion  identified. Post gadolinium imaging demonstrates no abnormal  enhancement.      Sternoclavicular articulations are unremarkable. Visualized portions  of the proximal clavicles are unremarkable. No focal marrow edema.  No  significant joint effusion. Costosternal junctions visualized costal  cartilages demonstrate no focal discontinuity. There is asymmetric  soft tissue edema in the intercostal space between the 3rd and 4th  ribs. There is a edema in the adjacent left pectoralis major muscle  incompletely evaluated on this examination.      Remainder of the visualized musculature within the anterior chest  wall are unremarkable within limits of this MRI visualized portions.                  IMPRESSION:  1. No evidence for metastatic disease in the sternum.      2. Mild asymmetric edema in the intercostal space between the left  anterior 3rd and 4th ribs with edema in the adjacent pectoralis major  muscle. The rib characterization in this areas limited given the  respiratory motion artifact. Continued attention to this area on  follow-up. No definite lesion.          MACRO:  None      Signed by: Darryl Esparza 5/22/2025 11:53 AM  Dictation workstation:   [id number]       MR femur right w and wo IV contrast  Status: Final result     PACS Images     Show images for MR femur right w and wo IV contrast  Signed by    Signed Time Phone Pager   Darryl Esparza MD 5/21/2025 09:20 [phone]      Exam Information    Status Exam Begun Exam Ended   Final 5/20/2025 20:16 5/20/2025 20:59     Study Result    Narrative & Impression   Interpreted By:  Darryl Esparza,   STUDY:  MR FEMUR RIGHT W AND WO IV CONTRAST; ;  5/20/2025 8:59 pm      INDICATION:  Signs/Symptoms:radiotracer uptake in right proximal femur.      ,R93.6 Abnormal findings on diagnostic imaging of limbs,R93.7  Abnormal findings on diagnostic imaging of other parts of  musculoskeletal system,R94.8 Abnormal results of function studies of  other organs and systems,M89.8X9 Other specified disorders of bone,  unspecified site,C50.911 Malignant neoplasm of unspecified site of  right female breast,Z17.0 Estrogen receptor positive status (ER+)      COMPARISON:  Bone scan  05/13/2025      ACCESSION NUMBER(S):  [id number]      ORDERING CLINICIAN:  SONNY KIRBY      TECHNIQUE:  Multiplanar multisequence pre and post gadolinium imaging obtained of  the right femur. Post gadolinium imaging obtained following  intravenous administration of the 20 mL of Dotarem.      FINDINGS:  Right femur demonstrates focal marrow edema within the greater  trochanter posteriorly about the insertion of the gluteus medius  tendon into the posterosuperior facet. There is surrounding soft  tissue edema demonstrated in this area with more focal intermediate  signal intensity in the tendon and findings in keeping with gluteus  medius insertional tendinitis (series 6 image 10-12). Surrounding  soft tissue edema seen tracking deep and superficial to the tendon  proximal to the insertion as well as about the peritrochanteric  location distally. Post gadolinium imaging demonstrates component of  mild enhancement in this location. There is a no evidence for  insertional tear. Insertion into the lateral facet of the greater  trochanter is intact. The gluteus medius muscle visualized portions  is unremarkable.      Gluteus minimus tendon insertion is unremarkable. Of note,  characterization of the posterior femoral cortex in the  subtrochanteric location about the linea aspera demonstrates  intermediate signal intensity in the intramuscular septum. This is  about its proximal attachment to the linea aspera which also involves  portions of the iliotibial tract. Corresponding diffusion imaging  demonstrates more focal restriction in this area. There is no  discrete hypointense signal within the linea aspera to suggest  hydroxyapatite deposition in this location with mild surrounding  edema as well as generalized intermediate signal intensity and  thickening of the intramuscular septum about the attachment to the  cortex. Post gadolinium imaging demonstrates mild enhancement in this  distribution.      Remainder of  the visualized musculature within the compartments of  the right thigh demonstrate no asymmetric atrophy or edema. There is  moderate right common hamstring tendinosis with a component of mild  tendinitis suggested. No focal tear.      Right femur demonstrates no foci of marrow edema. No right knee joint  effusion visualized portions. Right hip joint demonstrates minimal  subchondral cystic change and mild osteoarthritic degenerative change.                      IMPRESSION:  1. Right gluteus medius insertional tendinitis into the  posterosuperior facet of the right greater trochanter.      2. Generalized intermediate signal intensity and thickening of the  intramuscular septum at the attachment to the linea aspera in the  subtrochanteric location with surrounding soft tissue edema without  definite periosteal reaction. There is mild enhancement in this area  on post gadolinium imaging. No hypointense T1/T2 signal to suggest  hydroxyapatite deposition or calcification within the septum as a  potential etiology. However, sequela of postinflammatory change  related to this finding is a possibility. Other potential etiologies  including tug injury felt to be less likely.          MACRO:  None      Signed by: Darryl Esparza 5/21/2025 9:20 AM  Dictation workstation:   [id number]     Discussed that the results reflex arthritic/degenerative changes. However also waiting to hear from Dr. Tena for any other recommendations. [facility] for pain improvement and alternating tylenol/NSAIDs. Will update her with any other recs from Dr. Tena. No other questions or concerns.

## 2025-05-27 ENCOUNTER — TELEPHONE (OUTPATIENT)
Dept: HEMATOLOGY/ONCOLOGY | Facility: HOSPITAL | Age: 35
End: 2025-05-27
Payer: COMMERCIAL

## 2025-05-27 DIAGNOSIS — E11.9 DIABETES MELLITUS TYPE 2 WITHOUT RETINOPATHY (MULTI): ICD-10-CM

## 2025-05-27 DIAGNOSIS — E11.65 UNCONTROLLED TYPE 2 DIABETES MELLITUS WITH HYPERGLYCEMIA: ICD-10-CM

## 2025-05-27 RX ORDER — INSULIN ASPART 100 [IU]/ML
INJECTION, SOLUTION INTRAVENOUS; SUBCUTANEOUS
Qty: 15 ML | Refills: 5 | Status: SHIPPED | OUTPATIENT
Start: 2025-05-27

## 2025-05-27 RX ORDER — PEN NEEDLE, DIABETIC 30 GX3/16"
200 NEEDLE, DISPOSABLE MISCELLANEOUS
Qty: 200 EACH | Refills: 3 | Status: SHIPPED | OUTPATIENT
Start: 2025-05-27

## 2025-05-27 RX ORDER — INSULIN GLARGINE 100 [IU]/ML
INJECTION, SOLUTION SUBCUTANEOUS
Qty: 60 ML | Refills: 1 | Status: SHIPPED | OUTPATIENT
Start: 2025-05-27

## 2025-05-27 RX ORDER — METFORMIN HYDROCHLORIDE 500 MG/1
1000 TABLET, EXTENDED RELEASE ORAL
Qty: 360 TABLET | Refills: 1 | Status: SHIPPED | OUTPATIENT
Start: 2025-05-27

## 2025-05-28 ENCOUNTER — APPOINTMENT (OUTPATIENT)
Dept: RADIOLOGY | Facility: HOSPITAL | Age: 35
End: 2025-05-28
Payer: COMMERCIAL

## 2025-05-29 ENCOUNTER — APPOINTMENT (OUTPATIENT)
Dept: RADIOLOGY | Facility: HOSPITAL | Age: 35
End: 2025-05-29
Payer: COMMERCIAL

## 2025-06-02 ENCOUNTER — APPOINTMENT (OUTPATIENT)
Dept: ORTHOPEDIC SURGERY | Facility: HOSPITAL | Age: 35
End: 2025-06-02
Payer: COMMERCIAL

## 2025-06-02 ENCOUNTER — INFUSION (OUTPATIENT)
Dept: HEMATOLOGY/ONCOLOGY | Facility: HOSPITAL | Age: 35
End: 2025-06-02
Payer: COMMERCIAL

## 2025-06-02 VITALS
HEART RATE: 77 BPM | RESPIRATION RATE: 20 BRPM | BODY MASS INDEX: 33.85 KG/M2 | OXYGEN SATURATION: 100 % | SYSTOLIC BLOOD PRESSURE: 119 MMHG | TEMPERATURE: 97.2 F | HEIGHT: 70 IN | DIASTOLIC BLOOD PRESSURE: 62 MMHG

## 2025-06-02 DIAGNOSIS — Z17.0 MALIGNANT NEOPLASM OF RIGHT BREAST IN FEMALE, ESTROGEN RECEPTOR POSITIVE, UNSPECIFIED SITE OF BREAST: ICD-10-CM

## 2025-06-02 DIAGNOSIS — C50.911 MALIGNANT NEOPLASM OF RIGHT BREAST IN FEMALE, ESTROGEN RECEPTOR POSITIVE, UNSPECIFIED SITE OF BREAST: ICD-10-CM

## 2025-06-02 PROCEDURE — 2500000004 HC RX 250 GENERAL PHARMACY W/ HCPCS (ALT 636 FOR OP/ED): Mod: JZ,SE

## 2025-06-02 PROCEDURE — 96402 CHEMO HORMON ANTINEOPL SQ/IM: CPT

## 2025-06-02 RX ORDER — ALBUTEROL SULFATE 0.83 MG/ML
3 SOLUTION RESPIRATORY (INHALATION) AS NEEDED
OUTPATIENT
Start: 2025-06-23

## 2025-06-02 RX ORDER — LIDOCAINE HYDROCHLORIDE 10 MG/ML
2 INJECTION, SOLUTION EPIDURAL; INFILTRATION; INTRACAUDAL; PERINEURAL ONCE
OUTPATIENT
Start: 2025-06-23

## 2025-06-02 RX ORDER — FAMOTIDINE 10 MG/ML
20 INJECTION, SOLUTION INTRAVENOUS ONCE AS NEEDED
OUTPATIENT
Start: 2025-06-23

## 2025-06-02 RX ORDER — LIDOCAINE HYDROCHLORIDE 10 MG/ML
2 INJECTION, SOLUTION EPIDURAL; INFILTRATION; INTRACAUDAL; PERINEURAL ONCE
Status: COMPLETED | OUTPATIENT
Start: 2025-06-02 | End: 2025-06-02

## 2025-06-02 RX ORDER — DIPHENHYDRAMINE HYDROCHLORIDE 50 MG/ML
50 INJECTION, SOLUTION INTRAMUSCULAR; INTRAVENOUS AS NEEDED
OUTPATIENT
Start: 2025-06-23

## 2025-06-02 RX ORDER — EPINEPHRINE 0.3 MG/.3ML
0.3 INJECTION SUBCUTANEOUS EVERY 5 MIN PRN
OUTPATIENT
Start: 2025-06-23

## 2025-06-02 RX ADMIN — GOSERELIN ACETATE 3.6 MG: 3.6 IMPLANT SUBCUTANEOUS at 12:47

## 2025-06-02 RX ADMIN — LIDOCAINE HYDROCHLORIDE 20 MG: 10 INJECTION, SOLUTION EPIDURAL; INFILTRATION; INTRACAUDAL; PERINEURAL at 12:47

## 2025-06-02 NOTE — PROGRESS NOTES
Nia Leroy arrived to infusion center for Zoladex injection. Pt tolerated treatment without issues. Pt discharged home in stable condition.

## 2025-06-08 NOTE — ASSESSMENT & PLAN NOTE
Obesity Class 3 - initial   Initial BMI 46  BMI- Current: Body mass index is 33.86 kg/m².   S/p Bariatric Surgery, current MWL tx Qsymia, now switching to Phentermine/Topiramate as generics     - Reviewed obesity is a chronic, relapsing, progressive, treatable, multifactorial, neurobehavioral disease, where in an increase in body fat promotes adipose (fat) tissue dysfunction, as well as biomechanical stress on the body which can result in adverse metabolic, biomechanical and psychosocial health consequences, in addition to reducing quality of life and lifespan.     -Without treatment, obesity is likely to progress and worsen, further increasing the patient's risk for numerous health conditions caused by excess adiposity and increasing the risk for premature death.     -The patient was counseled on the science of weight and appetite regulation. The pathophysiology of obesity was reviewed, as well as the biological adaptations to weight loss.      -We reviewed the role of nutrition, physical activity, stress and sleep. We discussed the potential for bariatric surgery and or anti-obesity medications in the treatment of obesity. We reviewed the health impacts of obesity, and the health benefits of a 5-10% weight loss. We also reviewed program expectations and coordination of care. All additional questions were answered     - Reviewed role of bariatric surgical interventions for obesity treatment, indications for bariatric surgery, benefits of bariatric & metabolic surgery to long term health and obesity treatment.  - Meets ASMBS Guidelines for bariatric surgery with BMI > 40 or > 35 with weight related comorbidity: Yes - post-op weight loss stall with subsequent regain, seeking MWL to further progress for breast ca surgery, now scheduled for August.      -Reviewed use of FDA approved anti obesity pharmacologic therapies and how they assist lifestyle changes.  Discussed medications risk/benefit, expected weight loss  outcomes with use, contraindications, side effects and monitoring parameters.    - Contraindications to AOMs: n/a Previously took trulicity and ozempic and had ADR n/v. Wishes to proceed with current plan- qsymia Will stop following today's visit and resume generic form (phentermine/topiramate).   -Reviewed pertinent history, patient has no current contraindications to use of any prescribed medication for adjunct treatment in weight management.   -Counseling provided on clinical success criteria of 5% weight loss within 12 weeks of therapeutic dose of medication.  Currently meeting continuation criteria.

## 2025-06-08 NOTE — ASSESSMENT & PLAN NOTE
Self-reports BP WNL, most recent 120/62  Reviewed goal BP <120/80  On-going monitoring with AOM use  Continue follow up with PCP for mgmt of antiHTN regimen  Encourage adherence to medications if prescribed for BP control  DASH/Mediterranean Diet lifestyle encouraged.  Weight reduction of 5-10% of clinical significance to improve BP control  Continues to work on lifestyle change goals to support blood pressure control and weight reduction.  Continue to follow up with your primary care physician

## 2025-06-08 NOTE — PATIENT INSTRUCTIONS
You have been prescribed Qsymia (Phentermine/Topiramate) for weight loss.   This medication will decrease your appetite and promote improved appetite control, may help to reduce cravings and improve satiety (feeling of fullness).  This medication has been approved for chronic weight management if you achieve at least 5% total body weight reduction in the first 12 weeks of use.    This medication is a controlled substance and will result in a positive urine drug screen of amphetamine if taking regularly.    IMPORTANT Lifestyle Management with the use of anti-obesity medication include:  - Monitor/track your protein intake, ensure at least 80-90g of protein per day for women, 110-120g for men -> this helps to ensure you are consuming adequate protein to maintain/preserve lean body mass in weight loss setting.  - Consume at least 64 oz of water per day -> this helps to reduce the risk of constipation/dehydration associated with this medication  - Engage in resistance at least 2x/week - targeting upper & lower body group with each resistance training session -> this helps to ensure you are consuming adequate protein to maintain/preserve lean body mass in weight loss setting.    Side Effects include: tingling of the arms and legs, nausea, a change in the way foods taste, diarrhea, nervousness, cough, fogginess, constipation, dry mouth, insomnia.   --> Topiramate containing medications can increase the risk of kidney stone formation.    --> Topiramate containing medications can increase the risk of mood change, depression or suicidal thoughts, if you experience these symptoms after starting medication you must notify this provider immediately to discuss medication discontinuation.      If you develop insomnia, take the medication as early a possible in the day, you can intermittently use an over the counter melatonin supplement IF needed.    If you develop constipation, be sure you are drinking at least 60 oz of water  daily, you can also add an over the counter fiber supplement and stool softener to lessen symptoms.      For WOMEN of childbearing age: you must continue to take a reliable method of contraception while on this medication - IT IS CONTRAINDICATED IN PREGNANCY due to the risk of cleft palate formation in developing fetus.  A monthly home pregnancy test should be used to confirm negative pregnancy if of child bearing age in addition to contraception.    If using Qsymia [facility] Mail Order Program, you will need to set up a mail order account at the following website:  [facility] [email or web address] OR CALL   Phone: [phone] / [phone]  Fax: [phone]  8:00 AM - 8:00 PM ET, Monday- Friday    You have been prescribed Phentermine for weight loss.     This medication will decrease your appetite.  This medication is FDA approved as short term anorexiant/appetite suppressant in treatment of obesity    Side Effects include: difficulty sleeping, dry mouth, constipation, increased heart rate, increased blood pressure and heart palpitations.   If any difficulty sleeping, take the medication first thing in the morning.  I recommend avoiding additional caffeine intake while taking this medication or sparing use of caffeinated beverages as they can increase the stimulating and heart rate/blood pressure elevating effects of the medication.    If experiencing constipation, add a daily fiber supplement such a psyllium husk fiber or benefiber, 1-2 scoops in 8-12 oz of water per day.  You can also add an over the counter stool softener.  Be sure to increase fluid intake, aim for a minimum of 60 oz of water daily.    IMPORTANT Lifestyle Management with the use of anti-obesity medication include:  - Monitor/track your protein intake, ensure at least 80-90g of protein per day for women, 110-120g for men -> this helps to ensure you are consuming adequate protein to maintain/preserve lean body mass in  weight loss setting.  - Consume at least 64 oz of water per day -> this helps to reduce the risk of constipation/dehydration associated with this medication  - Engage in resistance at least 2x/week - targeting upper & lower body group with each resistance training session -> this helps to ensure you maintain/preserve lean body mass in weight loss setting.    *IMPORTANT*  This medication will cause a positive AMPHETAMINE result on a drug screen.  Please notify your prescribing provider if you require a letter stating you are using a prescription medication which causes this result.      You will need to been for regular follow up while on this medication.  If you do not have a result of at least a 5% total body weight loss from the start of this medication it will be discontinued due to ineffective treatment.      Please be aware you will need to monitor heart rate & blood pressure at home with home blood pressure monitoring cuff upon starting this medication to monitor for increased to heart rate and blood pressure.  --> Blood pressure cuff is over the counter purchase - can be found at pharmacy, online like [employer] or retail stores in health section.  Most blood pressure cuffs are approx $20-$40 and a useful tool to have for home monitoring of health parameters.     Prior authorizations will not be completed for this medication.  It is a generic medication and if not covered by your insurance, recommend using [employer] or other prescription discount program for coupon.

## 2025-06-08 NOTE — ASSESSMENT & PLAN NOTE
35 y.o. female   S/p LSG 03/2024  IW- 294 lbs  Initial BMI 40.3    Denies n/v, c/d, abdominal pain or GERD.   Tolerating diet, meeting protein and fluid goals  Was at weight loss stall, started AOM phentermine/Qsymia.  Exercise - maintaining   Taking appropriate bariatric supplements.  Bowel Regularity- regular.    imp: doing well     recs:   Continue with diet, increase protein servings per meal  Continue to advance exercise routine, goal 200-300 min/week aerobic and resistance training advised.   Continue bariatric vitamins and supplements as discussed  FU with dietitian  Encouraged join/participate in support groups.

## 2025-06-08 NOTE — PROGRESS NOTES
[facility]  Comprehensive Weight Management        Patient: Nia Harper   MRN: [id number]      Index Surgery:   Surgeon: Dr. Ervin  Procedure: LSG  Date: 03/25/2024     Virtual or Telephone Consent    An interactive audio and video telecommunication system which permits real time communications between the patient (at the originating site) and provider (at the distant site) was utilized to provide this telehealth service.   Verbal consent was requested and obtained from Nia Harper on this date, 06/09/25 for a telehealth visit and the patient's location was confirmed at the time of the visit.    HPI   Nia Harper is a 35 y.o. female presenting for follow up comprehensive weight management visit. She is s/p LSG 03.2024 with Dr. Ervin. Initial weight 294 lbs, BMI 40.3, Obesity Class 3. Prior Impression- doing well overall but reached plateau / stall in weight loss. Discussed AOM options, started Qsymia. Today's visit for follow up evaluation of progress and prescription management.     Diet:  - Stage: regular food diet  - Achieving protein and fluid goals: yes - both, occasionally uses shakes      Exercise:  -  walking, increasing activity as tolerated.      Concerns related to:  Nausea/Vomiting: no, denies   Reflux/heartburn: denies  Abdominal Pain: denies   Diarrhea/Constipation- bowel function is regular     Daily Supplements:  Calcium: Calcium Citrate w/ vitamin D (1200 - 1500mg) (centrum)  Multivitamin & Minerals: 2 per day   Vitamin B12: 500 mcg/day   Iron/Other: iron supplement, probiotic   PPI: n/a    Primary Medical Hx:  Patient Active Problem List   Diagnosis    Astigmatism of both eyes    Breast skin changes    Radial styloid tenosynovitis of right hand    Depression    Diabetes mellitus type 2 without retinopathy (Multi)    Diffuse goiter    Elevation of levels of liver transaminase levels    Epidermal inclusion cyst    Herpes simplex virus (HSV) infection    History of malignant neoplasm  of breast    Hyperlipemia    Hypertriglyceridemia    Myopia of both eyes    Nexplanon in place    Suspected sleep apnea    Uncontrolled type 2 diabetes mellitus with hyperglycemia    Chronic headaches    BMI 40.0-44.9, adult (Multi)    Severe obesity (Multi)    Breast cancer    Primary hypertension    Angioedema    ARNULFO (generalized anxiety disorder)    History of bariatric surgery    Fatty liver    Gallstones    History of cannabis abuse    History of noncompliance with medical treatment    Insufficient sleep syndrome    Sleep disorder breathing    Status post mastectomy    Systolic murmur    Artificial menopause    Acute vaginitis    Encounter for follow-up surveillance of breast cancer    Hidradenitis suppurativa    Suppression of ovarian secretion    Bariatric surgery status    Mass of left breast    Infected sebaceous cyst    Sebaceous cyst    Obesity with body mass index (BMI) of 30.0 to 39.9    Post-op pain    Obesity    S/P gastric sleeve procedure    Hospital discharge follow-up    Anxiety attack    Postoperative malabsorption (HHS-HCC)    Laceration of lesser toe of right foot without foreign body present or damage to nail    Abnormal uterine bleeding    Acne    Bacterial vaginosis    Dyspnea on exertion    Mild postpartum depression    Palpitations    Psychological factors affecting medical condition    Sepsis (Multi)    Vaginal irritation    Vaginal odor    Malignant neoplasm of breast    Epidermoid cyst    Calculus of gallbladder    Impaired skin integrity    Insomnia    Abscess    Encounter to discuss breast reconstruction    Pre-op evaluation    Pre-operative clearance    Acquired absence of breast and absent nipple, right    Dietary counseling      Past Surgical History:   Procedure Laterality Date     SECTION, CLASSIC  2019     Section    MASTECTOMY Right     MR HEAD ANGIO WO IV CONTRAST  2015    MR HEAD ANGIO WO IV CONTRAST 2015 [facility]    OTHER SURGICAL  HISTORY  2019    Surgical Treatment Of Missed  In First Trimester      Social History     Socioeconomic History    Marital status: Single     Spouse name: Not on file    Number of children: Not on file    Years of education: Not on file    Highest education level: Not on file   Occupational History    Not on file   Tobacco Use    Smoking status: Never     Passive exposure: Never    Smokeless tobacco: Never   Vaping Use    Vaping status: Never Used   Substance and Sexual Activity    Alcohol use: Never    Drug use: Not Currently     Types: Marijuana     Comment: last used 4 weeks ago    Sexual activity: Defer   Other Topics Concern    Not on file   Social History Narrative    Not on file     Social Drivers of Health     Financial Resource Strain: Patient Declined (3/25/2024)    Overall Financial Resource Strain (CARDIA)     Difficulty of Paying Living Expenses: Patient declined   Food Insecurity: No Food Insecurity (3/25/2024)    Hunger Vital Sign     Worried About Running Out of Food in the Last Year: Never true     Ran Out of Food in the Last Year: Never true   Transportation Needs: Patient Declined (3/25/2024)    PRAPARE - Transportation     Lack of Transportation (Medical): Patient declined     Lack of Transportation (Non-Medical): Patient declined   Physical Activity: Not on file   Stress: No Stress Concern Present (3/25/2024)    [employer] - Occupational Stress Questionnaire     Feeling of Stress : Only a little   Social Connections: Not on file   Intimate Partner Violence: Not At Risk (3/25/2024)    Humiliation, Afraid, Rape, and Kick questionnaire     Fear of Current or Ex-Partner: No     Emotionally Abused: No     Physically Abused: No     Sexually Abused: No   Housing Stability: Patient Declined (3/25/2024)    Housing Stability Vital Sign     Unable to Pay for Housing in the Last Year: Patient declined     Number of Places Lived in the Last Year: 1     Unstable  Housing in the Last Year: Patient declined     Family History   Problem Relation Name Age of Onset    Heart disease Father      Cancer Maternal Grandmother      Diabetes type II Maternal Grandmother      Cancer Maternal Grandfather      Cancer Paternal Grandmother      Diabetes type II Mother's Sister          uncontrolled    Breast cancer Father's Sister          two paternal 1st cousins (through paternal uncle, both sisters, both early onset, one ). two paternal great aunts (through PGF's side, both  in their 50s/60s)    Cancer Father's Sister      Cancer Father's Brother      Breast cancer Paternal Cousin          two paternal 1st cousins (through paternal uncle, both sisters, both early onset, one ). two paternal great aunts (through PGF's side, both  in their 50s/60s)    Breast cancer Cousin        Current Outpatient Medications on File Prior to Visit   Medication Sig Dispense Refill    acetaminophen (Tylenol) 325 mg tablet TAKE 2 TABLETS (650 MG) BY MOUTH EVERY 6 HOURS IF NEEDED (PAIN/FEVER) FOR UP TO 5 DAYS.      atorvastatin (Lipitor) 40 mg tablet Take 1 tablet (40 mg) by mouth once daily at bedtime. 90 tablet 1    atorvastatin (Lipitor) 40 mg tablet Take 1 tablet (40 mg) by mouth once daily at bedtime. 90 tablet 3    blood sugar diagnostic (OneTouch Verio test strips) strip CHECK BLOOD SUGARS UP TO 4X/DAY FOR CGM FAILURE 400 strip 1    cloNIDine (Catapres) 0.1 mg tablet Take 1 tablet (0.1 mg) by mouth once daily at bedtime. 90 tablet 1    DULoxetine (Cymbalta) 30 mg DR capsule Take 1 capsule (30 mg) by mouth once daily. Do not crush or chew. 90 capsule 1    DULoxetine (Cymbalta) 60 mg DR capsule Take 2 capsules (120 mg) by mouth once daily. Do not crush or chew. Take in the evening with food. 90 capsule 1    estradiol (Estrace) 0.01 % (0.1 mg/gram) vaginal cream Insert 0.5 Applicatorfuls (2 g) into the vagina once daily. Use once a day for 2 weeks then as needed there after 42.5 g 3  "   exemestane (Aromasin) 25 mg tablet Take 1 tablet (25 mg total) by mouth once daily.  Take after a meal.  Try to take at the same time each day. 90 tablet 3    ezetimibe (Zetia) 10 mg tablet Take 1 tablet (10 mg) by mouth once daily. 90 tablet 3    FreeStyle Cristina 3 Sensor device Change every 14 days 2 each 11    ibuprofen 600 mg tablet TAKE 1 TABLET (600 MG) BY MOUTH EVERY 6 HOURS IF NEEDED (PAIN) FOR UP TO 5 DAYS.      insulin aspart (NovoLOG) 100 unit/mL (3 mL) pen 5 units with meals plus sliding scale up to 25 units daily 15 mL 5    insulin glargine (Lantus Solostar U-100 Insulin) 100 unit/mL (3 mL) pen INJECT 40 UNITS UNDER THE SKIN IN THE AM AND 20 UNITS IN THE PM 60 mL 1    LORazepam (Ativan) 1 mg tablet TAKE 1 TABLET BY MOUTH ONCE DAILY AS NEEDED FOR ANXIETY 30 tablet 0    metFORMIN  mg 24 hr tablet Take 2 tablets (1,000 mg) by mouth 2 times daily (morning and late afternoon). 360 tablet 1    omega-3 acid ethyl esters (Lovaza) 1 gram capsule Take 2 capsules (2 g) by mouth 2 times daily (morning and late afternoon). 360 capsule 1    OneTouch Delica Plus Lancet 33 gauge misc For BG check up to 4x/day for CGM failure 100 each 3    OneTouch Verio Flex meter misc USE AS DIRECTED.      pen needle, diabetic (BD Ultra-Fine Mini Pen Needle) 31 gauge x 3/16\" needle USE 4 A DAY AS DIRECTED      pen needle, diabetic (BD Ultra-Fine Mini Pen Needle) 31 gauge x 3/16\" needle Inject 200 each under the skin 4 times a day before meals. Use 4 a day as directed 200 each 3    valACYclovir (Valtrex) 1 gram tablet       [DISCONTINUED] ARIPiprazole (Abilify) 2 mg tablet TAKE 1 TABLET BY MOUTH ONCE DAILY. (Patient not taking: Reported on 5/9/2025) 90 tablet 1    [DISCONTINUED] lidocaine (Lidoderm) 5 % patch PLEASE SEE ATTACHED FOR DETAILED DIRECTIONS (Patient not taking: Reported on 5/9/2025)      [DISCONTINUED] Qsymia 15-92 mg capsule, ER multiphase 24 hr TAKE 1 CAPSULE BY MOUTH ONCE DAILY 30 capsule 1     No current " "facility-administered medications on file prior to visit.      Allergies   Allergen Reactions    Cephalexin Hives, Other, Rash and Swelling     \"welts\"    Metformin Swelling     Immediate release only- currently taking ER without problems    Sulfamethoxazole-Trimethoprim Hives, Rash and Swelling     REVIEW OF SYSTEMS:  Negative unless otherwise reviewed in HPI.    PHYSICAL EXAM   Physical Exam   Ht: 5'10\"           Wt: 236 lbs       BMI: 33.86  /71 (self-report)  Alert, well appearing, no acute distress, nourished, hydrated.  Anicteric sclera, no ptosis  Facial symmetry  Neck supple  Unlabored respirations.  Easily conversant without increased respiratory effort  Oriented to person, place, time.  Judgement intact.  Appropriate mood, affect.      ASSESSMENT & PLAN    35 y.o. female presenting today for follow up comprehensive weight management visit. She is s/p LSG 03.2024 with subsequent weight loss stall. No acute or surgically related concerns. Following pouch rules, taking appropriate vitamins and supplements. Bowel regularity is WNL. Achieving protein and fluid goals, still tracking. She regained 10 lbs following a care accident / back injury. She then started Qsymia. Qualified for GLP-1 with historic dx of T2DM however when she took prior she had severe nausea and discontinued use. No current contraindications to AOM use. BP and HR WNL. Will transition now to generic Qsymia for three months following six month pause for cost savings benefit. She verbalizes understanding of plan and wishes to continue. Breast surgery scheduled for August. She is anxious to have this completed. Exercising daily. Continue phentermine/top. Continue to monitor HR and BP. Has not completed labs for annual micronutrient evaluation, instructed to complete labs. Follow up in 4 weeks, sooner if needed.    Weight Impression:  -Initial BMI: 40.3   -Initial Weight: 294 lbs   -Prior Weight: 245 lbs  / 35.15 (3 mo pov)  -Prior Weight: " 252 lbs / 46.09 (start MWL)   - Current Wt: 236 lbs   -%Total Weight Loss: -6.35 %   / -19.73 %     Problem List Items Addressed This Visit       Primary hypertension - Primary    Self-reports BP WNL, most recent 120/62  Reviewed goal BP <120/80  On-going monitoring with AOM use  Continue follow up with PCP for mgmt of antiHTN regimen  Encourage adherence to medications if prescribed for BP control  DASH/Mediterranean Diet lifestyle encouraged.  Weight reduction of 5-10% of clinical significance to improve BP control  Continues to work on lifestyle change goals to support blood pressure control and weight reduction.  Continue to follow up with your primary care physician           Obesity with body mass index (BMI) of 30.0 to 39.9    Obesity Class 3 - initial   Initial BMI 46  BMI- Current: Body mass index is 33.86 kg/m².   S/p Bariatric Surgery, current MWL tx Qsymia, now switching to Phentermine/Topiramate as generics     - Reviewed obesity is a chronic, relapsing, progressive, treatable, multifactorial, neurobehavioral disease, where in an increase in body fat promotes adipose (fat) tissue dysfunction, as well as biomechanical stress on the body which can result in adverse metabolic, biomechanical and psychosocial health consequences, in addition to reducing quality of life and lifespan.     -Without treatment, obesity is likely to progress and worsen, further increasing the patient's risk for numerous health conditions caused by excess adiposity and increasing the risk for premature death.     -The patient was counseled on the science of weight and appetite regulation. The pathophysiology of obesity was reviewed, as well as the biological adaptations to weight loss.      -We reviewed the role of nutrition, physical activity, stress and sleep. We discussed the potential for bariatric surgery and or anti-obesity medications in the treatment of obesity. We reviewed the health impacts of obesity, and the health  benefits of a 5-10% weight loss. We also reviewed program expectations and coordination of care. All additional questions were answered     - Reviewed role of bariatric surgical interventions for obesity treatment, indications for bariatric surgery, benefits of bariatric & metabolic surgery to long term health and obesity treatment.  - Meets ASMBS Guidelines for bariatric surgery with BMI > 40 or > 35 with weight related comorbidity: Yes - post-op weight loss stall with subsequent regain, seeking MWL to further progress for breast ca surgery, now scheduled for August.      -Reviewed use of FDA approved anti obesity pharmacologic therapies and how they assist lifestyle changes.  Discussed medications risk/benefit, expected weight loss outcomes with use, contraindications, side effects and monitoring parameters.    - Contraindications to AOMs: n/a Previously took trulicity and ozempic and had ADR n/v. Wishes to proceed with current plan- qsymia Will stop following today's visit and resume generic form (phentermine/topiramate).   -Reviewed pertinent history, patient has no current contraindications to use of any prescribed medication for adjunct treatment in weight management.   -Counseling provided on clinical success criteria of 5% weight loss within 12 weeks of therapeutic dose of medication.  Currently meeting continuation criteria.          Relevant Medications    phentermine (Adipex-P) 37.5 mg tablet    topiramate (Topamax) 50 mg tablet    S/P gastric sleeve procedure    35 y.o. female   S/p LSG 03/2024  IW- 294 lbs  Initial BMI 40.3    Denies n/v, c/d, abdominal pain or GERD.   Tolerating diet, meeting protein and fluid goals  Was at weight loss stall, started AOM phentermine/Qsymia.  Exercise - maintaining   Taking appropriate bariatric supplements.  Bowel Regularity- regular.    imp: doing well     recs:   Continue with diet, increase protein servings per meal  Continue to advance exercise routine, goal  200-300 min/week aerobic and resistance training advised.   Continue bariatric vitamins and supplements as discussed  FU with dietitian  Encouraged join/participate in support groups.          Dietary counseling    - Discussed self monitoring practices to ensure reduced calorie diet and for accountability  - Emphasis on increased dietary protein and fiber sources throughout the day  - High protein snacks/ meals reviewed.  - Reviewed protein targets per meal as recommendation to help with satiety and lean muscle mass.   - Counseled on benefits of increased regular exercise, both aerobic & resistance training.  Reviewed benefits of resistance training to enhance/maintain lean body mass.  - Counseled on dietary modifications to support weight reduction, reduced calorie diet, encourage adequate protein, increased dietary fiber from fruit and vegetable to improve appetite/satiety regulation and quality of diet.  Discussed impact of processed foods and liquid calories to weight gain & contribution to dysfunctional appetite signals.   -  Reviewed importance of intensification of lifestyle therapy in weight reduction and maintenance, set behavioral modification goals of nutrition, physical activity or behavioral practices to benefit weight reduction.          Please see Patient Instructions for today's relevant referrals, orders and instructions. All documented preventative counseling occurred face to face for duration of 15 minutes.     Follow Up: Follow up in about 4 weeks (around 7/7/2025).     Oma Horowitz, APRN-CNP

## 2025-06-08 NOTE — ASSESSMENT & PLAN NOTE
- Discussed self monitoring practices to ensure reduced calorie diet and for accountability  - Emphasis on increased dietary protein and fiber sources throughout the day  - High protein snacks/ meals reviewed.  - Reviewed protein targets per meal as recommendation to help with satiety and lean muscle mass.   - Counseled on benefits of increased regular exercise, both aerobic & resistance training.  Reviewed benefits of resistance training to enhance/maintain lean body mass.  - Counseled on dietary modifications to support weight reduction, reduced calorie diet, encourage adequate protein, increased dietary fiber from fruit and vegetable to improve appetite/satiety regulation and quality of diet.  Discussed impact of processed foods and liquid calories to weight gain & contribution to dysfunctional appetite signals.   -  Reviewed importance of intensification of lifestyle therapy in weight reduction and maintenance, set behavioral modification goals of nutrition, physical activity or behavioral practices to benefit weight reduction.

## 2025-06-09 ENCOUNTER — APPOINTMENT (OUTPATIENT)
Dept: ORTHOPEDIC SURGERY | Facility: HOSPITAL | Age: 35
End: 2025-06-09
Payer: COMMERCIAL

## 2025-06-09 ENCOUNTER — TELEMEDICINE (OUTPATIENT)
Dept: SURGERY | Facility: CLINIC | Age: 35
End: 2025-06-09
Payer: COMMERCIAL

## 2025-06-09 VITALS — WEIGHT: 236 LBS | BODY MASS INDEX: 33.79 KG/M2 | HEIGHT: 70 IN

## 2025-06-09 DIAGNOSIS — Z71.3 DIETARY COUNSELING: ICD-10-CM

## 2025-06-09 DIAGNOSIS — Z90.3 S/P GASTRIC SLEEVE PROCEDURE: ICD-10-CM

## 2025-06-09 DIAGNOSIS — M65.4 DE QUERVAIN'S TENOSYNOVITIS, LEFT: Primary | ICD-10-CM

## 2025-06-09 DIAGNOSIS — M25.532 LEFT WRIST PAIN: ICD-10-CM

## 2025-06-09 DIAGNOSIS — E66.9 OBESITY WITH BODY MASS INDEX (BMI) OF 30.0 TO 39.9: ICD-10-CM

## 2025-06-09 DIAGNOSIS — E66.9 OBESITY (BMI 35.0-39.9 WITHOUT COMORBIDITY): ICD-10-CM

## 2025-06-09 DIAGNOSIS — I10 PRIMARY HYPERTENSION: Primary | ICD-10-CM

## 2025-06-09 PROCEDURE — 99203 OFFICE O/P NEW LOW 30 MIN: CPT | Performed by: STUDENT IN AN ORGANIZED HEALTH CARE EDUCATION/TRAINING PROGRAM

## 2025-06-09 PROCEDURE — 99214 OFFICE O/P EST MOD 30 MIN: CPT

## 2025-06-09 PROCEDURE — 99214 OFFICE O/P EST MOD 30 MIN: CPT | Mod: GT,U1,CSA,95

## 2025-06-09 PROCEDURE — 1036F TOBACCO NON-USER: CPT | Performed by: STUDENT IN AN ORGANIZED HEALTH CARE EDUCATION/TRAINING PROGRAM

## 2025-06-09 PROCEDURE — 99212 OFFICE O/P EST SF 10 MIN: CPT | Mod: 25 | Performed by: STUDENT IN AN ORGANIZED HEALTH CARE EDUCATION/TRAINING PROGRAM

## 2025-06-09 PROCEDURE — 99401 PREV MED CNSL INDIV APPRX 15: CPT | Mod: U1

## 2025-06-09 PROCEDURE — 1036F TOBACCO NON-USER: CPT

## 2025-06-09 PROCEDURE — 2500000004 HC RX 250 GENERAL PHARMACY W/ HCPCS (ALT 636 FOR OP/ED): Performed by: STUDENT IN AN ORGANIZED HEALTH CARE EDUCATION/TRAINING PROGRAM

## 2025-06-09 PROCEDURE — 3008F BODY MASS INDEX DOCD: CPT

## 2025-06-09 PROCEDURE — 99401 PREV MED CNSL INDIV APPRX 15: CPT

## 2025-06-09 PROCEDURE — 20550 NJX 1 TENDON SHEATH/LIGAMENT: CPT | Mod: LT | Performed by: STUDENT IN AN ORGANIZED HEALTH CARE EDUCATION/TRAINING PROGRAM

## 2025-06-09 RX ORDER — LIDOCAINE HYDROCHLORIDE 10 MG/ML
0.5 INJECTION, SOLUTION INFILTRATION; PERINEURAL
Status: COMPLETED | OUTPATIENT
Start: 2025-06-09 | End: 2025-06-09

## 2025-06-09 RX ORDER — TOPIRAMATE 50 MG/1
TABLET, FILM COATED ORAL
Qty: 60 TABLET | Refills: 2 | Status: SHIPPED | OUTPATIENT
Start: 2025-06-09

## 2025-06-09 RX ORDER — BETAMETHASONE SODIUM PHOSPHATE AND BETAMETHASONE ACETATE 3; 3 MG/ML; MG/ML
6 INJECTION, SUSPENSION INTRA-ARTICULAR; INTRALESIONAL; INTRAMUSCULAR; SOFT TISSUE
Status: COMPLETED | OUTPATIENT
Start: 2025-06-09 | End: 2025-06-09

## 2025-06-09 RX ORDER — PHENTERMINE HYDROCHLORIDE 37.5 MG/1
37.5 TABLET ORAL
Qty: 30 TABLET | Refills: 0 | Status: SHIPPED | OUTPATIENT
Start: 2025-06-09 | End: 2025-07-09

## 2025-06-09 RX ADMIN — LIDOCAINE HYDROCHLORIDE 0.5 ML: 10 INJECTION, SOLUTION INFILTRATION; PERINEURAL at 14:03

## 2025-06-09 RX ADMIN — BETAMETHASONE SODIUM PHOSPHATE AND BETAMETHASONE ACETATE 6 MG: 3; 3 INJECTION, SUSPENSION INTRA-ARTICULAR; INTRALESIONAL; INTRAMUSCULAR at 14:03

## 2025-06-09 ASSESSMENT — PAIN SCALES - GENERAL: PAINLEVEL_OUTOF10: 6

## 2025-06-09 ASSESSMENT — PAIN DESCRIPTION - DESCRIPTORS: DESCRIPTORS: SORE;ACHING

## 2025-06-09 ASSESSMENT — PAIN - FUNCTIONAL ASSESSMENT: PAIN_FUNCTIONAL_ASSESSMENT: 0-10

## 2025-06-09 NOTE — PROGRESS NOTES
History of Present Illness:  Chief Complaint   Patient presents with    Left Wrist - New Patient Visit, Pain       Patient presents today for evaluation of left radial sided wrist pain that began approximately 6 months ago..  Aching pain at rest that is sharp with certain thumb and wrist motions.  She does note a fall from this time and developed radial sided left wrist pain shortly after.  Pain is somewhat better with rest.  Patient has attempted bracing without significant relief.  Patient has attempted rest.  Denies numbness and tingling.  She has had previous bilateral carpal tunnel releases by Dr. Fernández.    She is also been treated by Dr. Fernández for de Quervain's tenosynovitis which eventually resolved without operative intervention.    She has a type II diabetic and has a history of breast cancer and is planning to have a mastectomy later this year.    Medical History[1]    Medication Documentation Review Audit       Reviewed by Donna Galindo LPN (Licensed Nurse) on 06/09/25 at 1105      Medication Order Taking? Sig Documenting Provider Last Dose Status   acetaminophen (Tylenol) 325 mg tablet [id number]  TAKE 2 TABLETS (650 MG) BY MOUTH EVERY 6 HOURS IF NEEDED (PAIN/FEVER) FOR UP TO 5 DAYS. Historical Provider, MD  Active    Patient not taking:   Discontinued 06/08/25 1443   atorvastatin (Lipitor) 40 mg tablet [id number]  Take 1 tablet (40 mg) by mouth once daily at bedtime. Yasmin Clements MD  Active   atorvastatin (Lipitor) 40 mg tablet [id number]  Take 1 tablet (40 mg) by mouth once daily at bedtime. Yasmin Clements MD  Active   blood sugar diagnostic (OneTouch Verio test strips) strip [id number]  CHECK BLOOD SUGARS UP TO 4X/DAY FOR CGM FAILURE Yasmin Clements MD  Active   cloNIDine (Catapres) 0.1 mg tablet [id number]  Take 1 tablet (0.1 mg) by mouth once daily at bedtime. Marcie Kent MD  Active   DULoxetine (Cymbalta) 30 mg DR capsule [id number]  Take 1 capsule (30 mg) by mouth  once daily. Do not crush or chew. Marcie Kent MD  Active   DULoxetine (Cymbalta) 60 mg DR capsule [id number]  Take 2 capsules (120 mg) by mouth once daily. Do not crush or chew. Take in the evening with food. Marcie Kent MD  Active   estradiol (Estrace) 0.01 % (0.1 mg/gram) vaginal cream [id number]  Insert 0.5 Applicatorfuls (2 g) into the vagina once daily. Use once a day for 2 weeks then as needed there after Sanjuana Anand, APRN-CNP  Active   exemestane (Aromasin) 25 mg tablet [id number]  Take 1 tablet (25 mg total) by mouth once daily.  Take after a meal.  Try to take at the same time each day. OLLIE EngC  Active   ezetimibe (Zetia) 10 mg tablet [id number]  Take 1 tablet (10 mg) by mouth once daily. Yasmin Clements MD  Active   FreeStyle Cristina 3 Sensor device [id number]  Change every 14 days Yasmin Clements MD  Active   ibuprofen 600 mg tablet [id number]  TAKE 1 TABLET (600 MG) BY MOUTH EVERY 6 HOURS IF NEEDED (PAIN) FOR UP TO 5 DAYS. Historical Provider, MD  Active   insulin aspart (NovoLOG) 100 unit/mL (3 mL) pen [id number]  5 units with meals plus sliding scale up to 25 units daily Yasmin Clements MD  Active   insulin glargine (Lantus Solostar U-100 Insulin) 100 unit/mL (3 mL) pen [id number]  INJECT 40 UNITS UNDER THE SKIN IN THE AM AND 20 UNITS IN THE PM Yasmin Clements MD  Active   lidocaine (Lidoderm) 5 % patch [id number]  PLEASE SEE ATTACHED FOR DETAILED DIRECTIONS   Patient not taking: Reported on 2025    Historical Provider, MD  Active   LORazepam (Ativan) 1 mg tablet [id number]  TAKE 1 TABLET BY MOUTH ONCE DAILY AS NEEDED FOR ANXIETY Marcie Kent MD   [phone]   metFORMIN  mg 24 hr tablet [id number]  Take 2 tablets (1,000 mg) by mouth 2 times daily (morning and late afternoon). Yasmin Clements MD  Active   omega-3 acid ethyl esters (Lovaza) 1 gram capsule [id number]  Take 2 capsules (2 g) by mouth 2 times daily  "(morning and late afternoon). Yasmin Clements MD  Active   OneTouch Delica Plus Lancet 33 gauge misc [id number]  For BG check up to 4x/day for CGM failure Yasmin Clements MD  Active   KemPharmTouch Verio Flex meter misc [id number]  USE AS DIRECTED. Historical Provider, MD  Active   pen needle, diabetic (BD Ultra-Fine Mini Pen Needle) 31 gauge x 3/16\" needle [id number]  USE 4 A DAY AS DIRECTED Historical Provider, MD  Active   pen needle, diabetic (BD Ultra-Fine Mini Pen Needle) 31 gauge x 3/16\" needle [id number]  Inject 200 each under the skin 4 times a day before meals. Use 4 a day as directed Yasmin Clements MD  Active   Qsymia 15-92 mg capsule, ER multiphase 24 hr [id number]  TAKE 1 CAPSULE BY MOUTH ONCE DAILY Oma Horowitz, APRN-CNP  Active   valACYclovir (Valtrex) 1 gram tablet [id number]   Historical Provider, MD  Active                    RX Allergies[2]    Social History     Socioeconomic History    Marital status: Single     Spouse name: Not on file    Number of children: Not on file    Years of education: Not on file    Highest education level: Not on file   Occupational History    Not on file   Tobacco Use    Smoking status: Never     Passive exposure: Never    Smokeless tobacco: Never   Vaping Use    Vaping status: Never Used   Substance and Sexual Activity    Alcohol use: Never    Drug use: Not Currently     Types: Marijuana     Comment: last used 4 weeks ago    Sexual activity: Defer   Other Topics Concern    Not on file   Social History Narrative    Not on file     Social Drivers of Health     Financial Resource Strain: Patient Declined (3/25/2024)    Overall Financial Resource Strain (CARDIA)     Difficulty of Paying Living Expenses: Patient declined   Food Insecurity: No Food Insecurity (3/25/2024)    Hunger Vital Sign     Worried About Running Out of Food in the Last Year: Never true     Ran Out of Food in the Last Year: Never true   Transportation Needs: Patient Declined (3/25/2024)    " PRAPARE - Transportation     Lack of Transportation (Medical): Patient declined     Lack of Transportation (Non-Medical): Patient declined   Physical Activity: Not on file   Stress: No Stress Concern Present (3/25/2024)    [employer] - Occupational Stress Questionnaire     Feeling of Stress : Only a little   Social Connections: Not on file   Intimate Partner Violence: Not At Risk (3/25/2024)    Humiliation, Afraid, Rape, and Kick questionnaire     Fear of Current or Ex-Partner: No     Emotionally Abused: No     Physically Abused: No     Sexually Abused: No   Housing Stability: Patient Declined (3/25/2024)    Housing Stability Vital Sign     Unable to Pay for Housing in the Last Year: Patient declined     Number of Places Lived in the Last Year: 1     Unstable Housing in the Last Year: Patient declined       Surgical History[3]       Review of Systems   GENERAL: Negative for malaise, significant weight loss, fever  MUSCULOSKELETAL: see HPI  NEURO:  Negative     Physical Examination  Constitutional: Appears well-developed and well-nourished.  Head: Normocephalic and atraumatic.  Eyes: EOMI grossly  Cardiovascular: Intact distal pulses.   Respiratory: Effort normal. No respiratory distress.  Neurologic: Alert and oriented to person, place, and time.  Skin: Skin is warm and dry.  Hematologic / Lymphatic: No lymphedema, lymphangitis.  Psychiatric: normal mood and affect. Behavior is normal.     Musculoskeletal:  left wrist  No obvious atrophy, no skin changes  Tenderness overlying the first dorsal compartment.  Pain with resisted thumb extension and positive Finkelstein's maneuver.  Nontender over the SL interval.  Nontender over the anatomic snuffbox.  Nontender over the scaphoid volarly.  Nontender over the CMC joint of the thumb.  Nontender over the A1 pulley of the thumb.  Sensation grossly intact throughout distally.  Capillary refill less than 2 seconds distally.    XR of the left  wrist dated 5/2/2025 was reviewed in office today.  No fracture dislocations noted.     Assessment:  Patient with right de Quervain's tendinitis.     Plan:  I had a lengthy discussion with the patient about the etiology and management of DeQuervain's tenosynovitis. We discussed the range of treatment options and associated risks/benefits for de Quervain's tenosynovitis.  This includes conservative measures such as anti-inflammatories, splinting, corticosteroid injection and, if conservative measures fail, surgical release of the first dorsal compartment.     I explained that steroid shots are a good 1st line treatment and carry an estimated success rate of 60-80% with 1-2 injections. I do not feel strongly that a thumb spica brace must be used in conjunction with the injections, but I have advised them this may be helpful in managing their symptoms so this form of orthosis may be utilized if desired. I explained that we would typically try a series of 2 shots before considering open 1st dorsal compartment release. All questions were answered    The patient elected for: Corticosteroid injection to left first dorsal compartment.     Hand / UE Inj/Asp: L extensor compartment 1 for de Quervain's tenosynovitis on 6/9/2025 2:03 PM  Indications: pain, tendon swelling and therapeutic  Details: 25 G needle, radial approach  Medications: 6 mg betamethasone acet,sod phos 6 mg/mL; 0.5 mL lidocaine 10 mg/mL (1 %)  Outcome: tolerated well, no immediate complications  Procedure, treatment alternatives, risks and benefits explained, specific risks discussed. Consent was given by the patient. Immediately prior to procedure a time out was called to verify the correct patient, procedure, equipment, support staff and site/side marked as required. Patient was prepped and draped in the usual sterile fashion.               [1]   Past Medical History:  Diagnosis Date    Abnormal levels of other serum enzymes 08/26/2021    Elevated liver  "enzymes    Anxiety     Anxiety disorder, unspecified 2019    Anxiety and depression    Body mass index (BMI)40.0-44.9, adult 2019    BMI 40.0-44.9, adult    Breast cancer     Carpal tunnel syndrome, bilateral upper limbs 2019    Carpal tunnel syndrome on both sides    Depression, unspecified 2021    Depression    Elevated liver enzymes     Glycosuria 2014    Glucosuria    Hx antineoplastic chemo     Irregular menstruation, unspecified 2015    Missed periods    Mixed hyperlipidemia 2018    Hyperlipemia, mixed    Morbid (severe) obesity due to excess calories (Multi) 2019    Severe obesity (BMI >= 40)    Myopia, bilateral 09/15/2017    Myopia of both eyes with astigmatism    Type 2 diabetes mellitus without complications 2022    Diabetes    Vision loss     glasses   [2]   Allergies  Allergen Reactions    Cephalexin Hives, Other, Rash and Swelling     \"welts\"    Metformin Swelling     Immediate release only- currently taking ER without problems    Sulfamethoxazole-Trimethoprim Hives, Rash and Swelling   [3]   Past Surgical History:  Procedure Laterality Date     SECTION, CLASSIC  2019     Section    MASTECTOMY Right     MR HEAD ANGIO WO IV CONTRAST  2015    MR HEAD ANGIO WO IV CONTRAST 2015 [facility]    OTHER SURGICAL HISTORY  2019    Surgical Treatment Of Missed  In First Trimester     "

## 2025-06-11 ENCOUNTER — PATIENT MESSAGE (OUTPATIENT)
Dept: BEHAVIORAL HEALTH | Facility: HOSPITAL | Age: 35
End: 2025-06-11

## 2025-06-11 ENCOUNTER — APPOINTMENT (OUTPATIENT)
Dept: PRIMARY CARE | Facility: CLINIC | Age: 35
End: 2025-06-11
Payer: COMMERCIAL

## 2025-06-11 VITALS — TEMPERATURE: 97.7 F | SYSTOLIC BLOOD PRESSURE: 95 MMHG | HEART RATE: 65 BPM | DIASTOLIC BLOOD PRESSURE: 58 MMHG

## 2025-06-11 DIAGNOSIS — Z13.31 DEPRESSION SCREENING NEGATIVE: ICD-10-CM

## 2025-06-11 DIAGNOSIS — Z01.818 PRE-OPERATIVE CLEARANCE: Primary | ICD-10-CM

## 2025-06-11 DIAGNOSIS — E11.9 DIABETES MELLITUS TYPE 2 WITHOUT RETINOPATHY (MULTI): ICD-10-CM

## 2025-06-11 PROCEDURE — 1036F TOBACCO NON-USER: CPT | Performed by: FAMILY MEDICINE

## 2025-06-11 PROCEDURE — 3078F DIAST BP <80 MM HG: CPT | Performed by: FAMILY MEDICINE

## 2025-06-11 PROCEDURE — 99214 OFFICE O/P EST MOD 30 MIN: CPT | Performed by: FAMILY MEDICINE

## 2025-06-11 PROCEDURE — 3074F SYST BP LT 130 MM HG: CPT | Performed by: FAMILY MEDICINE

## 2025-06-11 ASSESSMENT — PATIENT HEALTH QUESTIONNAIRE - PHQ9
SUM OF ALL RESPONSES TO PHQ9 QUESTIONS 1 & 2: 0
2. FEELING DOWN, DEPRESSED OR HOPELESS: NOT AT ALL
1. LITTLE INTEREST OR PLEASURE IN DOING THINGS: NOT AT ALL

## 2025-06-11 NOTE — ASSESSMENT & PLAN NOTE
1-DM II was borderline controlled last on 10/02/2024: 7.4%.  -Continue Metformin, Lantus 40 HS and 20 U AM and ISS PRN.   -blood and urine tests ordered.  2-HTN is well controlled.   3-No anticoagulant.

## 2025-06-11 NOTE — PROGRESS NOTES
Subjective   Patient ID: Nia Harper is a 35 y.o. female who presents for Pre-op Clearance.  HPI    The patient is a 35 year old Female with a PMHx of DMII on insulin, marijuana use, ER+/WY+ right breast multicentric breast cancer, invasive ductal carcinoma (IDC), grade 2-3, ER/WY+, HER2- s/p right mastectomy 6/30/2023, gastric sleeve 3/25/2024, HTN, HLD, here for:    1- Preoperative clearance for some dental work to be done.  -DM II was borderline controlled last on 10/02/2024: 7.4%.  -HTN is well controlled.   -No anticoagulant.    A review of system was completed.  All systems were reviewed and were normal, except for the ones that are listed in the HPI.    Objective   Physical Exam  Constitutional:       Appearance: Normal appearance.   HENT:      Head: Normocephalic and atraumatic.      Right Ear: Tympanic membrane, ear canal and external ear normal.      Left Ear: Tympanic membrane, ear canal and external ear normal.      Nose: Nose normal.      Mouth/Throat:      Mouth: Mucous membranes are moist.      Pharynx: Oropharynx is clear.   Eyes:      Extraocular Movements: Extraocular movements intact.      Conjunctiva/sclera: Conjunctivae normal.      Pupils: Pupils are equal, round, and reactive to light.   Cardiovascular:      Rate and Rhythm: Normal rate and regular rhythm.      Pulses: Normal pulses.   Pulmonary:      Effort: Pulmonary effort is normal.      Breath sounds: Normal breath sounds.   Abdominal:      General: Abdomen is flat. Bowel sounds are normal.      Palpations: Abdomen is soft.   Musculoskeletal:         General: Normal range of motion.      Cervical back: Normal range of motion and neck supple.   Skin:     General: Skin is warm.   Neurological:      General: No focal deficit present.      Mental Status: She is alert and oriented to person, place, and time. Mental status is at baseline.   Psychiatric:         Mood and Affect: Mood normal.         Behavior: Behavior normal.         Thought  Content: Thought content normal.         Judgment: Judgment normal.     Assessment/Plan   Problem List Items Addressed This Visit       Diabetes mellitus type 2 without retinopathy (Multi)    -Continue Metformin, Lantus 40 HS and 20 U AM and ISS PRN.   -blood and urine tests ordered.           Relevant Orders    Albumin-Creatinine Ratio, Urine Random    CBC    Comprehensive Metabolic Panel    Hemoglobin A1C    Lipid Panel    TSH with reflex to Free T4 if abnormal    Pre-operative clearance - Primary    1-DM II was borderline controlled last on 10/02/2024: 7.4%.  -Continue Metformin, Lantus 40 HS and 20 U AM and ISS PRN.   -blood and urine tests ordered.  2-HTN is well controlled.   3-No anticoagulant.         Relevant Orders    Albumin-Creatinine Ratio, Urine Random    CBC    Comprehensive Metabolic Panel    Hemoglobin A1C    Lipid Panel    TSH with reflex to Free T4 if abnormal    Depression screening negative    Patient to return to office in 4 months for routine care.

## 2025-06-23 ENCOUNTER — PATIENT MESSAGE (OUTPATIENT)
Dept: BEHAVIORAL HEALTH | Facility: HOSPITAL | Age: 35
End: 2025-06-23
Payer: COMMERCIAL

## 2025-06-23 DIAGNOSIS — Z79.4 TYPE 2 DIABETES MELLITUS TREATED WITH INSULIN (MULTI): ICD-10-CM

## 2025-06-23 DIAGNOSIS — F41.0 ANXIETY ATTACK: ICD-10-CM

## 2025-06-23 DIAGNOSIS — E11.9 TYPE 2 DIABETES MELLITUS TREATED WITH INSULIN (MULTI): ICD-10-CM

## 2025-06-24 RX ORDER — LORAZEPAM 1 MG/1
1 TABLET ORAL DAILY PRN
Qty: 30 TABLET | Refills: 0 | Status: SHIPPED | OUTPATIENT
Start: 2025-06-24 | End: 2025-07-24

## 2025-06-25 RX ORDER — LANCETS 30 GAUGE
1 EACH MISCELLANEOUS DAILY
Qty: 1 EACH | Refills: 0 | Status: SHIPPED | OUTPATIENT
Start: 2025-06-25

## 2025-06-30 ENCOUNTER — PREP FOR PROCEDURE (OUTPATIENT)
Dept: SURGICAL ONCOLOGY | Facility: HOSPITAL | Age: 35
End: 2025-06-30
Payer: COMMERCIAL

## 2025-07-02 ENCOUNTER — INFUSION (OUTPATIENT)
Dept: HEMATOLOGY/ONCOLOGY | Facility: HOSPITAL | Age: 35
End: 2025-07-02
Payer: COMMERCIAL

## 2025-07-02 ENCOUNTER — APPOINTMENT (OUTPATIENT)
Dept: SURGERY | Facility: CLINIC | Age: 35
End: 2025-07-02
Payer: COMMERCIAL

## 2025-07-02 ENCOUNTER — TELEPHONE (OUTPATIENT)
Dept: PLASTIC SURGERY | Facility: CLINIC | Age: 35
End: 2025-07-02
Payer: COMMERCIAL

## 2025-07-02 VITALS
SYSTOLIC BLOOD PRESSURE: 129 MMHG | RESPIRATION RATE: 18 BRPM | HEART RATE: 95 BPM | OXYGEN SATURATION: 100 % | DIASTOLIC BLOOD PRESSURE: 72 MMHG | TEMPERATURE: 97.2 F

## 2025-07-02 DIAGNOSIS — Z17.0 MALIGNANT NEOPLASM OF RIGHT BREAST IN FEMALE, ESTROGEN RECEPTOR POSITIVE, UNSPECIFIED SITE OF BREAST: ICD-10-CM

## 2025-07-02 DIAGNOSIS — Z90.3 S/P GASTRIC SLEEVE PROCEDURE: ICD-10-CM

## 2025-07-02 DIAGNOSIS — E66.9 OBESITY WITH BODY MASS INDEX (BMI) OF 30.0 TO 39.9: Primary | ICD-10-CM

## 2025-07-02 DIAGNOSIS — I10 PRIMARY HYPERTENSION: ICD-10-CM

## 2025-07-02 DIAGNOSIS — C50.911 MALIGNANT NEOPLASM OF RIGHT BREAST IN FEMALE, ESTROGEN RECEPTOR POSITIVE, UNSPECIFIED SITE OF BREAST: ICD-10-CM

## 2025-07-02 DIAGNOSIS — Z71.3 DIETARY COUNSELING: ICD-10-CM

## 2025-07-02 PROCEDURE — 96402 CHEMO HORMON ANTINEOPL SQ/IM: CPT

## 2025-07-02 PROCEDURE — 2500000004 HC RX 250 GENERAL PHARMACY W/ HCPCS (ALT 636 FOR OP/ED): Mod: JZ,SE

## 2025-07-02 RX ORDER — LIDOCAINE HYDROCHLORIDE 10 MG/ML
2 INJECTION, SOLUTION EPIDURAL; INFILTRATION; INTRACAUDAL; PERINEURAL ONCE
Status: COMPLETED | OUTPATIENT
Start: 2025-07-02 | End: 2025-07-02

## 2025-07-02 RX ORDER — EPINEPHRINE 0.3 MG/.3ML
0.3 INJECTION SUBCUTANEOUS EVERY 5 MIN PRN
Status: DISCONTINUED | OUTPATIENT
Start: 2025-07-02 | End: 2025-07-02 | Stop reason: HOSPADM

## 2025-07-02 RX ORDER — LIDOCAINE HYDROCHLORIDE 10 MG/ML
2 INJECTION, SOLUTION EPIDURAL; INFILTRATION; INTRACAUDAL; PERINEURAL ONCE
OUTPATIENT
Start: 2025-07-21

## 2025-07-02 RX ORDER — DIPHENHYDRAMINE HYDROCHLORIDE 50 MG/ML
50 INJECTION, SOLUTION INTRAMUSCULAR; INTRAVENOUS AS NEEDED
OUTPATIENT
Start: 2025-07-21

## 2025-07-02 RX ORDER — FAMOTIDINE 10 MG/ML
20 INJECTION, SOLUTION INTRAVENOUS ONCE AS NEEDED
Status: DISCONTINUED | OUTPATIENT
Start: 2025-07-02 | End: 2025-07-02 | Stop reason: HOSPADM

## 2025-07-02 RX ORDER — ALBUTEROL SULFATE 0.83 MG/ML
3 SOLUTION RESPIRATORY (INHALATION) AS NEEDED
OUTPATIENT
Start: 2025-07-21

## 2025-07-02 RX ORDER — FAMOTIDINE 10 MG/ML
20 INJECTION, SOLUTION INTRAVENOUS ONCE AS NEEDED
OUTPATIENT
Start: 2025-07-21

## 2025-07-02 RX ORDER — EPINEPHRINE 0.3 MG/.3ML
0.3 INJECTION SUBCUTANEOUS EVERY 5 MIN PRN
OUTPATIENT
Start: 2025-07-21

## 2025-07-02 RX ORDER — DIPHENHYDRAMINE HYDROCHLORIDE 50 MG/ML
50 INJECTION, SOLUTION INTRAMUSCULAR; INTRAVENOUS AS NEEDED
Status: DISCONTINUED | OUTPATIENT
Start: 2025-07-02 | End: 2025-07-02 | Stop reason: HOSPADM

## 2025-07-02 RX ORDER — ALBUTEROL SULFATE 0.83 MG/ML
3 SOLUTION RESPIRATORY (INHALATION) AS NEEDED
Status: DISCONTINUED | OUTPATIENT
Start: 2025-07-02 | End: 2025-07-02 | Stop reason: HOSPADM

## 2025-07-02 RX ADMIN — GOSERELIN ACETATE 3.6 MG: 3.6 IMPLANT SUBCUTANEOUS at 12:18

## 2025-07-02 RX ADMIN — LIDOCAINE HYDROCHLORIDE 20 MG: 10 INJECTION, SOLUTION EPIDURAL; INFILTRATION; INTRACAUDAL; PERINEURAL at 12:18

## 2025-07-02 ASSESSMENT — PAIN SCALES - GENERAL: PAINLEVEL_OUTOF10: 0-NO PAIN

## 2025-07-02 NOTE — PROGRESS NOTES
Pt arrived ambulatory to infusion for treatment of zoladex.  Denies any new or worsening symptoms. Injection given in right lower abdomen. Patient Tolerated injection without issue. Discharged in stable condition.

## 2025-07-02 NOTE — ASSESSMENT & PLAN NOTE
Reviewed goal BP <120/80, she will continue to monitor with use of phentermine. Self-reports today /70.  Continue follow up with PCP for mgmt of antiHTN regimen  Encourage adherence to medications if prescribed for BP control  DASH/Mediterranean Diet lifestyle encouraged.  Weight reduction of 5-10% of clinical significance to improve BP control  Continues to work on lifestyle change goals to support blood pressure control and weight reduction.  Continue to follow up with your primary care physician

## 2025-07-02 NOTE — TELEPHONE ENCOUNTER
Spoke to patient regarding upcoming pre-operative appointment with Dr. Cerrato. Patient is scheduled for bilateral HELENE flap reconstruction on 8/27 and her PCP has put in a set of labs that should be drawn prior to her pre-op appointment to review. Patient was in agreement with plan and will get her labs drawn next week.

## 2025-07-02 NOTE — PROGRESS NOTES
Subjective :  Patient ID: Nia Harper is a 35 y.o. female surgical referral from Dr. Luis Carlos Borrero     History of Present Illness: Patient has a past medical history of gastric sleeve (3/25/24) and breast cancer, had a right partial mastectomy (6/30/23), DM (A1C was 7.4 on 10/2/24) anxiety, and depression. Plan for double mastectomy (Dr. Schroeder) and delayed HELENE reconstruction with Dr. Borrero and Dr. Cerrato.  Endocrinology saw patient on 10/7/24 and expressed no contraindications from a diabetes standpoint for breast reconstructive surgery. Patient presents today to discuss preoperative planning for the donor flaps. Patient spoke with her psychiatrist about decreased her dose of SNRI prior to procedure to reduce bleeding risk.     BMI: 33.85     STUDY:  CT ANGIO ABDOMEN PELVIS W AND/OR WO IV IV CONTRAST;  8/8/2024 8:45 am      INDICATION:  Signs/Symptoms:pre-op planning for possible HELENE breast reconstruction      FINDINGS:  Vascular:  No aortic aneurysm or dissection.   The celiac trunk,  superior mesenteric artery, renal arteries and inferior mesenteric  artery are patent.  Bilateral inferior epigastric vessels patent.      LOWER CHEST: No acute abnormality of the lung bases. Postsurgical  change from right mastectomy. BONES: No acute osseous abnormality.  ABDOMINAL WALL: Within normal limits.      ABDOMEN:      LIVER: Within normal limits.  BILE DUCTS: Normal caliber.  GALLBLADDER: No calcified gallstones. No wall thickening.  PANCREAS: Within normal limits.  SPLEEN: Within normal limits.  ADRENALS: Within normal limits.  KIDNEYS and URETERS: Symmetric renal enhancement. No hydronephrosis.      RETROPERITONEUM: No pathologically enlarged retroperitoneal lymph  nodes.      PELVIS:      REPRODUCTIVE ORGANS: No pelvic masses.  BLADDER: Within normal limits.      BOWEL: Postsurgical change along the greater curvature of the  stomach. No dilated bowel.  Normal appendix. PERITONEUM: No ascites  or free air, no  fluid collection.      IMPRESSION:  1. Widely patent arterial anatomy. Specifically, the bilateral  inferior epigastric arteries are patent.  2. No acute process within the abdomen or pelvis.      MACRO:  None    Objective :  Physical Exam  Vitals and nursing note reviewed. Exam conducted with a chaperone present.     Constitutional:       General: She is not in acute distress.     Appearance: She is not ill-appearing.   Eyes:      Extraocular Movements: Extraocular movements intact.      Conjunctiva/sclera: Conjunctivae normal.      Pupils: Pupils are equal, round, and reactive to light.   Cardiovascular:      Rate and Rhythm: Normal rate and regular rhythm.      Pulses: Normal pulses.   Pulmonary:      Effort: Pulmonary effort is normal.      Breath sounds: Normal breath sounds.   Abdominal:      Palpations: Abdomen is soft. There is no mass.      Tenderness: There is no abdominal tenderness.      Hernia: No hernia is present.   Musculoskeletal:         General: No swelling or tenderness.      Cervical back: Normal range of motion and neck supple.   Skin:     Capillary Refill: Capillary refill takes less than 2 seconds.      Coloration: Skin is not jaundiced.      Findings: No bruising or rash.   Neurological:      General: No focal deficit present.      Mental Status: She is oriented to person, place, and time.   Psychiatric:         Mood and Affect: Mood normal.         Behavior: Behavior normal.         Thought Content: Thought content normal.         Judgment: Judgment normal.     Chest: Right breast absent with lateral dog ear. Left breast: Grade III ptosis. No masses, no nipple discharge.   Abdomen: Pfannenstiel incision.  Soft, non tender, no masses, no hernias. Adequate abdominal tissue for bilateral breast reconstruction of moderate size.   Moderate excess tissue of inner thigh for unilateral breast reconstruction. Possible 300 g    Assessment/Plan :    Patient presents as a pre-operative visit after  improving HbA1c to 6.3 to discuss delayed right breast reconstruction and simultaneous left skin sparing mastectomy and breast reconstruction with autologous tissue. Current BMI is 33.85.     We reviewed CTA abdomen and pelvis.  I discussed the results of the CTA with the patient and their clinical implications as follows: Right HELENE is feasible based on medial row  (sub umbilical).  For the left helene, I could only appreciate a small size  above the umbilicus, which will require above the umbilicus incision with subsequent final abdominal closure scar located high on the abdomen. I also discussed that the final say about the  is intraoperatively based on ICG study and as a result, there is a  good chance that the left HELENE will be converted into free MS-TRAM, free TRAM, or SIEV with vascular graft. I also discussed possible use of PAP flap instead of left HELENE. Pros and cons were reviewed extensively today with patient. We also discussed proceeding with left direct to implant in case the left HELENE is not feasible and to plan autologous tissue reconstruction at a later date in delayed fashion. Patient expressed good understanding and accept all those alternatives.     Spoke with patient about extended length of general anesthesia for this procedure and the benefits of the nerve block, and also to address respiratory failure in the past. A preoperative consult to anesthesia was placed. We also discussed SNRIs during surgery and reviewed possible risk of bleeding , serotonin syndrome and other drugs interactions, relapse of psychiatric condition. We'll start a conversation with her anesthesia team and Dr. Kent regarding a need for lowering down her medication dose if indicated. I also reviewed with patient a linear relationship between high BMI and perioperative complications including but not limited to infection, wound breakdown, prolonged hospital stay, reoperation.      Next,  we discussed HELENE flap and PAP flap procedures in detail, and perioperative course, and reviewed possible complications  including but not limited to reaction to medication, deep vein thrombosis, pulmonary embolism, infection, seroma, bleeding and hematoma, wound healing issues, partial or total flap necrosis, wound healing issues, numbness and tingling at the chest and or abdomen, hernia or bulge at the abdomen, mesh-related complications, persistent pain, need for additional surgeries/procedures were discussed today with patient.     Plan  - Follow up 2 weeks before OR to order implants   - Preoperative consult to anesthesia placed  - Contact patient's psychiatrist to determine taper of SNRI  -Preoperative photos.

## 2025-07-02 NOTE — ASSESSMENT & PLAN NOTE
- Encouraged controlled CHO diet for glycemic control  - Encouraged ongoing tracking for awareness of daily intake and accountability.   - Counseled on benefits of increased regular exercise, both aerobic & resistance training.  Reviewed benefits of resistance training to enhance/maintain lean body mass.  - Counseled on dietary modifications to support weight reduction, reduced calorie diet, encourage adequate protein, increased dietary fiber from fruit and vegetable to improve appetite/satiety regulation and quality of diet.  Discussed impact of processed foods and liquid calories to weight gain & contribution to dysfunctional appetite signals.   -  Reviewed importance of intensification of lifestyle therapy in weight reduction and maintenance, set behavioral modification goals of nutrition, physical activity or behavioral practices to benefit weight reduction.    - Discussed self monitoring practices to ensure reduce calorie diet, emphasizing increased dietary protein & fiber.  Reviewed protein targets per meal as recommendation to help with satiety and lean mass maintenance.

## 2025-07-02 NOTE — PATIENT INSTRUCTIONS
You have been prescribed Qsymia (Phentermine/Topiramate) for weight loss.   This medication will decrease your appetite and promote improved appetite control, may help to reduce cravings and improve satiety (feeling of fullness).  This medication has been approved for chronic weight management if you achieve at least 5% total body weight reduction in the first 12 weeks of use.    This medication is a controlled substance and will result in a positive urine drug screen of amphetamine if taking regularly.    IMPORTANT Lifestyle Management with the use of anti-obesity medication include:  - Monitor/track your protein intake, ensure at least 80-90g of protein per day for women, 110-120g for men -> this helps to ensure you are consuming adequate protein to maintain/preserve lean body mass in weight loss setting.  - Consume at least 64 oz of water per day -> this helps to reduce the risk of constipation/dehydration associated with this medication  - Engage in resistance at least 2x/week - targeting upper & lower body group with each resistance training session -> this helps to ensure you are consuming adequate protein to maintain/preserve lean body mass in weight loss setting.    Side Effects include: tingling of the arms and legs, nausea, a change in the way foods taste, diarrhea, nervousness, cough, fogginess, constipation, dry mouth, insomnia.   --> Topiramate containing medications can increase the risk of kidney stone formation.    --> Topiramate containing medications can increase the risk of mood change, depression or suicidal thoughts, if you experience these symptoms after starting medication you must notify this provider immediately to discuss medication discontinuation.      If you develop insomnia, take the medication as early a possible in the day, you can intermittently use an over the counter melatonin supplement IF needed.    If you develop constipation, be sure you are drinking at least 60 oz of water  daily, you can also add an over the counter fiber supplement and stool softener to lessen symptoms.      For WOMEN of childbearing age: you must continue to take a reliable method of contraception while on this medication - IT IS CONTRAINDICATED IN PREGNANCY due to the risk of cleft palate formation in developing fetus.  A monthly home pregnancy test should be used to confirm negative pregnancy if of child bearing age in addition to contraception.    If using Qsymia [facility] Mail Order Program, you will need to set up a mail order account at the following website:  [employer] - [email or web address]   Phone: [phone] / [phone]  Fax: [phone]  8:00 AM - 8:00 PM ET, Monday- Friday

## 2025-07-02 NOTE — ASSESSMENT & PLAN NOTE
S/p LSG 03/2024   lbs  Initial BMI is 40.3     No acute issues or surgically related concerns.  Denies n/v, c/d, abdominal pain or GERD.   Tolerating diet, meeting protein and fluid goals  Was at weight loss stall, started AOM phentermine/Qsymia, now going into month 2 of 3.  Exercise - maintaining, increasing as able  Taking appropriate bariatric supplements.  Bowel Regularity- regular.     imp: doing well     recs: ongoing  Continue with diet, increase protein servings per meal  Continue to advance exercise routine, goal 200-300 min/week aerobic and resistance training advised.   Continue bariatric vitamins and supplements as discussed  FU with dietitian  Encouraged join/participate in support groups.

## 2025-07-08 ENCOUNTER — PATIENT MESSAGE (OUTPATIENT)
Dept: SURGICAL ONCOLOGY | Facility: HOSPITAL | Age: 35
End: 2025-07-08
Payer: COMMERCIAL

## 2025-07-10 ENCOUNTER — OFFICE VISIT (OUTPATIENT)
Dept: BEHAVIORAL HEALTH | Facility: HOSPITAL | Age: 35
End: 2025-07-10
Payer: COMMERCIAL

## 2025-07-10 VITALS
HEART RATE: 95 BPM | DIASTOLIC BLOOD PRESSURE: 66 MMHG | OXYGEN SATURATION: 98 % | TEMPERATURE: 95.2 F | SYSTOLIC BLOOD PRESSURE: 115 MMHG

## 2025-07-10 DIAGNOSIS — F34.1 PERSISTENT DEPRESSIVE DISORDER: ICD-10-CM

## 2025-07-10 DIAGNOSIS — F41.0 ANXIETY ATTACK: ICD-10-CM

## 2025-07-10 DIAGNOSIS — Z51.81 ENCOUNTER FOR MEDICATION MONITORING: ICD-10-CM

## 2025-07-10 DIAGNOSIS — F41.1 GAD (GENERALIZED ANXIETY DISORDER): ICD-10-CM

## 2025-07-10 DIAGNOSIS — F33.0 MILD EPISODE OF RECURRENT MAJOR DEPRESSIVE DISORDER: ICD-10-CM

## 2025-07-10 PROCEDURE — 99214 OFFICE O/P EST MOD 30 MIN: CPT | Mod: AM | Performed by: PSYCHIATRY & NEUROLOGY

## 2025-07-10 PROCEDURE — 90833 PSYTX W PT W E/M 30 MIN: CPT | Mod: AM | Performed by: PSYCHIATRY & NEUROLOGY

## 2025-07-10 PROCEDURE — 1036F TOBACCO NON-USER: CPT | Performed by: PSYCHIATRY & NEUROLOGY

## 2025-07-10 PROCEDURE — 99214 OFFICE O/P EST MOD 30 MIN: CPT | Performed by: PSYCHIATRY & NEUROLOGY

## 2025-07-10 PROCEDURE — 3078F DIAST BP <80 MM HG: CPT | Performed by: PSYCHIATRY & NEUROLOGY

## 2025-07-10 PROCEDURE — 3074F SYST BP LT 130 MM HG: CPT | Performed by: PSYCHIATRY & NEUROLOGY

## 2025-07-10 PROCEDURE — 90833 PSYTX W PT W E/M 30 MIN: CPT | Performed by: PSYCHIATRY & NEUROLOGY

## 2025-07-10 ASSESSMENT — PAIN SCALES - GENERAL: PAINLEVEL_OUTOF10: 8
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
## 2025-07-15 DIAGNOSIS — E66.9 OBESITY (BMI 35.0-39.9 WITHOUT COMORBIDITY): ICD-10-CM

## 2025-07-15 RX ORDER — PHENTERMINE HYDROCHLORIDE 37.5 MG/1
37.5 TABLET ORAL
Qty: 30 TABLET | Refills: 0 | Status: SHIPPED | OUTPATIENT
Start: 2025-07-15 | End: 2025-08-14

## 2025-07-16 ENCOUNTER — TELEPHONE (OUTPATIENT)
Dept: PLASTIC SURGERY | Facility: CLINIC | Age: 35
End: 2025-07-16
Payer: COMMERCIAL

## 2025-07-16 NOTE — TELEPHONE ENCOUNTER
Left voicemail for patient to get labs drawn prior to upcoming appointment with Dr. Rodrigues as discussed

## 2025-07-18 ENCOUNTER — APPOINTMENT (OUTPATIENT)
Dept: LAB | Facility: HOSPITAL | Age: 35
End: 2025-07-18
Payer: COMMERCIAL

## 2025-07-18 LAB
AMPHETAMINES UR QL SCN: ABNORMAL
BARBITURATES UR QL SCN: ABNORMAL
BZE UR QL SCN: ABNORMAL
CANNABINOIDS UR QL SCN: ABNORMAL
CHOLEST SERPL-MCNC: 145 MG/DL (ref 0–199)
CHOLESTEROL/HDL RATIO: 2.8
CREAT UR-MCNC: 181.8 MG/DL (ref 20–320)
CREAT UR-MCNC: 189.7 MG/DL (ref 20–320)
ERYTHROCYTE [DISTWIDTH] IN BLOOD BY AUTOMATED COUNT: 13 % (ref 11.5–14.5)
EST. AVERAGE GLUCOSE BLD GHB EST-MCNC: 134 MG/DL
HBA1C MFR BLD: 6.3 % (ref ?–5.7)
HCT VFR BLD AUTO: 42.6 % (ref 36–46)
HDLC SERPL-MCNC: 51.9 MG/DL
HGB BLD-MCNC: 14 G/DL (ref 12–16)
LDLC SERPL CALC-MCNC: 78 MG/DL
MCH RBC QN AUTO: 29.5 PG (ref 26–34)
MCHC RBC AUTO-ENTMCNC: 32.9 G/DL (ref 32–36)
MCV RBC AUTO: 90 FL (ref 80–100)
MICROALBUMIN UR-MCNC: 11.1 MG/L
MICROALBUMIN/CREAT UR: 5.9 UG/MG CREAT
NON HDL CHOLESTEROL: 93 MG/DL (ref 0–149)
NRBC BLD-RTO: 0 /100 WBCS (ref 0–0)
PCP UR QL SCN: ABNORMAL
PLATELET # BLD AUTO: 210 X10*3/UL (ref 150–450)
RBC # BLD AUTO: 4.75 X10*6/UL (ref 4–5.2)
TRIGL SERPL-MCNC: 75 MG/DL (ref 0–149)
TSH SERPL-ACNC: 0.74 MIU/L (ref 0.44–3.98)
VLDL: 15 MG/DL (ref 0–40)
WBC # BLD AUTO: 7.3 X10*3/UL (ref 4.4–11.3)

## 2025-07-18 PROCEDURE — 82570 ASSAY OF URINE CREATININE: CPT | Performed by: PSYCHIATRY & NEUROLOGY

## 2025-07-18 PROCEDURE — 84443 ASSAY THYROID STIM HORMONE: CPT | Performed by: FAMILY MEDICINE

## 2025-07-18 PROCEDURE — 85027 COMPLETE CBC AUTOMATED: CPT | Performed by: FAMILY MEDICINE

## 2025-07-18 PROCEDURE — 80346 BENZODIAZEPINES1-12: CPT | Performed by: PSYCHIATRY & NEUROLOGY

## 2025-07-18 PROCEDURE — 80359 METHYLENEDIOXYAMPHETAMINES: CPT | Performed by: PSYCHIATRY & NEUROLOGY

## 2025-07-18 PROCEDURE — 80061 LIPID PANEL: CPT | Performed by: FAMILY MEDICINE

## 2025-07-18 PROCEDURE — 82570 ASSAY OF URINE CREATININE: CPT | Performed by: FAMILY MEDICINE

## 2025-07-18 PROCEDURE — 83036 HEMOGLOBIN GLYCOSYLATED A1C: CPT | Performed by: FAMILY MEDICINE

## 2025-07-18 PROCEDURE — 80349 CANNABINOIDS NATURAL: CPT | Performed by: PSYCHIATRY & NEUROLOGY

## 2025-07-20 ENCOUNTER — PATIENT MESSAGE (OUTPATIENT)
Dept: HEMATOLOGY/ONCOLOGY | Facility: HOSPITAL | Age: 35
End: 2025-07-20
Payer: COMMERCIAL

## 2025-07-20 DIAGNOSIS — M19.90 OSTEOARTHRITIS, UNSPECIFIED OSTEOARTHRITIS TYPE, UNSPECIFIED SITE: Primary | ICD-10-CM

## 2025-07-20 RX ORDER — PHENTERMINE HYDROCHLORIDE 37.5 MG/1
37.5 TABLET ORAL
Qty: 30 TABLET | Refills: 0 | OUTPATIENT
Start: 2025-07-20

## 2025-07-21 ENCOUNTER — RESULTS FOLLOW-UP (OUTPATIENT)
Dept: BEHAVIORAL HEALTH | Facility: CLINIC | Age: 35
End: 2025-07-21

## 2025-07-21 ENCOUNTER — APPOINTMENT (OUTPATIENT)
Dept: PLASTIC SURGERY | Facility: CLINIC | Age: 35
End: 2025-07-21
Payer: COMMERCIAL

## 2025-07-21 ENCOUNTER — APPOINTMENT (OUTPATIENT)
Dept: HEMATOLOGY/ONCOLOGY | Facility: HOSPITAL | Age: 35
End: 2025-07-21
Payer: COMMERCIAL

## 2025-07-21 VITALS
WEIGHT: 235.9 LBS | SYSTOLIC BLOOD PRESSURE: 110 MMHG | BODY MASS INDEX: 33.85 KG/M2 | HEART RATE: 75 BPM | DIASTOLIC BLOOD PRESSURE: 74 MMHG | RESPIRATION RATE: 18 BRPM

## 2025-07-21 DIAGNOSIS — Z71.89 ENCOUNTER TO DISCUSS BREAST RECONSTRUCTION: Primary | ICD-10-CM

## 2025-07-21 DIAGNOSIS — E78.5 HYPERLIPIDEMIA, UNSPECIFIED HYPERLIPIDEMIA TYPE: ICD-10-CM

## 2025-07-21 DIAGNOSIS — E11.9 DIABETES MELLITUS TYPE 2 WITHOUT RETINOPATHY (MULTI): ICD-10-CM

## 2025-07-21 PROCEDURE — 99215 OFFICE O/P EST HI 40 MIN: CPT

## 2025-07-21 PROCEDURE — 3074F SYST BP LT 130 MM HG: CPT

## 2025-07-21 PROCEDURE — 3078F DIAST BP <80 MM HG: CPT

## 2025-07-21 RX ORDER — OMEGA-3-ACID ETHYL ESTERS 1 G/1
2 CAPSULE, LIQUID FILLED ORAL
Qty: 120 CAPSULE | Refills: 0 | Status: SHIPPED | OUTPATIENT
Start: 2025-07-21 | End: 2025-07-23 | Stop reason: SDUPTHER

## 2025-07-21 ASSESSMENT — PAIN SCALES - GENERAL: PAINLEVEL_OUTOF10: 6

## 2025-07-23 ENCOUNTER — TELEPHONE (OUTPATIENT)
Dept: ENDOCRINOLOGY | Facility: CLINIC | Age: 35
End: 2025-07-23

## 2025-07-23 ENCOUNTER — APPOINTMENT (OUTPATIENT)
Dept: ENDOCRINOLOGY | Facility: CLINIC | Age: 35
End: 2025-07-23
Payer: COMMERCIAL

## 2025-07-23 ENCOUNTER — HOSPITAL ENCOUNTER (OUTPATIENT)
Dept: RADIOLOGY | Facility: HOSPITAL | Age: 35
Discharge: HOME | End: 2025-07-23
Payer: COMMERCIAL

## 2025-07-23 DIAGNOSIS — E11.65 UNCONTROLLED TYPE 2 DIABETES MELLITUS WITH HYPERGLYCEMIA: ICD-10-CM

## 2025-07-23 DIAGNOSIS — E78.5 HYPERLIPIDEMIA, UNSPECIFIED HYPERLIPIDEMIA TYPE: ICD-10-CM

## 2025-07-23 DIAGNOSIS — C50.919 MALIGNANT NEOPLASM OF FEMALE BREAST, UNSPECIFIED ESTROGEN RECEPTOR STATUS, UNSPECIFIED LATERALITY, UNSPECIFIED SITE OF BREAST: ICD-10-CM

## 2025-07-23 DIAGNOSIS — E11.9 DIABETES MELLITUS TYPE 2 WITHOUT RETINOPATHY (MULTI): ICD-10-CM

## 2025-07-23 PROCEDURE — 77080 DXA BONE DENSITY AXIAL: CPT

## 2025-07-23 PROCEDURE — 77080 DXA BONE DENSITY AXIAL: CPT | Performed by: STUDENT IN AN ORGANIZED HEALTH CARE EDUCATION/TRAINING PROGRAM

## 2025-07-23 RX ORDER — PEN NEEDLE, DIABETIC 30 GX3/16"
NEEDLE, DISPOSABLE MISCELLANEOUS
Qty: 200 EACH | Refills: 3 | Status: SHIPPED | OUTPATIENT
Start: 2025-07-23

## 2025-07-23 RX ORDER — OMEGA-3-ACID ETHYL ESTERS 1 G/1
2 CAPSULE, LIQUID FILLED ORAL
Qty: 120 CAPSULE | Refills: 0 | Status: SHIPPED | OUTPATIENT
Start: 2025-07-23 | End: 2026-01-19

## 2025-07-23 RX ORDER — INSULIN ASPART 100 [IU]/ML
INJECTION, SOLUTION INTRAVENOUS; SUBCUTANEOUS
Qty: 15 ML | Refills: 5 | Status: SHIPPED | OUTPATIENT
Start: 2025-07-23

## 2025-07-23 RX ORDER — INSULIN GLARGINE 100 [IU]/ML
INJECTION, SOLUTION SUBCUTANEOUS
Qty: 60 ML | Refills: 1 | Status: SHIPPED | OUTPATIENT
Start: 2025-07-23

## 2025-07-23 NOTE — TELEPHONE ENCOUNTER
refill on the [place], Lantus,to [facility].     Also, had lab work done last Friday. Would like results please

## 2025-07-24 LAB
1OH-MIDAZOLAM UR CFM-MCNC: <25 NG/ML
6MAM UR CFM-MCNC: <25 NG/ML
7AMINOCLONAZEPAM UR CFM-MCNC: <25 NG/ML
A-OH ALPRAZ UR CFM-MCNC: <25 NG/ML
ALPRAZ UR CFM-MCNC: <25 NG/ML
CARBOXYTHC UR-MCNC: >500 NG/ML
CHLORDIAZEP UR CFM-MCNC: <25 NG/ML
CLONAZEPAM UR CFM-MCNC: <25 NG/ML
CODEINE UR CFM-MCNC: <50 NG/ML
DIAZEPAM UR CFM-MCNC: <25 NG/ML
EDDP UR CFM-MCNC: <25 NG/ML
FENTANYL UR CFM-MCNC: <2.5 NG/ML
HYDROCODONE CTO UR CFM-MCNC: <25 NG/ML
HYDROMORPHONE UR CFM-MCNC: <25 NG/ML
LORAZEPAM UR CFM-MCNC: 390 NG/ML
METHADONE UR CFM-MCNC: <25 NG/ML
MIDAZOLAM UR CFM-MCNC: <25 NG/ML
MORPHINE UR CFM-MCNC: <50 NG/ML
NORDIAZEPAM UR CFM-MCNC: <25 NG/ML
NORFENTANYL UR CFM-MCNC: <2.5 NG/ML
NORHYDROCODONE UR CFM-MCNC: <25 NG/ML
NOROXYCODONE UR CFM-MCNC: <25 NG/ML
NORTRAMADOL UR-MCNC: <50 NG/ML
OXAZEPAM UR CFM-MCNC: <25 NG/ML
OXYCODONE UR CFM-MCNC: <25 NG/ML
OXYMORPHONE UR CFM-MCNC: <25 NG/ML
TEMAZEPAM UR CFM-MCNC: <25 NG/ML
TRAMADOL UR CFM-MCNC: <50 NG/ML
ZOLPIDEM UR CFM-MCNC: <25 NG/ML
ZOLPIDEM UR-MCNC: <25 NG/ML

## 2025-07-25 LAB
AMPHET UR-MCNC: <50 NG/ML
MDA UR-MCNC: <200 NG/ML
MDEA UR-MCNC: <200 NG/ML
MDMA UR-MCNC: <200 NG/ML
METHAMPHET UR-MCNC: <200 NG/ML
PHENTERMINE UR CFM-MCNC: >5000 NG/ML

## 2025-07-28 ENCOUNTER — OFFICE VISIT (OUTPATIENT)
Dept: PLASTIC SURGERY | Facility: CLINIC | Age: 35
End: 2025-07-28
Payer: COMMERCIAL

## 2025-07-28 VITALS — HEIGHT: 70 IN | WEIGHT: 235 LBS | BODY MASS INDEX: 33.64 KG/M2

## 2025-07-28 DIAGNOSIS — E11.65 UNCONTROLLED TYPE 2 DIABETES MELLITUS WITH HYPERGLYCEMIA: ICD-10-CM

## 2025-07-28 DIAGNOSIS — D84.9 IMMUNODEFICIENCY, UNSPECIFIED: ICD-10-CM

## 2025-07-28 DIAGNOSIS — J96.01 ACUTE RESPIRATORY FAILURE WITH HYPOXIA: Primary | ICD-10-CM

## 2025-07-28 PROCEDURE — 3008F BODY MASS INDEX DOCD: CPT | Performed by: SURGERY

## 2025-07-28 PROCEDURE — 99213 OFFICE O/P EST LOW 20 MIN: CPT | Performed by: SURGERY

## 2025-07-28 RX ORDER — INSULIN ASPART 100 [IU]/ML
INJECTION, SOLUTION INTRAVENOUS; SUBCUTANEOUS
Qty: 15 ML | Refills: 6 | Status: SHIPPED | OUTPATIENT
Start: 2025-07-28

## 2025-07-28 NOTE — PROGRESS NOTES
Department of Plastic and Reconstructive Surgery            FUV    Date: 7/28/2025        Yoko Harper is a 35 y.o. female who was originally seen in June 2023 biopsy-proven invasive ductal carcinoma right breast at multiple locations.    At that time, her BMI was 43, the patient was instructed she was not a candidate for immediate reconstruction.  On 6/30/2023 she underwent right partial mastectomy with sentinel lymph node biopsy.  This was performed by Dr. Tamir Henderson.  She underwent chemo docetaxel/cyclophosphamide with ongoing goserelin (started 8/18/23)   She underwent laparoscopic sleeve gastrectomy, with Dr. Ervin on 3/25/2024.    Her maximum weight was approximately 300 pounds.  Current weight is 262 pounds, her goal is to be close to 220    We previously met to discuss delayed reconstructive options including possible delayed contralateral prophylactic mastectomy.  She was interested in free HELENE flap, we discussed that weight loss may be optimal.  When we last saw her her weight was 266 pounds, today her weight is 256 pounds    Objective    There were no vitals filed for this visit.      Gen: interactive and pleasant  Head: NCAT  Eyes: EOMI, PERRLA  Mouth: MMM  Throat: trachea midline  Cor: RRR  Pulm: nonlabored breathing  Abd: s/nt/nd  Neuro: AAOx3  Ext: extremities perfused    Body mass index is 33.72 kg/m².      Focused exam of the:   Breast, sternal notch: NAC = 34 cm; NAC = 9 cm, breast width 18 cm  Breast absent with excess skin, and lateral dogear breast width 18 cm    Assessment/Plan       Nia is a 35 y.o. female who presents to discuss delayed breast reconstruction, and also possibly body contouring.     She is now further out from her gastric sleeve surgery.  And is slowly losing weight to obtain a more optimal BMI.  She has considered our previous discussion, and is interested in moving forward with a contralateral prophylactic mastectomy and bilateral free  HELENE flaps.    We have been following her for awhile to get her to delayed HELENE   Patient has achieved a BMI of 33.72, her weight is 235 pounds.    She has met Dr. Nieto, and is planning for free tissue transfer.  She is scheduled for surgery on  August 27, 2025.  Planning for left skin-sparing mastectomy with simple immediate direct implant breast reconstruction and right free tissue transfer breast reconstruction, this would be the case if the perforators are suboptimal for left free tissue transfer.    Plan:   Will plan for left skin-sparing mastectomy with a plan for right free tissue transfer, possible left free tissue transfer if the left perforators are suboptimal we will plan for immediate direct implant breast reconstruction  She plans to meet with Dr. Schroeder    I spent 30 minutes with this patient. Greater than 50% of this time was spent in the counselling and/or coordination of care of this patient.  This note was created using voice recognition software and was not corrected for typographical or grammatical errors.    Signature: Luis Carlos Borrero MD   Date: 7/28/2025

## 2025-07-29 DIAGNOSIS — C50.911 MALIGNANT NEOPLASM OF RIGHT BREAST IN FEMALE, ESTROGEN RECEPTOR POSITIVE, UNSPECIFIED SITE OF BREAST: Primary | ICD-10-CM

## 2025-07-29 DIAGNOSIS — Z17.0 MALIGNANT NEOPLASM OF RIGHT BREAST IN FEMALE, ESTROGEN RECEPTOR POSITIVE, UNSPECIFIED SITE OF BREAST: Primary | ICD-10-CM

## 2025-07-30 ENCOUNTER — INFUSION (OUTPATIENT)
Dept: HEMATOLOGY/ONCOLOGY | Facility: HOSPITAL | Age: 35
End: 2025-07-30
Payer: COMMERCIAL

## 2025-07-30 ENCOUNTER — PREP FOR PROCEDURE (OUTPATIENT)
Dept: SURGICAL ONCOLOGY | Facility: HOSPITAL | Age: 35
End: 2025-07-30
Payer: COMMERCIAL

## 2025-07-30 ENCOUNTER — PREP FOR PROCEDURE (OUTPATIENT)
Dept: SURGICAL ONCOLOGY | Facility: HOSPITAL | Age: 35
End: 2025-07-30

## 2025-07-30 VITALS
DIASTOLIC BLOOD PRESSURE: 71 MMHG | HEART RATE: 90 BPM | RESPIRATION RATE: 17 BRPM | SYSTOLIC BLOOD PRESSURE: 128 MMHG | TEMPERATURE: 97.5 F | OXYGEN SATURATION: 100 %

## 2025-07-30 DIAGNOSIS — Z17.0 MALIGNANT NEOPLASM OF RIGHT BREAST IN FEMALE, ESTROGEN RECEPTOR POSITIVE, UNSPECIFIED SITE OF BREAST: ICD-10-CM

## 2025-07-30 DIAGNOSIS — C50.911 MALIGNANT NEOPLASM OF RIGHT BREAST IN FEMALE, ESTROGEN RECEPTOR POSITIVE, UNSPECIFIED SITE OF BREAST: ICD-10-CM

## 2025-07-30 PROCEDURE — 96372 THER/PROPH/DIAG INJ SC/IM: CPT

## 2025-07-30 PROCEDURE — 96402 CHEMO HORMON ANTINEOPL SQ/IM: CPT

## 2025-07-30 PROCEDURE — 2500000004 HC RX 250 GENERAL PHARMACY W/ HCPCS (ALT 636 FOR OP/ED): Mod: JZ,SE

## 2025-07-30 RX ORDER — FAMOTIDINE 10 MG/ML
20 INJECTION, SOLUTION INTRAVENOUS ONCE AS NEEDED
OUTPATIENT
Start: 2025-08-27

## 2025-07-30 RX ORDER — ALBUTEROL SULFATE 0.83 MG/ML
3 SOLUTION RESPIRATORY (INHALATION) AS NEEDED
OUTPATIENT
Start: 2025-08-27

## 2025-07-30 RX ORDER — LIDOCAINE HYDROCHLORIDE 10 MG/ML
2 INJECTION, SOLUTION EPIDURAL; INFILTRATION; INTRACAUDAL; PERINEURAL ONCE
OUTPATIENT
Start: 2025-08-27

## 2025-07-30 RX ORDER — LIDOCAINE HYDROCHLORIDE 10 MG/ML
2 INJECTION, SOLUTION EPIDURAL; INFILTRATION; INTRACAUDAL; PERINEURAL ONCE
Status: COMPLETED | OUTPATIENT
Start: 2025-07-30 | End: 2025-07-30

## 2025-07-30 RX ORDER — EPINEPHRINE 0.3 MG/.3ML
0.3 INJECTION SUBCUTANEOUS EVERY 5 MIN PRN
OUTPATIENT
Start: 2025-08-27

## 2025-07-30 RX ORDER — DIPHENHYDRAMINE HYDROCHLORIDE 50 MG/ML
50 INJECTION, SOLUTION INTRAMUSCULAR; INTRAVENOUS AS NEEDED
OUTPATIENT
Start: 2025-08-27

## 2025-07-30 RX ADMIN — LIDOCAINE HYDROCHLORIDE 20 MG: 10 INJECTION, SOLUTION EPIDURAL; INFILTRATION; INTRACAUDAL; PERINEURAL at 11:08

## 2025-07-30 RX ADMIN — GOSERELIN ACETATE 3.6 MG: 3.6 IMPLANT SUBCUTANEOUS at 11:08

## 2025-07-31 ENCOUNTER — CLINICAL SUPPORT (OUTPATIENT)
Dept: PREADMISSION TESTING | Facility: HOSPITAL | Age: 35
End: 2025-07-31
Payer: COMMERCIAL

## 2025-07-31 ASSESSMENT — DUKE ACTIVITY SCORE INDEX (DASI)
CAN YOU DO LIGHT WORK AROUND THE HOUSE LIKE DUSTING OR WASHING DISHES: YES
CAN YOU DO YARD WORK LIKE RAKING LEAVES, WEEDING OR PUSHING A MOWER: YES
CAN YOU HAVE SEXUAL RELATIONS: YES
CAN YOU DO MODERATE WORK AROUND THE HOUSE LIKE VACUUMING, SWEEPING FLOORS OR CARRYING GROCERIES: YES
CAN YOU WALK A BLOCK OR TWO ON LEVEL GROUND: YES
TOTAL_SCORE: 58.2
CAN YOU RUN A SHORT DISTANCE: YES
CAN YOU WALK INDOORS, SUCH AS AROUND YOUR HOUSE: YES
DASI METS SCORE: 9.9
CAN YOU PARTICIPATE IN MODERATE RECREATIONAL ACTIVITIES LIKE GOLF, BOWLING, DANCING, DOUBLES TENNIS OR THROWING A BASEBALL OR FOOTBALL: YES
CAN YOU CLIMB A FLIGHT OF STAIRS OR WALK UP A HILL: YES
CAN YOU TAKE CARE OF YOURSELF (EAT, DRESS, BATHE, OR USE TOILET): YES
CAN YOU PARTICIPATE IN STRENOUS SPORTS LIKE SWIMMING, SINGLES TENNIS, FOOTBALL, BASKETBALL, OR SKIING: YES
CAN YOU DO HEAVY WORK AROUND THE HOUSE LIKE SCRUBBING FLOORS OR LIFTING AND MOVING HEAVY FURNITURE: YES

## 2025-07-31 NOTE — CPM/PAT H&P
CPM/PAT Evaluation       Name: Nia Harper)  /Age: 1990/ y.o.     { PAT Visit Type:28685}      Chief Complaint: ***    HPI  Nia Harper is scheduled for RECONSTRUCTION, BREAST, USING HELENE SKIN FLAP - Bilateral  RELEASE, CONTRACTURE, TORSO - Right  RECONSTRUCTION, BREAST, WITH BREAST IMPLANT INSERTION - Left  LEFT SKIN SPARING MASTECTOMY - Left  with Dr. Fair and  on 25.    Medical History[1]    Surgical History[2]    Patient  reports being sexually active and has had partner(s) who are male. She reports using the following method of birth control/protection: None.    Family History[3]    Allergies[4]    Prior to Admission medications   Medication Sig Start Date End Date Taking? Authorizing Provider   acetaminophen (Tylenol) 325 mg tablet TAKE 2 TABLETS (650 MG) BY MOUTH EVERY 6 HOURS IF NEEDED (PAIN/FEVER) FOR UP TO 5 DAYS.    Historical Provider, MD   atorvastatin (Lipitor) 40 mg tablet Take 1 tablet (40 mg) by mouth once daily at bedtime. 25   Yasmin Clements MD   atorvastatin (Lipitor) 40 mg tablet Take 1 tablet (40 mg) by mouth once daily at bedtime. 25  Yasmin Clements MD   blood sugar diagnostic (OneTouch Verio test strips) strip CHECK BLOOD SUGARS UP TO 4X/DAY FOR CGM FAILURE 24   Yasmin Clements MD   DULoxetine (Cymbalta) 30 mg DR capsule Take 1 capsule (30 mg) by mouth once daily. Do not crush or chew. 5/15/25 11/11/25  Marcie Kent MD   DULoxetine (Cymbalta) 60 mg DR capsule Take 2 capsules (120 mg) by mouth once daily. Do not crush or chew. Take in the evening with food.  Patient taking differently: Take 1 capsule (60 mg) by mouth once daily. Do not crush or chew. Take in the evening with food. 5/15/25   Marcie Kent MD   estradiol (Estrace) 0.01 % (0.1 mg/gram) vaginal cream Insert 0.5 Applicatorfuls (2 g) into the vagina once daily. Use once a day for 2 weeks then as needed there after 25    "Sanjuana Anand, APRN-CNP   exemestane (Aromasin) 25 mg tablet Take 1 tablet (25 mg total) by mouth once daily.  Take after a meal.  Try to take at the same time each day. 12/4/24 12/4/25  Petrona Bermeo PA-C   ezetimibe (Zetia) 10 mg tablet Take 1 tablet (10 mg) by mouth once daily. 1/22/25   Yasmin Clements MD   FreeStyle Cristina 3 Sensor device Change every 14 days 10/7/24   Yasmin Clements MD   ibuprofen 600 mg tablet TAKE 1 TABLET (600 MG) BY MOUTH EVERY 6 HOURS IF NEEDED (PAIN) FOR UP TO 5 DAYS.    Historical Provider, MD   insulin aspart (NovoLOG) 100 unit/mL (3 mL) pen 5 units with meals plus sliding scale up to 25 units daily 7/28/25   Yasmin Clements MD   insulin glargine (Lantus Solostar U-100 Insulin) 100 unit/mL (3 mL) pen INJECT 40 UNITS UNDER THE SKIN IN THE AM AND 20 UNITS IN THE PM 7/23/25   Yasmin Clements MD   LORazepam (Ativan) 1 mg tablet TAKE 1 TABLET BY MOUTH ONCE DAILY AS NEEDED FOR ANXIETY 6/24/25 7/24/25  Marcie Kent MD   metFORMIN  mg 24 hr tablet Take 2 tablets (1,000 mg) by mouth 2 times daily (morning and late afternoon). 5/27/25   Yasmin Clements MD   omega-3 acid ethyl esters (Lovaza) 1 gram capsule Take 2 capsules (2 g) by mouth 2 times daily (morning and late afternoon). 7/23/25 1/19/26  MD Cynthia Holguin Delica Plus Lancet 33 gauge misc For BG check up to 4x/day for CGM failure 10/7/24   MD Cynthia Holguin Verio Flex meter misc Inject 1 each into the skin once daily. 6/25/25   Yasmin Clements MD   pen needle, diabetic (BD Ultra-Fine Mini Pen Needle) 31 gauge x 3/16\" needle Inject 200 each under the skin 4 times a day before meals. Use 4 a day as directed 5/27/25   Yasmin Clements MD   pen needle, diabetic (BD Ultra-Fine Mini Pen Needle) 31 gauge x 3/16\" needle For insulin 4x/day 7/23/25   Yasmin Clements MD   phentermine (Adipex-P) 37.5 mg tablet Take 1 tablet (37.5 mg) by mouth once daily in the " morning. Take before meals. BMI 33 7/15/25 8/14/25  Brianna Roy APRN-CNP   topiramate (Topamax) 50 mg tablet Take 2 tablet daily 6/9/25   Oma Horowitz APRN-CNP   Trulicity 3 mg/0.5 mL injection  11/1/24   Historical Provider, MD   valACYclovir (Valtrex) 1 gram tablet  9/5/24   Historical Provider, MD   insulin aspart (NovoLOG) 100 unit/mL (3 mL) pen 5 units with meals plus sliding scale up to 25 units daily 7/23/25 7/28/25  MD AAMIR Holguin Physical Exam     Airway      Visit Vitals  OB Status Premenopausal   Smoking Status Never       DASI Risk Score      Flowsheet Row Clinical Support from 7/31/2025 in [facility] Pre-Admission Testing from 3/13/2024 in [facility]   Can you take care of yourself (eat, dress, bathe, or use toilet)?  2.75 filed at 07/31/2025 1240 2.75 filed at 03/13/2024 0953   Can you walk indoors, such as around your house? 1.75 filed at 07/31/2025 1240 1.75 filed at 03/13/2024 0953   Can you walk a block or two on level ground?  2.75 filed at 07/31/2025 1240 2.75 filed at 03/13/2024 0953   Can you climb a flight of stairs or walk up a hill? 5.5 filed at 07/31/2025 1240 5.5 filed at 03/13/2024 0953   Can you run a short distance? 8 filed at 07/31/2025 1240 8 filed at 03/13/2024 0953   Can you do light work around the house like dusting or washing dishes? 2.7 filed at 07/31/2025 1240 2.7 filed at 03/13/2024 0953   Can you do moderate work around the house like vacuuming, sweeping floors or carrying groceries? 3.5 filed at 07/31/2025 1240 3.5 filed at 03/13/2024 0953   Can you do heavy work around the house like scrubbing floors or lifting and moving heavy furniture?  8 filed at 07/31/2025 1240 8 filed at 03/13/2024 0953   Can you do yard work like raking leaves, weeding or pushing a mower? 4.5 filed at 07/31/2025 1240 4.5 filed at 03/13/2024 0953   Can you have sexual relations? 5.25 filed at 07/31/2025 1240 5.25 filed at  03/13/2024 0953   Can you participate in moderate recreational activities like golf, bowling, dancing, doubles tennis or throwing a baseball or football? 6 filed at 07/31/2025 1240 6 filed at 03/13/2024 0953   Can you participate in strenous sports like swimming, singles tennis, football, basketball, or skiing? 7.5 filed at 07/31/2025 1240 7.5 filed at 03/13/2024 0953   DASI SCORE 58.2 filed at 07/31/2025 1240 58.2 filed at 03/13/2024 0953   METS Score (Will be calculated only when all the questions are answered) 9.9 filed at 07/31/2025 1240 9.9 filed at 03/13/2024 0953     Caprini DVT Assessment      Flowsheet Row Admission (Discharged) from 3/25/2024 in [facility] 9 with Sage Ervin MD Pre-Admission Testing from 3/13/2024 in [facility]   DVT Score (IF A SCORE IS NOT CALCULATING, MUST SELECT A BMI TO COMPLETE) 7 filed at 03/25/2024 0914 9 filed at 03/13/2024 1012   BMI (BMI MUST BE CHOSEN) 41-50 (Morbid obesity) filed at 03/25/2024 0914 31-40 (Obesity) filed at 03/13/2024 1012   RETIRED: Current Status Present cancer or chemotherapy, Major surgery planned, including arthroscopic and laproscopic (1-2 hours) filed at 03/25/2024 0914 Major surgery planned, lasting 2-3 hours filed at 03/13/2024 1012   RETIRED: History -- Previous malignancy, Prior major surgery filed at 03/13/2024 1012   RETIRED: Age Less than 40 years filed at 03/25/2024 0914 Less than 40 years filed at 03/13/2024 1012     Modified Frailty Index      Flowsheet Row Pre-Admission Testing from 3/13/2024 in [facility]   Non-independent functional status (problems with dressing, bathing, personal grooming, or cooking) 0 filed at 03/13/2024 1013   History of diabetes mellitus  0.0909 filed at 03/13/2024 1013   History of COPD 0 filed at 03/13/2024 1013   History of CHF No filed at 03/13/2024 1013   History of MI 0 filed at 03/13/2024 1013   History of Percutaneous Coronary Intervention,  Cardiac Surgery, or Angina No filed at 03/13/2024 1013   Hypertension requiring the use of medication  0 filed at 03/13/2024 1013   Peripheral vascular disease 0 filed at 03/13/2024 1013   Impaired sensorium (cognitive impairement or loss, clouding, or delirium) 0 filed at 03/13/2024 1013   TIA or CVA withouy residual deficit 0 filed at 03/13/2024 1013   Cerebrovascular accident with deficit 0 filed at 03/13/2024 1013   Modified Frailty Index Calculator .0909 filed at 03/13/2024 1013     [id number] Stroke Risk Points  Current as of just now        N/A 0 to 9 Points:      Last Change: N/A          The [id number] risk score (Lip JOHNSON, et al. 2009. © 2010 [employer]) quantifies the risk of stroke for a patient with atrial fibrillation. For patients without atrial fibrillation or under the age of 18 this score appears as N/A. Higher score values generally indicate higher risk of stroke.        This score is not applicable to this patient. Components are not calculated.          Revised Cardiac Risk Index      Flowsheet Row Pre-Admission Testing from 3/13/2024 in [facility]   High-Risk Surgery (Intraperitoneal, Intrathoracic,Suprainguinal vascular) 1 filed at 03/13/2024 1013   History of ischemic heart disease (History of MI, History of positive exercuse test, Current chest paint considered due to myocardial ischemia, Use of nitrate therapy, ECG with pathological Q Waves) 0 filed at 03/13/2024 1013   History of congestive heart failure (pulmonary edemia, bilateral rales or S3 gallop, Paroxysmal nocturnal dyspnea, CXR showing pulmonary vascular redistribution) 0 filed at 03/13/2024 1013   History of cerebrovascular disease (Prior TIA or stroke) 0 filed at 03/13/2024 1013   Pre-operative insulin treatment 0 filed at 03/13/2024 1013   Pre-operative creatinine>2 mg/dl 0 filed at 03/13/2024 1013   Revised Cardiac Risk Calculator 1 filed at 03/13/2024 1013     Apfel Simplified Score       Flowsheet Row Pre-Admission Testing from 3/13/2024 in [facility]   Smoking status 1 filed at 03/13/2024 1013   History of motion sickness or PONV  0 filed at 03/13/2024 1013   Use of postoperative opioids 1 filed at 03/13/2024 1013   Apfel Simplified Score Calculator 3 filed at 03/13/2024 1013     Risk Analysis Index Results This Encounter    No data found in the last 10 encounters.       Stop Bang Score      Flowsheet Row Pre-Admission Testing from 3/13/2024 in [facility]   Do you snore loudly? 1 filed at 03/13/2024 0953   Do you often feel tired or fatigued after your sleep? 1 filed at 03/13/2024 0953   Has anyone ever observed you stop breathing in your sleep? 0 filed at 03/13/2024 0953   Do you have or are you being treated for high blood pressure? 0 filed at 03/13/2024 0953   Recent BMI (Calculated) 40.3 filed at 03/13/2024 0953   Is BMI greater than 35 kg/m2? 1=Yes filed at 03/13/2024 0953   Age older than 50 years old? 0=No filed at 03/13/2024 0953   Is your neck circumference greater than 17 inches (Male) or 16 inches (Female)? 0 filed at 03/13/2024 0953   Gender - Male 0=No filed at 03/13/2024 0953   STOP-BANG Total Score 3 filed at 03/13/2024 0953     Prodigy: High Risk  Total Score: 0          ARISCAT Score for Postoperative Pulmonary Complications    No data to display  Silva Perioperative Risk for Myocardial Infarction or Cardiac Arrest (ORIN)    No data to display      Testing/Diagnostic:   MR MSK CHEST W AND WO IV CONTRAST; ; 5/22/2025   IMPRESSION:  1. No evidence for metastatic disease in the sternum.  2. Mild asymmetric edema in the intercostal space between the left  anterior 3rd and 4th ribs with edema in the adjacent pectoralis major  muscle. The rib characterization in this areas limited given the  respiratory motion artifact. Continued attention to this area on  follow-up. No definite lesion.    MR FEMUR RIGHT W AND WO IV CONTRAST; ; 5/20/2025    IMPRESSION:  1. Right gluteus medius insertional tendinitis into the  posterosuperior facet of the right greater trochanter.      2. Generalized intermediate signal intensity and thickening of the  intramuscular septum at the attachment to the linea aspera in the  subtrochanteric location with surrounding soft tissue edema without  definite periosteal reaction. There is mild enhancement in this area  on post gadolinium imaging. No hypointense T1/T2 signal to suggest  hydroxyapatite deposition or calcification within the septum as a  potential etiology. However, sequela of postinflammatory change  related to this finding is a possibility. Other potential etiologies  including tug injury felt to be less likely.    NM BONE WHOLE BODY; 5/13/2025   IMPRESSION:  1. Increased radiotracer uptake within the posterior right proximal  femur and sternum. Correlate with future anatomic imaging is  recommended.    ECG; 10/3/24  Normal sinus rhythm  Nonspecific T wave abnormality  Abnormal ECG    TRANSTHORACIC ECHO; 6/15/2023   CONCLUSIONS:  1. Left ventricular systolic function is normal with a 60% estimated ejection fraction.     Exercise Stress Echo and Definity Contrast; 6/15/2023   Summary:  1. Adequate level of stress achieved.  2. Of note, the patient had a hypertensive response to exercise with peak blood pressure of 230/92. This may be related to deconditioning.  3. The resting ejection fraction was estimated at 55 to 60% with a peak exercise ejection fraction estimated at 70 to 75%.  4. No electrocardiographic, echocardiographic or clinical evidence for ischemia at a maximal workload.     Patient Specialist/PCP:   Plastic Surgery   7/28/25- Luis Carlos Borrero MD - presents to discuss delayed breast reconstruction, and also possibly body contouring.   Plan:   Will plan for left skin-sparing mastectomy with a plan for right free tissue transfer, possible left free tissue transfer if the left perforators are suboptimal we  will plan for immediate direct implant breast reconstruction  She plans to meet with Dr. Schroeder    Plastic Surgery   7/21/25- Meggan Cerrato MD - seen to discuss delayed right breast reconstruction and simultaneous left skin sparing mastectomy and breast reconstruction with autologous tissue.   Plan  - Follow up 2 weeks before OR to order implants   - Preoperative consult to anesthesia placed  - Contact patient's psychiatrist to determine taper of SNRI  -Preoperative photos.    Behavioral Health   7/10/25- Marcie Kent MD - FUV h/o anxiety and depression   Plan for surgery 8/27--may need to adjust SNRI - Staff message sent requesting recommendations.   Staff Message Reply: MD Azeb Mendoza, RN  Pt should decrease by 30mg weekly starting now ahead of surgery. Thanks    PCP  6/11/25- Olga Hancock MD- presents for Preoperative clearance for some dental work to be done.     General Surgery   6/9/25- RONEL Pierson-CNP- presenting for follow up comprehensive weight management visit . H/O obesity s/p gastric sleeve procedure     Orthopaedic Surgery   6/9/25- Will B DO Dana - NPV for evaluation of left wrist pain.    Oncology   5/9/25- RONEL Pemberton-CNP - FUV for breast cancer s/p Right mastectomy and SLNBx     Endocrinology  10/7/24-Yasmin Clements MD- FUV for DM2  Will have breast reconstructive surgery, no contraindications form a diabetes standpoint, last A1c with improvement to 7.4%   Telephone encounter on 7/23- refill of medications and lab work   A1c 6.3 on 7/18/25    Assessment and Plan:   Telephone call completed. Medication Instructions:  Instructed to hold vitamins, supplements, and NSAIDs 7 days prior to surgery.      Azeb James, RN  Pre Admission Testing   {[facility] ASSESSMENT AND PLAN:25903}             [1]   Past Medical History:  Diagnosis Date    Abnormal levels of other serum enzymes 08/26/2021    Elevated liver enzymes     Anxiety     Anxiety disorder, unspecified 2019    Anxiety and depression    Body mass index (BMI)40.0-44.9, adult 2019    BMI 40.0-44.9, adult    Breast cancer     s/p ight partial mastectomy with sentinel lymph node biopsy 23    Carpal tunnel syndrome, bilateral upper limbs 2019    Carpal tunnel syndrome on both sides    Depression, unspecified 2021    Depression    Elevated liver enzymes     Glycosuria 2014    Glucosuria    Hx antineoplastic chemo     Hypertension     Irregular menstruation, unspecified 2015    Missed periods    Mixed hyperlipidemia 2018    Hyperlipemia, mixed    Morbid (severe) obesity due to excess calories (Multi) 2019    Severe obesity (BMI >= 40)    Myopia, bilateral 09/15/2017    Myopia of both eyes with astigmatism    Type 2 diabetes mellitus without complications 2022    Diabetes    Vision loss     glasses   [2]   Past Surgical History:  Procedure Laterality Date    BARIATRIC SURGERY  2024    BREAST BIOPSY       SECTION, CLASSIC  2019     Section    MASTECTOMY Right 2023    MR HEAD ANGIO WO IV CONTRAST  2015    MR HEAD ANGIO WO IV CONTRAST 2015 [facility]    OTHER SURGICAL HISTORY      Surgical Treatment Of Missed  In First Trimester   [3]   Family History  Problem Relation Name Age of Onset    Heart disease Father Lele Harper     Diabetes Father Lele Harper     Cancer Maternal Grandmother      Diabetes type II Maternal Grandmother      Cancer Maternal Grandfather      Cancer Paternal Grandmother      Diabetes type II Mother's Sister          uncontrolled    Breast cancer Father's Sister          two paternal 1st cousins (through paternal uncle, both sisters, both early onset, one ). two paternal great aunts (through PGF's side, both  in their 50s/60s)    Cancer Father's Sister      Cancer Father's Brother      Breast cancer Cousin      Breast cancer  "Paternal Cousin          two paternal 1st cousins (through paternal uncle, both sisters, both early onset, one ). two paternal great aunts (through PGF's side, both  in their 50s/60s)   [4]   Allergies  Allergen Reactions    Cephalexin Hives, Other, Rash and Swelling     \"welts\"    Metformin Swelling     Immediate release only- currently taking ER without problems    Sulfamethoxazole-Trimethoprim Hives, Rash and Swelling     "

## 2025-08-01 ENCOUNTER — TELEPHONE (OUTPATIENT)
Dept: BEHAVIORAL HEALTH | Facility: HOSPITAL | Age: 35
End: 2025-08-01
Payer: COMMERCIAL

## 2025-08-01 NOTE — TELEPHONE ENCOUNTER
----- Message from Nurse Azeb HOOK sent at 8/1/2025  7:10 AM EDT -----  Regarding: RE: pre-op SNRI recommendations    ----- Message -----  From: Marcie Kent MD  Sent: 7/31/2025   4:18 PM EDT  To: Azeb James RN  Subject: RE: pre-op SNRI recommendations                  Pt should decrease by 30mg weekly starting now ahead of surgery. Thanks  ----- Message -----  From: Azeb James RN  Sent: 7/31/2025  12:54 PM EDT  To: RONEL Shearer-LASHAWN; Marcie Wagner  Subject: pre-op SNRI recommendations                      Good Afternoon Dr. Childers,   This patient is scheduled for breast reconstruction surgery with Dr. Fair on 8/27/25. Please provide perioperative SNRI instructions.

## 2025-08-03 ENCOUNTER — PATIENT MESSAGE (OUTPATIENT)
Dept: SURGERY | Facility: CLINIC | Age: 35
End: 2025-08-03
Payer: COMMERCIAL

## 2025-08-03 DIAGNOSIS — E66.9 OBESITY (BMI 35.0-39.9 WITHOUT COMORBIDITY): ICD-10-CM

## 2025-08-04 ENCOUNTER — APPOINTMENT (OUTPATIENT)
Dept: BEHAVIORAL HEALTH | Facility: CLINIC | Age: 35
End: 2025-08-04
Payer: COMMERCIAL

## 2025-08-04 DIAGNOSIS — F33.0 MILD EPISODE OF RECURRENT MAJOR DEPRESSIVE DISORDER: ICD-10-CM

## 2025-08-04 DIAGNOSIS — F41.0 ANXIETY ATTACK: ICD-10-CM

## 2025-08-04 DIAGNOSIS — F41.1 GAD (GENERALIZED ANXIETY DISORDER): ICD-10-CM

## 2025-08-04 DIAGNOSIS — F34.1 PERSISTENT DEPRESSIVE DISORDER: ICD-10-CM

## 2025-08-04 PROCEDURE — 99214 OFFICE O/P EST MOD 30 MIN: CPT | Performed by: PSYCHIATRY & NEUROLOGY

## 2025-08-04 PROCEDURE — 1036F TOBACCO NON-USER: CPT | Performed by: PSYCHIATRY & NEUROLOGY

## 2025-08-04 RX ORDER — TOPIRAMATE 50 MG/1
TABLET, FILM COATED ORAL
Qty: 60 TABLET | Refills: 2 | Status: SHIPPED | OUTPATIENT
Start: 2025-08-04

## 2025-08-04 RX ORDER — PHENTERMINE HYDROCHLORIDE 37.5 MG/1
37.5 TABLET ORAL
Qty: 30 TABLET | Refills: 0 | Status: SHIPPED | OUTPATIENT
Start: 2025-08-04 | End: 2025-09-03
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
## 2025-08-05 ENCOUNTER — APPOINTMENT (OUTPATIENT)
Dept: PLASTIC SURGERY | Facility: CLINIC | Age: 35
End: 2025-08-05
Payer: COMMERCIAL

## 2025-08-06 ENCOUNTER — TELEPHONE (OUTPATIENT)
Dept: PLASTIC SURGERY | Facility: HOSPITAL | Age: 35
End: 2025-08-06

## 2025-08-08 ENCOUNTER — TELEMEDICINE (OUTPATIENT)
Dept: HEMATOLOGY/ONCOLOGY | Facility: HOSPITAL | Age: 35
End: 2025-08-08
Payer: COMMERCIAL

## 2025-08-08 DIAGNOSIS — C50.919 MALIGNANT NEOPLASM OF FEMALE BREAST, UNSPECIFIED ESTROGEN RECEPTOR STATUS, UNSPECIFIED LATERALITY, UNSPECIFIED SITE OF BREAST: ICD-10-CM

## 2025-08-08 DIAGNOSIS — N89.8 VAGINAL DRYNESS: ICD-10-CM

## 2025-08-08 PROCEDURE — 1036F TOBACCO NON-USER: CPT | Performed by: NURSE PRACTITIONER

## 2025-08-11 ENCOUNTER — APPOINTMENT (OUTPATIENT)
Dept: HEMATOLOGY/ONCOLOGY | Facility: HOSPITAL | Age: 35
End: 2025-08-11
Payer: COMMERCIAL

## 2025-08-11 ENCOUNTER — OFFICE VISIT (OUTPATIENT)
Dept: SURGICAL ONCOLOGY | Facility: HOSPITAL | Age: 35
End: 2025-08-11
Payer: COMMERCIAL

## 2025-08-11 ENCOUNTER — HOSPITAL ENCOUNTER (OUTPATIENT)
Dept: RADIOLOGY | Facility: HOSPITAL | Age: 35
Discharge: HOME | End: 2025-08-11
Payer: COMMERCIAL

## 2025-08-11 VITALS
WEIGHT: 235 LBS | HEIGHT: 70 IN | BODY MASS INDEX: 33.64 KG/M2 | DIASTOLIC BLOOD PRESSURE: 72 MMHG | SYSTOLIC BLOOD PRESSURE: 108 MMHG | HEART RATE: 98 BPM

## 2025-08-11 DIAGNOSIS — Z85.3 HISTORY OF MALIGNANT NEOPLASM OF BREAST: Primary | ICD-10-CM

## 2025-08-11 DIAGNOSIS — Z12.31 ENCOUNTER FOR SCREENING MAMMOGRAM FOR MALIGNANT NEOPLASM OF BREAST: ICD-10-CM

## 2025-08-11 PROCEDURE — 3078F DIAST BP <80 MM HG: CPT | Performed by: SURGERY

## 2025-08-11 PROCEDURE — 1036F TOBACCO NON-USER: CPT | Performed by: SURGERY

## 2025-08-11 PROCEDURE — 99214 OFFICE O/P EST MOD 30 MIN: CPT | Performed by: SURGERY

## 2025-08-11 PROCEDURE — 77067 SCR MAMMO BI INCL CAD: CPT | Mod: LEFT SIDE | Performed by: RADIOLOGY

## 2025-08-11 PROCEDURE — 77063 BREAST TOMOSYNTHESIS BI: CPT | Mod: LEFT SIDE | Performed by: RADIOLOGY

## 2025-08-11 PROCEDURE — 77067 SCR MAMMO BI INCL CAD: CPT | Mod: 52,LT

## 2025-08-11 PROCEDURE — 3074F SYST BP LT 130 MM HG: CPT | Performed by: SURGERY

## 2025-08-11 PROCEDURE — 3008F BODY MASS INDEX DOCD: CPT | Performed by: SURGERY

## 2025-08-13 ENCOUNTER — PRE-ADMISSION TESTING (OUTPATIENT)
Dept: PREADMISSION TESTING | Facility: HOSPITAL | Age: 35
End: 2025-08-13
Payer: COMMERCIAL

## 2025-08-13 VITALS
TEMPERATURE: 97 F | WEIGHT: 235 LBS | BODY MASS INDEX: 33.64 KG/M2 | SYSTOLIC BLOOD PRESSURE: 115 MMHG | HEIGHT: 70 IN | HEART RATE: 80 BPM | OXYGEN SATURATION: 99 % | DIASTOLIC BLOOD PRESSURE: 73 MMHG

## 2025-08-13 DIAGNOSIS — Z85.3 HISTORY OF MALIGNANT NEOPLASM OF BREAST: Primary | ICD-10-CM

## 2025-08-13 LAB
ABO GROUP (TYPE) IN BLOOD: NORMAL
ALBUMIN SERPL BCP-MCNC: 4.5 G/DL (ref 3.4–5)
ALP SERPL-CCNC: 78 U/L (ref 33–110)
ALT SERPL W P-5'-P-CCNC: 48 U/L (ref 7–45)
ANION GAP SERPL CALC-SCNC: 13 MMOL/L (ref 10–20)
ANTIBODY SCREEN: NORMAL
AST SERPL W P-5'-P-CCNC: 30 U/L (ref 9–39)
BILIRUB SERPL-MCNC: 0.4 MG/DL (ref 0–1.2)
BUN SERPL-MCNC: 23 MG/DL (ref 6–23)
CALCIUM SERPL-MCNC: 9.4 MG/DL (ref 8.6–10.6)
CHLORIDE SERPL-SCNC: 105 MMOL/L (ref 98–107)
CO2 SERPL-SCNC: 27 MMOL/L (ref 21–32)
CREAT SERPL-MCNC: 0.62 MG/DL (ref 0.5–1.05)
EGFRCR SERPLBLD CKD-EPI 2021: >90 ML/MIN/1.73M*2
GLUCOSE SERPL-MCNC: 89 MG/DL (ref 74–99)
POTASSIUM SERPL-SCNC: 4 MMOL/L (ref 3.5–5.3)
PROT SERPL-MCNC: 7.2 G/DL (ref 6.4–8.2)
RH FACTOR (ANTIGEN D): NORMAL
SODIUM SERPL-SCNC: 141 MMOL/L (ref 136–145)

## 2025-08-13 PROCEDURE — 36415 COLL VENOUS BLD VENIPUNCTURE: CPT

## 2025-08-13 PROCEDURE — 86900 BLOOD TYPING SEROLOGIC ABO: CPT

## 2025-08-13 PROCEDURE — 80053 COMPREHEN METABOLIC PANEL: CPT

## 2025-08-13 PROCEDURE — 99245 OFF/OP CONSLTJ NEW/EST HI 55: CPT | Performed by: NURSE PRACTITIONER

## 2025-08-13 RX ORDER — CHLORHEXIDINE GLUCONATE 40 MG/ML
SOLUTION TOPICAL
Qty: 473 ML | Refills: 0 | Status: SHIPPED | OUTPATIENT
Start: 2025-08-13

## 2025-08-13 ASSESSMENT — ENCOUNTER SYMPTOMS
EYES NEGATIVE: 1
ENDOCRINE NEGATIVE: 1
CARDIOVASCULAR NEGATIVE: 1
GASTROINTESTINAL NEGATIVE: 1
ARTHRALGIAS: 1
CONSTITUTIONAL NEGATIVE: 1
COUGH: 1
MYALGIAS: 1
NECK NEGATIVE: 1
NEUROLOGICAL NEGATIVE: 1

## 2025-08-13 ASSESSMENT — DUKE ACTIVITY SCORE INDEX (DASI)
CAN YOU HAVE SEXUAL RELATIONS: YES
CAN YOU TAKE CARE OF YOURSELF (EAT, DRESS, BATHE, OR USE TOILET): YES
CAN YOU PARTICIPATE IN STRENOUS SPORTS LIKE SWIMMING, SINGLES TENNIS, FOOTBALL, BASKETBALL, OR SKIING: YES
CAN YOU DO MODERATE WORK AROUND THE HOUSE LIKE VACUUMING, SWEEPING FLOORS OR CARRYING GROCERIES: YES
CAN YOU PARTICIPATE IN MODERATE RECREATIONAL ACTIVITIES LIKE GOLF, BOWLING, DANCING, DOUBLES TENNIS OR THROWING A BASEBALL OR FOOTBALL: YES
CAN YOU CLIMB A FLIGHT OF STAIRS OR WALK UP A HILL: YES
CAN YOU DO YARD WORK LIKE RAKING LEAVES, WEEDING OR PUSHING A MOWER: YES
CAN YOU WALK INDOORS, SUCH AS AROUND YOUR HOUSE: YES
CAN YOU DO LIGHT WORK AROUND THE HOUSE LIKE DUSTING OR WASHING DISHES: YES
DASI METS SCORE: 9.9
CAN YOU WALK A BLOCK OR TWO ON LEVEL GROUND: YES
CAN YOU RUN A SHORT DISTANCE: YES
CAN YOU DO HEAVY WORK AROUND THE HOUSE LIKE SCRUBBING FLOORS OR LIFTING AND MOVING HEAVY FURNITURE: YES
TOTAL_SCORE: 58.2

## 2025-08-13 ASSESSMENT — LIFESTYLE VARIABLES: SMOKING_STATUS: NONSMOKER

## 2025-08-18 ENCOUNTER — APPOINTMENT (OUTPATIENT)
Dept: PLASTIC SURGERY | Facility: CLINIC | Age: 35
End: 2025-08-18
Payer: COMMERCIAL

## 2025-08-18 VITALS
RESPIRATION RATE: 16 BRPM | HEART RATE: 80 BPM | SYSTOLIC BLOOD PRESSURE: 112 MMHG | WEIGHT: 227 LBS | BODY MASS INDEX: 32.57 KG/M2 | DIASTOLIC BLOOD PRESSURE: 72 MMHG

## 2025-08-18 DIAGNOSIS — Z71.89 ENCOUNTER TO DISCUSS BREAST RECONSTRUCTION: ICD-10-CM

## 2025-08-18 DIAGNOSIS — L08.9 INFECTED CYST OF SKIN: Primary | ICD-10-CM

## 2025-08-18 DIAGNOSIS — L72.9 INFECTED CYST OF SKIN: Primary | ICD-10-CM

## 2025-08-18 PROCEDURE — 3074F SYST BP LT 130 MM HG: CPT

## 2025-08-18 PROCEDURE — 3078F DIAST BP <80 MM HG: CPT

## 2025-08-18 PROCEDURE — 99213 OFFICE O/P EST LOW 20 MIN: CPT

## 2025-08-18 RX ORDER — LEVOFLOXACIN 250 MG/1
500 TABLET, FILM COATED ORAL DAILY
Qty: 18 TABLET | Refills: 0 | Status: SHIPPED | OUTPATIENT
Start: 2025-08-18 | End: 2025-08-18 | Stop reason: SDUPTHER

## 2025-08-18 ASSESSMENT — PAIN SCALES - GENERAL: PAINLEVEL_OUTOF10: 3

## 2025-08-25 ENCOUNTER — TELEMEDICINE (OUTPATIENT)
Dept: HEMATOLOGY/ONCOLOGY | Facility: HOSPITAL | Age: 35
End: 2025-08-25
Payer: COMMERCIAL

## 2025-08-25 ENCOUNTER — APPOINTMENT (OUTPATIENT)
Dept: HEMATOLOGY/ONCOLOGY | Facility: HOSPITAL | Age: 35
End: 2025-08-25
Payer: COMMERCIAL

## 2025-08-25 DIAGNOSIS — Z17.0 MALIGNANT NEOPLASM OF RIGHT BREAST IN FEMALE, ESTROGEN RECEPTOR POSITIVE, UNSPECIFIED SITE OF BREAST: Primary | ICD-10-CM

## 2025-08-25 DIAGNOSIS — F41.1 GENERALIZED ANXIETY DISORDER: ICD-10-CM

## 2025-08-25 DIAGNOSIS — C50.911 MALIGNANT NEOPLASM OF RIGHT BREAST IN FEMALE, ESTROGEN RECEPTOR POSITIVE, UNSPECIFIED SITE OF BREAST: Primary | ICD-10-CM

## 2025-08-25 DIAGNOSIS — Z51.5 ENCOUNTER FOR PALLIATIVE CARE: ICD-10-CM

## 2025-08-25 PROCEDURE — 99214 OFFICE O/P EST MOD 30 MIN: CPT | Performed by: NURSE PRACTITIONER

## 2025-08-25 PROCEDURE — 1036F TOBACCO NON-USER: CPT | Performed by: NURSE PRACTITIONER

## 2025-08-25 ASSESSMENT — PAIN SCALES - GENERAL: PAINLEVEL_OUTOF10: 7

## 2025-08-26 ENCOUNTER — ANESTHESIA EVENT (OUTPATIENT)
Dept: OPERATING ROOM | Facility: HOSPITAL | Age: 35
End: 2025-08-26
Payer: COMMERCIAL

## 2025-08-27 ENCOUNTER — HOSPITAL ENCOUNTER (INPATIENT)
Facility: HOSPITAL | Age: 35
LOS: 3 days | Discharge: HOME | End: 2025-08-30
Admitting: PHYSICIAN ASSISTANT
Payer: COMMERCIAL

## 2025-08-27 ENCOUNTER — ANESTHESIA (OUTPATIENT)
Dept: OPERATING ROOM | Facility: HOSPITAL | Age: 35
End: 2025-08-27
Payer: COMMERCIAL

## 2025-08-27 ENCOUNTER — APPOINTMENT (OUTPATIENT)
Dept: RADIOLOGY | Facility: HOSPITAL | Age: 35
End: 2025-08-27
Payer: COMMERCIAL

## 2025-08-27 DIAGNOSIS — Z98.890 POST-OPERATIVE STATE: Primary | ICD-10-CM

## 2025-08-27 DIAGNOSIS — Z90.11 ACQUIRED ABSENCE OF BREAST AND ABSENT NIPPLE, RIGHT: ICD-10-CM

## 2025-08-27 LAB
ANION GAP BLDA CALCULATED.4IONS-SCNC: 10 MMO/L (ref 10–25)
ANION GAP BLDA CALCULATED.4IONS-SCNC: 11 MMO/L (ref 10–25)
ANION GAP BLDA CALCULATED.4IONS-SCNC: 11 MMO/L (ref 10–25)
BASE EXCESS BLDA CALC-SCNC: -2.4 MMOL/L (ref -2–3)
BASE EXCESS BLDA CALC-SCNC: -3.3 MMOL/L (ref -2–3)
BASE EXCESS BLDA CALC-SCNC: -3.8 MMOL/L (ref -2–3)
BODY TEMPERATURE: 37 DEGREES CELSIUS
CA-I BLDA-SCNC: 1.07 MMOL/L (ref 1.1–1.33)
CA-I BLDA-SCNC: 1.16 MMOL/L (ref 1.1–1.33)
CA-I BLDA-SCNC: 1.16 MMOL/L (ref 1.1–1.33)
CHLORIDE BLDA-SCNC: 109 MMOL/L (ref 98–107)
CHLORIDE BLDA-SCNC: 110 MMOL/L (ref 98–107)
CHLORIDE BLDA-SCNC: 111 MMOL/L (ref 98–107)
GLUCOSE BLD MANUAL STRIP-MCNC: 142 MG/DL (ref 74–99)
GLUCOSE BLD MANUAL STRIP-MCNC: 89 MG/DL (ref 74–99)
GLUCOSE BLDA-MCNC: 122 MG/DL (ref 74–99)
GLUCOSE BLDA-MCNC: 85 MG/DL (ref 74–99)
GLUCOSE BLDA-MCNC: 99 MG/DL (ref 74–99)
HCO3 BLDA-SCNC: 21.5 MMOL/L (ref 22–26)
HCO3 BLDA-SCNC: 22.1 MMOL/L (ref 22–26)
HCO3 BLDA-SCNC: 23.2 MMOL/L (ref 22–26)
HCT VFR BLD EST: 35 % (ref 36–46)
HCT VFR BLD EST: 37 % (ref 36–46)
HCT VFR BLD EST: 40 % (ref 36–46)
HGB BLDA-MCNC: 11.6 G/DL (ref 12–16)
HGB BLDA-MCNC: 12.3 G/DL (ref 12–16)
HGB BLDA-MCNC: 13.2 G/DL (ref 12–16)
INHALED O2 CONCENTRATION: 50 %
LACTATE BLDA-SCNC: 0.8 MMOL/L (ref 0.4–2)
LACTATE BLDA-SCNC: 1.1 MMOL/L (ref 0.4–2)
LACTATE BLDA-SCNC: 1.5 MMOL/L (ref 0.4–2)
OXYHGB MFR BLDA: 96.6 % (ref 94–98)
OXYHGB MFR BLDA: 97 % (ref 94–98)
OXYHGB MFR BLDA: 97 % (ref 94–98)
PCO2 BLDA: 39 MM HG (ref 38–42)
PCO2 BLDA: 40 MM HG (ref 38–42)
PCO2 BLDA: 42 MM HG (ref 38–42)
PH BLDA: 7.35 PH (ref 7.38–7.42)
PO2 BLDA: 159 MM HG (ref 85–95)
PO2 BLDA: 160 MM HG (ref 85–95)
PO2 BLDA: 162 MM HG (ref 85–95)
POTASSIUM BLDA-SCNC: 3.5 MMOL/L (ref 3.5–5.3)
POTASSIUM BLDA-SCNC: 3.7 MMOL/L (ref 3.5–5.3)
POTASSIUM BLDA-SCNC: 3.8 MMOL/L (ref 3.5–5.3)
PREGNANCY TEST URINE, POC: NEGATIVE
SAO2 % BLDA: 100 % (ref 94–100)
SAO2 % BLDA: 100 % (ref 94–100)
SAO2 % BLDA: 99 % (ref 94–100)
SODIUM BLDA-SCNC: 138 MMOL/L (ref 136–145)
SODIUM BLDA-SCNC: 139 MMOL/L (ref 136–145)
SODIUM BLDA-SCNC: 140 MMOL/L (ref 136–145)

## 2025-08-27 PROCEDURE — 3600000005 HC OR TIME - INITIAL BASE CHARGE - PROCEDURE LEVEL FIVE

## 2025-08-27 PROCEDURE — 37799 UNLISTED PX VASCULAR SURGERY: CPT

## 2025-08-27 PROCEDURE — 99255 IP/OBS CONSLTJ NEW/EST HI 80: CPT

## 2025-08-27 PROCEDURE — 82947 ASSAY GLUCOSE BLOOD QUANT: CPT

## 2025-08-27 PROCEDURE — 84132 ASSAY OF SERUM POTASSIUM: CPT

## 2025-08-27 PROCEDURE — 15777 ACELLULAR DERM MATRIX IMPLT: CPT | Performed by: SURGERY

## 2025-08-27 PROCEDURE — 19370 REVJ PERI-IMPLT CAPSULE BRST: CPT

## 2025-08-27 PROCEDURE — 64722 DECOMPRESSION UNSPEC NERVE: CPT

## 2025-08-27 PROCEDURE — 76937 US GUIDE VASCULAR ACCESS: CPT | Performed by: ANESTHESIOLOGY

## 2025-08-27 PROCEDURE — A9999 DME SUPPLY OR ACCESSORY, NOS: HCPCS

## 2025-08-27 PROCEDURE — 2500000005 HC RX 250 GENERAL PHARMACY W/O HCPCS: Mod: SE

## 2025-08-27 PROCEDURE — 2500000004 HC RX 250 GENERAL PHARMACY W/ HCPCS (ALT 636 FOR OP/ED): Mod: SE

## 2025-08-27 PROCEDURE — 2500000004 HC RX 250 GENERAL PHARMACY W/ HCPCS (ALT 636 FOR OP/ED): Mod: SE | Performed by: SURGERY

## 2025-08-27 PROCEDURE — 99232 SBSQ HOSP IP/OBS MODERATE 35: CPT

## 2025-08-27 PROCEDURE — 19340 INSJ BREAST IMPLT SM D MAST: CPT | Performed by: SURGERY

## 2025-08-27 PROCEDURE — 71045 X-RAY EXAM CHEST 1 VIEW: CPT

## 2025-08-27 PROCEDURE — 3700000002 HC GENERAL ANESTHESIA TIME - EACH INCREMENTAL 1 MINUTE

## 2025-08-27 PROCEDURE — 19303 MAST SIMPLE COMPLETE: CPT | Performed by: SURGERY

## 2025-08-27 PROCEDURE — C1789 PROSTHESIS, BREAST, IMP: HCPCS

## 2025-08-27 PROCEDURE — 19364 BRST RCNSTJ FREE FLAP: CPT | Performed by: SURGERY

## 2025-08-27 PROCEDURE — P9045 ALBUMIN (HUMAN), 5%, 250 ML: HCPCS | Mod: JZ,SE | Performed by: ANESTHESIOLOGY

## 2025-08-27 PROCEDURE — 11406 EXC TR-EXT B9+MARG >4.0 CM: CPT

## 2025-08-27 PROCEDURE — 2500000004 HC RX 250 GENERAL PHARMACY W/ HCPCS (ALT 636 FOR OP/ED): Mod: JZ,SE

## 2025-08-27 PROCEDURE — 2500000004 HC RX 250 GENERAL PHARMACY W/ HCPCS (ALT 636 FOR OP/ED): Mod: JZ,SE | Performed by: ANESTHESIOLOGY

## 2025-08-27 PROCEDURE — 2720000007 HC OR 272 NO HCPCS

## 2025-08-27 PROCEDURE — 7100000001 HC RECOVERY ROOM TIME - INITIAL BASE CHARGE

## 2025-08-27 PROCEDURE — 36620 INSERTION CATHETER ARTERY: CPT | Performed by: ANESTHESIOLOGY

## 2025-08-27 PROCEDURE — 7100000002 HC RECOVERY ROOM TIME - EACH INCREMENTAL 1 MINUTE

## 2025-08-27 PROCEDURE — 3700000001 HC GENERAL ANESTHESIA TIME - INITIAL BASE CHARGE

## 2025-08-27 PROCEDURE — 1170000001 HC PRIVATE ONCOLOGY ROOM DAILY

## 2025-08-27 PROCEDURE — 2780000003 HC OR 278 NO HCPCS

## 2025-08-27 PROCEDURE — C1781 MESH (IMPLANTABLE): HCPCS

## 2025-08-27 PROCEDURE — 96372 THER/PROPH/DIAG INJ SC/IM: CPT | Performed by: PHYSICIAN ASSISTANT

## 2025-08-27 PROCEDURE — 36556 INSERT NON-TUNNEL CV CATH: CPT | Performed by: ANESTHESIOLOGY

## 2025-08-27 PROCEDURE — 3600000010 HC OR TIME - EACH INCREMENTAL 1 MINUTE - PROCEDURE LEVEL FIVE

## 2025-08-27 PROCEDURE — 19364 BRST RCNSTJ FREE FLAP: CPT

## 2025-08-27 PROCEDURE — 81025 URINE PREGNANCY TEST: CPT | Performed by: ANESTHESIOLOGY

## 2025-08-27 PROCEDURE — 2500000005 HC RX 250 GENERAL PHARMACY W/O HCPCS: Mod: SE | Performed by: NURSE ANESTHETIST, CERTIFIED REGISTERED

## 2025-08-27 PROCEDURE — 15002 WOUND PREP TRK/ARM/LEG: CPT

## 2025-08-27 PROCEDURE — 15860 IV NJX TST VASC FLO FLAP/GRF: CPT

## 2025-08-27 PROCEDURE — P9045 ALBUMIN (HUMAN), 5%, 250 ML: HCPCS | Mod: JZ,SE

## 2025-08-27 PROCEDURE — 2500000002 HC RX 250 W HCPCS SELF ADMINISTERED DRUGS (ALT 637 FOR MEDICARE OP, ALT 636 FOR OP/ED): Mod: SE

## 2025-08-27 PROCEDURE — 2500000004 HC RX 250 GENERAL PHARMACY W/ HCPCS (ALT 636 FOR OP/ED): Mod: SE | Performed by: PHYSICIAN ASSISTANT

## 2025-08-27 DEVICE — IMPLANTABLE DEVICE: Type: IMPLANTABLE DEVICE | Site: BREAST | Status: FUNCTIONAL

## 2025-08-27 DEVICE — MESH, TIGR MATRIX, 20X30, RESORABLE: Type: IMPLANTABLE DEVICE | Site: BREAST | Status: FUNCTIONAL

## 2025-08-27 RX ORDER — ALBUMIN HUMAN 50 G/1000ML
12.5 SOLUTION INTRAVENOUS ONCE
Status: COMPLETED | OUTPATIENT
Start: 2025-08-27 | End: 2025-08-27

## 2025-08-27 RX ORDER — HYDROMORPHONE HYDROCHLORIDE 0.2 MG/ML
0.2 INJECTION INTRAMUSCULAR; INTRAVENOUS; SUBCUTANEOUS EVERY 5 MIN PRN
Status: DISCONTINUED | OUTPATIENT
Start: 2025-08-27 | End: 2025-08-27 | Stop reason: HOSPADM

## 2025-08-27 RX ORDER — PROPOFOL 10 MG/ML
INJECTION, EMULSION INTRAVENOUS AS NEEDED
Status: DISCONTINUED | OUTPATIENT
Start: 2025-08-27 | End: 2025-08-27

## 2025-08-27 RX ORDER — SODIUM CHLORIDE, SODIUM LACTATE, POTASSIUM CHLORIDE, CALCIUM CHLORIDE 600; 310; 30; 20 MG/100ML; MG/100ML; MG/100ML; MG/100ML
INJECTION, SOLUTION INTRAVENOUS CONTINUOUS PRN
Status: DISCONTINUED | OUTPATIENT
Start: 2025-08-27 | End: 2025-08-27

## 2025-08-27 RX ORDER — ROPIVACAINE HYDROCHLORIDE 5 MG/ML
INJECTION, SOLUTION EPIDURAL; INFILTRATION; PERINEURAL AS NEEDED
Status: DISCONTINUED | OUTPATIENT
Start: 2025-08-27 | End: 2025-08-27

## 2025-08-27 RX ORDER — LIDOCAINE HYDROCHLORIDE 20 MG/ML
INJECTION, SOLUTION INFILTRATION; PERINEURAL AS NEEDED
Status: DISCONTINUED | OUTPATIENT
Start: 2025-08-27 | End: 2025-08-27

## 2025-08-27 RX ORDER — FENTANYL CITRATE 50 UG/ML
INJECTION, SOLUTION INTRAMUSCULAR; INTRAVENOUS AS NEEDED
Status: DISCONTINUED | OUTPATIENT
Start: 2025-08-27 | End: 2025-08-27

## 2025-08-27 RX ORDER — METOCLOPRAMIDE HYDROCHLORIDE 5 MG/ML
10 INJECTION INTRAMUSCULAR; INTRAVENOUS ONCE AS NEEDED
Status: DISCONTINUED | OUTPATIENT
Start: 2025-08-27 | End: 2025-08-27 | Stop reason: HOSPADM

## 2025-08-27 RX ORDER — HEPARIN SODIUM 5000 [USP'U]/ML
5000 INJECTION, SOLUTION INTRAVENOUS; SUBCUTANEOUS ONCE
Status: COMPLETED | OUTPATIENT
Start: 2025-08-27 | End: 2025-08-27

## 2025-08-27 RX ORDER — ROPIVACAINE IN 0.9% SOD CHL/PF 0.2 %
8 PLASTIC BAG, INJECTION (ML) EPIDURAL CONTINUOUS
Status: DISCONTINUED | OUTPATIENT
Start: 2025-08-27 | End: 2025-08-30 | Stop reason: HOSPADM

## 2025-08-27 RX ORDER — MEPERIDINE HYDROCHLORIDE 25 MG/ML
12.5 INJECTION INTRAMUSCULAR; INTRAVENOUS; SUBCUTANEOUS EVERY 10 MIN PRN
Status: DISCONTINUED | OUTPATIENT
Start: 2025-08-27 | End: 2025-08-27 | Stop reason: HOSPADM

## 2025-08-27 RX ORDER — KETOROLAC TROMETHAMINE 30 MG/ML
30 INJECTION, SOLUTION INTRAMUSCULAR; INTRAVENOUS EVERY 6 HOURS SCHEDULED
Status: DISCONTINUED | OUTPATIENT
Start: 2025-08-27 | End: 2025-08-27

## 2025-08-27 RX ORDER — SCOPOLAMINE 1 MG/3D
PATCH, EXTENDED RELEASE TRANSDERMAL AS NEEDED
Status: DISCONTINUED | OUTPATIENT
Start: 2025-08-27 | End: 2025-08-27

## 2025-08-27 RX ORDER — LIDOCAINE HYDROCHLORIDE 10 MG/ML
0.1 INJECTION, SOLUTION INFILTRATION; PERINEURAL ONCE
Status: DISCONTINUED | OUTPATIENT
Start: 2025-08-27 | End: 2025-08-27 | Stop reason: HOSPADM

## 2025-08-27 RX ORDER — DEXAMETHASONE SODIUM PHOSPHATE 10 MG/ML
INJECTION INTRAMUSCULAR; INTRAVENOUS AS NEEDED
Status: DISCONTINUED | OUTPATIENT
Start: 2025-08-27 | End: 2025-08-27

## 2025-08-27 RX ORDER — ONDANSETRON HYDROCHLORIDE 2 MG/ML
INJECTION, SOLUTION INTRAVENOUS AS NEEDED
Status: DISCONTINUED | OUTPATIENT
Start: 2025-08-27 | End: 2025-08-27

## 2025-08-27 RX ORDER — HYDROMORPHONE HYDROCHLORIDE 1 MG/ML
INJECTION, SOLUTION INTRAMUSCULAR; INTRAVENOUS; SUBCUTANEOUS AS NEEDED
Status: DISCONTINUED | OUTPATIENT
Start: 2025-08-27 | End: 2025-08-27

## 2025-08-27 RX ORDER — APREPITANT 40 MG/1
CAPSULE ORAL AS NEEDED
Status: DISCONTINUED | OUTPATIENT
Start: 2025-08-27 | End: 2025-08-27

## 2025-08-27 RX ORDER — DEXTROSE 50 % IN WATER (D50W) INTRAVENOUS SYRINGE
25
Status: DISCONTINUED | OUTPATIENT
Start: 2025-08-27 | End: 2025-08-28

## 2025-08-27 RX ORDER — BUPIVACAINE HYDROCHLORIDE 5 MG/ML
INJECTION, SOLUTION PERINEURAL AS NEEDED
Status: DISCONTINUED | OUTPATIENT
Start: 2025-08-27 | End: 2025-08-27 | Stop reason: HOSPADM

## 2025-08-27 RX ORDER — LIDOCAINE HYDROCHLORIDE 20 MG/ML
INJECTION, SOLUTION INFILTRATION; PERINEURAL AS NEEDED
Status: DISCONTINUED | OUTPATIENT
Start: 2025-08-27 | End: 2025-08-27 | Stop reason: HOSPADM

## 2025-08-27 RX ORDER — PHENYLEPHRINE HCL IN 0.9% NACL 0.4MG/10ML
SYRINGE (ML) INTRAVENOUS AS NEEDED
Status: DISCONTINUED | OUTPATIENT
Start: 2025-08-27 | End: 2025-08-27

## 2025-08-27 RX ORDER — ALBUMIN HUMAN 50 G/1000ML
SOLUTION INTRAVENOUS AS NEEDED
Status: DISCONTINUED | OUTPATIENT
Start: 2025-08-27 | End: 2025-08-27

## 2025-08-27 RX ORDER — ACETAMINOPHEN 10 MG/ML
INJECTION, SOLUTION INTRAVENOUS AS NEEDED
Status: DISCONTINUED | OUTPATIENT
Start: 2025-08-27 | End: 2025-08-27

## 2025-08-27 RX ORDER — PHENYLEPHRINE 10 MG/250 ML(40 MCG/ML)IN 0.9 % SOD.CHLORIDE INTRAVENOUS
CONTINUOUS PRN
Status: DISCONTINUED | OUTPATIENT
Start: 2025-08-27 | End: 2025-08-27

## 2025-08-27 RX ORDER — CEFAZOLIN 1 G/1
INJECTION, POWDER, FOR SOLUTION INTRAVENOUS AS NEEDED
Status: DISCONTINUED | OUTPATIENT
Start: 2025-08-27 | End: 2025-08-27

## 2025-08-27 RX ORDER — ROCURONIUM BROMIDE 10 MG/ML
INJECTION, SOLUTION INTRAVENOUS AS NEEDED
Status: DISCONTINUED | OUTPATIENT
Start: 2025-08-27 | End: 2025-08-27

## 2025-08-27 RX ORDER — ACETAMINOPHEN 325 MG/1
975 TABLET ORAL EVERY 8 HOURS
Status: DISCONTINUED | OUTPATIENT
Start: 2025-08-27 | End: 2025-08-30 | Stop reason: HOSPADM

## 2025-08-27 RX ORDER — INSULIN LISPRO 100 [IU]/ML
0-5 INJECTION, SOLUTION INTRAVENOUS; SUBCUTANEOUS EVERY 4 HOURS
Status: DISCONTINUED | OUTPATIENT
Start: 2025-08-27 | End: 2025-08-28

## 2025-08-27 RX ORDER — NORETHINDRONE AND ETHINYL ESTRADIOL 0.5-0.035
KIT ORAL AS NEEDED
Status: DISCONTINUED | OUTPATIENT
Start: 2025-08-27 | End: 2025-08-27

## 2025-08-27 RX ORDER — HEPARIN SODIUM 5000 [USP'U]/ML
INJECTION, SOLUTION INTRAVENOUS; SUBCUTANEOUS AS NEEDED
Status: DISCONTINUED | OUTPATIENT
Start: 2025-08-27 | End: 2025-08-27

## 2025-08-27 RX ORDER — MIDAZOLAM HYDROCHLORIDE 2 MG/2ML
INJECTION, SOLUTION INTRAMUSCULAR; INTRAVENOUS AS NEEDED
Status: DISCONTINUED | OUTPATIENT
Start: 2025-08-27 | End: 2025-08-27

## 2025-08-27 RX ORDER — DEXTROSE 50 % IN WATER (D50W) INTRAVENOUS SYRINGE
12.5
Status: DISCONTINUED | OUTPATIENT
Start: 2025-08-27 | End: 2025-08-28

## 2025-08-27 RX ORDER — ESMOLOL HYDROCHLORIDE 10 MG/ML
INJECTION INTRAVENOUS AS NEEDED
Status: DISCONTINUED | OUTPATIENT
Start: 2025-08-27 | End: 2025-08-27

## 2025-08-27 RX ORDER — SODIUM CHLORIDE 0.9 G/100ML
INJECTION, SOLUTION IRRIGATION AS NEEDED
Status: DISCONTINUED | OUTPATIENT
Start: 2025-08-27 | End: 2025-08-27 | Stop reason: HOSPADM

## 2025-08-27 RX ADMIN — SCOPOLAMINE 1 PATCH: 1.5 PATCH, EXTENDED RELEASE TRANSDERMAL at 08:30

## 2025-08-27 RX ADMIN — ROCURONIUM BROMIDE 20 MG: 10 INJECTION INTRAVENOUS at 11:51

## 2025-08-27 RX ADMIN — Medication 120 MCG: at 18:43

## 2025-08-27 RX ADMIN — ACETAMINOPHEN 1000 MG: 10 INJECTION, SOLUTION INTRAVENOUS at 10:04

## 2025-08-27 RX ADMIN — ROCURONIUM BROMIDE 20 MG: 10 INJECTION INTRAVENOUS at 17:00

## 2025-08-27 RX ADMIN — ROCURONIUM BROMIDE 20 MG: 10 INJECTION INTRAVENOUS at 15:05

## 2025-08-27 RX ADMIN — EPHEDRINE SULFATE 10 MG: 50 INJECTION INTRAVENOUS at 18:17

## 2025-08-27 RX ADMIN — ROCURONIUM BROMIDE 20 MG: 10 INJECTION INTRAVENOUS at 18:13

## 2025-08-27 RX ADMIN — FENTANYL CITRATE 50 MCG: 50 INJECTION, SOLUTION INTRAMUSCULAR; INTRAVENOUS at 07:36

## 2025-08-27 RX ADMIN — LIDOCAINE HYDROCHLORIDE 100 MG: 20 INJECTION, SOLUTION INFILTRATION; PERINEURAL at 09:22

## 2025-08-27 RX ADMIN — SODIUM CHLORIDE, SODIUM LACTATE, POTASSIUM CHLORIDE, AND CALCIUM CHLORIDE: 600; 310; 30; 20 INJECTION, SOLUTION INTRAVENOUS at 09:09

## 2025-08-27 RX ADMIN — Medication 10 MG: at 14:39

## 2025-08-27 RX ADMIN — Medication 120 MCG: at 12:31

## 2025-08-27 RX ADMIN — Medication 50 MG: at 09:22

## 2025-08-27 RX ADMIN — Medication 120 MCG: at 12:21

## 2025-08-27 RX ADMIN — EPHEDRINE SULFATE 10 MG: 50 INJECTION INTRAVENOUS at 21:31

## 2025-08-27 RX ADMIN — Medication 16 ML/HR: at 11:22

## 2025-08-27 RX ADMIN — ALBUMIN HUMAN 250 ML: 0.05 INJECTION, SOLUTION INTRAVENOUS at 13:57

## 2025-08-27 RX ADMIN — Medication 120 MCG: at 18:57

## 2025-08-27 RX ADMIN — Medication 10 MG: at 12:19

## 2025-08-27 RX ADMIN — FENTANYL CITRATE 25 MCG: 50 INJECTION, SOLUTION INTRAMUSCULAR; INTRAVENOUS at 10:44

## 2025-08-27 RX ADMIN — FENTANYL CITRATE 50 MCG: 50 INJECTION, SOLUTION INTRAMUSCULAR; INTRAVENOUS at 10:14

## 2025-08-27 RX ADMIN — PHENYLEPHRINE-NACL IV SOLUTION 10 MG/250ML-0.9% 0.2 MCG/KG/MIN: 10-0.9/25 SOLUTION at 12:30

## 2025-08-27 RX ADMIN — EPHEDRINE SULFATE 10 MG: 50 INJECTION INTRAVENOUS at 18:48

## 2025-08-27 RX ADMIN — FENTANYL CITRATE 50 MCG: 50 INJECTION, SOLUTION INTRAMUSCULAR; INTRAVENOUS at 07:30

## 2025-08-27 RX ADMIN — Medication 120 MCG: at 19:17

## 2025-08-27 RX ADMIN — Medication 80 MCG: at 19:58

## 2025-08-27 RX ADMIN — ROCURONIUM BROMIDE 20 MG: 10 INJECTION INTRAVENOUS at 13:14

## 2025-08-27 RX ADMIN — SUGAMMADEX 200 MG: 100 INJECTION, SOLUTION INTRAVENOUS at 20:10

## 2025-08-27 RX ADMIN — Medication 120 MCG: at 19:53

## 2025-08-27 RX ADMIN — Medication 10 MG: at 16:42

## 2025-08-27 RX ADMIN — ROCURONIUM BROMIDE 30 MG: 10 INJECTION INTRAVENOUS at 10:14

## 2025-08-27 RX ADMIN — Medication 80 MCG: at 13:58

## 2025-08-27 RX ADMIN — Medication 80 MCG: at 18:52

## 2025-08-27 RX ADMIN — ALBUMIN HUMAN 12.5 G: 0.05 INJECTION, SOLUTION INTRAVENOUS at 22:19

## 2025-08-27 RX ADMIN — Medication 160 MCG: at 12:25

## 2025-08-27 RX ADMIN — APREPITANT 40 MG: 40 CAPSULE ORAL at 08:30

## 2025-08-27 RX ADMIN — EPHEDRINE SULFATE 10 MG: 50 INJECTION INTRAVENOUS at 17:39

## 2025-08-27 RX ADMIN — Medication 120 MCG: at 12:39

## 2025-08-27 RX ADMIN — ESMOLOL HYDROCHLORIDE 30 MG: 10 INJECTION, SOLUTION INTRAVENOUS at 19:51

## 2025-08-27 RX ADMIN — EPHEDRINE SULFATE 5 MG: 50 INJECTION INTRAVENOUS at 18:29

## 2025-08-27 RX ADMIN — ROCURONIUM BROMIDE 20 MG: 10 INJECTION INTRAVENOUS at 17:46

## 2025-08-27 RX ADMIN — ROCURONIUM BROMIDE 20 MG: 10 INJECTION INTRAVENOUS at 13:54

## 2025-08-27 RX ADMIN — DEXAMETHASONE SODIUM PHOSPHATE 6 MG: 10 INJECTION, SOLUTION INTRAMUSCULAR; INTRAVENOUS at 09:26

## 2025-08-27 RX ADMIN — ROCURONIUM BROMIDE 20 MG: 10 INJECTION INTRAVENOUS at 14:31

## 2025-08-27 RX ADMIN — Medication 80 MCG: at 18:32

## 2025-08-27 RX ADMIN — CEFAZOLIN 2 G: 1 INJECTION, POWDER, FOR SOLUTION INTRAMUSCULAR; INTRAVENOUS at 10:02

## 2025-08-27 RX ADMIN — Medication 80 MCG: at 19:03

## 2025-08-27 RX ADMIN — HYDROMORPHONE HYDROCHLORIDE 0.2 MG: 1 INJECTION, SOLUTION INTRAMUSCULAR; INTRAVENOUS; SUBCUTANEOUS at 11:25

## 2025-08-27 RX ADMIN — ROCURONIUM BROMIDE 60 MG: 10 INJECTION INTRAVENOUS at 09:23

## 2025-08-27 RX ADMIN — EPHEDRINE SULFATE 10 MG: 50 INJECTION INTRAVENOUS at 17:09

## 2025-08-27 RX ADMIN — SODIUM CHLORIDE, SODIUM LACTATE, POTASSIUM CHLORIDE, AND CALCIUM CHLORIDE: 600; 310; 30; 20 INJECTION, SOLUTION INTRAVENOUS at 17:48

## 2025-08-27 RX ADMIN — HEPARIN SODIUM 5000 UNITS: 5000 INJECTION INTRAVENOUS; SUBCUTANEOUS at 15:58

## 2025-08-27 RX ADMIN — Medication 40 MCG: at 15:32

## 2025-08-27 RX ADMIN — ONDANSETRON 4 MG: 2 INJECTION INTRAMUSCULAR; INTRAVENOUS at 20:12

## 2025-08-27 RX ADMIN — Medication 120 MCG: at 16:22

## 2025-08-27 RX ADMIN — ACETAMINOPHEN 1000 MG: 10 INJECTION, SOLUTION INTRAVENOUS at 18:05

## 2025-08-27 RX ADMIN — Medication 120 MCG: at 19:12

## 2025-08-27 RX ADMIN — SODIUM CHLORIDE, POTASSIUM CHLORIDE, SODIUM LACTATE AND CALCIUM CHLORIDE: 600; 310; 30; 20 INJECTION, SOLUTION INTRAVENOUS at 09:41

## 2025-08-27 RX ADMIN — ROCURONIUM BROMIDE 20 MG: 10 INJECTION INTRAVENOUS at 12:31

## 2025-08-27 RX ADMIN — HYDROMORPHONE HYDROCHLORIDE 0.4 MG: 1 INJECTION, SOLUTION INTRAMUSCULAR; INTRAVENOUS; SUBCUTANEOUS at 11:10

## 2025-08-27 RX ADMIN — EPHEDRINE SULFATE 15 MG: 50 INJECTION INTRAVENOUS at 19:01

## 2025-08-27 RX ADMIN — MIDAZOLAM HYDROCHLORIDE 2 MG: 2 INJECTION, SOLUTION INTRAMUSCULAR; INTRAVENOUS at 07:20

## 2025-08-27 RX ADMIN — Medication 120 MCG: at 18:39

## 2025-08-27 RX ADMIN — ROCURONIUM BROMIDE 20 MG: 10 INJECTION INTRAVENOUS at 14:16

## 2025-08-27 RX ADMIN — EPHEDRINE SULFATE 15 MG: 50 INJECTION INTRAVENOUS at 17:24

## 2025-08-27 RX ADMIN — CEFAZOLIN 2 G: 1 INJECTION, POWDER, FOR SOLUTION INTRAMUSCULAR; INTRAVENOUS at 17:56

## 2025-08-27 RX ADMIN — Medication 80 MCG: at 12:17

## 2025-08-27 RX ADMIN — HYDROMORPHONE HYDROCHLORIDE 0.4 MG: 1 INJECTION, SOLUTION INTRAMUSCULAR; INTRAVENOUS; SUBCUTANEOUS at 18:01

## 2025-08-27 RX ADMIN — ROCURONIUM BROMIDE 20 MG: 10 INJECTION INTRAVENOUS at 11:22

## 2025-08-27 RX ADMIN — MIDAZOLAM HYDROCHLORIDE 2 MG: 2 INJECTION, SOLUTION INTRAMUSCULAR; INTRAVENOUS at 09:22

## 2025-08-27 RX ADMIN — ROCURONIUM BROMIDE 30 MG: 10 INJECTION INTRAVENOUS at 16:50

## 2025-08-27 RX ADMIN — ROCURONIUM BROMIDE 20 MG: 10 INJECTION INTRAVENOUS at 10:58

## 2025-08-27 RX ADMIN — FENTANYL CITRATE 25 MCG: 50 INJECTION, SOLUTION INTRAMUSCULAR; INTRAVENOUS at 10:54

## 2025-08-27 RX ADMIN — ALBUMIN HUMAN 250 ML: 0.05 INJECTION, SOLUTION INTRAVENOUS at 12:16

## 2025-08-27 RX ADMIN — Medication 120 MCG: at 14:58

## 2025-08-27 RX ADMIN — Medication 120 MCG: at 16:58

## 2025-08-27 RX ADMIN — PROPOFOL 200 MG: 10 INJECTION, EMULSION INTRAVENOUS at 09:22

## 2025-08-27 RX ADMIN — Medication 80 MCG: at 10:36

## 2025-08-27 RX ADMIN — HYDROMORPHONE HYDROCHLORIDE 0.6 MG: 1 INJECTION, SOLUTION INTRAMUSCULAR; INTRAVENOUS; SUBCUTANEOUS at 20:16

## 2025-08-27 RX ADMIN — Medication 120 MCG: at 18:20

## 2025-08-27 RX ADMIN — KETOROLAC TROMETHAMINE 30 MG: 30 INJECTION, SOLUTION INTRAMUSCULAR; INTRAVENOUS at 21:15

## 2025-08-27 RX ADMIN — Medication 120 MCG: at 10:21

## 2025-08-27 RX ADMIN — ROCURONIUM BROMIDE 20 MG: 10 INJECTION INTRAVENOUS at 10:36

## 2025-08-27 RX ADMIN — HEPARIN SODIUM 5000 UNITS: 5000 INJECTION, SOLUTION INTRAVENOUS; SUBCUTANEOUS at 08:14

## 2025-08-27 RX ADMIN — ALBUMIN HUMAN 250 ML: 0.05 INJECTION, SOLUTION INTRAVENOUS at 16:22

## 2025-08-27 RX ADMIN — CEFAZOLIN 2 G: 1 INJECTION, POWDER, FOR SOLUTION INTRAMUSCULAR; INTRAVENOUS at 14:00

## 2025-08-27 RX ADMIN — ROPIVACAINE HYDROCHLORIDE 20 ML: 5 INJECTION, SOLUTION EPIDURAL; INFILTRATION; PERINEURAL at 07:45

## 2025-08-27 RX ADMIN — ALBUMIN HUMAN 250 ML: 0.05 INJECTION, SOLUTION INTRAVENOUS at 12:39

## 2025-08-27 RX ADMIN — ROCURONIUM BROMIDE 30 MG: 10 INJECTION INTRAVENOUS at 15:50

## 2025-08-27 RX ADMIN — Medication 10 MG: at 13:35

## 2025-08-27 RX ADMIN — Medication 120 MCG: at 14:22

## 2025-08-27 RX ADMIN — Medication 80 MCG: at 10:40

## 2025-08-27 RX ADMIN — Medication 10 MG: at 15:35

## 2025-08-27 RX ADMIN — HYDROMORPHONE HYDROCHLORIDE 0.4 MG: 1 INJECTION, SOLUTION INTRAMUSCULAR; INTRAVENOUS; SUBCUTANEOUS at 19:47

## 2025-08-27 SDOH — HEALTH STABILITY: MENTAL HEALTH: CURRENT SMOKER: 0

## 2025-08-27 ASSESSMENT — PAIN - FUNCTIONAL ASSESSMENT
PAIN_FUNCTIONAL_ASSESSMENT: 0-10

## 2025-08-27 ASSESSMENT — COGNITIVE AND FUNCTIONAL STATUS - GENERAL
EATING MEALS: A LITTLE
STANDING UP FROM CHAIR USING ARMS: A LITTLE
MOVING TO AND FROM BED TO CHAIR: A LITTLE
DRESSING REGULAR UPPER BODY CLOTHING: A LITTLE
TOILETING: A LITTLE
PERSONAL GROOMING: A LITTLE
DAILY ACTIVITIY SCORE: 18
MOBILITY SCORE: 16
HELP NEEDED FOR BATHING: A LITTLE
MOVING FROM LYING ON BACK TO SITTING ON SIDE OF FLAT BED WITH BEDRAILS: A LITTLE
CLIMB 3 TO 5 STEPS WITH RAILING: A LOT
WALKING IN HOSPITAL ROOM: A LOT
DRESSING REGULAR LOWER BODY CLOTHING: A LITTLE
TURNING FROM BACK TO SIDE WHILE IN FLAT BAD: A LITTLE

## 2025-08-27 ASSESSMENT — PAIN SCALES - GENERAL
PAINLEVEL_OUTOF10: 10 - WORST POSSIBLE PAIN
PAINLEVEL_OUTOF10: 10 - WORST POSSIBLE PAIN
PAINLEVEL_OUTOF10: 8
PAINLEVEL_OUTOF10: 10 - WORST POSSIBLE PAIN
PAINLEVEL_OUTOF10: 6
PAINLEVEL_OUTOF10: 10 - WORST POSSIBLE PAIN

## 2025-08-27 ASSESSMENT — PAIN DESCRIPTION - DESCRIPTORS
DESCRIPTORS: ACHING
DESCRIPTORS: ACHING

## 2025-08-28 LAB
ANION GAP SERPL CALC-SCNC: 11 MMOL/L (ref 10–20)
BASOPHILS # BLD AUTO: 0.01 X10*3/UL (ref 0–0.1)
BASOPHILS NFR BLD AUTO: 0.2 %
BUN SERPL-MCNC: 16 MG/DL (ref 6–23)
CALCIUM SERPL-MCNC: 8.6 MG/DL (ref 8.6–10.6)
CHLORIDE SERPL-SCNC: 108 MMOL/L (ref 98–107)
CO2 SERPL-SCNC: 28 MMOL/L (ref 21–32)
CREAT SERPL-MCNC: 0.56 MG/DL (ref 0.5–1.05)
EGFRCR SERPLBLD CKD-EPI 2021: >90 ML/MIN/1.73M*2
EOSINOPHIL # BLD AUTO: 0.04 X10*3/UL (ref 0–0.7)
EOSINOPHIL NFR BLD AUTO: 0.6 %
ERYTHROCYTE [DISTWIDTH] IN BLOOD BY AUTOMATED COUNT: 13.3 % (ref 11.5–14.5)
GLUCOSE BLD MANUAL STRIP-MCNC: 105 MG/DL (ref 74–99)
GLUCOSE BLD MANUAL STRIP-MCNC: 107 MG/DL (ref 74–99)
GLUCOSE BLD MANUAL STRIP-MCNC: 115 MG/DL (ref 74–99)
GLUCOSE BLD MANUAL STRIP-MCNC: 136 MG/DL (ref 74–99)
GLUCOSE BLD MANUAL STRIP-MCNC: 146 MG/DL (ref 74–99)
GLUCOSE BLD MANUAL STRIP-MCNC: 171 MG/DL (ref 74–99)
GLUCOSE BLD MANUAL STRIP-MCNC: 187 MG/DL (ref 74–99)
GLUCOSE SERPL-MCNC: 118 MG/DL (ref 74–99)
HCT VFR BLD AUTO: 29.3 % (ref 36–46)
HGB BLD-MCNC: 9.6 G/DL (ref 12–16)
IMM GRANULOCYTES # BLD AUTO: 0.03 X10*3/UL (ref 0–0.7)
IMM GRANULOCYTES NFR BLD AUTO: 0.5 % (ref 0–0.9)
LYMPHOCYTES # BLD AUTO: 0.43 X10*3/UL (ref 1.2–4.8)
LYMPHOCYTES NFR BLD AUTO: 6.7 %
MAGNESIUM SERPL-MCNC: 1.71 MG/DL (ref 1.6–2.4)
MCH RBC QN AUTO: 29.5 PG (ref 26–34)
MCHC RBC AUTO-ENTMCNC: 32.8 G/DL (ref 32–36)
MCV RBC AUTO: 90 FL (ref 80–100)
MONOCYTES # BLD AUTO: 0.25 X10*3/UL (ref 0.1–1)
MONOCYTES NFR BLD AUTO: 3.9 %
NEUTROPHILS # BLD AUTO: 5.64 X10*3/UL (ref 1.2–7.7)
NEUTROPHILS NFR BLD AUTO: 88.1 %
NRBC BLD-RTO: 0 /100 WBCS (ref 0–0)
PHOSPHATE SERPL-MCNC: 3.8 MG/DL (ref 2.5–4.9)
PLATELET # BLD AUTO: 138 X10*3/UL (ref 150–450)
POTASSIUM SERPL-SCNC: 3.8 MMOL/L (ref 3.5–5.3)
RBC # BLD AUTO: 3.25 X10*6/UL (ref 4–5.2)
RBC MORPH BLD: NORMAL
SODIUM SERPL-SCNC: 143 MMOL/L (ref 136–145)
WBC # BLD AUTO: 6.4 X10*3/UL (ref 4.4–11.3)

## 2025-08-28 PROCEDURE — 82947 ASSAY GLUCOSE BLOOD QUANT: CPT

## 2025-08-28 PROCEDURE — 2500000001 HC RX 250 WO HCPCS SELF ADMINISTERED DRUGS (ALT 637 FOR MEDICARE OP): Mod: SE | Performed by: PHYSICIAN ASSISTANT

## 2025-08-28 PROCEDURE — 1170000001 HC PRIVATE ONCOLOGY ROOM DAILY

## 2025-08-28 PROCEDURE — 97161 PT EVAL LOW COMPLEX 20 MIN: CPT | Mod: GP

## 2025-08-28 PROCEDURE — 2500000004 HC RX 250 GENERAL PHARMACY W/ HCPCS (ALT 636 FOR OP/ED): Mod: SE | Performed by: PHYSICIAN ASSISTANT

## 2025-08-28 PROCEDURE — 2500000002 HC RX 250 W HCPCS SELF ADMINISTERED DRUGS (ALT 637 FOR MEDICARE OP, ALT 636 FOR OP/ED): Mod: SE | Performed by: PHYSICIAN ASSISTANT

## 2025-08-28 PROCEDURE — 97116 GAIT TRAINING THERAPY: CPT | Mod: GP

## 2025-08-28 PROCEDURE — 2500000004 HC RX 250 GENERAL PHARMACY W/ HCPCS (ALT 636 FOR OP/ED): Mod: JZ,SE

## 2025-08-28 PROCEDURE — 2500000005 HC RX 250 GENERAL PHARMACY W/O HCPCS: Mod: SE | Performed by: PHYSICIAN ASSISTANT

## 2025-08-28 PROCEDURE — 84100 ASSAY OF PHOSPHORUS: CPT

## 2025-08-28 PROCEDURE — 80048 BASIC METABOLIC PNL TOTAL CA: CPT | Performed by: PHYSICIAN ASSISTANT

## 2025-08-28 PROCEDURE — 85025 COMPLETE CBC W/AUTO DIFF WBC: CPT | Performed by: PHYSICIAN ASSISTANT

## 2025-08-28 PROCEDURE — 2500000001 HC RX 250 WO HCPCS SELF ADMINISTERED DRUGS (ALT 637 FOR MEDICARE OP): Mod: SE

## 2025-08-28 PROCEDURE — 83735 ASSAY OF MAGNESIUM: CPT

## 2025-08-28 PROCEDURE — 99231 SBSQ HOSP IP/OBS SF/LOW 25: CPT

## 2025-08-28 PROCEDURE — 37799 UNLISTED PX VASCULAR SURGERY: CPT | Performed by: PHYSICIAN ASSISTANT

## 2025-08-28 PROCEDURE — 87081 CULTURE SCREEN ONLY: CPT

## 2025-08-28 RX ORDER — SODIUM CHLORIDE 0.9 % (FLUSH) 0.9 %
10 SYRINGE (ML) INJECTION AS NEEDED
Status: DISCONTINUED | OUTPATIENT
Start: 2025-08-28 | End: 2025-08-30 | Stop reason: HOSPADM

## 2025-08-28 RX ORDER — METFORMIN HYDROCHLORIDE 500 MG/1
1000 TABLET, EXTENDED RELEASE ORAL
Status: DISCONTINUED | OUTPATIENT
Start: 2025-08-28 | End: 2025-08-30 | Stop reason: HOSPADM

## 2025-08-28 RX ORDER — ATORVASTATIN CALCIUM 40 MG/1
40 TABLET, FILM COATED ORAL NIGHTLY
Status: DISCONTINUED | OUTPATIENT
Start: 2025-08-28 | End: 2025-08-28

## 2025-08-28 RX ORDER — ADHESIVE BANDAGE
30 BANDAGE TOPICAL DAILY PRN
Status: DISCONTINUED | OUTPATIENT
Start: 2025-08-28 | End: 2025-08-30 | Stop reason: HOSPADM

## 2025-08-28 RX ORDER — SODIUM CHLORIDE, SODIUM LACTATE, POTASSIUM CHLORIDE, CALCIUM CHLORIDE 600; 310; 30; 20 MG/100ML; MG/100ML; MG/100ML; MG/100ML
100 INJECTION, SOLUTION INTRAVENOUS CONTINUOUS
Status: ACTIVE | OUTPATIENT
Start: 2025-08-28 | End: 2025-08-29

## 2025-08-28 RX ORDER — SODIUM CHLORIDE 0.9 % (FLUSH) 0.9 %
10 SYRINGE (ML) INJECTION EVERY 8 HOURS SCHEDULED
Status: DISCONTINUED | OUTPATIENT
Start: 2025-08-28 | End: 2025-08-30 | Stop reason: HOSPADM

## 2025-08-28 RX ORDER — ONDANSETRON 4 MG/1
4 TABLET, FILM COATED ORAL EVERY 8 HOURS PRN
Status: DISCONTINUED | OUTPATIENT
Start: 2025-08-28 | End: 2025-08-30 | Stop reason: HOSPADM

## 2025-08-28 RX ORDER — PANTOPRAZOLE SODIUM 40 MG/10ML
40 INJECTION, POWDER, LYOPHILIZED, FOR SOLUTION INTRAVENOUS
Status: DISCONTINUED | OUTPATIENT
Start: 2025-08-28 | End: 2025-08-30 | Stop reason: HOSPADM

## 2025-08-28 RX ORDER — METHOCARBAMOL 500 MG/1
500 TABLET, FILM COATED ORAL EVERY 6 HOURS SCHEDULED
Status: DISCONTINUED | OUTPATIENT
Start: 2025-08-28 | End: 2025-08-30 | Stop reason: HOSPADM

## 2025-08-28 RX ORDER — DEXTROSE 50 % IN WATER (D50W) INTRAVENOUS SYRINGE
12.5
Status: DISCONTINUED | OUTPATIENT
Start: 2025-08-28 | End: 2025-08-28

## 2025-08-28 RX ORDER — ATORVASTATIN CALCIUM 40 MG/1
40 TABLET, FILM COATED ORAL NIGHTLY
Status: DISCONTINUED | OUTPATIENT
Start: 2025-08-28 | End: 2025-08-30 | Stop reason: HOSPADM

## 2025-08-28 RX ORDER — DOCUSATE SODIUM 100 MG/1
100 CAPSULE, LIQUID FILLED ORAL 2 TIMES DAILY
Status: DISCONTINUED | OUTPATIENT
Start: 2025-08-28 | End: 2025-08-30 | Stop reason: HOSPADM

## 2025-08-28 RX ORDER — EZETIMIBE 10 MG/1
10 TABLET ORAL DAILY
Status: DISCONTINUED | OUTPATIENT
Start: 2025-08-28 | End: 2025-08-30

## 2025-08-28 RX ORDER — KETOROLAC TROMETHAMINE 30 MG/ML
30 INJECTION, SOLUTION INTRAMUSCULAR; INTRAVENOUS ONCE
Status: COMPLETED | OUTPATIENT
Start: 2025-08-28 | End: 2025-08-28

## 2025-08-28 RX ORDER — ASPIRIN 81 MG/1
81 TABLET ORAL DAILY
Status: DISCONTINUED | OUTPATIENT
Start: 2025-08-28 | End: 2025-08-30 | Stop reason: HOSPADM

## 2025-08-28 RX ORDER — HEPARIN SODIUM 5000 [USP'U]/ML
5000 INJECTION, SOLUTION INTRAVENOUS; SUBCUTANEOUS EVERY 8 HOURS
Status: DISCONTINUED | OUTPATIENT
Start: 2025-08-28 | End: 2025-08-30 | Stop reason: HOSPADM

## 2025-08-28 RX ORDER — ONDANSETRON HYDROCHLORIDE 2 MG/ML
4 INJECTION, SOLUTION INTRAVENOUS EVERY 8 HOURS PRN
Status: DISCONTINUED | OUTPATIENT
Start: 2025-08-28 | End: 2025-08-30 | Stop reason: HOSPADM

## 2025-08-28 RX ORDER — DULOXETIN HYDROCHLORIDE 60 MG/1
120 CAPSULE, DELAYED RELEASE ORAL DAILY
Status: DISCONTINUED | OUTPATIENT
Start: 2025-08-28 | End: 2025-08-30 | Stop reason: HOSPADM

## 2025-08-28 RX ORDER — CELECOXIB 200 MG/1
200 CAPSULE ORAL 2 TIMES DAILY
Status: DISCONTINUED | OUTPATIENT
Start: 2025-08-28 | End: 2025-08-29

## 2025-08-28 RX ORDER — NITROGLYCERIN 20 MG/G
0.5 OINTMENT TOPICAL DAILY
Status: DISCONTINUED | OUTPATIENT
Start: 2025-08-28 | End: 2025-08-29

## 2025-08-28 RX ORDER — TOPIRAMATE 50 MG/1
50 TABLET, FILM COATED ORAL DAILY
Status: DISCONTINUED | OUTPATIENT
Start: 2025-08-28 | End: 2025-08-30 | Stop reason: HOSPADM

## 2025-08-28 RX ORDER — INSULIN LISPRO 100 [IU]/ML
0-5 INJECTION, SOLUTION INTRAVENOUS; SUBCUTANEOUS
Status: DISCONTINUED | OUTPATIENT
Start: 2025-08-28 | End: 2025-08-28

## 2025-08-28 RX ORDER — GABAPENTIN 300 MG/1
300 CAPSULE ORAL EVERY 8 HOURS SCHEDULED
Status: DISCONTINUED | OUTPATIENT
Start: 2025-08-28 | End: 2025-08-29

## 2025-08-28 RX ORDER — PANTOPRAZOLE SODIUM 40 MG/1
40 TABLET, DELAYED RELEASE ORAL
Status: DISCONTINUED | OUTPATIENT
Start: 2025-08-28 | End: 2025-08-30 | Stop reason: HOSPADM

## 2025-08-28 RX ORDER — CEFAZOLIN SODIUM 2 G/100ML
2 INJECTION, SOLUTION INTRAVENOUS EVERY 8 HOURS
Status: COMPLETED | OUTPATIENT
Start: 2025-08-28 | End: 2025-08-28

## 2025-08-28 RX ADMIN — NITROGLYCERIN 0.5 INCH: 20 OINTMENT TOPICAL at 09:04

## 2025-08-28 RX ADMIN — METHOCARBAMOL 500 MG: 500 TABLET ORAL at 12:45

## 2025-08-28 RX ADMIN — DOCUSATE SODIUM 100 MG: 100 CAPSULE, LIQUID FILLED ORAL at 20:42

## 2025-08-28 RX ADMIN — ATORVASTATIN CALCIUM 40 MG: 40 TABLET, FILM COATED ORAL at 20:42

## 2025-08-28 RX ADMIN — METHOCARBAMOL 500 MG: 500 TABLET ORAL at 00:41

## 2025-08-28 RX ADMIN — DOCUSATE SODIUM 100 MG: 100 CAPSULE, LIQUID FILLED ORAL at 09:02

## 2025-08-28 RX ADMIN — CEFAZOLIN SODIUM 2 G: 2 INJECTION, SOLUTION INTRAVENOUS at 09:04

## 2025-08-28 RX ADMIN — METFORMIN ER 500 MG 1000 MG: 500 TABLET ORAL at 17:31

## 2025-08-28 RX ADMIN — METHOCARBAMOL 500 MG: 500 TABLET ORAL at 17:31

## 2025-08-28 RX ADMIN — CEFAZOLIN SODIUM 2 G: 2 INJECTION, SOLUTION INTRAVENOUS at 15:48

## 2025-08-28 RX ADMIN — ACETAMINOPHEN 975 MG: 325 TABLET ORAL at 09:02

## 2025-08-28 RX ADMIN — ACETAMINOPHEN 975 MG: 325 TABLET ORAL at 15:49

## 2025-08-28 RX ADMIN — CELECOXIB 200 MG: 200 CAPSULE ORAL at 09:03

## 2025-08-28 RX ADMIN — ACETAMINOPHEN 975 MG: 325 TABLET ORAL at 00:06

## 2025-08-28 RX ADMIN — EZETIMIBE 10 MG: 10 TABLET ORAL at 09:03

## 2025-08-28 RX ADMIN — METHOCARBAMOL 500 MG: 500 TABLET ORAL at 05:23

## 2025-08-28 RX ADMIN — GABAPENTIN 300 MG: 300 CAPSULE ORAL at 00:41

## 2025-08-28 RX ADMIN — ASPIRIN 81 MG: 81 TABLET, DELAYED RELEASE ORAL at 15:59

## 2025-08-28 RX ADMIN — PANTOPRAZOLE SODIUM 40 MG: 40 TABLET, DELAYED RELEASE ORAL at 06:01

## 2025-08-28 RX ADMIN — HEPARIN SODIUM 5000 UNITS: 5000 INJECTION, SOLUTION INTRAVENOUS; SUBCUTANEOUS at 00:41

## 2025-08-28 RX ADMIN — GABAPENTIN 300 MG: 300 CAPSULE ORAL at 09:02

## 2025-08-28 RX ADMIN — DULOXETINE 120 MG: 60 CAPSULE, DELAYED RELEASE ORAL at 09:02

## 2025-08-28 RX ADMIN — HEPARIN SODIUM 5000 UNITS: 5000 INJECTION, SOLUTION INTRAVENOUS; SUBCUTANEOUS at 15:48

## 2025-08-28 RX ADMIN — SODIUM CHLORIDE, SODIUM LACTATE, POTASSIUM CHLORIDE, AND CALCIUM CHLORIDE 100 ML/HR: .6; .31; .03; .02 INJECTION, SOLUTION INTRAVENOUS at 00:41

## 2025-08-28 RX ADMIN — DOCUSATE SODIUM 100 MG: 100 CAPSULE, LIQUID FILLED ORAL at 00:41

## 2025-08-28 RX ADMIN — KETOROLAC TROMETHAMINE 30 MG: 30 INJECTION, SOLUTION INTRAMUSCULAR; INTRAVENOUS at 20:42

## 2025-08-28 RX ADMIN — HEPARIN SODIUM 5000 UNITS: 5000 INJECTION, SOLUTION INTRAVENOUS; SUBCUTANEOUS at 09:03

## 2025-08-28 RX ADMIN — TOPIRAMATE 50 MG: 50 TABLET, FILM COATED ORAL at 09:03

## 2025-08-28 RX ADMIN — CEFAZOLIN SODIUM 2 G: 2 INJECTION, SOLUTION INTRAVENOUS at 00:41

## 2025-08-28 SDOH — SOCIAL STABILITY: SOCIAL INSECURITY: ABUSE: ADULT

## 2025-08-28 SDOH — SOCIAL STABILITY: SOCIAL INSECURITY: WITHIN THE LAST YEAR, HAVE YOU BEEN AFRAID OF YOUR PARTNER OR EX-PARTNER?: NO

## 2025-08-28 SDOH — SOCIAL STABILITY: SOCIAL INSECURITY: ARE THERE ANY APPARENT SIGNS OF INJURIES/BEHAVIORS THAT COULD BE RELATED TO ABUSE/NEGLECT?: NO

## 2025-08-28 SDOH — ECONOMIC STABILITY: HOUSING INSECURITY: IN THE LAST 12 MONTHS, WAS THERE A TIME WHEN YOU WERE NOT ABLE TO PAY THE MORTGAGE OR RENT ON TIME?: NO

## 2025-08-28 SDOH — SOCIAL STABILITY: SOCIAL INSECURITY: WITHIN THE LAST YEAR, HAVE YOU BEEN HUMILIATED OR EMOTIONALLY ABUSED IN OTHER WAYS BY YOUR PARTNER OR EX-PARTNER?: NO

## 2025-08-28 SDOH — SOCIAL STABILITY: SOCIAL INSECURITY
WITHIN THE LAST YEAR, HAVE YOU BEEN KICKED, HIT, SLAPPED, OR OTHERWISE PHYSICALLY HURT BY YOUR PARTNER OR EX-PARTNER?: NO

## 2025-08-28 SDOH — HEALTH STABILITY: PHYSICAL HEALTH: ON AVERAGE, HOW MANY MINUTES DO YOU ENGAGE IN EXERCISE AT THIS LEVEL?: 40 MIN

## 2025-08-28 SDOH — SOCIAL STABILITY: SOCIAL INSECURITY
ASK PARENT OR GUARDIAN: ARE THERE TIMES WHEN YOU, YOUR CHILD(REN), OR ANY MEMBER OF YOUR HOUSEHOLD FEEL UNSAFE, HARMED, OR THREATENED AROUND PERSONS WITH WHOM YOU KNOW OR LIVE?: NO

## 2025-08-28 SDOH — SOCIAL STABILITY: SOCIAL INSECURITY: ARE YOU OR HAVE YOU BEEN THREATENED OR ABUSED PHYSICALLY, EMOTIONALLY, OR SEXUALLY BY ANYONE?: NO

## 2025-08-28 SDOH — HEALTH STABILITY: PHYSICAL HEALTH: ON AVERAGE, HOW MANY DAYS PER WEEK DO YOU ENGAGE IN MODERATE TO STRENUOUS EXERCISE (LIKE A BRISK WALK)?: 7 DAYS

## 2025-08-28 SDOH — SOCIAL STABILITY: SOCIAL INSECURITY: HAS ANYONE EVER THREATENED TO HURT YOUR FAMILY OR YOUR PETS?: NO

## 2025-08-28 SDOH — SOCIAL STABILITY: SOCIAL INSECURITY: DOES ANYONE TRY TO KEEP YOU FROM HAVING/CONTACTING OTHER FRIENDS OR DOING THINGS OUTSIDE YOUR HOME?: NO

## 2025-08-28 SDOH — ECONOMIC STABILITY: FOOD INSECURITY: WITHIN THE PAST 12 MONTHS, THE FOOD YOU BOUGHT JUST DIDN'T LAST AND YOU DIDN'T HAVE MONEY TO GET MORE.: NEVER TRUE

## 2025-08-28 SDOH — SOCIAL STABILITY: SOCIAL INSECURITY: DO YOU FEEL ANYONE HAS EXPLOITED OR TAKEN ADVANTAGE OF YOU FINANCIALLY OR OF YOUR PERSONAL PROPERTY?: NO

## 2025-08-28 SDOH — ECONOMIC STABILITY: HOUSING INSECURITY: IN THE PAST 12 MONTHS, HOW MANY TIMES HAVE YOU MOVED WHERE YOU WERE LIVING?: 0

## 2025-08-28 SDOH — SOCIAL STABILITY: SOCIAL INSECURITY
WITHIN THE LAST YEAR, HAVE YOU BEEN RAPED OR FORCED TO HAVE ANY KIND OF SEXUAL ACTIVITY BY YOUR PARTNER OR EX-PARTNER?: NO

## 2025-08-28 SDOH — ECONOMIC STABILITY: FOOD INSECURITY: WITHIN THE PAST 12 MONTHS, YOU WORRIED THAT YOUR FOOD WOULD RUN OUT BEFORE YOU GOT THE MONEY TO BUY MORE.: NEVER TRUE

## 2025-08-28 SDOH — ECONOMIC STABILITY: FOOD INSECURITY: HOW HARD IS IT FOR YOU TO PAY FOR THE VERY BASICS LIKE FOOD, HOUSING, MEDICAL CARE, AND HEATING?: NOT HARD AT ALL

## 2025-08-28 SDOH — SOCIAL STABILITY: SOCIAL INSECURITY: HAVE YOU HAD THOUGHTS OF HARMING ANYONE ELSE?: NO

## 2025-08-28 SDOH — ECONOMIC STABILITY: INCOME INSECURITY: IN THE PAST 12 MONTHS HAS THE ELECTRIC, GAS, OIL, OR WATER COMPANY THREATENED TO SHUT OFF SERVICES IN YOUR HOME?: NO

## 2025-08-28 SDOH — SOCIAL STABILITY: SOCIAL INSECURITY: DO YOU FEEL UNSAFE GOING BACK TO THE PLACE WHERE YOU ARE LIVING?: NO

## 2025-08-28 SDOH — ECONOMIC STABILITY: HOUSING INSECURITY: DO YOU FEEL UNSAFE GOING BACK TO THE PLACE WHERE YOU LIVE?: NO

## 2025-08-28 SDOH — SOCIAL STABILITY: SOCIAL INSECURITY: HAVE YOU HAD ANY THOUGHTS OF HARMING ANYONE ELSE?: NO

## 2025-08-28 SDOH — SOCIAL STABILITY: SOCIAL INSECURITY: WERE YOU ABLE TO COMPLETE ALL THE BEHAVIORAL HEALTH SCREENINGS?: YES

## 2025-08-28 SDOH — ECONOMIC STABILITY: HOUSING INSECURITY: AT ANY TIME IN THE PAST 12 MONTHS, WERE YOU HOMELESS OR LIVING IN A SHELTER (INCLUDING NOW)?: NO

## 2025-08-28 SDOH — ECONOMIC STABILITY: TRANSPORTATION INSECURITY: IN THE PAST 12 MONTHS, HAS LACK OF TRANSPORTATION KEPT YOU FROM MEDICAL APPOINTMENTS OR FROM GETTING MEDICATIONS?: NO

## 2025-08-28 ASSESSMENT — COGNITIVE AND FUNCTIONAL STATUS - GENERAL
EATING MEALS: A LITTLE
TOILETING: A LITTLE
HELP NEEDED FOR BATHING: A LITTLE
DAILY ACTIVITIY SCORE: 18
TURNING FROM BACK TO SIDE WHILE IN FLAT BAD: A LITTLE
WALKING IN HOSPITAL ROOM: A LOT
EATING MEALS: A LITTLE
MOVING TO AND FROM BED TO CHAIR: A LITTLE
DRESSING REGULAR UPPER BODY CLOTHING: A LITTLE
TURNING FROM BACK TO SIDE WHILE IN FLAT BAD: A LITTLE
WALKING IN HOSPITAL ROOM: A LOT
CLIMB 3 TO 5 STEPS WITH RAILING: A LITTLE
DRESSING REGULAR UPPER BODY CLOTHING: A LITTLE
MOVING FROM LYING ON BACK TO SITTING ON SIDE OF FLAT BED WITH BEDRAILS: A LITTLE
MOVING FROM LYING ON BACK TO SITTING ON SIDE OF FLAT BED WITH BEDRAILS: A LITTLE
TURNING FROM BACK TO SIDE WHILE IN FLAT BAD: A LITTLE
STANDING UP FROM CHAIR USING ARMS: A LITTLE
MOBILITY SCORE: 16
DRESSING REGULAR LOWER BODY CLOTHING: A LITTLE
TOILETING: A LITTLE
PERSONAL GROOMING: A LITTLE
HELP NEEDED FOR BATHING: A LITTLE
MOVING TO AND FROM BED TO CHAIR: A LITTLE
PATIENT BASELINE BEDBOUND: NO
STANDING UP FROM CHAIR USING ARMS: A LITTLE
MOBILITY SCORE: 18
CLIMB 3 TO 5 STEPS WITH RAILING: A LOT
MOVING TO AND FROM BED TO CHAIR: A LITTLE
PERSONAL GROOMING: A LITTLE
DRESSING REGULAR LOWER BODY CLOTHING: A LITTLE
WALKING IN HOSPITAL ROOM: A LITTLE
CLIMB 3 TO 5 STEPS WITH RAILING: A LOT
STANDING UP FROM CHAIR USING ARMS: A LITTLE
MOVING FROM LYING ON BACK TO SITTING ON SIDE OF FLAT BED WITH BEDRAILS: A LITTLE

## 2025-08-28 ASSESSMENT — PAIN - FUNCTIONAL ASSESSMENT
PAIN_FUNCTIONAL_ASSESSMENT: 0-10

## 2025-08-28 ASSESSMENT — PAIN DESCRIPTION - DESCRIPTORS
DESCRIPTORS: ACHING
DESCRIPTORS: ACHING
DESCRIPTORS: ACHING;DISCOMFORT;TENDER;THROBBING

## 2025-08-28 ASSESSMENT — ACTIVITIES OF DAILY LIVING (ADL)
JUDGMENT_ADEQUATE_SAFELY_COMPLETE_DAILY_ACTIVITIES: YES
FEEDING YOURSELF: INDEPENDENT
EFFECT OF PAIN ON DAILY ACTIVITIES: DISCOMFORT
LACK_OF_TRANSPORTATION: NO
PATIENT'S MEMORY ADEQUATE TO SAFELY COMPLETE DAILY ACTIVITIES?: YES
HEARING - RIGHT EAR: FUNCTIONAL
DRESSING YOURSELF: INDEPENDENT
LACK_OF_TRANSPORTATION: NO
ADL_ASSISTANCE: INDEPENDENT
HEARING - LEFT EAR: FUNCTIONAL
WALKS IN HOME: NEEDS ASSISTANCE
TOILETING: NEEDS ASSISTANCE
ADEQUATE_TO_COMPLETE_ADL: YES
BATHING: INDEPENDENT
GROOMING: INDEPENDENT

## 2025-08-28 ASSESSMENT — PAIN SCALES - GENERAL
PAINLEVEL_OUTOF10: 7
PAINLEVEL_OUTOF10: 6
PAINLEVEL_OUTOF10: 6
PAINLEVEL_OUTOF10: 7
PAINLEVEL_OUTOF10: 7
PAINLEVEL_OUTOF10: 6
PAINLEVEL_OUTOF10: 8
PAINLEVEL_OUTOF10: 3
PAINLEVEL_OUTOF10: 9
PAINLEVEL_OUTOF10: 6

## 2025-08-28 ASSESSMENT — LIFESTYLE VARIABLES
AUDIT-C TOTAL SCORE: 0
HOW OFTEN DO YOU HAVE 6 OR MORE DRINKS ON ONE OCCASION: NEVER
AUDIT-C TOTAL SCORE: 0
HOW OFTEN DO YOU HAVE A DRINK CONTAINING ALCOHOL: NEVER
HOW MANY STANDARD DRINKS CONTAINING ALCOHOL DO YOU HAVE ON A TYPICAL DAY: PATIENT DOES NOT DRINK
SKIP TO QUESTIONS 9-10: 1

## 2025-08-28 ASSESSMENT — PATIENT HEALTH QUESTIONNAIRE - PHQ9
1. LITTLE INTEREST OR PLEASURE IN DOING THINGS: NOT AT ALL
SUM OF ALL RESPONSES TO PHQ9 QUESTIONS 1 & 2: 0
2. FEELING DOWN, DEPRESSED OR HOPELESS: NOT AT ALL

## 2025-08-29 LAB
ABO GROUP (TYPE) IN BLOOD: NORMAL
ANION GAP SERPL CALC-SCNC: 10 MMOL/L (ref 10–20)
ANTIBODY SCREEN: NORMAL
BLOOD EXPIRATION DATE: NORMAL
BUN SERPL-MCNC: 16 MG/DL (ref 6–23)
CALCIUM SERPL-MCNC: 8.6 MG/DL (ref 8.6–10.6)
CHLORIDE SERPL-SCNC: 106 MMOL/L (ref 98–107)
CO2 SERPL-SCNC: 29 MMOL/L (ref 21–32)
CREAT SERPL-MCNC: 0.57 MG/DL (ref 0.5–1.05)
DISPENSE STATUS: NORMAL
EGFRCR SERPLBLD CKD-EPI 2021: >90 ML/MIN/1.73M*2
ERYTHROCYTE [DISTWIDTH] IN BLOOD BY AUTOMATED COUNT: 13.3 % (ref 11.5–14.5)
GLUCOSE BLD MANUAL STRIP-MCNC: 100 MG/DL (ref 74–99)
GLUCOSE SERPL-MCNC: 101 MG/DL (ref 74–99)
HCT VFR BLD AUTO: 27.7 % (ref 36–46)
HGB BLD-MCNC: 8.9 G/DL (ref 12–16)
MCH RBC QN AUTO: 29.6 PG (ref 26–34)
MCHC RBC AUTO-ENTMCNC: 32.1 G/DL (ref 32–36)
MCV RBC AUTO: 92 FL (ref 80–100)
NRBC BLD-RTO: 0 /100 WBCS (ref 0–0)
PLATELET # BLD AUTO: 133 X10*3/UL (ref 150–450)
POTASSIUM SERPL-SCNC: 3.5 MMOL/L (ref 3.5–5.3)
PRODUCT BLOOD TYPE: 5100
PRODUCT CODE: NORMAL
RBC # BLD AUTO: 3.01 X10*6/UL (ref 4–5.2)
RH FACTOR (ANTIGEN D): NORMAL
SODIUM SERPL-SCNC: 141 MMOL/L (ref 136–145)
STAPHYLOCOCCUS SPEC CULT: NORMAL
UNIT ABO: NORMAL
UNIT NUMBER: NORMAL
UNIT RH: NORMAL
UNIT VOLUME: 281
WBC # BLD AUTO: 5.6 X10*3/UL (ref 4.4–11.3)
XM INTEP: NORMAL

## 2025-08-29 PROCEDURE — 97165 OT EVAL LOW COMPLEX 30 MIN: CPT | Mod: GO

## 2025-08-29 PROCEDURE — 85027 COMPLETE CBC AUTOMATED: CPT

## 2025-08-29 PROCEDURE — 82947 ASSAY GLUCOSE BLOOD QUANT: CPT

## 2025-08-29 PROCEDURE — 86923 COMPATIBILITY TEST ELECTRIC: CPT

## 2025-08-29 PROCEDURE — 2500000005 HC RX 250 GENERAL PHARMACY W/O HCPCS: Mod: SE | Performed by: PHYSICIAN ASSISTANT

## 2025-08-29 PROCEDURE — 2500000001 HC RX 250 WO HCPCS SELF ADMINISTERED DRUGS (ALT 637 FOR MEDICARE OP): Mod: SE

## 2025-08-29 PROCEDURE — 1170000001 HC PRIVATE ONCOLOGY ROOM DAILY

## 2025-08-29 PROCEDURE — 2500000002 HC RX 250 W HCPCS SELF ADMINISTERED DRUGS (ALT 637 FOR MEDICARE OP, ALT 636 FOR OP/ED): Mod: SE | Performed by: PHYSICIAN ASSISTANT

## 2025-08-29 PROCEDURE — P9040 RBC LEUKOREDUCED IRRADIATED: HCPCS

## 2025-08-29 PROCEDURE — 2500000001 HC RX 250 WO HCPCS SELF ADMINISTERED DRUGS (ALT 637 FOR MEDICARE OP): Mod: SE | Performed by: PHYSICIAN ASSISTANT

## 2025-08-29 PROCEDURE — 86901 BLOOD TYPING SEROLOGIC RH(D): CPT | Performed by: PHYSICIAN ASSISTANT

## 2025-08-29 PROCEDURE — 37799 UNLISTED PX VASCULAR SURGERY: CPT

## 2025-08-29 PROCEDURE — 36415 COLL VENOUS BLD VENIPUNCTURE: CPT | Performed by: PHYSICIAN ASSISTANT

## 2025-08-29 PROCEDURE — 36430 TRANSFUSION BLD/BLD COMPNT: CPT

## 2025-08-29 PROCEDURE — 80048 BASIC METABOLIC PNL TOTAL CA: CPT

## 2025-08-29 PROCEDURE — 2500000004 HC RX 250 GENERAL PHARMACY W/ HCPCS (ALT 636 FOR OP/ED): Mod: SE | Performed by: PHYSICIAN ASSISTANT

## 2025-08-29 RX ORDER — CELECOXIB 200 MG/1
200 CAPSULE ORAL ONCE
Status: COMPLETED | OUTPATIENT
Start: 2025-08-29 | End: 2025-08-29

## 2025-08-29 RX ORDER — LORAZEPAM 0.5 MG/1
0.5 TABLET ORAL DAILY PRN
Status: DISCONTINUED | OUTPATIENT
Start: 2025-08-29 | End: 2025-08-30 | Stop reason: HOSPADM

## 2025-08-29 RX ORDER — CELECOXIB 200 MG/1
200 CAPSULE ORAL 2 TIMES DAILY
Status: DISCONTINUED | OUTPATIENT
Start: 2025-08-29 | End: 2025-08-30 | Stop reason: HOSPADM

## 2025-08-29 RX ADMIN — HEPARIN SODIUM 5000 UNITS: 5000 INJECTION, SOLUTION INTRAVENOUS; SUBCUTANEOUS at 00:10

## 2025-08-29 RX ADMIN — HEPARIN SODIUM 5000 UNITS: 5000 INJECTION, SOLUTION INTRAVENOUS; SUBCUTANEOUS at 08:09

## 2025-08-29 RX ADMIN — EZETIMIBE 10 MG: 10 TABLET ORAL at 08:35

## 2025-08-29 RX ADMIN — METHOCARBAMOL 500 MG: 500 TABLET ORAL at 17:10

## 2025-08-29 RX ADMIN — HEPARIN SODIUM 5000 UNITS: 5000 INJECTION, SOLUTION INTRAVENOUS; SUBCUTANEOUS at 17:09

## 2025-08-29 RX ADMIN — DOCUSATE SODIUM 100 MG: 100 CAPSULE, LIQUID FILLED ORAL at 21:45

## 2025-08-29 RX ADMIN — CELECOXIB 200 MG: 200 CAPSULE ORAL at 21:46

## 2025-08-29 RX ADMIN — TOPIRAMATE 50 MG: 50 TABLET, FILM COATED ORAL at 08:12

## 2025-08-29 RX ADMIN — METHOCARBAMOL 500 MG: 500 TABLET ORAL at 11:47

## 2025-08-29 RX ADMIN — METHOCARBAMOL 500 MG: 500 TABLET ORAL at 00:10

## 2025-08-29 RX ADMIN — METFORMIN ER 500 MG 1000 MG: 500 TABLET ORAL at 08:10

## 2025-08-29 RX ADMIN — ACETAMINOPHEN 975 MG: 325 TABLET ORAL at 15:32

## 2025-08-29 RX ADMIN — Medication 10 ML: at 22:04

## 2025-08-29 RX ADMIN — METHOCARBAMOL 500 MG: 500 TABLET ORAL at 05:11

## 2025-08-29 RX ADMIN — PANTOPRAZOLE SODIUM 40 MG: 40 TABLET, DELAYED RELEASE ORAL at 06:01

## 2025-08-29 RX ADMIN — CELECOXIB 200 MG: 200 CAPSULE ORAL at 13:48

## 2025-08-29 RX ADMIN — ATORVASTATIN CALCIUM 40 MG: 40 TABLET, FILM COATED ORAL at 21:46

## 2025-08-29 RX ADMIN — NITROGLYCERIN 0.5 INCH: 20 OINTMENT TOPICAL at 09:30

## 2025-08-29 RX ADMIN — GABAPENTIN 300 MG: 300 CAPSULE ORAL at 09:00

## 2025-08-29 RX ADMIN — LORAZEPAM 0.5 MG: 0.5 TABLET ORAL at 09:30

## 2025-08-29 RX ADMIN — ACETAMINOPHEN 975 MG: 325 TABLET ORAL at 00:10

## 2025-08-29 RX ADMIN — METFORMIN ER 500 MG 1000 MG: 500 TABLET ORAL at 17:10

## 2025-08-29 RX ADMIN — DOCUSATE SODIUM 100 MG: 100 CAPSULE, LIQUID FILLED ORAL at 08:11

## 2025-08-29 RX ADMIN — ASPIRIN 81 MG: 81 TABLET, DELAYED RELEASE ORAL at 08:12

## 2025-08-29 RX ADMIN — ACETAMINOPHEN 975 MG: 325 TABLET ORAL at 08:10

## 2025-08-29 ASSESSMENT — PAIN SCALES - GENERAL
PAINLEVEL_OUTOF10: 7
PAINLEVEL_OUTOF10: 6

## 2025-08-29 ASSESSMENT — COGNITIVE AND FUNCTIONAL STATUS - GENERAL
DRESSING REGULAR UPPER BODY CLOTHING: A LITTLE
HELP NEEDED FOR BATHING: A LITTLE
CLIMB 3 TO 5 STEPS WITH RAILING: A LOT
WALKING IN HOSPITAL ROOM: A LITTLE
DRESSING REGULAR LOWER BODY CLOTHING: A LITTLE
MOVING FROM LYING ON BACK TO SITTING ON SIDE OF FLAT BED WITH BEDRAILS: A LITTLE
MOBILITY SCORE: 17
DRESSING REGULAR LOWER BODY CLOTHING: A LITTLE
DAILY ACTIVITIY SCORE: 20
TURNING FROM BACK TO SIDE WHILE IN FLAT BAD: A LITTLE
STANDING UP FROM CHAIR USING ARMS: A LITTLE
MOVING TO AND FROM BED TO CHAIR: A LITTLE
HELP NEEDED FOR BATHING: A LITTLE
TOILETING: A LITTLE
DAILY ACTIVITIY SCORE: 20
TOILETING: A LITTLE
DRESSING REGULAR UPPER BODY CLOTHING: A LITTLE

## 2025-08-29 ASSESSMENT — ACTIVITIES OF DAILY LIVING (ADL)
BATHING_ASSISTANCE: MINIMAL
ADL_ASSISTANCE: INDEPENDENT

## 2025-08-29 ASSESSMENT — PAIN - FUNCTIONAL ASSESSMENT
PAIN_FUNCTIONAL_ASSESSMENT: 0-10
PAIN_FUNCTIONAL_ASSESSMENT: 0-10

## 2025-08-30 VITALS
HEIGHT: 70 IN | BODY MASS INDEX: 35.7 KG/M2 | OXYGEN SATURATION: 100 % | DIASTOLIC BLOOD PRESSURE: 83 MMHG | HEART RATE: 64 BPM | RESPIRATION RATE: 16 BRPM | TEMPERATURE: 97.2 F | WEIGHT: 249.34 LBS | SYSTOLIC BLOOD PRESSURE: 135 MMHG

## 2025-08-30 LAB
ANION GAP SERPL CALC-SCNC: 9 MMOL/L (ref 10–20)
BUN SERPL-MCNC: 13 MG/DL (ref 6–23)
CALCIUM SERPL-MCNC: 8.4 MG/DL (ref 8.6–10.6)
CHLORIDE SERPL-SCNC: 106 MMOL/L (ref 98–107)
CO2 SERPL-SCNC: 28 MMOL/L (ref 21–32)
CREAT SERPL-MCNC: 0.47 MG/DL (ref 0.5–1.05)
EGFRCR SERPLBLD CKD-EPI 2021: >90 ML/MIN/1.73M*2
ERYTHROCYTE [DISTWIDTH] IN BLOOD BY AUTOMATED COUNT: 14.1 % (ref 11.5–14.5)
GLUCOSE SERPL-MCNC: 185 MG/DL (ref 74–99)
HCT VFR BLD AUTO: 28.9 % (ref 36–46)
HGB BLD-MCNC: 9.1 G/DL (ref 12–16)
MCH RBC QN AUTO: 29.1 PG (ref 26–34)
MCHC RBC AUTO-ENTMCNC: 31.5 G/DL (ref 32–36)
MCV RBC AUTO: 92 FL (ref 80–100)
NRBC BLD-RTO: 0 /100 WBCS (ref 0–0)
PLATELET # BLD AUTO: 133 X10*3/UL (ref 150–450)
POTASSIUM SERPL-SCNC: 4.2 MMOL/L (ref 3.5–5.3)
RBC # BLD AUTO: 3.13 X10*6/UL (ref 4–5.2)
SODIUM SERPL-SCNC: 139 MMOL/L (ref 136–145)
WBC # BLD AUTO: 5.4 X10*3/UL (ref 4.4–11.3)

## 2025-08-30 PROCEDURE — 2500000001 HC RX 250 WO HCPCS SELF ADMINISTERED DRUGS (ALT 637 FOR MEDICARE OP): Mod: SE

## 2025-08-30 PROCEDURE — 36415 COLL VENOUS BLD VENIPUNCTURE: CPT | Performed by: PHYSICIAN ASSISTANT

## 2025-08-30 PROCEDURE — 80048 BASIC METABOLIC PNL TOTAL CA: CPT | Performed by: PHYSICIAN ASSISTANT

## 2025-08-30 PROCEDURE — 2500000001 HC RX 250 WO HCPCS SELF ADMINISTERED DRUGS (ALT 637 FOR MEDICARE OP): Mod: SE | Performed by: PHYSICIAN ASSISTANT

## 2025-08-30 PROCEDURE — 85027 COMPLETE CBC AUTOMATED: CPT | Performed by: PHYSICIAN ASSISTANT

## 2025-08-30 PROCEDURE — 2500000004 HC RX 250 GENERAL PHARMACY W/ HCPCS (ALT 636 FOR OP/ED): Mod: SE | Performed by: PHYSICIAN ASSISTANT

## 2025-08-30 RX ORDER — OXYCODONE HYDROCHLORIDE 5 MG/1
5 TABLET ORAL EVERY 6 HOURS PRN
Refills: 0 | Status: DISCONTINUED | OUTPATIENT
Start: 2025-08-30 | End: 2025-08-30

## 2025-08-30 RX ORDER — OXYCODONE HYDROCHLORIDE 5 MG/1
2.5 TABLET ORAL EVERY 6 HOURS PRN
Refills: 0 | Status: DISCONTINUED | OUTPATIENT
Start: 2025-08-30 | End: 2025-08-30

## 2025-08-30 RX ORDER — OXYCODONE HYDROCHLORIDE 5 MG/1
2.5 TABLET ORAL ONCE
Refills: 0 | Status: DISCONTINUED | OUTPATIENT
Start: 2025-08-30 | End: 2025-08-30 | Stop reason: HOSPADM

## 2025-08-30 RX ORDER — ACETAMINOPHEN 325 MG/1
975 TABLET ORAL EVERY 8 HOURS
Qty: 126 TABLET | Refills: 0 | Status: SHIPPED | OUTPATIENT
Start: 2025-08-30 | End: 2025-09-13

## 2025-08-30 RX ORDER — CELECOXIB 200 MG/1
200 CAPSULE ORAL 2 TIMES DAILY
Qty: 28 CAPSULE | Refills: 0 | Status: SHIPPED | OUTPATIENT
Start: 2025-08-30 | End: 2025-09-13

## 2025-08-30 RX ORDER — METHOCARBAMOL 500 MG/1
500 TABLET, FILM COATED ORAL EVERY 6 HOURS SCHEDULED
Qty: 56 TABLET | Refills: 0 | Status: SHIPPED | OUTPATIENT
Start: 2025-08-30 | End: 2025-09-13

## 2025-08-30 RX ORDER — EZETIMIBE 10 MG/1
10 TABLET ORAL DAILY
Status: DISCONTINUED | OUTPATIENT
Start: 2025-08-30 | End: 2025-08-30 | Stop reason: HOSPADM

## 2025-08-30 RX ORDER — DOCUSATE SODIUM 100 MG/1
100 CAPSULE, LIQUID FILLED ORAL 2 TIMES DAILY
Qty: 28 CAPSULE | Refills: 0 | Status: SHIPPED | OUTPATIENT
Start: 2025-08-30 | End: 2025-09-13

## 2025-08-30 RX ORDER — OXYCODONE HYDROCHLORIDE 5 MG/1
2.5 TABLET ORAL EVERY 6 HOURS PRN
Qty: 15 TABLET | Refills: 0 | Status: SHIPPED | OUTPATIENT
Start: 2025-08-30

## 2025-08-30 RX ORDER — KETOROLAC TROMETHAMINE 30 MG/ML
30 INJECTION, SOLUTION INTRAMUSCULAR; INTRAVENOUS ONCE
Status: DISCONTINUED | OUTPATIENT
Start: 2025-08-30 | End: 2025-08-30

## 2025-08-30 RX ORDER — ASPIRIN 81 MG/1
81 TABLET ORAL DAILY
Qty: 30 TABLET | Refills: 0 | Status: SHIPPED | OUTPATIENT
Start: 2025-08-31 | End: 2025-09-30

## 2025-08-30 RX ADMIN — HEPARIN SODIUM 5000 UNITS: 5000 INJECTION, SOLUTION INTRAVENOUS; SUBCUTANEOUS at 01:37

## 2025-08-30 RX ADMIN — ASPIRIN 81 MG: 81 TABLET, DELAYED RELEASE ORAL at 08:57

## 2025-08-30 RX ADMIN — METHOCARBAMOL 500 MG: 500 TABLET ORAL at 01:37

## 2025-08-30 RX ADMIN — CELECOXIB 200 MG: 200 CAPSULE ORAL at 09:06

## 2025-08-30 RX ADMIN — ACETAMINOPHEN 975 MG: 325 TABLET ORAL at 08:57

## 2025-08-30 RX ADMIN — ACETAMINOPHEN 975 MG: 325 TABLET ORAL at 01:36

## 2025-08-30 RX ADMIN — METHOCARBAMOL 500 MG: 500 TABLET ORAL at 11:46

## 2025-08-30 RX ADMIN — HEPARIN SODIUM 5000 UNITS: 5000 INJECTION, SOLUTION INTRAVENOUS; SUBCUTANEOUS at 08:56

## 2025-08-30 RX ADMIN — PANTOPRAZOLE SODIUM 40 MG: 40 TABLET, DELAYED RELEASE ORAL at 05:44

## 2025-08-30 RX ADMIN — METFORMIN ER 500 MG 1000 MG: 500 TABLET ORAL at 08:58

## 2025-08-30 RX ADMIN — OXYCODONE HYDROCHLORIDE 5 MG: 5 TABLET ORAL at 05:11

## 2025-08-30 RX ADMIN — METHOCARBAMOL 500 MG: 500 TABLET ORAL at 05:44

## 2025-08-30 RX ADMIN — TOPIRAMATE 50 MG: 50 TABLET, FILM COATED ORAL at 08:57

## 2025-08-30 ASSESSMENT — COGNITIVE AND FUNCTIONAL STATUS - GENERAL
DRESSING REGULAR LOWER BODY CLOTHING: A LITTLE
CLIMB 3 TO 5 STEPS WITH RAILING: A LOT
MOVING FROM LYING ON BACK TO SITTING ON SIDE OF FLAT BED WITH BEDRAILS: A LITTLE
TURNING FROM BACK TO SIDE WHILE IN FLAT BAD: A LITTLE
MOVING TO AND FROM BED TO CHAIR: A LITTLE
DRESSING REGULAR UPPER BODY CLOTHING: A LITTLE
MOVING TO AND FROM BED TO CHAIR: A LITTLE
STANDING UP FROM CHAIR USING ARMS: A LITTLE
WALKING IN HOSPITAL ROOM: A LITTLE
HELP NEEDED FOR BATHING: A LITTLE
DRESSING REGULAR UPPER BODY CLOTHING: A LITTLE
MOBILITY SCORE: 19
MOBILITY SCORE: 17
TOILETING: A LITTLE
TOILETING: A LITTLE
TURNING FROM BACK TO SIDE WHILE IN FLAT BAD: A LITTLE
STANDING UP FROM CHAIR USING ARMS: A LITTLE
HELP NEEDED FOR BATHING: A LITTLE
DAILY ACTIVITIY SCORE: 20
WALKING IN HOSPITAL ROOM: A LITTLE
DAILY ACTIVITIY SCORE: 21
CLIMB 3 TO 5 STEPS WITH RAILING: A LITTLE

## 2025-08-30 ASSESSMENT — PAIN - FUNCTIONAL ASSESSMENT
PAIN_FUNCTIONAL_ASSESSMENT: 0-10

## 2025-08-30 ASSESSMENT — PAIN SCALES - GENERAL
PAINLEVEL_OUTOF10: 5 - MODERATE PAIN
PAINLEVEL_OUTOF10: 9
PAINLEVEL_OUTOF10: 7

## 2025-09-01 ENCOUNTER — TELEPHONE (OUTPATIENT)
Dept: PLASTIC SURGERY | Facility: HOSPITAL | Age: 35
End: 2025-09-01
Payer: COMMERCIAL

## 2025-09-03 ENCOUNTER — TELEPHONE (OUTPATIENT)
Dept: PLASTIC SURGERY | Facility: CLINIC | Age: 35
End: 2025-09-03

## 2025-09-03 ENCOUNTER — APPOINTMENT (OUTPATIENT)
Dept: PLASTIC SURGERY | Facility: CLINIC | Age: 35
End: 2025-09-03
Payer: COMMERCIAL

## 2025-09-03 ENCOUNTER — PATIENT MESSAGE (OUTPATIENT)
Dept: BEHAVIORAL HEALTH | Facility: CLINIC | Age: 35
End: 2025-09-03

## 2025-09-03 ENCOUNTER — TELEPHONE (OUTPATIENT)
Dept: SURGICAL ONCOLOGY | Facility: HOSPITAL | Age: 35
End: 2025-09-03

## 2025-09-03 ASSESSMENT — PAIN SCALES - GENERAL: PAINLEVEL_OUTOF10: 4

## 2025-09-04 ENCOUNTER — APPOINTMENT (OUTPATIENT)
Dept: BEHAVIORAL HEALTH | Facility: HOSPITAL | Age: 35
End: 2025-09-04
Payer: COMMERCIAL

## 2025-09-04 ENCOUNTER — TELEPHONE (OUTPATIENT)
Dept: PLASTIC SURGERY | Facility: CLINIC | Age: 35
End: 2025-09-04
Payer: COMMERCIAL

## 2025-09-04 DIAGNOSIS — E11.9 DIABETES MELLITUS TYPE 2 WITHOUT RETINOPATHY (MULTI): ICD-10-CM

## 2025-09-04 RX ORDER — BLOOD-GLUCOSE METER
EACH MISCELLANEOUS
Qty: 400 STRIP | Refills: 1 | Status: SHIPPED | OUTPATIENT
Start: 2025-09-04

## 2025-09-05 ENCOUNTER — OFFICE VISIT (OUTPATIENT)
Dept: PRIMARY CARE | Facility: CLINIC | Age: 35
End: 2025-09-05
Payer: COMMERCIAL

## 2025-09-05 VITALS — SYSTOLIC BLOOD PRESSURE: 103 MMHG | DIASTOLIC BLOOD PRESSURE: 63 MMHG | HEART RATE: 92 BPM | TEMPERATURE: 97.5 F

## 2025-09-05 DIAGNOSIS — B00.9 HSV-2 (HERPES SIMPLEX VIRUS 2) INFECTION: ICD-10-CM

## 2025-09-05 DIAGNOSIS — Z09 HOSPITAL DISCHARGE FOLLOW-UP: Primary | ICD-10-CM

## 2025-09-05 DIAGNOSIS — Z90.12 STATUS POST LEFT MASTECTOMY: ICD-10-CM

## 2025-09-05 PROCEDURE — 99496 TRANSJ CARE MGMT HIGH F2F 7D: CPT | Performed by: FAMILY MEDICINE

## 2025-09-05 PROCEDURE — 3074F SYST BP LT 130 MM HG: CPT | Performed by: FAMILY MEDICINE

## 2025-09-05 PROCEDURE — 3061F NEG MICROALBUMINURIA REV: CPT | Performed by: FAMILY MEDICINE

## 2025-09-05 PROCEDURE — 3044F HG A1C LEVEL LT 7.0%: CPT | Performed by: FAMILY MEDICINE

## 2025-09-05 PROCEDURE — 3048F LDL-C <100 MG/DL: CPT | Performed by: FAMILY MEDICINE

## 2025-09-05 PROCEDURE — 1036F TOBACCO NON-USER: CPT | Performed by: FAMILY MEDICINE

## 2025-09-05 PROCEDURE — 3078F DIAST BP <80 MM HG: CPT | Performed by: FAMILY MEDICINE

## 2025-09-05 RX ORDER — VALACYCLOVIR HYDROCHLORIDE 500 MG/1
500 TABLET, FILM COATED ORAL DAILY
Qty: 30 TABLET | Refills: 5 | Status: SHIPPED | OUTPATIENT
Start: 2025-09-05 | End: 2026-03-04

## 2025-09-08 ENCOUNTER — APPOINTMENT (OUTPATIENT)
Dept: PLASTIC SURGERY | Facility: CLINIC | Age: 35
End: 2025-09-08
Payer: COMMERCIAL

## 2025-09-15 ENCOUNTER — APPOINTMENT (OUTPATIENT)
Dept: BEHAVIORAL HEALTH | Facility: CLINIC | Age: 35
End: 2025-09-15
Payer: COMMERCIAL

## (undated) DEVICE — NEEDLE, SPINAL, 22 G X 3.5 IN, BLACK HUB

## (undated) DEVICE — MICROCLIPS, GEM HEMOSTATIC TITANIUM

## (undated) DEVICE — TOWEL, SURGICAL, NEURO, O/R, 16 X 26, BLUE, STERILE

## (undated) DEVICE — CLEANER, WIPE, INSTRUMENT, 3.25 X 3.25 IN

## (undated) DEVICE — LUBRICANT, SURGILUBE, STERILE, 2OZ

## (undated) DEVICE — ADHESIVE, SKIN, LIQUIBAND EXCEED

## (undated) DEVICE — SPEAR, EYE, SURGICAL, WECK-CEL, CELLULOSE

## (undated) DEVICE — CARE KIT, LAPAROSCOPIC, ADVANCED

## (undated) DEVICE — BANDAGE, GAUZE, 6 PLY, KERLIX, 4.5 IN X 4.1 YD, AMD, STERILE

## (undated) DEVICE — Device

## (undated) DEVICE — DEVICE, SUTURE, ENDOSTITCH, 10 MM

## (undated) DEVICE — COVER, CART, 45 X 27 X 48 IN, CLEAR

## (undated) DEVICE — APPLIER,  LIGACLIP MULTI CLIP, 30 MED 11 1/2

## (undated) DEVICE — DRAPE PACK, UNIVERSAL II

## (undated) DEVICE — DRAIN, CHANNEL, HUBLESS, 1/4 ROUND FULL FLUTE, 19 FR/W 1/4" TROCAR"

## (undated) DEVICE — DRAPE, SMARTDRAPE, FOR TIVATO MICROSCOPE

## (undated) DEVICE — RETRACTOR, CORDLESS, RADIALUX, LIGHTED

## (undated) DEVICE — APPLIER, LIGACLIP, MULTIPLE, SMALL 9-3/8IN

## (undated) DEVICE — SOLUTION, PREP, PVP IODINE, FLIP TOP, 4OZ

## (undated) DEVICE — BITE BLOCK, ENDOSCOPY, ADULT, NS

## (undated) DEVICE — LIGASURE, L-HOOK 44CM SEALER/DIVIDER LAP, MARYLAND

## (undated) DEVICE — CLIP, ENDO APPLIER LIGAMAX 5MM

## (undated) DEVICE — TUBING, SUCTION, CONNECTING, STERILE 0.25 X 120 IN., LF

## (undated) DEVICE — SUTURE, VICRYL, 2-0, 27 IN, BR/SH 27, VIOLET

## (undated) DEVICE — HOLSTER, ELECTROSURGERY ACCESSORY, STERILE

## (undated) DEVICE — PROBE COVER, INTRAOPERATIVE, 13 X 244CM (5 X 96IN)

## (undated) DEVICE — IRRIGATION SYSTEM, WOUND, SURGIPHOR, 450ML, STERILE

## (undated) DEVICE — TROCAR, OPTICAL, BLADELESS, 12MM, THREADED, 100MM LENGTH

## (undated) DEVICE — SUTURE, MONOCRYL, 2-0, 27 IN, SH/V-20 , UNDYED

## (undated) DEVICE — COVER, TABLE, 44 X 75 IN, DISPOSABLE, LF, STERILE

## (undated) DEVICE — SUTURE, ETHILON, 3-0, 18 IN, PS1, BLACK

## (undated) DEVICE — STAPLER,  LINEAR ENDO 60MM RELOAD, GREEN, DISP

## (undated) DEVICE — COVER PROBE, SOFT FLEX W/ GEL, 5 X 48 IN (13X122CM)

## (undated) DEVICE — TROCAR, KII OPTICAL BLADELESS 5MM Z THREAD 100MM LNGTH

## (undated) DEVICE — SUTURE, SILK, 2-0, 30 IN, SH, BLACK

## (undated) DEVICE — DRAIN, WOUND, ROUND, W/TROCAR, HOLE PATTERN, 10 IN, MEDIUM/LARGE, 3/16 X 49 IN

## (undated) DEVICE — WOUND VAC KIT, W/CANISTER, 500ML

## (undated) DEVICE — SUTURE, MONOCRYL, 3-0, 18 IN, PS2, UNDYED

## (undated) DEVICE — SUTURE, VICRYL, 4-0, 18 IN, PS2, UNDYED

## (undated) DEVICE — STAPLER, ECHELON 3000, 60MM LONG

## (undated) DEVICE — STAPLER, SKIN PROXIMATE, 35 WIDE

## (undated) DEVICE — SYRINGE, LUER LOCK, 12ML

## (undated) DEVICE — EVACUATOR, WOUND, SUCTION, CLOSED, JACKSON-PRATT, 100 CC, SILICONE

## (undated) DEVICE — STAPLER, LINEAR ENDO RELOAD, 60MM, BLUE, DISP

## (undated) DEVICE — BAG, DECANTER

## (undated) DEVICE — CATHETER TRAY, SURESTEP, 16FR, URINE METER W/STATLOCK

## (undated) DEVICE — GOWN, SURGICAL, SMARTGOWN, XLARGE, STERILE

## (undated) DEVICE — MANIFOLD, 4 PORT NEPTUNE STANDARD

## (undated) DEVICE — DRESSING, ADHESIVE, ISLAND, TELFA, 2 X 3.75 IN, LF

## (undated) DEVICE — CATHETER, IV, INSYTE, AUTOGUARD, SHIELDED, 24 G X 0.75 IN, VIALON

## (undated) DEVICE — PREP TRAY, SKIN, DRY, W/GLOVES

## (undated) DEVICE — SUTURE, PDS II, 0, 27 IN, CT-2, VIOLET

## (undated) DEVICE — ELECTRODE, ELECTROSURGICAL, BLADE EXT 4 INCH, INSULATED

## (undated) DEVICE — PRE-CLEAN KIT, BEDSIDE, FIRST STEP

## (undated) DEVICE — RELOAD, ENDOSTITCH, POLYSORB, 2-0, 48 IN, ES9, VIOLET, SULU

## (undated) DEVICE — STAPLER,  LINEAR ENDO 60MM RELOAD, BLACK, DISP

## (undated) DEVICE — STAY SET, SURGICAL , 5MM SHARP HOOK, CS/ 50

## (undated) DEVICE — NEEDLE, MICRODISSECTION STR 4CM

## (undated) DEVICE — APPLICATOR, CHLORAPREP, W/ORANGE TINT, 26ML

## (undated) DEVICE — PUMP, STRYKERFLOW 2 & HANDPIECE W/10FT. IRRIGATION TUBING

## (undated) DEVICE — DRAPE, TIBURON W/ADHESIVE, 19 X 30

## (undated) DEVICE — MARKER, SKIN, DUAL TIP, W/RULER AND LABEL

## (undated) DEVICE — CAUTERY, PENCIL, PUSH BUTTON, SMOKE EVAC, 70MM

## (undated) DEVICE — ADAPTER, WATER BOTTLE, SMART CAP, 140/160/180 SERIES

## (undated) DEVICE — SYSTEM, GASTRECTOMY SLEEVE, 36FR W/BULB, VISIGI 3D

## (undated) DEVICE — RELOAD, ENDOSTITCH, SURGIDAC, 2-0, 48 IN, GREEN, SULU

## (undated) DEVICE — NEEDLE, SPINAL, LUMBAR PUNCTURE, NEONATAL, 22 G X 2.5 IN, BLACK HUB

## (undated) DEVICE — STAPLER, ECHELON 3000, 60MM STD

## (undated) DEVICE — TUBE SET, PNEUMOCLEAR, SMOKE EVACU, HIGH-FLOW

## (undated) DEVICE — STAPLELINE, BIOABSORB, SEAMGUARD, 60